# Patient Record
Sex: FEMALE | Race: WHITE | Employment: UNEMPLOYED | URBAN - METROPOLITAN AREA
[De-identification: names, ages, dates, MRNs, and addresses within clinical notes are randomized per-mention and may not be internally consistent; named-entity substitution may affect disease eponyms.]

---

## 2018-09-17 ENCOUNTER — OFFICE VISIT (OUTPATIENT)
Dept: FAMILY MEDICINE CLINIC | Age: 43
End: 2018-09-17

## 2018-09-17 VITALS
OXYGEN SATURATION: 98 % | BODY MASS INDEX: 42.52 KG/M2 | HEIGHT: 66 IN | RESPIRATION RATE: 18 BRPM | SYSTOLIC BLOOD PRESSURE: 124 MMHG | HEART RATE: 82 BPM | WEIGHT: 264.6 LBS | DIASTOLIC BLOOD PRESSURE: 82 MMHG

## 2018-09-17 DIAGNOSIS — Z79.899 LONG-TERM USE OF HIGH-RISK MEDICATION: ICD-10-CM

## 2018-09-17 DIAGNOSIS — F31.61 BIPOLAR DISORDER, CURRENT EPISODE MIXED, MILD (HCC): Primary | ICD-10-CM

## 2018-09-17 DIAGNOSIS — Z13.220 LIPID SCREENING: ICD-10-CM

## 2018-09-17 DIAGNOSIS — Z12.39 BREAST CANCER SCREENING: ICD-10-CM

## 2018-09-17 DIAGNOSIS — G60.9 IDIOPATHIC PERIPHERAL NEUROPATHY: ICD-10-CM

## 2018-09-17 DIAGNOSIS — Z13.31 POSITIVE DEPRESSION SCREENING: ICD-10-CM

## 2018-09-17 DIAGNOSIS — Z13.1 DIABETES MELLITUS SCREENING: ICD-10-CM

## 2018-09-17 DIAGNOSIS — Z11.4 SCREENING FOR HUMAN IMMUNODEFICIENCY VIRUS WITHOUT PRESENCE OF RISK FACTORS: ICD-10-CM

## 2018-09-17 LAB
A/G RATIO: 2 (ref 1.1–2.2)
ALBUMIN SERPL-MCNC: 4.9 G/DL (ref 3.4–5)
ALP BLD-CCNC: 101 U/L (ref 40–129)
ALT SERPL-CCNC: 21 U/L (ref 10–40)
AMPHETAMINE SCREEN, URINE: NEGATIVE
ANION GAP SERPL CALCULATED.3IONS-SCNC: 13 MMOL/L (ref 3–16)
AST SERPL-CCNC: 16 U/L (ref 15–37)
BARBITURATE SCREEN, URINE: NEGATIVE
BENZODIAZEPINE SCREEN, URINE: POSITIVE
BILIRUB SERPL-MCNC: <0.2 MG/DL (ref 0–1)
BUN BLDV-MCNC: 13 MG/DL (ref 7–20)
BUPRENORPHINE URINE: NEGATIVE
CALCIUM SERPL-MCNC: 10.6 MG/DL (ref 8.3–10.6)
CHLORIDE BLD-SCNC: 100 MMOL/L (ref 99–110)
CHOLESTEROL, TOTAL: 270 MG/DL (ref 0–199)
CO2: 26 MMOL/L (ref 21–32)
COCAINE METABOLITE SCREEN URINE: NEGATIVE
CREAT SERPL-MCNC: 0.7 MG/DL (ref 0.6–1.1)
GABAPENTIN SCREEN, URINE: NEGATIVE
GFR AFRICAN AMERICAN: >60
GFR NON-AFRICAN AMERICAN: >60
GLOBULIN: 2.5 G/DL
GLUCOSE BLD-MCNC: 118 MG/DL (ref 70–99)
HDLC SERPL-MCNC: 46 MG/DL (ref 40–60)
LDL CHOLESTEROL CALCULATED: 182 MG/DL
METHADONE SCREEN, URINE: NEGATIVE
METHAMPHETAMINE, URINE: NEGATIVE
OPIATE SCREEN URINE: NEGATIVE
OXYCODONE SCREEN URINE: NEGATIVE
PHENCYCLIDINE SCREEN URINE: NEGATIVE
POTASSIUM SERPL-SCNC: 4.9 MMOL/L (ref 3.5–5.1)
PROPOXYPHENE SCREEN, URINE: NEGATIVE
SODIUM BLD-SCNC: 139 MMOL/L (ref 136–145)
THC SCREEN, URINE: NEGATIVE
TOTAL PROTEIN: 7.4 G/DL (ref 6.4–8.2)
TRICYCLIC ANTIDEPRESSANTS, UR: POSITIVE
TRIGL SERPL-MCNC: 212 MG/DL (ref 0–150)
VLDLC SERPL CALC-MCNC: 42 MG/DL

## 2018-09-17 PROCEDURE — G8431 POS CLIN DEPRES SCRN F/U DOC: HCPCS | Performed by: NURSE PRACTITIONER

## 2018-09-17 PROCEDURE — 1036F TOBACCO NON-USER: CPT | Performed by: NURSE PRACTITIONER

## 2018-09-17 PROCEDURE — 36415 COLL VENOUS BLD VENIPUNCTURE: CPT | Performed by: NURSE PRACTITIONER

## 2018-09-17 PROCEDURE — G8417 CALC BMI ABV UP PARAM F/U: HCPCS | Performed by: NURSE PRACTITIONER

## 2018-09-17 PROCEDURE — 80305 DRUG TEST PRSMV DIR OPT OBS: CPT | Performed by: NURSE PRACTITIONER

## 2018-09-17 PROCEDURE — 99204 OFFICE O/P NEW MOD 45 MIN: CPT | Performed by: NURSE PRACTITIONER

## 2018-09-17 PROCEDURE — G8427 DOCREV CUR MEDS BY ELIG CLIN: HCPCS | Performed by: NURSE PRACTITIONER

## 2018-09-17 PROCEDURE — 96160 PT-FOCUSED HLTH RISK ASSMT: CPT | Performed by: NURSE PRACTITIONER

## 2018-09-17 RX ORDER — LITHIUM CARBONATE 300 MG/1
300 CAPSULE ORAL 2 TIMES DAILY WITH MEALS
Qty: 90 CAPSULE | Refills: 3 | Status: SHIPPED | OUTPATIENT
Start: 2018-09-17 | End: 2019-03-19 | Stop reason: SDUPTHER

## 2018-09-17 RX ORDER — FLUOXETINE HYDROCHLORIDE 20 MG/1
20 CAPSULE ORAL NIGHTLY
COMMUNITY
Start: 2018-02-15 | End: 2019-02-13 | Stop reason: ALTCHOICE

## 2018-09-17 RX ORDER — BUSPIRONE HYDROCHLORIDE 10 MG/1
10 TABLET ORAL 2 TIMES DAILY
COMMUNITY
Start: 2018-02-15 | End: 2019-02-15

## 2018-09-17 RX ORDER — FLUOXETINE HYDROCHLORIDE 40 MG/1
60 CAPSULE ORAL NIGHTLY
COMMUNITY
Start: 2018-02-15 | End: 2019-02-28 | Stop reason: SDUPTHER

## 2018-09-17 RX ORDER — DIAZEPAM 10 MG/1
1 TABLET ORAL PRN
COMMUNITY
Start: 2018-08-27 | End: 2018-12-19 | Stop reason: SDUPTHER

## 2018-09-17 RX ORDER — AMITRIPTYLINE HYDROCHLORIDE 100 MG/1
100 TABLET, FILM COATED ORAL NIGHTLY
COMMUNITY
Start: 2018-05-21 | End: 2018-11-27 | Stop reason: SDUPTHER

## 2018-09-17 RX ORDER — HYDROCODONE BITARTRATE AND ACETAMINOPHEN 10; 325 MG/1; MG/1
1 TABLET ORAL EVERY 8 HOURS PRN
Qty: 50 TABLET | Refills: 0 | Status: SHIPPED | OUTPATIENT
Start: 2018-09-17 | End: 2018-09-17 | Stop reason: SDUPTHER

## 2018-09-17 RX ORDER — HYDROCODONE BITARTRATE AND ACETAMINOPHEN 5; 325 MG/1; MG/1
1 TABLET ORAL EVERY 8 HOURS PRN
COMMUNITY
End: 2018-09-17 | Stop reason: SDUPTHER

## 2018-09-17 RX ORDER — GABAPENTIN 300 MG/1
1 CAPSULE ORAL 3 TIMES DAILY
COMMUNITY
Start: 2018-04-18 | End: 2019-02-22 | Stop reason: SDUPTHER

## 2018-09-17 RX ORDER — HYDROCODONE BITARTRATE AND ACETAMINOPHEN 10; 325 MG/1; MG/1
1 TABLET ORAL EVERY 8 HOURS PRN
Qty: 50 TABLET | Refills: 0 | Status: SHIPPED | OUTPATIENT
Start: 2018-10-18 | End: 2018-11-06 | Stop reason: SDUPTHER

## 2018-09-17 RX ORDER — LISINOPRIL AND HYDROCHLOROTHIAZIDE 25; 20 MG/1; MG/1
1 TABLET ORAL DAILY
COMMUNITY
Start: 2017-09-18 | End: 2018-11-27 | Stop reason: SDUPTHER

## 2018-09-17 ASSESSMENT — PATIENT HEALTH QUESTIONNAIRE - PHQ9
8. MOVING OR SPEAKING SO SLOWLY THAT OTHER PEOPLE COULD HAVE NOTICED. OR THE OPPOSITE, BEING SO FIGETY OR RESTLESS THAT YOU HAVE BEEN MOVING AROUND A LOT MORE THAN USUAL: 2
2. FEELING DOWN, DEPRESSED OR HOPELESS: 3
SUM OF ALL RESPONSES TO PHQ QUESTIONS 1-9: 21
3. TROUBLE FALLING OR STAYING ASLEEP: 3
5. POOR APPETITE OR OVEREATING: 1
9. THOUGHTS THAT YOU WOULD BE BETTER OFF DEAD, OR OF HURTING YOURSELF: 0
10. IF YOU CHECKED OFF ANY PROBLEMS, HOW DIFFICULT HAVE THESE PROBLEMS MADE IT FOR YOU TO DO YOUR WORK, TAKE CARE OF THINGS AT HOME, OR GET ALONG WITH OTHER PEOPLE: 2
6. FEELING BAD ABOUT YOURSELF - OR THAT YOU ARE A FAILURE OR HAVE LET YOURSELF OR YOUR FAMILY DOWN: 3
7. TROUBLE CONCENTRATING ON THINGS, SUCH AS READING THE NEWSPAPER OR WATCHING TELEVISION: 3
1. LITTLE INTEREST OR PLEASURE IN DOING THINGS: 3
4. FEELING TIRED OR HAVING LITTLE ENERGY: 3
SUM OF ALL RESPONSES TO PHQ QUESTIONS 1-9: 21
SUM OF ALL RESPONSES TO PHQ9 QUESTIONS 1 & 2: 6

## 2018-09-17 NOTE — LETTER
MEDICATION AGREEMENT     Annabella Browder  [birth year]      For certain conditions, multiple classes of medications may be used to help better manage your symptoms, and to improve your ability to function at home, work and in social settings. However, these medications do have risks, which will be discussed with you, including addiction and dependency. The following prescribed medications need frequent monitoring and will require you to partner and assist in your healthcare. Medication  Dose, instructions and quantity as indicated on current prescription bottle Diagnosis/Reason(s) for Taking Category     Norco 10 /325 mg  Neuropathy  II                           Benefits and goals of Controlled Substance Medications: There are two potential goals for your treatment: (1) decreased pain and suffering (2) improved daily life functions. There are many possible treatments for your chronic condition(s), and, in addition to controlled substance medications, we will try alternatives such as physical therapy, yoga, massage, home daily exercise, meditation, relaxation techniques, injections, chiropractic manipulations, surgery, cognitive therapy, hypnosis and many medications that are not habit-forming. Use of controlled substance medications may be helpful, but they are unlikely to resolve all of your symptoms or restore all function. Risks of Controlled Substance Medications:    Opioid pain medications: These medications can lead to problems such as addiction/dependence, sedation, lightheadedness/dizziness, memory issues, falls, constipation, nausea, or vomiting. They may also impair the ability to drive or operate machinery. Additionally, these medications may lower testosterone levels, leading to loss of bone strength, stamina and sex drive.   They may cause problems with breathing, sleep apnea and reduced coughing, which are especially dangerous for patients with lung supplements and oral decongestants. Dependence withdrawal symptoms may include depressed mood, loss of interest, suicidal thoughts, anxiety, fatigue, appetite changes and agitation. Testosterone replacement therapy:  Potential side effects include increased risk of stroke and heart attack, blood clots, increased blood pressure, increased cholesterol, enlarged prostate, sleep apnea, irritability/aggression and other mood disorders, and decreased fertility. Other:     1. I understand that I have the following responsibilities:  · I will take medications at the dose and frequency prescribed. · I will not increase or change how I take my medications without the approval of the health care provider who signs this Medication Agreement. · I will arrange for refills at the prescribed interval ONLY during regular office hours. I will not ask for refills earlier than agreed, after-hours, on holidays or on weekends. · I will obtain all refills for these medications at  ·  ________[facility]____________  pharmacy (phone number  ·  ________[phone]___), with full consent for my provider and pharmacist to exchange information in writing or verbally. · I will not request any pain medications or controlled substances from other providers and will inform this provider of all other medications I am taking. · I will inform my other health care providers that I am taking these medications and of the existence of this [address]. In the event of an emergency, I will provide the same information to the emergency department providers. · I will protect my prescriptions and medications. I understand that lost or misplaced prescriptions will not be replaced. · I will keep medications only for my own use and will not share them with others. I will keep all medications away from children.   · I agree to participate in any medical, psychological or psychiatric assessments recommended by my provider. · I will actively participate in any program designed to improve function, including social, physical, psychological and daily or work activities. 2. I will not use illegal or street drugs or another person's prescription. If I have an addiction problem with drugs or alcohol and my provider asks me to enter a program to address this issue, I agree to follow through. Such programs may include:  · 12-Step program and securing a sponsor  · Individual counseling   · Inpatient or outpatient treatment  · Other:_____________________________________________________________________________________________________________________________________________    If in treatment, I will request that a copy of the programs initial evaluation and treatment recommendations be sent to this provider and will not expect refills until that is received. I will also request written monthly updates be sent to this provider to verify my continuing treatment. 3. I will consent to drug screening upon my providers request to assure I am only taking the prescribed drugs, described in this MEDICATION AGREEMENT. I understand that a drug screen is a laboratory test in which a sample of my urine, blood or saliva is checked to see what drugs I have been taking. 4. I agree that I will treat the providers and staff at this office with respect at all times. I will keep all of my scheduled appointments, but if I need to cancel my appointment, I will do so a minimum of 24 hours before it is scheduled. 5. I understand that this provider may stop prescribing the medications listed if:  · I do not show any improvement in pain, or my activity has not improved. · I develop rapid tolerance or loss of improvement, as described in my treatment plan. · I develop significant side effects from the medication.   · My behavior is inconsistent with the responsibilities outlined above, which may also result in my being prevented from receiving further care from this office. · Other:____________________________________________________________________    AGREEMENT:    I have read the above and have had all of my questions answered. For chronic disease management, I know that my symptoms can be managed with many types of treatments. A chronic medication trial may be part of my treatment, but I must be an active participant in my care. Medication therapy is only one part of my symptom management plan. In some cases, there may be limited scientific evidence to support the chronic use of certain medications to improve symptoms and daily function. Furthermore, in certain circumstances, there may be scientific information that suggests that use of chronic controlled substances may actually worsen my symptoms and increase my risk of unintentional death directly related to this medication therapy. I know that if my provider feels my risk from controlled medications is greater than my benefit, I will have my controlled substance medication(s) compassionately lowered or removed altogether. I agree to a controlled substance medication trial.      I further agree to allow this office to contact family or friends if there are concerns about my safety and use of the controlled medications. I have agreed to use the following medications above as instructed by my physician and as stated in this [address].      Patient Signature:  ______________________  Date:9/17/2018 or _____________    Provider Signature:______________________  Date:9/17/2018 or _____________

## 2018-09-17 NOTE — PATIENT INSTRUCTIONS
label.  · Schedule time each day for a bowel movement. Having a daily routine may help. Take your time and do not strain when having a bowel movement. · Ask your doctor about a laxative. The goal is to have one easy bowel movement every 1 to 2 days. Do not let constipation go untreated for more than 3 days. When should you call for help? Call your doctor now or seek immediate medical care if:    · You feel sad, anxious, or hopeless for more than a few days. This could mean you are depressed. Depression is common in people who have a lot of pain. But it can be treated.     · You have trouble with bowel movements, such as:  ¨ No bowel movement in 3 days. ¨ Blood in the anal area, in your stool, or on the toilet paper. ¨ Diarrhea for more than 24 hours.    Watch closely for changes in your health, and be sure to contact your doctor if:    · Your pain is getting worse.     · You can't sleep because of pain.     · You are very worried or anxious about your pain.     · You have trouble taking your pain medicine.     · You have any concerns about your pain medicine or its side effects.     · You have vomiting or cramps for more than 2 hours. Where can you learn more? Go to https://eThor.com.SlideRocket. org and sign in to your [employer] account. Enter W948 in the [employer] box to learn more about \"Neuropathic Pain: Care Instructions. \"     If you do not have an account, please click on the \"Sign Up Now\" link. Current as of: October 9, 2017  Content Version: 11.7  © 8636-0984 [employer], [facility]. Care instructions adapted under license by [facility] ([facility]). If you have questions about a medical condition or this instruction, always ask your healthcare professional. [place] [address] any warranty or liability for your use of this information.        Patient Education        Bipolar Disorder: Care Instructions  Your Care Instructions    Bipolar disorder is an illness that causes extreme mood changes, from times of very high energy (manic episodes) to times of depression. But many people with bipolar disorder show only the symptoms of depression. These moods may cause problems with your work, school, family life, friendships, and how well you function. This disease is also called manic-depression. There is no cure for bipolar disorder, but it can be helped with medicines. Counseling may also help. It is important to take your medicines exactly as prescribed, even when you feel well. You may need lifelong treatment. Follow-up care is a key part of your treatment and safety. Be sure to make and go to all appointments, and call your doctor if you are having problems. It's also a good idea to know your test results and keep a list of the medicines you take. How can you care for yourself at home? · Be safe with medicines. Take your medicines exactly as prescribed. Do not stop or change a medicine without talking to your doctor first. Kush Finders and your doctor may need to try different combinations of medicines to find what works for you. · Take your medicines on schedule to keep your moods even. When you feel good, you may think that you do not need your medicines. But it is important to keep taking them. · Go to your counseling sessions. Call and talk with your counselor if you can't go to a session or if you don't think the sessions are helping. Do not just stop going. · Get at least 30 minutes of activity on most days of the week. Walking is a good choice. You also may want to do other things, such as running, swimming, or cycling. · Get enough sleep. Keep your room dark and quiet. Try to go to bed at the same time every night. · Eat a healthy diet. This includes whole grains, dairy, fruits, vegetables, and protein. Eat foods from each of these groups. · Try to lower your stress. Manage your time, build a strong support system, and lead a healthy lifestyle.  To lower your stress, try else.   [facility] your doctor now or seek immediate medical care if:    · You hear voices.     · Someone you know has bipolar disorder and talks about suicide. Keep the numbers for these national suicide hotlines: 3-438-937-TALK (0-262.135.7966) and 7-617-AUGDDUU (7-779.308.4339). If a suicide threat seems real, with a specific plan and a way to carry it out, stay with the person, or ask someone you trust to stay with the person, until you can get help.     · Someone you know has bipolar disorder and:  ¨ Starts to give away possessions. ¨ Is using illegal drugs or drinking alcohol heavily. ¨ Talks or writes about death, including writing suicide notes or talking about guns, knives, or pills. ¨ Talks or writes about hurting someone else. ¨ Starts to spend a lot of time alone. ¨ Acts very aggressively or suddenly appears calm. ¨ Talks about beliefs that are not based in reality (delusions).    Watch closely for changes in your health, and be sure to contact your doctor if:    · You cannot go to your counseling sessions. Where can you learn more? Go to https://Structure VisionpepicYourSportseb.Altius Education. org and sign in to your [employer] account. Enter K052 in the [facility] box to learn more about \"Bipolar Disorder: Care Instructions. \"     If you do not have an account, please click on the \"Sign Up Now\" link. Current as of: December 7, 2017  Content Version: 11.7  © 3723-8051 Healthwise, Incorporated. Care instructions adapted under license by [facility] ([facility]). If you have questions about a medical condition or this instruction, always ask your healthcare professional. [place] [address] any warranty or liability for your use of this information. Patient Education        Anxiety Disorder: Care Instructions  Your Care Instructions    Anxiety is a normal reaction to stress. Difficult situations can cause you to have symptoms such as sweaty palms and a nervous feeling.   In an anxiety disorder, the

## 2018-09-17 NOTE — PROGRESS NOTES
On the basis of positive PHQ-9 screening (PHQ-9 Total Score: 21), the following plan was implemented: {[facility] Depression Plan:69466}. Patient will follow-up in {NUMBERS 1-12:10} {DAYS/WEEKS/MONTHS (D):50390} with {[facility] Psych Provider:70781}.
OBJECTIVE:  Vitals:    09/17/18 0747   BP: 124/82   Pulse: 82   Resp: 18   SpO2: 98%       Physical Exam   Constitutional: She is oriented to person, place, and time. Vital signs are normal. She appears well-developed. She is active. Eyes: Pupils are equal, round, and reactive to light. Conjunctivae, EOM and lids are normal.   Neck: Carotid bruit is not present. Cardiovascular: Normal rate, regular rhythm, S1 normal, S2 normal and normal heart sounds. No edema noted to feet or ankles   Pulmonary/Chest: Effort normal and breath sounds normal.   Musculoskeletal: Normal range of motion. Neurological: She is alert and oriented to person, place, and time. Skin: Skin is warm, dry and intact. Psychiatric: She has a normal mood and affect. Her speech is normal and behavior is normal. Judgment and thought content normal. Cognition and memory are normal.       ASSESSMENT:   Diagnosis Orders   1. Bipolar disorder, current episode mixed, mild (HCC)  lithium 300 MG capsule   2. Positive depression screening  Positive Screen for Clinical Depression with a Documented Follow-up Plan     lithium 300 MG capsule   3. Idiopathic peripheral neuropathy  HYDROcodone-acetaminophen (NORCO)  MG per tablet    DISCONTINUED: HYDROcodone-acetaminophen (NORCO)  MG per tablet    DISCONTINUED: HYDROcodone-acetaminophen (NORCO)  MG per tablet   4. Long-term use of high-risk medication  POCT Rapid Drug Screen   5. Lipid screening  Lipid Panel    Lipid Panel   6. Diabetes mellitus screening  Comprehensive Metabolic Panel    Hemoglobin A1C    Comprehensive Metabolic Panel    Hemoglobin A1C   7. Screening for human immunodeficiency virus without presence of risk factors  HIV-1 AND HIV-2 ANTIBODIES    HIV-1 AND HIV-2 ANTIBODIES   8. Breast cancer screening  MAMMO DIGITAL SCREEN BILATERAL MOBILE       PLAN:  Marian Schumacher was seen today for new patient, depression and peripheral neuropathy.     Diagnoses and all orders

## 2018-09-18 LAB
ESTIMATED AVERAGE GLUCOSE: 128.4 MG/DL
HBA1C MFR BLD: 6.1 %
HIV AG/AB: NORMAL
HIV ANTIGEN: NORMAL
HIV-1 ANTIBODY: NORMAL
HIV-2 AB: NORMAL

## 2018-11-06 ENCOUNTER — OFFICE VISIT (OUTPATIENT)
Dept: FAMILY MEDICINE CLINIC | Age: 43
End: 2018-11-06
Payer: COMMERCIAL

## 2018-11-06 VITALS
WEIGHT: 263.2 LBS | BODY MASS INDEX: 42.3 KG/M2 | SYSTOLIC BLOOD PRESSURE: 130 MMHG | OXYGEN SATURATION: 97 % | HEART RATE: 93 BPM | DIASTOLIC BLOOD PRESSURE: 84 MMHG | HEIGHT: 66 IN | RESPIRATION RATE: 21 BRPM

## 2018-11-06 DIAGNOSIS — G60.9 IDIOPATHIC PERIPHERAL NEUROPATHY: ICD-10-CM

## 2018-11-06 DIAGNOSIS — M16.11 ARTHRITIS OF RIGHT HIP: Primary | ICD-10-CM

## 2018-11-06 PROCEDURE — 99213 OFFICE O/P EST LOW 20 MIN: CPT | Performed by: NURSE PRACTITIONER

## 2018-11-06 RX ORDER — HYDROCODONE BITARTRATE AND ACETAMINOPHEN 10; 325 MG/1; MG/1
1 TABLET ORAL EVERY 8 HOURS PRN
Qty: 50 TABLET | Refills: 0 | Status: SHIPPED | OUTPATIENT
Start: 2018-11-17 | End: 2018-12-17

## 2018-11-06 ASSESSMENT — PATIENT HEALTH QUESTIONNAIRE - PHQ9
SUM OF ALL RESPONSES TO PHQ9 QUESTIONS 1 & 2: 2
2. FEELING DOWN, DEPRESSED OR HOPELESS: 1
SUM OF ALL RESPONSES TO PHQ QUESTIONS 1-9: 2
SUM OF ALL RESPONSES TO PHQ QUESTIONS 1-9: 2
1. LITTLE INTEREST OR PLEASURE IN DOING THINGS: 1

## 2018-11-08 ENCOUNTER — OFFICE VISIT (OUTPATIENT)
Dept: ORTHOPEDIC SURGERY | Age: 43
End: 2018-11-08
Payer: COMMERCIAL

## 2018-11-08 ENCOUNTER — HOSPITAL ENCOUNTER (OUTPATIENT)
Dept: MAMMOGRAPHY | Age: 43
Discharge: HOME | End: 2018-11-13
Payer: COMMERCIAL

## 2018-11-08 VITALS
TEMPERATURE: 99 F | DIASTOLIC BLOOD PRESSURE: 87 MMHG | SYSTOLIC BLOOD PRESSURE: 137 MMHG | HEART RATE: 81 BPM | WEIGHT: 263 LBS | RESPIRATION RATE: 16 BRPM | BODY MASS INDEX: 42.27 KG/M2 | HEIGHT: 66 IN

## 2018-11-08 DIAGNOSIS — M25.552 BILATERAL HIP PAIN: Primary | ICD-10-CM

## 2018-11-08 DIAGNOSIS — M25.551 BILATERAL HIP PAIN: Primary | ICD-10-CM

## 2018-11-08 DIAGNOSIS — M16.0 PRIMARY OSTEOARTHRITIS OF BOTH HIPS: ICD-10-CM

## 2018-11-08 DIAGNOSIS — Z12.31 VISIT FOR SCREENING MAMMOGRAM: ICD-10-CM

## 2018-11-08 PROCEDURE — G8484 FLU IMMUNIZE NO ADMIN: HCPCS | Performed by: PHYSICIAN ASSISTANT

## 2018-11-08 PROCEDURE — 1036F TOBACCO NON-USER: CPT | Performed by: PHYSICIAN ASSISTANT

## 2018-11-08 PROCEDURE — G8417 CALC BMI ABV UP PARAM F/U: HCPCS | Performed by: PHYSICIAN ASSISTANT

## 2018-11-08 PROCEDURE — G8427 DOCREV CUR MEDS BY ELIG CLIN: HCPCS | Performed by: PHYSICIAN ASSISTANT

## 2018-11-08 PROCEDURE — 99203 OFFICE O/P NEW LOW 30 MIN: CPT | Performed by: PHYSICIAN ASSISTANT

## 2018-11-08 PROCEDURE — 77067 SCR MAMMO BI INCL CAD: CPT

## 2018-11-08 NOTE — PROGRESS NOTES
Marijuana      Comment: socially     Sexual activity: Not on file       Current Outpatient Prescriptions   Medication Sig Dispense Refill    [START ON 11/17/2018] HYDROcodone-acetaminophen (NORCO)  MG per tablet Take 1 tablet by mouth every 8 hours as needed for Pain for up to 30 days. G60.9  FILL 11/17. Earliest Fill Date: 11/17/18 50 tablet 0    lisinopril-hydrochlorothiazide (PRINZIDE;ZESTORETIC) 20-25 MG per tablet Take 1 tablet by mouth daily      amitriptyline (ELAVIL) 100 MG tablet Take 100 mg by mouth nightly      busPIRone (BUSPAR) 10 MG tablet Take 10 mg by mouth nightly      FLUoxetine (PROZAC) 20 MG capsule Take 20 mg by mouth nightly      FLUoxetine (PROZAC) 40 MG capsule Take 40 mg by mouth nightly      gabapentin (NEURONTIN) 300 MG capsule Take 1 capsule by mouth 3 times daily. Adiel Kolbman diazepam (VALIUM) 10 MG tablet Take 1 tablet by mouth as needed. Rosealee Brakeman lithium 300 MG capsule Take 1 capsule by mouth 2 times daily (with meals) 90 capsule 3     No current facility-administered medications for this visit. Objective:     She is alert, oriented x 3, pleasant, well nourished, developed and in no   acute distress. /87   Pulse 81   Temp 99 °F (37.2 °C) (Temporal)   Resp 16   Ht 5' 6.25\" (1.683 m)   Wt 263 lb (119.3 kg)   BMI 42.13 kg/m²        Examination of the left and right hip shows: There is not deformity. There is not erythema. There is marked pain with internal and external rotation. There is moderate pain with flexion and extension. There is moderate pain with active SLR. ROM diminished range of motion. Leg lengths: Equal  Trochanteric region is not tender to palpation. Sacral Iliac is not tender to palpation. There is moderate pain with weight bearing. Examination of the lower extremities are intact with sensation to light touch. Motor testing  5/5 in all major motor groups of the lower extremities. Gait is normal heel to toe.   Gait is

## 2018-11-09 ENCOUNTER — TELEPHONE (OUTPATIENT)
Dept: ORTHOPEDIC SURGERY | Age: 43
End: 2018-11-09

## 2018-11-14 ENCOUNTER — OFFICE VISIT (OUTPATIENT)
Dept: ORTHOPEDIC SURGERY | Age: 43
End: 2018-11-14
Payer: COMMERCIAL

## 2018-11-14 VITALS
HEIGHT: 66 IN | DIASTOLIC BLOOD PRESSURE: 75 MMHG | SYSTOLIC BLOOD PRESSURE: 122 MMHG | WEIGHT: 263 LBS | BODY MASS INDEX: 42.27 KG/M2 | HEART RATE: 82 BPM

## 2018-11-14 DIAGNOSIS — M16.11 PRIMARY OSTEOARTHRITIS OF RIGHT HIP: Primary | ICD-10-CM

## 2018-11-14 PROCEDURE — 99999 PR OFFICE/OUTPT VISIT,PROCEDURE ONLY: CPT | Performed by: ORTHOPAEDIC SURGERY

## 2018-11-14 PROCEDURE — 20611 DRAIN/INJ JOINT/BURSA W/US: CPT | Performed by: ORTHOPAEDIC SURGERY

## 2018-11-14 RX ORDER — METHYLPREDNISOLONE ACETATE 40 MG/ML
80 INJECTION, SUSPENSION INTRA-ARTICULAR; INTRALESIONAL; INTRAMUSCULAR; SOFT TISSUE ONCE
Status: COMPLETED | OUTPATIENT
Start: 2018-11-14 | End: 2018-11-14

## 2018-11-14 RX ADMIN — METHYLPREDNISOLONE ACETATE 80 MG: 40 INJECTION, SUSPENSION INTRA-ARTICULAR; INTRALESIONAL; INTRAMUSCULAR; SOFT TISSUE at 10:23

## 2018-11-15 ENCOUNTER — HOSPITAL ENCOUNTER (OUTPATIENT)
Dept: ULTRASOUND IMAGING | Age: 43
Discharge: HOME | End: 2018-11-15
Payer: COMMERCIAL

## 2018-11-15 ENCOUNTER — HOSPITAL ENCOUNTER (OUTPATIENT)
Dept: MAMMOGRAPHY | Age: 43
Discharge: HOME | End: 2018-11-15
Payer: COMMERCIAL

## 2018-11-15 DIAGNOSIS — R92.8 ABNORMAL MAMMOGRAM: ICD-10-CM

## 2018-11-15 PROCEDURE — 76882 US LMTD JT/FCL EVL NVASC XTR: CPT

## 2018-11-26 ENCOUNTER — OFFICE VISIT (OUTPATIENT)
Dept: ORTHOPEDIC SURGERY | Age: 43
End: 2018-11-26
Payer: COMMERCIAL

## 2018-11-26 VITALS
TEMPERATURE: 95.7 F | BODY MASS INDEX: 42.27 KG/M2 | HEART RATE: 88 BPM | WEIGHT: 263 LBS | HEIGHT: 66 IN | DIASTOLIC BLOOD PRESSURE: 77 MMHG | SYSTOLIC BLOOD PRESSURE: 148 MMHG

## 2018-11-26 DIAGNOSIS — M16.11 PRIMARY OSTEOARTHRITIS OF RIGHT HIP: Primary | ICD-10-CM

## 2018-11-26 PROCEDURE — G8427 DOCREV CUR MEDS BY ELIG CLIN: HCPCS | Performed by: ORTHOPAEDIC SURGERY

## 2018-11-26 PROCEDURE — 1036F TOBACCO NON-USER: CPT | Performed by: ORTHOPAEDIC SURGERY

## 2018-11-26 PROCEDURE — G8417 CALC BMI ABV UP PARAM F/U: HCPCS | Performed by: ORTHOPAEDIC SURGERY

## 2018-11-26 PROCEDURE — 99214 OFFICE O/P EST MOD 30 MIN: CPT | Performed by: ORTHOPAEDIC SURGERY

## 2018-11-26 PROCEDURE — G8484 FLU IMMUNIZE NO ADMIN: HCPCS | Performed by: ORTHOPAEDIC SURGERY

## 2018-11-27 ENCOUNTER — TELEPHONE (OUTPATIENT)
Dept: FAMILY MEDICINE CLINIC | Age: 43
End: 2018-11-27

## 2018-11-27 RX ORDER — LISINOPRIL AND HYDROCHLOROTHIAZIDE 25; 20 MG/1; MG/1
1 TABLET ORAL DAILY
Qty: 30 TABLET | Refills: 0 | Status: SHIPPED | OUTPATIENT
Start: 2018-11-27 | End: 2018-12-19 | Stop reason: SDUPTHER

## 2018-11-27 RX ORDER — AMITRIPTYLINE HYDROCHLORIDE 100 MG/1
100 TABLET, FILM COATED ORAL NIGHTLY
Qty: 30 TABLET | Refills: 0 | Status: SHIPPED | OUTPATIENT
Start: 2018-11-27 | End: 2018-12-19 | Stop reason: SDUPTHER

## 2018-12-02 NOTE — PROGRESS NOTES
Patient is a 68-year-old female we follow for a degenerative arthritis of the right hip joint. She's been treated in the past with anti-inflammatories physical therapy and recently underwent right hip cortisone injection on 11/14/2018. Patient stated that she had about 90% relief however her symptoms have returned. Patient has no history of injury or surgery to the right hip. Patient has no history of DVT or allergies to metal.  Patient states she is diabetic however she does carry several psychiatric diagnoses including bipolar affective disorder and depression. There is a family history for degenerative arthritis. Unfortunately patient not only takes oral narcotics but also smokes cigarettes. Finally this patient has class III morbid obesity with BMI of 42.45. She complains of pain rating it up to 3-4 out of 10 and difficulty with ambulation. X-rays in the past of shown degenerative osteoarthritis of the right hip. She's here to discuss possible right total hip arthroplasty. Patient Active Problem List   Diagnosis    Migraine    Amenorrhea, secondary    Depression    Gastritis    TMJ (temporomandibular joint syndrome)    Lipoma    Grief reaction    Anxiety    Tobacco abuse    Insomnia    Bipolar affective disorder ([facility] [address]    Sleep apnea    Periodic limb movement disorder (PLMD)    Primary osteoarthritis of both hips    Bilateral hip pain     Prior to Visit Medications    Medication Sig Taking? Authorizing Provider   lisinopril-hydrochlorothiazide (PRINZIDE;ZESTORETIC) 20-25 MG per tablet Take 1 tablet by mouth daily appt required prior to additional refills  ROSSANA Zhou CNP   amitriptyline (ELAVIL) 100 MG tablet Take 1 tablet by mouth nightly appt required prior to additional refills  ROSSANA Zhou CNP   HYDROcodone-acetaminophen (NORCO)  MG per tablet Take 1 tablet by mouth every 8 hours as needed for Pain for up to 30 days. G60.9  FILL 11/17.

## 2018-12-11 ENCOUNTER — TELEPHONE (OUTPATIENT)
Dept: ORTHOPEDIC SURGERY | Age: 43
End: 2018-12-11

## 2018-12-11 DIAGNOSIS — M16.11 PRIMARY OSTEOARTHRITIS OF RIGHT HIP: Primary | ICD-10-CM

## 2018-12-14 ENCOUNTER — TELEPHONE (OUTPATIENT)
Dept: FAMILY MEDICINE CLINIC | Age: 43
End: 2018-12-14

## 2018-12-19 ENCOUNTER — OFFICE VISIT (OUTPATIENT)
Dept: FAMILY MEDICINE CLINIC | Age: 43
End: 2018-12-19
Payer: COMMERCIAL

## 2018-12-19 VITALS
HEART RATE: 68 BPM | SYSTOLIC BLOOD PRESSURE: 122 MMHG | RESPIRATION RATE: 20 BRPM | OXYGEN SATURATION: 96 % | WEIGHT: 290 LBS | DIASTOLIC BLOOD PRESSURE: 78 MMHG | BODY MASS INDEX: 46.61 KG/M2 | HEIGHT: 66 IN

## 2018-12-19 DIAGNOSIS — F51.04 PSYCHOPHYSIOLOGICAL INSOMNIA: ICD-10-CM

## 2018-12-19 DIAGNOSIS — M16.0 PRIMARY OSTEOARTHRITIS OF BOTH HIPS: Primary | ICD-10-CM

## 2018-12-19 DIAGNOSIS — F41.9 ANXIETY: ICD-10-CM

## 2018-12-19 DIAGNOSIS — I10 BENIGN ESSENTIAL HTN: ICD-10-CM

## 2018-12-19 DIAGNOSIS — G60.9 IDIOPATHIC PERIPHERAL NEUROPATHY: ICD-10-CM

## 2018-12-19 DIAGNOSIS — F31.62 BIPOLAR DISORDER, CURRENT EPISODE MIXED, MODERATE (HCC): ICD-10-CM

## 2018-12-19 LAB
LITHIUM DOSE AMOUNT: NORMAL
LITHIUM LEVEL: 0.7 MMOL/L (ref 0.6–1.2)

## 2018-12-19 PROCEDURE — G8427 DOCREV CUR MEDS BY ELIG CLIN: HCPCS | Performed by: NURSE PRACTITIONER

## 2018-12-19 PROCEDURE — G8484 FLU IMMUNIZE NO ADMIN: HCPCS | Performed by: NURSE PRACTITIONER

## 2018-12-19 PROCEDURE — 99214 OFFICE O/P EST MOD 30 MIN: CPT | Performed by: NURSE PRACTITIONER

## 2018-12-19 PROCEDURE — 1036F TOBACCO NON-USER: CPT | Performed by: NURSE PRACTITIONER

## 2018-12-19 PROCEDURE — 36415 COLL VENOUS BLD VENIPUNCTURE: CPT | Performed by: NURSE PRACTITIONER

## 2018-12-19 PROCEDURE — G8417 CALC BMI ABV UP PARAM F/U: HCPCS | Performed by: NURSE PRACTITIONER

## 2018-12-19 RX ORDER — DIAZEPAM 10 MG/1
10 TABLET ORAL PRN
Qty: 30 TABLET | Refills: 2 | Status: SHIPPED | OUTPATIENT
Start: 2018-12-19 | End: 2019-07-02 | Stop reason: SDUPTHER

## 2018-12-19 RX ORDER — HYDROCODONE BITARTRATE AND ACETAMINOPHEN 10; 325 MG/1; MG/1
1 TABLET ORAL EVERY 8 HOURS PRN
Qty: 90 TABLET | Refills: 0 | Status: SHIPPED | OUTPATIENT
Start: 2019-01-17 | End: 2019-02-08

## 2018-12-19 RX ORDER — HYDROCODONE BITARTRATE AND ACETAMINOPHEN 10; 325 MG/1; MG/1
1 TABLET ORAL EVERY 8 HOURS PRN
COMMUNITY
End: 2018-12-19 | Stop reason: SDUPTHER

## 2018-12-19 RX ORDER — AMITRIPTYLINE HYDROCHLORIDE 100 MG/1
100 TABLET, FILM COATED ORAL NIGHTLY
Qty: 30 TABLET | Refills: 0 | Status: SHIPPED | OUTPATIENT
Start: 2018-12-19 | End: 2019-01-28 | Stop reason: SDUPTHER

## 2018-12-19 RX ORDER — HYDROCODONE BITARTRATE AND ACETAMINOPHEN 10; 325 MG/1; MG/1
1 TABLET ORAL EVERY 8 HOURS PRN
Qty: 90 TABLET | Refills: 0 | Status: SHIPPED | OUTPATIENT
Start: 2018-12-19 | End: 2019-03-25 | Stop reason: SDUPTHER

## 2018-12-19 RX ORDER — LISINOPRIL AND HYDROCHLOROTHIAZIDE 25; 20 MG/1; MG/1
1 TABLET ORAL DAILY
Qty: 30 TABLET | Refills: 5 | Status: SHIPPED | OUTPATIENT
Start: 2018-12-19 | End: 2019-03-19 | Stop reason: SDUPTHER

## 2018-12-19 RX ORDER — HYDROCODONE BITARTRATE AND ACETAMINOPHEN 10; 325 MG/1; MG/1
1 TABLET ORAL EVERY 8 HOURS PRN
Qty: 90 TABLET | Refills: 0 | Status: ON HOLD | OUTPATIENT
Start: 2019-02-16 | End: 2019-02-19 | Stop reason: HOSPADM

## 2018-12-19 NOTE — PATIENT INSTRUCTIONS
are having a hard time with your feelings or with your relationship with your family member, talk with a counselor. When should you call for help? Call 911 anytime you think you may need emergency care. For example, call if:    · You feel like hurting yourself or someone else.     · Someone who has bipolar disorder displays dangerous behavior, and you think the person might hurt himself or herself or someone else.   [facility] your doctor now or seek immediate medical care if:    · You hear voices.     · Someone you know has bipolar disorder and talks about suicide. Keep the numbers for these national suicide hotlines: 6-250-895-TALK (8-561.838.5841) and 5-408-OFBRIPW (7-528.299.6882). If a suicide threat seems real, with a specific plan and a way to carry it out, stay with the person, or ask someone you trust to stay with the person, until you can get help.     · Someone you know has bipolar disorder and:  ? Starts to give away possessions. ? Is using illegal drugs or drinking alcohol heavily. ? Talks or writes about death, including writing suicide notes or talking about guns, knives, or pills. ? Talks or writes about hurting someone else. ? Starts to spend a lot of time alone. ? Acts very aggressively or suddenly appears calm. ? Talks about beliefs that are not based in reality (delusions).    Watch closely for changes in your health, and be sure to contact your doctor if:    · You cannot go to your counseling sessions. Where can you learn more? Go to https://Radiant Communicationsbao.EZ-Ticket. org and sign in to your [facility] account. Enter K052 in the [facility] box to learn more about \"Bipolar Disorder: Care Instructions. \"     If you do not have an account, please click on the \"Sign Up Now\" link. Current as of: December 7, 2017  Content Version: 11.8  © 6854-4909 Healthwise, Incorporated. Care instructions adapted under license by [facility] ([facility]).  If you have questions about a medical condition or of this information.

## 2018-12-19 NOTE — PROGRESS NOTES
SUBJECTIVE:    Patient ID: Danny Clemons is a 37 y.o. y.o. female. HPI   Chief Complaint   Patient presents with    Foot Pain     PT HERE FOR ROUTINE FOLLOW UP ON FOOT PAIN AND NEEDS REFILLS ON [address]. OARRS IN MEDIA, MC AND UDS UP TO DATE    Anxiety     PT HERE FOR ROUTINE FOLLOW UP ON ANXIETY AND NEEDS REFILLS ON VALIUM. OARRS IN MEDIA, MC AND UDS UP TO DATE    Manic Behavior     PT HERE FOR BIPOLAR DISORDER AND NEEDS REFILLS ON LITHIUM AND ELAVIL     NEUROPATHIC PAIN TO BILATERAL FEET -  SHE HAS NUMBNESS AND TINGLING IN BOTH - THEY PAIN CAN BE REALLY BAD AT TIMES - THE NEURONTIN HELPS BUT SHE HAS TO TAKE THE [address] AS WELL      BIPOLAR DISORDER   DIAGNOSED SEVERAL YEARS AGO HAS NOT MADE AN APPT WITH THE [facility] - SHE WOULD LIKE HER LITHIUM INCREASED CURRENTLY ON PROZAC - BUSPAR AND ELAVIL- SHE HAS HAD TO STOP WORKING DUE TO HER DISABILTY    SHE DOES NOT CHECK HER BLOOD PRESSURE AT HOME - N/O C/O JARRETT -PALPITATIONS - OR SWELLING OF FEET OR ANKLES- NO ISSUES WITH THE SODIUM INTAKE NO REGULAR EXERCISE SHE DOES NOT ADD SALT TO FOODS -  TRIES TO AVOID CARBS    Review of Systems   Cardiovascular: Negative for chest pain, palpitations and leg swelling. Musculoskeletal:        Right hip pain    Neuropathy to both feet   Psychiatric/Behavioral: Positive for sleep disturbance. The patient is nervous/anxious. All other systems reviewed and are negative. OBJECTIVE:    Vitals:    12/19/18 0749   BP: 122/78   Pulse: 68   Resp: 20   SpO2: 96%       Physical Exam   Constitutional: Vital signs are normal. She appears well-developed and well-nourished. She is active. Eyes: Pupils are equal, round, and reactive to light. Conjunctivae and EOM are normal. Lids are everted and swept, no foreign bodies found. Fundoscopic exam:       The right eye shows red reflex. The left eye shows red reflex. Neck: Carotid bruit is not present.    Cardiovascular: Normal rate, regular rhythm, S1 normal, S2 normal and

## 2019-01-15 DIAGNOSIS — M16.11 PRIMARY OSTEOARTHRITIS OF RIGHT HIP: ICD-10-CM

## 2019-01-18 LAB
3-OH-COTININE: <2 NG/ML
COTININE: <2 NG/ML
NICOTINE: <2 NG/ML

## 2019-01-24 ENCOUNTER — TELEPHONE (OUTPATIENT)
Dept: ORTHOPEDIC SURGERY | Age: 44
End: 2019-01-24

## 2019-01-25 ENCOUNTER — PREP FOR PROCEDURE (OUTPATIENT)
Dept: ORTHOPEDICS UNIT | Age: 44
End: 2019-01-25

## 2019-01-28 ENCOUNTER — TELEPHONE (OUTPATIENT)
Dept: FAMILY MEDICINE CLINIC | Age: 44
End: 2019-01-28

## 2019-01-28 DIAGNOSIS — F51.04 PSYCHOPHYSIOLOGICAL INSOMNIA: ICD-10-CM

## 2019-01-28 RX ORDER — CYCLOBENZAPRINE HCL 10 MG
10 TABLET ORAL 3 TIMES DAILY PRN
Qty: 30 TABLET | Refills: 0 | Status: SHIPPED | OUTPATIENT
Start: 2019-01-28 | End: 2019-02-07

## 2019-01-29 RX ORDER — AMITRIPTYLINE HYDROCHLORIDE 100 MG/1
100 TABLET, FILM COATED ORAL NIGHTLY
Qty: 30 TABLET | Refills: 0 | Status: SHIPPED | OUTPATIENT
Start: 2019-01-29 | End: 2019-02-26 | Stop reason: SDUPTHER

## 2019-01-29 RX ORDER — CELECOXIB 200 MG/1
200 CAPSULE ORAL ONCE
Status: CANCELLED | OUTPATIENT
Start: 2019-01-29 | End: 2019-01-29

## 2019-01-29 RX ORDER — OXYCODONE HYDROCHLORIDE 5 MG/1
10 TABLET ORAL ONCE
Status: CANCELLED | OUTPATIENT
Start: 2019-01-29 | End: 2019-01-29

## 2019-01-31 ENCOUNTER — OFFICE VISIT (OUTPATIENT)
Dept: ORTHOPEDIC SURGERY | Age: 44
End: 2019-01-31
Payer: COMMERCIAL

## 2019-01-31 VITALS
SYSTOLIC BLOOD PRESSURE: 137 MMHG | WEIGHT: 267 LBS | BODY MASS INDEX: 42.91 KG/M2 | HEART RATE: 102 BPM | DIASTOLIC BLOOD PRESSURE: 87 MMHG | HEIGHT: 66 IN | TEMPERATURE: 98.7 F

## 2019-01-31 DIAGNOSIS — M16.0 PRIMARY OSTEOARTHRITIS OF BOTH HIPS: Primary | ICD-10-CM

## 2019-01-31 PROCEDURE — G8417 CALC BMI ABV UP PARAM F/U: HCPCS | Performed by: ORTHOPAEDIC SURGERY

## 2019-01-31 PROCEDURE — 1036F TOBACCO NON-USER: CPT | Performed by: ORTHOPAEDIC SURGERY

## 2019-01-31 PROCEDURE — 99214 OFFICE O/P EST MOD 30 MIN: CPT | Performed by: ORTHOPAEDIC SURGERY

## 2019-01-31 PROCEDURE — G8427 DOCREV CUR MEDS BY ELIG CLIN: HCPCS | Performed by: ORTHOPAEDIC SURGERY

## 2019-01-31 PROCEDURE — G8484 FLU IMMUNIZE NO ADMIN: HCPCS | Performed by: ORTHOPAEDIC SURGERY

## 2019-02-08 ENCOUNTER — OFFICE VISIT (OUTPATIENT)
Dept: FAMILY MEDICINE CLINIC | Age: 44
End: 2019-02-08
Payer: COMMERCIAL

## 2019-02-08 VITALS
SYSTOLIC BLOOD PRESSURE: 130 MMHG | DIASTOLIC BLOOD PRESSURE: 80 MMHG | OXYGEN SATURATION: 95 % | BODY MASS INDEX: 43.58 KG/M2 | WEIGHT: 270 LBS | TEMPERATURE: 98.3 F | HEART RATE: 79 BPM

## 2019-02-08 DIAGNOSIS — Z01.818 PRE-OP EXAM: ICD-10-CM

## 2019-02-08 DIAGNOSIS — M16.11 ARTHRITIS OF RIGHT HIP: Primary | ICD-10-CM

## 2019-02-08 PROCEDURE — 99242 OFF/OP CONSLTJ NEW/EST SF 20: CPT | Performed by: NURSE PRACTITIONER

## 2019-02-08 PROCEDURE — G8417 CALC BMI ABV UP PARAM F/U: HCPCS | Performed by: NURSE PRACTITIONER

## 2019-02-08 PROCEDURE — G8484 FLU IMMUNIZE NO ADMIN: HCPCS | Performed by: NURSE PRACTITIONER

## 2019-02-08 PROCEDURE — G8427 DOCREV CUR MEDS BY ELIG CLIN: HCPCS | Performed by: NURSE PRACTITIONER

## 2019-02-11 ENCOUNTER — PREP FOR PROCEDURE (OUTPATIENT)
Dept: ORTHOPEDIC SURGERY | Age: 44
End: 2019-02-11

## 2019-02-11 DIAGNOSIS — M16.11 PRIMARY OSTEOARTHRITIS OF RIGHT HIP: Primary | ICD-10-CM

## 2019-02-12 ENCOUNTER — HOSPITAL ENCOUNTER (OUTPATIENT)
Dept: PREADMISSION TESTING | Age: 44
Discharge: HOME | End: 2019-02-16
Payer: COMMERCIAL

## 2019-02-12 DIAGNOSIS — M16.11 PRIMARY OSTEOARTHRITIS OF RIGHT HIP: ICD-10-CM

## 2019-02-12 LAB
ABO/RH: NORMAL
ALBUMIN SERPL-MCNC: 4 G/DL (ref 3.4–5)
ANION GAP SERPL CALCULATED.3IONS-SCNC: 17 MMOL/L (ref 3–16)
ANTIBODY SCREEN: NORMAL
APTT: 30.5 SEC (ref 26–36)
BACTERIA: ABNORMAL /HPF
BASOPHILS ABSOLUTE: 0 K/UL (ref 0–0.2)
BASOPHILS RELATIVE PERCENT: 0.5 %
BILIRUBIN URINE: NEGATIVE
BLOOD, URINE: NEGATIVE
BUN BLDV-MCNC: 16 MG/DL (ref 7–20)
CALCIUM SERPL-MCNC: 9.4 MG/DL (ref 8.3–10.6)
CHLORIDE BLD-SCNC: 103 MMOL/L (ref 99–110)
CLARITY: ABNORMAL
CO2: 20 MMOL/L (ref 21–32)
COLOR: YELLOW
CREAT SERPL-MCNC: 0.9 MG/DL (ref 0.6–1.1)
EKG ATRIAL RATE: 91 BPM
EKG DIAGNOSIS: NORMAL
EKG P AXIS: 56 DEGREES
EKG P-R INTERVAL: 154 MS
EKG Q-T INTERVAL: 348 MS
EKG QRS DURATION: 76 MS
EKG QTC CALCULATION (BAZETT): 428 MS
EKG R AXIS: 23 DEGREES
EKG T AXIS: 42 DEGREES
EKG VENTRICULAR RATE: 91 BPM
EOSINOPHILS ABSOLUTE: 0.2 K/UL (ref 0–0.6)
EOSINOPHILS RELATIVE PERCENT: 2.1 %
EPITHELIAL CELLS, UA: 14 /HPF (ref 0–5)
ESTIMATED AVERAGE GLUCOSE: 111.2 MG/DL
GFR AFRICAN AMERICAN: >60
GFR NON-AFRICAN AMERICAN: >60
GLUCOSE BLD-MCNC: 232 MG/DL (ref 70–99)
GLUCOSE URINE: 100 MG/DL
HBA1C MFR BLD: 5.5 %
HCT VFR BLD CALC: 39.2 % (ref 36–48)
HEMOGLOBIN: 12.7 G/DL (ref 12–16)
HYALINE CASTS: 3 /LPF (ref 0–8)
INR BLD: 0.94 (ref 0.86–1.14)
KETONES, URINE: NEGATIVE MG/DL
LEUKOCYTE ESTERASE, URINE: ABNORMAL
LYMPHOCYTES ABSOLUTE: 2 K/UL (ref 1–5.1)
LYMPHOCYTES RELATIVE PERCENT: 25 %
MCH RBC QN AUTO: 30.8 PG (ref 26–34)
MCHC RBC AUTO-ENTMCNC: 32.5 G/DL (ref 31–36)
MCV RBC AUTO: 95 FL (ref 80–100)
MICROSCOPIC EXAMINATION: YES
MONOCYTES ABSOLUTE: 0.4 K/UL (ref 0–1.3)
MONOCYTES RELATIVE PERCENT: 4.5 %
NEUTROPHILS ABSOLUTE: 5.6 K/UL (ref 1.7–7.7)
NEUTROPHILS RELATIVE PERCENT: 67.9 %
NITRITE, URINE: NEGATIVE
PDW BLD-RTO: 13.2 % (ref 12.4–15.4)
PH UA: 5
PLATELET # BLD: 259 K/UL (ref 135–450)
PMV BLD AUTO: 8.4 FL (ref 5–10.5)
POTASSIUM SERPL-SCNC: 4.4 MMOL/L (ref 3.5–5.1)
PREALBUMIN: 25.5 MG/DL (ref 20–40)
PROTEIN UA: NEGATIVE MG/DL
PROTHROMBIN TIME: 10.7 SEC (ref 9.8–13)
RBC # BLD: 4.13 M/UL (ref 4–5.2)
RBC UA: 2 /HPF (ref 0–4)
SEDIMENTATION RATE, ERYTHROCYTE: 14 MM/HR (ref 0–20)
SODIUM BLD-SCNC: 140 MMOL/L (ref 136–145)
SPECIFIC GRAVITY UA: 1.01
URINE REFLEX TO CULTURE: YES
URINE TYPE: ABNORMAL
UROBILINOGEN, URINE: 0.2 E.U./DL
VITAMIN D 25-HYDROXY: 36.9 NG/ML
WBC # BLD: 8.2 K/UL (ref 4–11)
WBC UA: 9 /HPF (ref 0–5)

## 2019-02-12 PROCEDURE — 93010 ELECTROCARDIOGRAM REPORT: CPT | Performed by: INTERNAL MEDICINE

## 2019-02-12 PROCEDURE — 86850 RBC ANTIBODY SCREEN: CPT

## 2019-02-12 PROCEDURE — 86901 BLOOD TYPING SEROLOGIC RH(D): CPT

## 2019-02-12 PROCEDURE — 85610 PROTHROMBIN TIME: CPT

## 2019-02-12 PROCEDURE — 93005 ELECTROCARDIOGRAM TRACING: CPT

## 2019-02-12 PROCEDURE — 87641 MR-STAPH DNA AMP PROBE: CPT

## 2019-02-12 PROCEDURE — 86900 BLOOD TYPING SEROLOGIC ABO: CPT

## 2019-02-12 PROCEDURE — 84134 ASSAY OF PREALBUMIN: CPT

## 2019-02-12 PROCEDURE — 85730 THROMBOPLASTIN TIME PARTIAL: CPT

## 2019-02-12 PROCEDURE — 82306 VITAMIN D 25 HYDROXY: CPT

## 2019-02-12 PROCEDURE — 82040 ASSAY OF SERUM ALBUMIN: CPT

## 2019-02-12 PROCEDURE — 85652 RBC SED RATE AUTOMATED: CPT

## 2019-02-12 PROCEDURE — 80048 BASIC METABOLIC PNL TOTAL CA: CPT

## 2019-02-12 PROCEDURE — 85025 COMPLETE CBC W/AUTO DIFF WBC: CPT

## 2019-02-12 PROCEDURE — 87086 URINE CULTURE/COLONY COUNT: CPT

## 2019-02-12 PROCEDURE — 83036 HEMOGLOBIN GLYCOSYLATED A1C: CPT

## 2019-02-12 PROCEDURE — 81001 URINALYSIS AUTO W/SCOPE: CPT

## 2019-02-13 LAB
MRSA SCREEN RT-PCR: NORMAL
URINE CULTURE, ROUTINE: NORMAL

## 2019-02-14 ENCOUNTER — OFFICE VISIT (OUTPATIENT)
Dept: ORTHOPEDIC SURGERY | Age: 44
End: 2019-02-14

## 2019-02-14 DIAGNOSIS — M16.11 PRIMARY OSTEOARTHRITIS OF RIGHT HIP: Primary | ICD-10-CM

## 2019-02-14 PROCEDURE — 99999 PR OFFICE/OUTPT VISIT,PROCEDURE ONLY: CPT | Performed by: ORTHOPAEDIC SURGERY

## 2019-02-18 ENCOUNTER — ANESTHESIA EVENT (OUTPATIENT)
Dept: OPERATING ROOM | Age: 44
DRG: 301 | End: 2019-02-18
Payer: COMMERCIAL

## 2019-02-19 ENCOUNTER — ANESTHESIA (OUTPATIENT)
Dept: OPERATING ROOM | Age: 44
DRG: 301 | End: 2019-02-19
Payer: COMMERCIAL

## 2019-02-19 ENCOUNTER — APPOINTMENT (OUTPATIENT)
Dept: GENERAL RADIOLOGY | Age: 44
DRG: 301 | End: 2019-02-19
Attending: ORTHOPAEDIC SURGERY
Payer: COMMERCIAL

## 2019-02-19 ENCOUNTER — HOSPITAL ENCOUNTER (INPATIENT)
Age: 44
LOS: 1 days | Discharge: HOME HEALTH | DRG: 301 | End: 2019-02-20
Attending: ORTHOPAEDIC SURGERY | Admitting: ORTHOPAEDIC SURGERY
Payer: COMMERCIAL

## 2019-02-19 VITALS
OXYGEN SATURATION: 100 % | RESPIRATION RATE: 30 BRPM | SYSTOLIC BLOOD PRESSURE: 102 MMHG | DIASTOLIC BLOOD PRESSURE: 50 MMHG

## 2019-02-19 DIAGNOSIS — M16.11 ARTHRITIS OF RIGHT HIP: ICD-10-CM

## 2019-02-19 PROBLEM — E66.813 OBESITY, CLASS III, BMI 40-49.9 (MORBID OBESITY): Status: ACTIVE | Noted: 2019-02-19

## 2019-02-19 PROBLEM — E66.01 OBESITY, CLASS III, BMI 40-49.9 (MORBID OBESITY) (HCC): Status: ACTIVE | Noted: 2019-02-19

## 2019-02-19 LAB
ABO/RH: NORMAL
ANTIBODY SCREEN: NORMAL
GLUCOSE BLD-MCNC: 107 MG/DL (ref 70–99)
GLUCOSE BLD-MCNC: 125 MG/DL (ref 70–99)
GLUCOSE BLD-MCNC: 185 MG/DL (ref 70–99)
PERFORMED ON: ABNORMAL

## 2019-02-19 PROCEDURE — 97166 OT EVAL MOD COMPLEX 45 MIN: CPT

## 2019-02-19 PROCEDURE — 3600000015 HC SURGERY LEVEL 5 ADDTL 15MIN: Performed by: ORTHOPAEDIC SURGERY

## 2019-02-19 PROCEDURE — 6360000002 HC RX W HCPCS: Performed by: ORTHOPAEDIC SURGERY

## 2019-02-19 PROCEDURE — 3700000000 HC ANESTHESIA ATTENDED CARE: Performed by: ORTHOPAEDIC SURGERY

## 2019-02-19 PROCEDURE — 97530 THERAPEUTIC ACTIVITIES: CPT

## 2019-02-19 PROCEDURE — 94760 N-INVAS EAR/PLS OXIMETRY 1: CPT

## 2019-02-19 PROCEDURE — 6370000000 HC RX 637 (ALT 250 FOR IP): Performed by: NURSE PRACTITIONER

## 2019-02-19 PROCEDURE — 6370000000 HC RX 637 (ALT 250 FOR IP): Performed by: ORTHOPAEDIC SURGERY

## 2019-02-19 PROCEDURE — 3700000001 HC ADD 15 MINUTES (ANESTHESIA): Performed by: ORTHOPAEDIC SURGERY

## 2019-02-19 PROCEDURE — 0SR90JA REPLACEMENT OF RIGHT HIP JOINT WITH SYNTHETIC SUBSTITUTE, UNCEMENTED, OPEN APPROACH: ICD-10-PCS | Performed by: ORTHOPAEDIC SURGERY

## 2019-02-19 PROCEDURE — 2580000003 HC RX 258: Performed by: ORTHOPAEDIC SURGERY

## 2019-02-19 PROCEDURE — 7100000001 HC PACU RECOVERY - ADDTL 15 MIN: Performed by: ORTHOPAEDIC SURGERY

## 2019-02-19 PROCEDURE — 2500000003 HC RX 250 WO HCPCS: Performed by: ORTHOPAEDIC SURGERY

## 2019-02-19 PROCEDURE — 88304 TISSUE EXAM BY PATHOLOGIST: CPT

## 2019-02-19 PROCEDURE — 86900 BLOOD TYPING SEROLOGIC ABO: CPT

## 2019-02-19 PROCEDURE — 83036 HEMOGLOBIN GLYCOSYLATED A1C: CPT

## 2019-02-19 PROCEDURE — 2580000003 HC RX 258: Performed by: NURSE ANESTHETIST, CERTIFIED REGISTERED

## 2019-02-19 PROCEDURE — 6360000002 HC RX W HCPCS: Performed by: NURSE ANESTHETIST, CERTIFIED REGISTERED

## 2019-02-19 PROCEDURE — 6360000002 HC RX W HCPCS: Performed by: ANESTHESIOLOGY

## 2019-02-19 PROCEDURE — 2500000003 HC RX 250 WO HCPCS: Performed by: NURSE ANESTHETIST, CERTIFIED REGISTERED

## 2019-02-19 PROCEDURE — 3209999900 FLUORO FOR SURGICAL PROCEDURES

## 2019-02-19 PROCEDURE — 2500000003 HC RX 250 WO HCPCS

## 2019-02-19 PROCEDURE — 88311 DECALCIFY TISSUE: CPT

## 2019-02-19 PROCEDURE — 2580000003 HC RX 258: Performed by: ANESTHESIOLOGY

## 2019-02-19 PROCEDURE — 3600000005 HC SURGERY LEVEL 5 BASE: Performed by: ORTHOPAEDIC SURGERY

## 2019-02-19 PROCEDURE — 86850 RBC ANTIBODY SCREEN: CPT

## 2019-02-19 PROCEDURE — 2709999900 HC NON-CHARGEABLE SUPPLY: Performed by: ORTHOPAEDIC SURGERY

## 2019-02-19 PROCEDURE — 36415 COLL VENOUS BLD VENIPUNCTURE: CPT

## 2019-02-19 PROCEDURE — 7100000000 HC PACU RECOVERY - FIRST 15 MIN: Performed by: ORTHOPAEDIC SURGERY

## 2019-02-19 PROCEDURE — C1776 JOINT DEVICE (IMPLANTABLE): HCPCS | Performed by: ORTHOPAEDIC SURGERY

## 2019-02-19 PROCEDURE — 86901 BLOOD TYPING SEROLOGIC RH(D): CPT

## 2019-02-19 PROCEDURE — 73502 X-RAY EXAM HIP UNI 2-3 VIEWS: CPT

## 2019-02-19 PROCEDURE — 1200000000 HC SEMI PRIVATE

## 2019-02-19 PROCEDURE — 97162 PT EVAL MOD COMPLEX 30 MIN: CPT

## 2019-02-19 DEVICE — STEM FEM SZ 2 HI OFFSET CLLRD 12/14 TAPR ACTIS ARTC EZ: Type: IMPLANTABLE DEVICE | Site: HIP | Status: FUNCTIONAL

## 2019-02-19 DEVICE — CUP ACET DIA52MM HIP GRIPTION PRI CEMENTLESS FIX SECT SER: Type: IMPLANTABLE DEVICE | Site: HIP | Status: FUNCTIONAL

## 2019-02-19 DEVICE — HEAD FEM DIA36MM -2MM OFFSET 12/14 TAPR ARTC EZ HIP MTL: Type: IMPLANTABLE DEVICE | Site: HIP | Status: FUNCTIONAL

## 2019-02-19 DEVICE — LINER ACET OD52MM ID36MM +4MM OFFSET HIP POLYETH MTL ON: Type: IMPLANTABLE DEVICE | Site: HIP | Status: FUNCTIONAL

## 2019-02-19 RX ORDER — SODIUM CHLORIDE 9 MG/ML
INJECTION, SOLUTION INTRAVENOUS CONTINUOUS
Status: DISCONTINUED | OUTPATIENT
Start: 2019-02-19 | End: 2019-02-19

## 2019-02-19 RX ORDER — MORPHINE SULFATE 2 MG/ML
2 INJECTION, SOLUTION INTRAMUSCULAR; INTRAVENOUS EVERY 5 MIN PRN
Status: DISCONTINUED | OUTPATIENT
Start: 2019-02-19 | End: 2019-02-19 | Stop reason: HOSPADM

## 2019-02-19 RX ORDER — LISINOPRIL AND HYDROCHLOROTHIAZIDE 12.5; 1 MG/1; MG/1
2 TABLET ORAL DAILY
Status: DISCONTINUED | OUTPATIENT
Start: 2019-02-20 | End: 2019-02-20 | Stop reason: HOSPADM

## 2019-02-19 RX ORDER — GABAPENTIN 300 MG/1
300 CAPSULE ORAL 3 TIMES DAILY
Status: DISCONTINUED | OUTPATIENT
Start: 2019-02-19 | End: 2019-02-20 | Stop reason: HOSPADM

## 2019-02-19 RX ORDER — OXYCODONE HYDROCHLORIDE AND ACETAMINOPHEN 5; 325 MG/1; MG/1
2 TABLET ORAL PRN
Status: DISCONTINUED | OUTPATIENT
Start: 2019-02-19 | End: 2019-02-19 | Stop reason: HOSPADM

## 2019-02-19 RX ORDER — FENTANYL CITRATE 50 UG/ML
50 INJECTION, SOLUTION INTRAMUSCULAR; INTRAVENOUS EVERY 5 MIN PRN
Status: COMPLETED | OUTPATIENT
Start: 2019-02-19 | End: 2019-02-19

## 2019-02-19 RX ORDER — SODIUM CHLORIDE 450 MG/100ML
INJECTION, SOLUTION INTRAVENOUS CONTINUOUS
Status: DISCONTINUED | OUTPATIENT
Start: 2019-02-19 | End: 2019-02-20 | Stop reason: HOSPADM

## 2019-02-19 RX ORDER — ROCURONIUM BROMIDE 10 MG/ML
INJECTION, SOLUTION INTRAVENOUS PRN
Status: DISCONTINUED | OUTPATIENT
Start: 2019-02-19 | End: 2019-02-19 | Stop reason: SDUPTHER

## 2019-02-19 RX ORDER — DEXTROSE MONOHYDRATE 25 G/50ML
12.5 INJECTION, SOLUTION INTRAVENOUS PRN
Status: DISCONTINUED | OUTPATIENT
Start: 2019-02-19 | End: 2019-02-20 | Stop reason: HOSPADM

## 2019-02-19 RX ORDER — SODIUM CHLORIDE 0.9 % (FLUSH) 0.9 %
10 SYRINGE (ML) INJECTION EVERY 12 HOURS SCHEDULED
Status: DISCONTINUED | OUTPATIENT
Start: 2019-02-19 | End: 2019-02-20 | Stop reason: HOSPADM

## 2019-02-19 RX ORDER — FLUOXETINE HYDROCHLORIDE 20 MG/1
60 CAPSULE ORAL NIGHTLY
Status: DISCONTINUED | OUTPATIENT
Start: 2019-02-19 | End: 2019-02-20 | Stop reason: HOSPADM

## 2019-02-19 RX ORDER — MORPHINE SULFATE 2 MG/ML
1 INJECTION, SOLUTION INTRAMUSCULAR; INTRAVENOUS EVERY 5 MIN PRN
Status: DISCONTINUED | OUTPATIENT
Start: 2019-02-19 | End: 2019-02-19 | Stop reason: HOSPADM

## 2019-02-19 RX ORDER — HYDROCODONE BITARTRATE AND ACETAMINOPHEN 10; 325 MG/1; MG/1
1 TABLET ORAL EVERY 4 HOURS PRN
Status: DISCONTINUED | OUTPATIENT
Start: 2019-02-19 | End: 2019-02-20 | Stop reason: HOSPADM

## 2019-02-19 RX ORDER — OXYCODONE HYDROCHLORIDE 5 MG/1
10 TABLET ORAL ONCE
Status: COMPLETED | OUTPATIENT
Start: 2019-02-19 | End: 2019-02-19

## 2019-02-19 RX ORDER — LIDOCAINE HYDROCHLORIDE 20 MG/ML
INJECTION, SOLUTION INFILTRATION; PERINEURAL PRN
Status: DISCONTINUED | OUTPATIENT
Start: 2019-02-19 | End: 2019-02-19 | Stop reason: SDUPTHER

## 2019-02-19 RX ORDER — DOCUSATE SODIUM 100 MG/1
100 CAPSULE, LIQUID FILLED ORAL 2 TIMES DAILY
Status: DISCONTINUED | OUTPATIENT
Start: 2019-02-19 | End: 2019-02-20 | Stop reason: HOSPADM

## 2019-02-19 RX ORDER — ACETAMINOPHEN 325 MG/1
650 TABLET ORAL EVERY 4 HOURS PRN
Status: DISCONTINUED | OUTPATIENT
Start: 2019-02-19 | End: 2019-02-20 | Stop reason: HOSPADM

## 2019-02-19 RX ORDER — PROPOFOL 10 MG/ML
INJECTION, EMULSION INTRAVENOUS PRN
Status: DISCONTINUED | OUTPATIENT
Start: 2019-02-19 | End: 2019-02-19 | Stop reason: SDUPTHER

## 2019-02-19 RX ORDER — MEPERIDINE HYDROCHLORIDE 25 MG/ML
12.5 INJECTION INTRAMUSCULAR; INTRAVENOUS; SUBCUTANEOUS EVERY 5 MIN PRN
Status: DISCONTINUED | OUTPATIENT
Start: 2019-02-19 | End: 2019-02-19 | Stop reason: HOSPADM

## 2019-02-19 RX ORDER — DEXTROSE MONOHYDRATE 50 MG/ML
100 INJECTION, SOLUTION INTRAVENOUS PRN
Status: DISCONTINUED | OUTPATIENT
Start: 2019-02-19 | End: 2019-02-20 | Stop reason: HOSPADM

## 2019-02-19 RX ORDER — TRANEXAMIC ACID 100 MG/ML
INJECTION, SOLUTION INTRAVENOUS
Status: COMPLETED | OUTPATIENT
Start: 2019-02-19 | End: 2019-02-19

## 2019-02-19 RX ORDER — ONDANSETRON 2 MG/ML
4 INJECTION INTRAMUSCULAR; INTRAVENOUS EVERY 6 HOURS PRN
Status: DISCONTINUED | OUTPATIENT
Start: 2019-02-19 | End: 2019-02-20 | Stop reason: HOSPADM

## 2019-02-19 RX ORDER — DIAZEPAM 5 MG/1
10 TABLET ORAL EVERY 6 HOURS PRN
Status: DISCONTINUED | OUTPATIENT
Start: 2019-02-19 | End: 2019-02-20 | Stop reason: HOSPADM

## 2019-02-19 RX ORDER — DIAZEPAM 10 MG/1
10 TABLET ORAL EVERY 6 HOURS PRN
COMMUNITY
End: 2020-06-12

## 2019-02-19 RX ORDER — SUCCINYLCHOLINE CHLORIDE 20 MG/ML
INJECTION INTRAMUSCULAR; INTRAVENOUS PRN
Status: DISCONTINUED | OUTPATIENT
Start: 2019-02-19 | End: 2019-02-19 | Stop reason: SDUPTHER

## 2019-02-19 RX ORDER — SODIUM CHLORIDE 0.9 % (FLUSH) 0.9 %
10 SYRINGE (ML) INJECTION PRN
Status: DISCONTINUED | OUTPATIENT
Start: 2019-02-19 | End: 2019-02-20 | Stop reason: HOSPADM

## 2019-02-19 RX ORDER — OXYCODONE HYDROCHLORIDE AND ACETAMINOPHEN 5; 325 MG/1; MG/1
1 TABLET ORAL EVERY 4 HOURS PRN
Status: DISCONTINUED | OUTPATIENT
Start: 2019-02-19 | End: 2019-02-19

## 2019-02-19 RX ORDER — SODIUM CHLORIDE 0.9 % (FLUSH) 0.9 %
10 SYRINGE (ML) INJECTION PRN
Status: DISCONTINUED | OUTPATIENT
Start: 2019-02-19 | End: 2019-02-19 | Stop reason: HOSPADM

## 2019-02-19 RX ORDER — MIDAZOLAM HYDROCHLORIDE 1 MG/ML
INJECTION INTRAMUSCULAR; INTRAVENOUS PRN
Status: DISCONTINUED | OUTPATIENT
Start: 2019-02-19 | End: 2019-02-19 | Stop reason: SDUPTHER

## 2019-02-19 RX ORDER — CELECOXIB 200 MG/1
200 CAPSULE ORAL EVERY 24 HOURS
Status: DISCONTINUED | OUTPATIENT
Start: 2019-02-20 | End: 2019-02-20 | Stop reason: HOSPADM

## 2019-02-19 RX ORDER — LITHIUM CARBONATE 300 MG/1
300 CAPSULE ORAL 2 TIMES DAILY WITH MEALS
Status: DISCONTINUED | OUTPATIENT
Start: 2019-02-19 | End: 2019-02-20 | Stop reason: HOSPADM

## 2019-02-19 RX ORDER — NICOTINE POLACRILEX 4 MG
15 LOZENGE BUCCAL PRN
Status: DISCONTINUED | OUTPATIENT
Start: 2019-02-19 | End: 2019-02-20 | Stop reason: HOSPADM

## 2019-02-19 RX ORDER — SODIUM CHLORIDE 0.9 % (FLUSH) 0.9 %
10 SYRINGE (ML) INJECTION EVERY 12 HOURS SCHEDULED
Status: DISCONTINUED | OUTPATIENT
Start: 2019-02-19 | End: 2019-02-19 | Stop reason: HOSPADM

## 2019-02-19 RX ORDER — OXYCODONE HYDROCHLORIDE AND ACETAMINOPHEN 5; 325 MG/1; MG/1
2 TABLET ORAL EVERY 4 HOURS PRN
Status: DISCONTINUED | OUTPATIENT
Start: 2019-02-19 | End: 2019-02-19

## 2019-02-19 RX ORDER — HYDROCODONE BITARTRATE AND ACETAMINOPHEN 10; 325 MG/1; MG/1
1 TABLET ORAL EVERY 4 HOURS PRN
Qty: 1 TABLET | Refills: 0 | Status: SHIPPED | OUTPATIENT
Start: 2019-02-19 | End: 2019-02-26

## 2019-02-19 RX ORDER — MORPHINE SULFATE 4 MG/ML
4 INJECTION, SOLUTION INTRAMUSCULAR; INTRAVENOUS
Status: DISCONTINUED | OUTPATIENT
Start: 2019-02-19 | End: 2019-02-20 | Stop reason: HOSPADM

## 2019-02-19 RX ORDER — HYDROMORPHONE HCL 110MG/55ML
PATIENT CONTROLLED ANALGESIA SYRINGE INTRAVENOUS PRN
Status: DISCONTINUED | OUTPATIENT
Start: 2019-02-19 | End: 2019-02-19 | Stop reason: SDUPTHER

## 2019-02-19 RX ORDER — ONDANSETRON 2 MG/ML
INJECTION INTRAMUSCULAR; INTRAVENOUS PRN
Status: DISCONTINUED | OUTPATIENT
Start: 2019-02-19 | End: 2019-02-19 | Stop reason: SDUPTHER

## 2019-02-19 RX ORDER — INSULIN GLARGINE 100 [IU]/ML
0.25 INJECTION, SOLUTION SUBCUTANEOUS NIGHTLY
Status: DISCONTINUED | OUTPATIENT
Start: 2019-02-19 | End: 2019-02-20 | Stop reason: HOSPADM

## 2019-02-19 RX ORDER — DIAZEPAM 5 MG/1
5 TABLET ORAL
Status: COMPLETED | OUTPATIENT
Start: 2019-02-19 | End: 2019-02-19

## 2019-02-19 RX ORDER — FENTANYL CITRATE 50 UG/ML
INJECTION, SOLUTION INTRAMUSCULAR; INTRAVENOUS PRN
Status: DISCONTINUED | OUTPATIENT
Start: 2019-02-19 | End: 2019-02-19 | Stop reason: SDUPTHER

## 2019-02-19 RX ORDER — AMITRIPTYLINE HYDROCHLORIDE 50 MG/1
100 TABLET, FILM COATED ORAL NIGHTLY
Status: DISCONTINUED | OUTPATIENT
Start: 2019-02-19 | End: 2019-02-20 | Stop reason: HOSPADM

## 2019-02-19 RX ORDER — FENTANYL CITRATE 50 UG/ML
25 INJECTION, SOLUTION INTRAMUSCULAR; INTRAVENOUS EVERY 5 MIN PRN
Status: DISCONTINUED | OUTPATIENT
Start: 2019-02-19 | End: 2019-02-19 | Stop reason: HOSPADM

## 2019-02-19 RX ORDER — DEXAMETHASONE SODIUM PHOSPHATE 4 MG/ML
INJECTION, SOLUTION INTRA-ARTICULAR; INTRALESIONAL; INTRAMUSCULAR; INTRAVENOUS; SOFT TISSUE PRN
Status: DISCONTINUED | OUTPATIENT
Start: 2019-02-19 | End: 2019-02-19 | Stop reason: SDUPTHER

## 2019-02-19 RX ORDER — CELECOXIB 200 MG/1
200 CAPSULE ORAL ONCE
Status: COMPLETED | OUTPATIENT
Start: 2019-02-19 | End: 2019-02-19

## 2019-02-19 RX ORDER — GLYCOPYRROLATE 0.2 MG/ML
INJECTION INTRAMUSCULAR; INTRAVENOUS PRN
Status: DISCONTINUED | OUTPATIENT
Start: 2019-02-19 | End: 2019-02-19 | Stop reason: SDUPTHER

## 2019-02-19 RX ORDER — ONDANSETRON 2 MG/ML
4 INJECTION INTRAMUSCULAR; INTRAVENOUS
Status: DISCONTINUED | OUTPATIENT
Start: 2019-02-19 | End: 2019-02-19 | Stop reason: HOSPADM

## 2019-02-19 RX ORDER — KETAMINE HCL IN NACL, ISO-OSM 100MG/10ML
SYRINGE (ML) INJECTION PRN
Status: DISCONTINUED | OUTPATIENT
Start: 2019-02-19 | End: 2019-02-19 | Stop reason: SDUPTHER

## 2019-02-19 RX ORDER — MORPHINE SULFATE 2 MG/ML
2 INJECTION, SOLUTION INTRAMUSCULAR; INTRAVENOUS
Status: DISCONTINUED | OUTPATIENT
Start: 2019-02-19 | End: 2019-02-20 | Stop reason: HOSPADM

## 2019-02-19 RX ORDER — OXYCODONE HYDROCHLORIDE AND ACETAMINOPHEN 5; 325 MG/1; MG/1
1 TABLET ORAL PRN
Status: DISCONTINUED | OUTPATIENT
Start: 2019-02-19 | End: 2019-02-19 | Stop reason: HOSPADM

## 2019-02-19 RX ADMIN — FLUOXETINE 60 MG: 20 CAPSULE ORAL at 20:51

## 2019-02-19 RX ADMIN — SODIUM CHLORIDE: 9 INJECTION, SOLUTION INTRAVENOUS at 11:59

## 2019-02-19 RX ADMIN — MORPHINE SULFATE 4 MG: 4 INJECTION INTRAVENOUS at 22:46

## 2019-02-19 RX ADMIN — SUGAMMADEX 50 MG: 100 INJECTION, SOLUTION INTRAVENOUS at 12:19

## 2019-02-19 RX ADMIN — FENTANYL CITRATE 50 MCG: 50 INJECTION, SOLUTION INTRAMUSCULAR; INTRAVENOUS at 13:30

## 2019-02-19 RX ADMIN — Medication 2 G: at 11:04

## 2019-02-19 RX ADMIN — ROCURONIUM BROMIDE 20 MG: 10 INJECTION INTRAVENOUS at 11:34

## 2019-02-19 RX ADMIN — CELECOXIB 200 MG: 200 CAPSULE ORAL at 10:17

## 2019-02-19 RX ADMIN — MIDAZOLAM 1 MG: 1 INJECTION INTRAMUSCULAR; INTRAVENOUS at 11:04

## 2019-02-19 RX ADMIN — DOCUSATE SODIUM 100 MG: 100 CAPSULE, LIQUID FILLED ORAL at 20:51

## 2019-02-19 RX ADMIN — FENTANYL CITRATE 50 MCG: 50 INJECTION, SOLUTION INTRAMUSCULAR; INTRAVENOUS at 12:50

## 2019-02-19 RX ADMIN — Medication 10 MG: at 12:15

## 2019-02-19 RX ADMIN — LITHIUM CARBONATE 300 MG: 300 CAPSULE, GELATIN COATED ORAL at 18:55

## 2019-02-19 RX ADMIN — FENTANYL CITRATE 50 MCG: 50 INJECTION, SOLUTION INTRAMUSCULAR; INTRAVENOUS at 13:22

## 2019-02-19 RX ADMIN — PHENYLEPHRINE HYDROCHLORIDE 100 MCG: 10 INJECTION, SOLUTION INTRAMUSCULAR; INTRAVENOUS; SUBCUTANEOUS at 12:12

## 2019-02-19 RX ADMIN — GABAPENTIN 300 MG: 300 CAPSULE ORAL at 20:51

## 2019-02-19 RX ADMIN — DIAZEPAM 10 MG: 5 TABLET ORAL at 20:51

## 2019-02-19 RX ADMIN — HYDROMORPHONE HYDROCHLORIDE 0.5 MG: 2 INJECTION INTRAMUSCULAR; INTRAVENOUS; SUBCUTANEOUS at 12:15

## 2019-02-19 RX ADMIN — PHENYLEPHRINE HYDROCHLORIDE 200 MCG: 10 INJECTION, SOLUTION INTRAMUSCULAR; INTRAVENOUS; SUBCUTANEOUS at 12:15

## 2019-02-19 RX ADMIN — PROPOFOL 200 MG: 10 INJECTION, EMULSION INTRAVENOUS at 11:10

## 2019-02-19 RX ADMIN — SUGAMMADEX 50 MG: 100 INJECTION, SOLUTION INTRAVENOUS at 12:25

## 2019-02-19 RX ADMIN — TRANEXAMIC ACID 1000 MG: 1 INJECTION, SOLUTION INTRAVENOUS at 11:16

## 2019-02-19 RX ADMIN — SUCCINYLCHOLINE CHLORIDE 140 MG: 20 INJECTION, SOLUTION INTRAMUSCULAR; INTRAVENOUS at 11:10

## 2019-02-19 RX ADMIN — MIDAZOLAM 1 MG: 1 INJECTION INTRAMUSCULAR; INTRAVENOUS at 11:10

## 2019-02-19 RX ADMIN — DEXAMETHASONE SODIUM PHOSPHATE 8 MG: 4 INJECTION, SOLUTION INTRAMUSCULAR; INTRAVENOUS at 11:20

## 2019-02-19 RX ADMIN — SODIUM CHLORIDE: 9 INJECTION, SOLUTION INTRAVENOUS at 09:54

## 2019-02-19 RX ADMIN — SUGAMMADEX 50 MG: 100 INJECTION, SOLUTION INTRAVENOUS at 12:22

## 2019-02-19 RX ADMIN — Medication 3 G: at 18:51

## 2019-02-19 RX ADMIN — FENTANYL CITRATE 50 MCG: 50 INJECTION, SOLUTION INTRAMUSCULAR; INTRAVENOUS at 13:11

## 2019-02-19 RX ADMIN — PHENYLEPHRINE HYDROCHLORIDE 200 MCG: 10 INJECTION, SOLUTION INTRAMUSCULAR; INTRAVENOUS; SUBCUTANEOUS at 12:27

## 2019-02-19 RX ADMIN — PHENYLEPHRINE HYDROCHLORIDE 200 MCG: 10 INJECTION, SOLUTION INTRAMUSCULAR; INTRAVENOUS; SUBCUTANEOUS at 12:24

## 2019-02-19 RX ADMIN — GABAPENTIN 300 MG: 300 CAPSULE ORAL at 16:28

## 2019-02-19 RX ADMIN — AMITRIPTYLINE HYDROCHLORIDE 100 MG: 50 TABLET, FILM COATED ORAL at 20:52

## 2019-02-19 RX ADMIN — GLYCOPYRROLATE 0.1 MG: 0.2 INJECTION, SOLUTION INTRAMUSCULAR; INTRAVENOUS at 11:04

## 2019-02-19 RX ADMIN — ONDANSETRON 4 MG: 2 INJECTION INTRAMUSCULAR; INTRAVENOUS at 12:10

## 2019-02-19 RX ADMIN — HYDROCODONE BITARTRATE AND ACETAMINOPHEN 1 TABLET: 10; 325 TABLET ORAL at 16:28

## 2019-02-19 RX ADMIN — MORPHINE SULFATE 4 MG: 4 INJECTION INTRAVENOUS at 18:51

## 2019-02-19 RX ADMIN — ROCURONIUM BROMIDE 30 MG: 10 INJECTION INTRAVENOUS at 11:15

## 2019-02-19 RX ADMIN — DIAZEPAM 5 MG: 5 TABLET ORAL at 10:18

## 2019-02-19 RX ADMIN — Medication 20 MG: at 11:10

## 2019-02-19 RX ADMIN — HYDROMORPHONE HYDROCHLORIDE 0.5 MG: 2 INJECTION INTRAMUSCULAR; INTRAVENOUS; SUBCUTANEOUS at 11:50

## 2019-02-19 RX ADMIN — FENTANYL CITRATE 50 MCG: 50 INJECTION INTRAMUSCULAR; INTRAVENOUS at 11:04

## 2019-02-19 RX ADMIN — LIDOCAINE HYDROCHLORIDE 5 ML: 20 INJECTION, SOLUTION INFILTRATION; PERINEURAL at 11:10

## 2019-02-19 RX ADMIN — OXYCODONE HYDROCHLORIDE 10 MG: 5 TABLET ORAL at 10:18

## 2019-02-19 RX ADMIN — FENTANYL CITRATE 50 MCG: 50 INJECTION INTRAMUSCULAR; INTRAVENOUS at 11:10

## 2019-02-19 RX ADMIN — HYDROCODONE BITARTRATE AND ACETAMINOPHEN 1 TABLET: 10; 325 TABLET ORAL at 20:52

## 2019-02-19 ASSESSMENT — PULMONARY FUNCTION TESTS
PIF_VALUE: 14
PIF_VALUE: 22
PIF_VALUE: 20
PIF_VALUE: 22
PIF_VALUE: 20
PIF_VALUE: 2
PIF_VALUE: 21
PIF_VALUE: 21
PIF_VALUE: 22
PIF_VALUE: 22
PIF_VALUE: 11
PIF_VALUE: 2
PIF_VALUE: 13
PIF_VALUE: 16
PIF_VALUE: 20
PIF_VALUE: 18
PIF_VALUE: 21
PIF_VALUE: 21
PIF_VALUE: 20
PIF_VALUE: 22
PIF_VALUE: 21
PIF_VALUE: 20
PIF_VALUE: 39
PIF_VALUE: 23
PIF_VALUE: 6
PIF_VALUE: 20
PIF_VALUE: 20
PIF_VALUE: 13
PIF_VALUE: 20
PIF_VALUE: 20
PIF_VALUE: 12
PIF_VALUE: 20
PIF_VALUE: 16
PIF_VALUE: 22
PIF_VALUE: 0
PIF_VALUE: 20
PIF_VALUE: 20
PIF_VALUE: 6
PIF_VALUE: 4
PIF_VALUE: 22
PIF_VALUE: 20
PIF_VALUE: 22
PIF_VALUE: 21
PIF_VALUE: 4
PIF_VALUE: 21
PIF_VALUE: 30
PIF_VALUE: 21
PIF_VALUE: 22
PIF_VALUE: 20
PIF_VALUE: 3
PIF_VALUE: 13
PIF_VALUE: 20
PIF_VALUE: 4
PIF_VALUE: 14
PIF_VALUE: 20
PIF_VALUE: 22
PIF_VALUE: 21
PIF_VALUE: 22
PIF_VALUE: 22
PIF_VALUE: 20
PIF_VALUE: 21
PIF_VALUE: 23
PIF_VALUE: 19
PIF_VALUE: 12
PIF_VALUE: 20
PIF_VALUE: 8
PIF_VALUE: 18
PIF_VALUE: 6
PIF_VALUE: 20
PIF_VALUE: 20
PIF_VALUE: 19
PIF_VALUE: 20
PIF_VALUE: 22
PIF_VALUE: 21
PIF_VALUE: 22
PIF_VALUE: 20
PIF_VALUE: 20
PIF_VALUE: 12
PIF_VALUE: 4
PIF_VALUE: 11
PIF_VALUE: 21
PIF_VALUE: 0
PIF_VALUE: 5
PIF_VALUE: 21
PIF_VALUE: 22
PIF_VALUE: 21

## 2019-02-19 ASSESSMENT — PAIN DESCRIPTION - PROGRESSION
CLINICAL_PROGRESSION: GRADUALLY IMPROVING

## 2019-02-19 ASSESSMENT — PAIN DESCRIPTION - ONSET
ONSET: ON-GOING

## 2019-02-19 ASSESSMENT — PAIN SCALES - GENERAL
PAINLEVEL_OUTOF10: 0
PAINLEVEL_OUTOF10: 10
PAINLEVEL_OUTOF10: 4
PAINLEVEL_OUTOF10: 0
PAINLEVEL_OUTOF10: 5
PAINLEVEL_OUTOF10: 8
PAINLEVEL_OUTOF10: 9
PAINLEVEL_OUTOF10: 7
PAINLEVEL_OUTOF10: 8
PAINLEVEL_OUTOF10: 7
PAINLEVEL_OUTOF10: 7
PAINLEVEL_OUTOF10: 8
PAINLEVEL_OUTOF10: 8
PAINLEVEL_OUTOF10: 9
PAINLEVEL_OUTOF10: 0
PAINLEVEL_OUTOF10: 8
PAINLEVEL_OUTOF10: 5
PAINLEVEL_OUTOF10: 6

## 2019-02-19 ASSESSMENT — PAIN DESCRIPTION - PAIN TYPE
TYPE: SURGICAL PAIN

## 2019-02-19 ASSESSMENT — PAIN DESCRIPTION - LOCATION
LOCATION: HIP

## 2019-02-19 ASSESSMENT — PAIN - FUNCTIONAL ASSESSMENT
PAIN_FUNCTIONAL_ASSESSMENT: 0-10
PAIN_FUNCTIONAL_ASSESSMENT: PREVENTS OR INTERFERES SOME ACTIVE ACTIVITIES AND ADLS

## 2019-02-19 ASSESSMENT — PAIN DESCRIPTION - ORIENTATION
ORIENTATION: RIGHT

## 2019-02-19 ASSESSMENT — PAIN DESCRIPTION - FREQUENCY
FREQUENCY: CONTINUOUS

## 2019-02-19 ASSESSMENT — ENCOUNTER SYMPTOMS: SHORTNESS OF BREATH: 0

## 2019-02-19 ASSESSMENT — PAIN DESCRIPTION - DESCRIPTORS
DESCRIPTORS: ACHING

## 2019-02-19 ASSESSMENT — LIFESTYLE VARIABLES: SMOKING_STATUS: 0

## 2019-02-20 ENCOUNTER — FOLLOWUP TELEPHONE ENCOUNTER (OUTPATIENT)
Dept: ORTHOPEDICS UNIT | Age: 44
End: 2019-02-20

## 2019-02-20 VITALS
TEMPERATURE: 98.1 F | RESPIRATION RATE: 18 BRPM | OXYGEN SATURATION: 94 % | WEIGHT: 263.01 LBS | BODY MASS INDEX: 42.27 KG/M2 | DIASTOLIC BLOOD PRESSURE: 65 MMHG | SYSTOLIC BLOOD PRESSURE: 112 MMHG | HEIGHT: 66 IN | HEART RATE: 98 BPM

## 2019-02-20 LAB
ESTIMATED AVERAGE GLUCOSE: 114 MG/DL
GLUCOSE BLD-MCNC: 135 MG/DL (ref 70–99)
HBA1C MFR BLD: 5.6 %
HCT VFR BLD CALC: 27.4 % (ref 36–48)
HEMOGLOBIN: 8.9 G/DL (ref 12–16)
PERFORMED ON: ABNORMAL

## 2019-02-20 PROCEDURE — 6360000002 HC RX W HCPCS: Performed by: ORTHOPAEDIC SURGERY

## 2019-02-20 PROCEDURE — 94760 N-INVAS EAR/PLS OXIMETRY 1: CPT

## 2019-02-20 PROCEDURE — 97530 THERAPEUTIC ACTIVITIES: CPT

## 2019-02-20 PROCEDURE — 6370000000 HC RX 637 (ALT 250 FOR IP): Performed by: NURSE PRACTITIONER

## 2019-02-20 PROCEDURE — 6370000000 HC RX 637 (ALT 250 FOR IP): Performed by: ORTHOPAEDIC SURGERY

## 2019-02-20 PROCEDURE — 97110 THERAPEUTIC EXERCISES: CPT

## 2019-02-20 PROCEDURE — 36415 COLL VENOUS BLD VENIPUNCTURE: CPT

## 2019-02-20 PROCEDURE — 97116 GAIT TRAINING THERAPY: CPT

## 2019-02-20 PROCEDURE — 97535 SELF CARE MNGMENT TRAINING: CPT

## 2019-02-20 PROCEDURE — 85014 HEMATOCRIT: CPT

## 2019-02-20 PROCEDURE — 85018 HEMOGLOBIN: CPT

## 2019-02-20 RX ADMIN — LITHIUM CARBONATE 300 MG: 300 CAPSULE, GELATIN COATED ORAL at 08:35

## 2019-02-20 RX ADMIN — INSULIN LISPRO 1 UNITS: 100 INJECTION, SOLUTION INTRAVENOUS; SUBCUTANEOUS at 00:30

## 2019-02-20 RX ADMIN — DOCUSATE SODIUM 100 MG: 100 CAPSULE, LIQUID FILLED ORAL at 09:46

## 2019-02-20 RX ADMIN — MORPHINE SULFATE 4 MG: 4 INJECTION INTRAVENOUS at 03:18

## 2019-02-20 RX ADMIN — HYDROCODONE BITARTRATE AND ACETAMINOPHEN 1 TABLET: 10; 325 TABLET ORAL at 00:41

## 2019-02-20 RX ADMIN — ENOXAPARIN SODIUM 40 MG: 40 INJECTION SUBCUTANEOUS at 09:47

## 2019-02-20 RX ADMIN — INSULIN LISPRO 10 UNITS: 100 INJECTION, SOLUTION INTRAVENOUS; SUBCUTANEOUS at 09:52

## 2019-02-20 RX ADMIN — GABAPENTIN 300 MG: 300 CAPSULE ORAL at 09:47

## 2019-02-20 RX ADMIN — HYDROCODONE BITARTRATE AND ACETAMINOPHEN 1 TABLET: 10; 325 TABLET ORAL at 08:35

## 2019-02-20 RX ADMIN — INSULIN GLARGINE 30 UNITS: 100 INJECTION, SOLUTION SUBCUTANEOUS at 00:30

## 2019-02-20 RX ADMIN — CELECOXIB 200 MG: 200 CAPSULE ORAL at 06:20

## 2019-02-20 RX ADMIN — HYDROCODONE BITARTRATE AND ACETAMINOPHEN 1 TABLET: 10; 325 TABLET ORAL at 04:31

## 2019-02-20 RX ADMIN — Medication 3 G: at 03:19

## 2019-02-20 ASSESSMENT — PAIN DESCRIPTION - DESCRIPTORS
DESCRIPTORS: ACHING

## 2019-02-20 ASSESSMENT — PAIN - FUNCTIONAL ASSESSMENT
PAIN_FUNCTIONAL_ASSESSMENT: PREVENTS OR INTERFERES SOME ACTIVE ACTIVITIES AND ADLS

## 2019-02-20 ASSESSMENT — PAIN SCALES - GENERAL
PAINLEVEL_OUTOF10: 3
PAINLEVEL_OUTOF10: 6
PAINLEVEL_OUTOF10: 7
PAINLEVEL_OUTOF10: 5
PAINLEVEL_OUTOF10: 0
PAINLEVEL_OUTOF10: 7
PAINLEVEL_OUTOF10: 6
PAINLEVEL_OUTOF10: 6
PAINLEVEL_OUTOF10: 5

## 2019-02-20 ASSESSMENT — PAIN DESCRIPTION - FREQUENCY
FREQUENCY: CONTINUOUS

## 2019-02-20 ASSESSMENT — PAIN DESCRIPTION - PAIN TYPE
TYPE: ACUTE PAIN;SURGICAL PAIN
TYPE: SURGICAL PAIN
TYPE: SURGICAL PAIN
TYPE: SURGICAL PAIN;ACUTE PAIN
TYPE: ACUTE PAIN;SURGICAL PAIN

## 2019-02-20 ASSESSMENT — PAIN DESCRIPTION - ONSET
ONSET: ON-GOING

## 2019-02-20 ASSESSMENT — PAIN DESCRIPTION - ORIENTATION
ORIENTATION: RIGHT

## 2019-02-20 ASSESSMENT — PAIN DESCRIPTION - PROGRESSION
CLINICAL_PROGRESSION: GRADUALLY IMPROVING

## 2019-02-20 ASSESSMENT — PAIN DESCRIPTION - LOCATION
LOCATION: HIP

## 2019-02-21 ENCOUNTER — FOLLOWUP TELEPHONE ENCOUNTER (OUTPATIENT)
Dept: ORTHOPEDICS UNIT | Age: 44
End: 2019-02-21

## 2019-02-21 ENCOUNTER — TELEPHONE (OUTPATIENT)
Dept: FAMILY MEDICINE CLINIC | Age: 44
End: 2019-02-21

## 2019-02-22 DIAGNOSIS — G60.9 IDIOPATHIC PERIPHERAL NEUROPATHY: Primary | ICD-10-CM

## 2019-02-22 RX ORDER — GABAPENTIN 300 MG/1
300 CAPSULE ORAL 3 TIMES DAILY
Qty: 90 CAPSULE | Refills: 2 | Status: SHIPPED | OUTPATIENT
Start: 2019-02-22 | End: 2019-08-20 | Stop reason: SDUPTHER

## 2019-02-26 DIAGNOSIS — F51.04 PSYCHOPHYSIOLOGICAL INSOMNIA: ICD-10-CM

## 2019-02-26 RX ORDER — AMITRIPTYLINE HYDROCHLORIDE 100 MG/1
100 TABLET, FILM COATED ORAL NIGHTLY
Qty: 30 TABLET | Refills: 5 | Status: SHIPPED | OUTPATIENT
Start: 2019-02-26 | End: 2019-08-19 | Stop reason: SDUPTHER

## 2019-02-28 RX ORDER — FLUOXETINE HYDROCHLORIDE 40 MG/1
60 CAPSULE ORAL NIGHTLY
Qty: 30 CAPSULE | Refills: 0 | Status: SHIPPED | OUTPATIENT
Start: 2019-02-28 | End: 2019-07-02 | Stop reason: ALTCHOICE

## 2019-03-01 DIAGNOSIS — F33.41 RECURRENT MAJOR DEPRESSIVE DISORDER, IN PARTIAL REMISSION (HCC): Primary | ICD-10-CM

## 2019-03-01 RX ORDER — FLUOXETINE HYDROCHLORIDE 20 MG/1
20 CAPSULE ORAL DAILY
Qty: 90 CAPSULE | Refills: 1 | Status: SHIPPED | OUTPATIENT
Start: 2019-03-01 | End: 2019-07-02 | Stop reason: ALTCHOICE

## 2019-03-04 ENCOUNTER — OFFICE VISIT (OUTPATIENT)
Dept: ORTHOPEDIC SURGERY | Age: 44
End: 2019-03-04

## 2019-03-04 ENCOUNTER — TELEPHONE (OUTPATIENT)
Dept: ORTHOPEDIC SURGERY | Age: 44
End: 2019-03-04

## 2019-03-04 VITALS
RESPIRATION RATE: 16 BRPM | HEART RATE: 88 BPM | WEIGHT: 270 LBS | HEIGHT: 66 IN | TEMPERATURE: 100.1 F | BODY MASS INDEX: 43.39 KG/M2

## 2019-03-04 DIAGNOSIS — Z96.641 H/O TOTAL HIP ARTHROPLASTY, RIGHT: ICD-10-CM

## 2019-03-04 DIAGNOSIS — M25.551 HIP PAIN, RIGHT: Primary | ICD-10-CM

## 2019-03-04 PROCEDURE — 99024 POSTOP FOLLOW-UP VISIT: CPT | Performed by: ORTHOPAEDIC SURGERY

## 2019-03-04 RX ORDER — OXYCODONE HYDROCHLORIDE AND ACETAMINOPHEN 5; 325 MG/1; MG/1
1-2 TABLET ORAL EVERY 4 HOURS PRN
Qty: 60 TABLET | Refills: 0 | Status: SHIPPED | OUTPATIENT
Start: 2019-03-04 | End: 2019-03-11

## 2019-03-13 ENCOUNTER — HOSPITAL ENCOUNTER (OUTPATIENT)
Dept: PHYSICAL THERAPY | Age: 44
Setting detail: THERAPIES SERIES
Discharge: HOME | End: 2019-03-13
Payer: COMMERCIAL

## 2019-03-13 PROCEDURE — G8978 MOBILITY CURRENT STATUS: HCPCS

## 2019-03-13 PROCEDURE — 97110 THERAPEUTIC EXERCISES: CPT

## 2019-03-13 PROCEDURE — 97161 PT EVAL LOW COMPLEX 20 MIN: CPT

## 2019-03-13 PROCEDURE — 97530 THERAPEUTIC ACTIVITIES: CPT

## 2019-03-13 ASSESSMENT — PAIN DESCRIPTION - PAIN TYPE: TYPE: SURGICAL PAIN

## 2019-03-13 ASSESSMENT — PAIN DESCRIPTION - LOCATION: LOCATION: HIP

## 2019-03-13 ASSESSMENT — PAIN DESCRIPTION - FREQUENCY: FREQUENCY: CONTINUOUS

## 2019-03-13 ASSESSMENT — PAIN DESCRIPTION - DESCRIPTORS: DESCRIPTORS: ACHING;CONSTANT

## 2019-03-13 ASSESSMENT — PAIN DESCRIPTION - ORIENTATION: ORIENTATION: RIGHT

## 2019-03-13 ASSESSMENT — PAIN SCALES - GENERAL: PAINLEVEL_OUTOF10: 8

## 2019-03-15 ENCOUNTER — HOSPITAL ENCOUNTER (OUTPATIENT)
Dept: PHYSICAL THERAPY | Age: 44
Setting detail: THERAPIES SERIES
Discharge: HOME | End: 2019-03-15
Payer: COMMERCIAL

## 2019-03-15 DIAGNOSIS — F31.61 BIPOLAR DISORDER, CURRENT EPISODE MIXED, MILD (HCC): ICD-10-CM

## 2019-03-15 DIAGNOSIS — Z13.31 POSITIVE DEPRESSION SCREENING: ICD-10-CM

## 2019-03-15 PROCEDURE — 97110 THERAPEUTIC EXERCISES: CPT

## 2019-03-18 ENCOUNTER — TELEPHONE (OUTPATIENT)
Dept: FAMILY MEDICINE CLINIC | Age: 44
End: 2019-03-18

## 2019-03-18 DIAGNOSIS — Z13.31 POSITIVE DEPRESSION SCREENING: ICD-10-CM

## 2019-03-18 DIAGNOSIS — F31.61 BIPOLAR DISORDER, CURRENT EPISODE MIXED, MILD (HCC): ICD-10-CM

## 2019-03-18 NOTE — TELEPHONE ENCOUNTER
[facility] [address], [address] [facility] -  [phone]    CLARIFICATION  SCRIPT FOR FLUOXETINE 40 MG  -- NEEDS TO BE QTY 60 -  FOR 30 DAYS    L/S 02/08/19

## 2019-03-19 DIAGNOSIS — Z13.31 POSITIVE DEPRESSION SCREENING: ICD-10-CM

## 2019-03-19 DIAGNOSIS — F31.61 BIPOLAR DISORDER, CURRENT EPISODE MIXED, MILD (HCC): ICD-10-CM

## 2019-03-19 RX ORDER — LITHIUM CARBONATE 300 MG/1
300 CAPSULE ORAL 2 TIMES DAILY WITH MEALS
Qty: 90 CAPSULE | Refills: 3 | Status: CANCELLED | OUTPATIENT
Start: 2019-03-19

## 2019-03-19 RX ORDER — LITHIUM CARBONATE 300 MG/1
300 CAPSULE ORAL 2 TIMES DAILY WITH MEALS
Qty: 90 CAPSULE | Refills: 0 | Status: SHIPPED | OUTPATIENT
Start: 2019-03-19 | End: 2019-04-18 | Stop reason: SDUPTHER

## 2019-03-19 RX ORDER — LISINOPRIL AND HYDROCHLOROTHIAZIDE 25; 20 MG/1; MG/1
1 TABLET ORAL DAILY
Qty: 60 TABLET | Refills: 0 | Status: SHIPPED | OUTPATIENT
Start: 2019-03-19 | End: 2019-06-26 | Stop reason: SDUPTHER

## 2019-03-19 RX ORDER — LISINOPRIL AND HYDROCHLOROTHIAZIDE 25; 20 MG/1; MG/1
1 TABLET ORAL DAILY
Qty: 30 TABLET | Refills: 5 | Status: CANCELLED | OUTPATIENT
Start: 2019-03-19

## 2019-03-19 RX ORDER — FLUOXETINE HYDROCHLORIDE 40 MG/1
40 CAPSULE ORAL DAILY
Qty: 60 CAPSULE | Refills: 0 | Status: SHIPPED | OUTPATIENT
Start: 2019-03-19 | End: 2019-04-05

## 2019-03-19 RX ORDER — LITHIUM CARBONATE 300 MG/1
CAPSULE ORAL
Qty: 90 CAPSULE | Refills: 3 | OUTPATIENT
Start: 2019-03-19

## 2019-03-19 NOTE — TELEPHONE ENCOUNTER
PHARMACY NEEDS FLUOXETINE 40 CHANGED TO 60QTY, NOW ALSO PT IS REQUESTING: LITHIUM AND LISINOPRIL     LAST VISIT 02/08/2019 PRE[facility], NEXT VISIT NONE.       12/19/2018  2:20 PM - [facility] Incoming Lab Results From Soft (Epic Adt)     Component Value Ref Range & Units Status Collected Lab   Lithium Lvl 0.7  0.6 - 1.2 mmol/L Final 12/19/2018  8:43 AM  - [facility]   Lithium Dose Amount Unknown   Final 12/19/2018  8:43 AM [facility]

## 2019-03-19 NOTE — TELEPHONE ENCOUNTER
Pt calling to f/u up on her medications - she is out of      Fluoxetine  Lithium  Lisinopril     Please call   Pt @  [phone]

## 2019-03-20 ENCOUNTER — TELEPHONE (OUTPATIENT)
Dept: ORTHOPEDIC SURGERY | Age: 44
End: 2019-03-20

## 2019-03-20 ENCOUNTER — HOSPITAL ENCOUNTER (OUTPATIENT)
Dept: PHYSICAL THERAPY | Age: 44
Setting detail: THERAPIES SERIES
Discharge: HOME | End: 2019-03-20
Payer: COMMERCIAL

## 2019-03-20 PROCEDURE — 97110 THERAPEUTIC EXERCISES: CPT

## 2019-03-22 ENCOUNTER — HOSPITAL ENCOUNTER (OUTPATIENT)
Dept: PHYSICAL THERAPY | Age: 44
Setting detail: THERAPIES SERIES
Discharge: HOME | End: 2019-03-22
Payer: COMMERCIAL

## 2019-03-25 ENCOUNTER — HOSPITAL ENCOUNTER (OUTPATIENT)
Dept: PHYSICAL THERAPY | Age: 44
Setting detail: THERAPIES SERIES
Discharge: HOME | End: 2019-03-25
Payer: COMMERCIAL

## 2019-03-25 ENCOUNTER — TELEPHONE (OUTPATIENT)
Dept: FAMILY MEDICINE CLINIC | Age: 44
End: 2019-03-25

## 2019-03-25 DIAGNOSIS — M16.0 PRIMARY OSTEOARTHRITIS OF BOTH HIPS: ICD-10-CM

## 2019-03-25 PROCEDURE — 97112 NEUROMUSCULAR REEDUCATION: CPT

## 2019-03-25 PROCEDURE — 97110 THERAPEUTIC EXERCISES: CPT

## 2019-03-25 RX ORDER — HYDROCODONE BITARTRATE AND ACETAMINOPHEN 10; 325 MG/1; MG/1
1 TABLET ORAL EVERY 8 HOURS PRN
Qty: 30 TABLET | Refills: 0 | Status: SHIPPED | OUTPATIENT
Start: 2019-03-25 | End: 2019-04-04

## 2019-03-27 ENCOUNTER — HOSPITAL ENCOUNTER (OUTPATIENT)
Dept: PHYSICAL THERAPY | Age: 44
Setting detail: THERAPIES SERIES
Discharge: HOME | End: 2019-03-27
Payer: COMMERCIAL

## 2019-03-27 PROCEDURE — 97110 THERAPEUTIC EXERCISES: CPT

## 2019-04-01 ENCOUNTER — HOSPITAL ENCOUNTER (OUTPATIENT)
Dept: PHYSICAL THERAPY | Age: 44
Setting detail: THERAPIES SERIES
Discharge: HOME | End: 2019-04-01
Payer: COMMERCIAL

## 2019-04-01 PROCEDURE — 97110 THERAPEUTIC EXERCISES: CPT

## 2019-04-01 NOTE — FLOWSHEET NOTE
Physical Therapy Daily Treatment Note  Date:  2019    Patient Name:  Sohan Pretty  \"Jen\"  :  1975  MRN: [id number]  Restrictions/Precautions:  Direct lateral/posterior approach Per Dr Hannah Lopes notes   Medical/Treatment Diagnosis Information:   · Diagnosis: R THR 19   · Treatment Diagnosis: Decreased right hip ROM, strength, balance, decreased gait mechanics    Tracking Information:  Physician Information Referring Practitioner: Antonio Melgar     Plan of Care Sent Date:  3/13/19  Signed Received:    Visit Count / Total Visits       Insurance Approved Visits    Approved Dates:     Insurance Information PT Insurance Information: [facility] 30 visits/year      Progress Note/G-codes   []  Yes  [x]  No Next Due:  10 or next f/u with MD 19     Pain level: 4/10 R hip      Subjective:  Pt reports she continues to have pain and swelling. She wishes she could return to the gym. Pt recently transitioned to the cane. She hasn't really been doing her exercises, but she has been doing a lot of walking and stairs at home. Objective:   Observation: 3/27. Increased antalgia this date, significant difficulty with gait with [facility]  3/25. Pt now on [facility] over the last 3 days. 3/15. No new findings to indicate further pathology, but discussed contacting Dr Hannah Lopes and to reassess subjective complaints over next 24-48 hours SEE EVAL   Test measurements:       Exercises:  Exercise/Equipment Resistance/Repetitions Other comments   Nu step Bike Level 5, seat 12 x 4 mins    Stairs Gentle HS Stretch 2 x 20 sec B (observing precautions)Hip Flexor Stretch 2 x 20 sec B (partial range)       T.G.  Squats 2 x 10  Sit to stand or // Bar squat NV   Mat   Supine hooklying marching x 10 BClamshells 3 x 10 R LE onlyBridging  3 x 10         Balance // Bars     Tandem balance 2 x 30 sec B on Airex       SLS on Airex 20\" x 3, occ UE support    Orange yoga block step-over x 10 RLE A-P and M-L (cues for good form)

## 2019-04-03 ENCOUNTER — HOSPITAL ENCOUNTER (OUTPATIENT)
Dept: PHYSICAL THERAPY | Age: 44
Setting detail: THERAPIES SERIES
Discharge: HOME | End: 2019-04-03
Payer: COMMERCIAL

## 2019-04-03 ENCOUNTER — TELEPHONE (OUTPATIENT)
Dept: FAMILY MEDICINE CLINIC | Age: 44
End: 2019-04-03

## 2019-04-03 PROCEDURE — 97530 THERAPEUTIC ACTIVITIES: CPT

## 2019-04-03 PROCEDURE — 97110 THERAPEUTIC EXERCISES: CPT

## 2019-04-03 NOTE — FLOWSHEET NOTE
Physical Therapy Daily Treatment Note  Date:  4/3/2019    Patient Name:  Candice Alpers  \"Jen\"  :  1975  MRN: [id number]  Restrictions/Precautions:  Direct lateral/posterior approach Per Dr Michell Guardado notes   Medical/Treatment Diagnosis Information:   · Diagnosis: R THR 19   · Treatment Diagnosis: Decreased right hip ROM, strength, balance, decreased gait mechanics    Tracking Information:  Physician Information Referring Practitioner: Cory Huber     Plan of Care Sent Date:  3/13/19  Signed Received:    Visit Count / Total Visits       Insurance Approved Visits    Approved Dates:     Insurance Information PT Insurance Information: [facility] 30 visits/year      Progress Note/G-codes   []  Yes  [x]  No Next Due:  10 or next f/u with MD 19     Pain level: 4/10 R hip      Subjective:  SEE PN. Pt has follow up with Dr. Michell Guardado tomorrow. She started doing her clamshells at home and is more sore than usual.     Objective:   Observation: 3/27. Increased antalgia this date, significant difficulty with gait with [address]  3/25. Pt now on [address] over the last 3 days. 3/15. No new findings to indicate further pathology, but discussed contacting Dr Michell Guardado and to reassess subjective complaints over next 24-48 hours SEE EVAL   Test measurements:  4/3: SEE PN       Exercises:  Exercise/Equipment Resistance/Repetitions Other comments   Nu step L5, Seat 11, Arms 10 x 5 min    Stairs Gentle HS Stretch 2 x 20 sec B (observing precautions)  Hip Flexor Stretch 2 x 20 sec B (partial range)      T.G. Squats 2 x 10  Sit to stand or // Bar squat NV   Mat   Clamshells 3 x 10 R LE only        Balance // Bars                    SLS with hip hike occasional UE support 3 x 15 sec B    Cable Column      Gait Training                           [facility]                  Other Therapeutic Activities:    4/3: Comprehensive reassessment performed this date.  Pt ed provided regarding updated objective measures,

## 2019-04-03 NOTE — PROGRESS NOTES
Outpatient Physical Therapy    Phone: [phone] Fax: [phone]    Physical Therapy Progress Note  Date: 4/3/2019        Patient Name:  Silvia Darke    :  1975  MRN: [id number]  Restrictions/Precautions:  Direct lateral/posterior approach Per Dr Pop Hicks notes   Medical/Treatment Diagnosis Information:   · Diagnosis: R THR 19   · Treatment Diagnosis: Decreased right hip ROM, strength, balance, decreased gait mechanics  Tracking Information:       Physician Information Referring Practitioner: Yuri Brice     Plan of Care Sent Date:  3/13/19  Signed Received:    Visit Count / Total Visits        Insurance Approved Visits    Approved Dates:     Insurance Information PT Insurance Information: [employer] 30 visits/year      Progress Note/G-codes   [x]  Yes                 []  No Next Due:  12 or next f/u with MD      Pain level: 2/10 R posterior/lateral hip, worst in past week 5/10    Time Period for Report: 3/13/19-4/3/19  Cancels/No-shows to date:      Plan of Care/Treatment to date:  x? Therapeutic Exercise      ? Modalities:  x? Therapeutic Activity       ? Ultrasound    x? Gait Training        ? Cervical Traction   x? Neuromuscular Re-education      ? Cold/hotpack    x? Instruction in HEP       ? Lumbar Traction  x? Manual Therapy        ? Electrical Stimulation            ? Aquatic Therapy        ? Iontophoresis            ? Lymphedema management  ? Women's Health     Other:  ? Vestibular Rehab        ?    ?  Needed                        Significant Findings At Last Visit/Comments:    Subjective: Pt reports overall things are going pretty good. She has been feeling slow and swollen. She feels like is functioning at 50% of where she would like to be at the end of her recovery. She continues to struggle with stairs, bending/lifting (laundry), dressing (tying shoes).       Objective:  AROM RLE (degrees)  R Hip Flexion 0-125: 100 deg  R Hip External Rotation 0-45: 35 min walk test and demonstrate normative values with no A.D. in order to return to normal community activities. GOAL   Long term goal 5: Pt will improve SLS to 15 secs RLE in order to establish normal gait mechanics. GOAL     Current Frequency/Duration:  # Days per week: ? 1 day # Weeks: ? 1 week ? 4 weeks      x? 2 days   ? 2 weeks ? 5 weeks      ? 3 days   x? 3 weeks ? 6 weeks     Rehab Potential: ? Excellent x? Good ? Fair  ? Poor     Goal Status:  ? Achieved x? Partially Achieved  ? Not Achieved     Patient Status: ? Continue per initial plan of Care     ? Patient now discharged     ? Additional visits requested, Please re-certify for additional visits:      Requested frequency/duration:  2 X/week for 4 weeks    Electronically signed by:  Little Sevilla, PT, DPT    If you have any questions or concerns, please don't hesitate to call.   Thank you for your referral.    Physician Signature:________________________________Date:__________________  By signing above, therapists plan is approved by physician

## 2019-04-04 ENCOUNTER — OFFICE VISIT (OUTPATIENT)
Dept: ORTHOPEDIC SURGERY | Age: 44
End: 2019-04-04

## 2019-04-04 VITALS — BODY MASS INDEX: 43.39 KG/M2 | HEIGHT: 66 IN | WEIGHT: 270 LBS | RESPIRATION RATE: 16 BRPM | TEMPERATURE: 98.2 F

## 2019-04-04 DIAGNOSIS — Z96.641 H/O TOTAL HIP ARTHROPLASTY, RIGHT: Primary | ICD-10-CM

## 2019-04-04 PROCEDURE — APPSS15 APP SPLIT SHARED TIME 0-15 MINUTES: Performed by: PHYSICIAN ASSISTANT

## 2019-04-04 PROCEDURE — 99024 POSTOP FOLLOW-UP VISIT: CPT | Performed by: PHYSICIAN ASSISTANT

## 2019-04-04 NOTE — PROGRESS NOTES
Subjective:      Patient ID: Bessy Morales is a 53 y.o.  female. Chief Complaint   Patient presents with    Hip Problem     f/u R hip, HUA 2/19/19        HPI:  Here for follow up on right hip arthroplasty. The date of procedure- 2/19/19. Anterior lateral approach. Mild discomfort. Still using a cane with a  mild limp. Review of Systems:   Negative for fever or chills. Negative for drainage or erythema around the surgical incision site. Past Medical History:   Diagnosis Date    Arthritis     Bipolar disorder ([address]     Depression     Gastritis     Hypertension     Insomnia     Migraine     PLMD (periodic limb movement disorder)     Sleep apnea     TMJ (temporomandibular joint syndrome)        Family History   Problem Relation Age of Onset    COPD Mother     Mult Sclerosis Father     No Known Problems Sister     High Cholesterol Brother     Diabetes Brother     Obesity Brother     Other Maternal Grandmother         hips replacement    Obesity Maternal Grandmother     No Known Problems Maternal Grandfather     No Known Problems Paternal Grandmother     Diabetes Paternal Grandfather     No Known Problems Sister        Past Surgical History:   Procedure Laterality Date    COLPOSCOPY      ENDOMETRIAL ABLATION      FOOT SURGERY      JOINT REPLACEMENT Right 02/19/2019    RIGHT LATERAL TOTAL HIP REPLACEMENT    TONSILLECTOMY      TOTAL HIP ARTHROPLASTY Right 2/19/2019    RIGHT LATERAL TOTAL HIP REPLACEMENT performed by Td Tobias MD at [address] [place] [address] History     Occupational History    Occupation: bank collections   Tobacco Use    Smoking status: Former Smoker     Packs/day: 0.50     Types: Cigarettes     Start date: 9/17/1987    Smokeless tobacco: Never Used    Tobacco comment: advised to quit   Substance and Sexual Activity    Alcohol use:  Yes     Alcohol/week: 3.0 oz     Types: 6 Standard drinks or equivalent per week     Comment: socially    Drug use: Yes     Types: Marijuana     Comment: - last smoke months ago-1/2019    Sexual activity: Not on file       Current Outpatient Medications   Medication Sig Dispense Refill    HYDROcodone-acetaminophen (NORCO)  MG per tablet Take 1 tablet by mouth every 8 hours as needed for Pain for up to 10 days. 30 tablet 0    lisinopril-hydrochlorothiazide (PRINZIDE;ZESTORETIC) 20-25 MG per tablet Take 1 tablet by mouth daily appt required prior to additional refills 60 tablet 0    lithium 300 MG capsule Take 1 capsule by mouth 2 times daily (with meals) appt required prior to additional refills 90 capsule 0    FLUoxetine (PROZAC) 40 MG capsule Take 1 capsule by mouth daily In addition to the 20 mg cap appt required prior to additional refills 60 capsule 0    FLUoxetine (PROZAC) 20 MG capsule Take 1 capsule by mouth daily 90 capsule 1    FLUoxetine (PROZAC) 40 MG capsule Take 2 capsules by mouth nightly 30 capsule 0    amitriptyline (ELAVIL) 100 MG tablet TAKE 1 TABLET BY MOUTH NIGHTLY 30 tablet 5    gabapentin (NEURONTIN) 300 MG capsule Take 1 capsule by mouth 3 times daily for 30 days. . 90 capsule 2    diazepam (VALIUM) 10 MG tablet Take 10 mg by mouth every 6 hours as needed for Anxiety. Pepper Frankel apixaban (ELIQUIS) 2.5 MG TABS tablet Take 1 tablet by mouth 2 times daily Begin day after discharge from hospital and continue for 30 total days 60 tablet 0     No current facility-administered medications for this visit. Objective:   She is alert, oriented x 3, pleasant, well nourished, developed and in no acute distress. Temp 98.2 °F (36.8 °C) (Temporal)   Resp 16   Ht 5' 6\" (1.676 m)   Wt 270 lb (122.5 kg)   BMI 43.58 kg/m²      HIP EXAM:  Examination of the right hip:  The incision is healing without evidence of infection. There is no erythema. There is no pain with internal and external rotation. There is mild  pain with flexion and extension. There is mild  pain with active SLR.   There is no pain with weight bearing. X Rays: not performed in the office today:     Diagnosis:        ICD-10-CM    1. H/O total hip arthroplasty, right 2/19/19 Z96.641         Assessment/plan:    Clinically doing very well status post right hip arthroplasty performed through an anterior lateral approach. Still has some abductor weakness as expected. Continue with a PT/ hip arthoplasty rehab as discussed. A home exercise program was instructed today including ROM exercises and strengthening exercises. The patient verbalized understanding of these exercises as well as the importance of the exercise program to promote return of normal function. If pain intensifies or other problems arise you are to notify the office. Activity restrictions discussed today. Need for dental prophylactics discussed today. Follow Up: 6 weeks. X-rays will be obtained of the right hip at that time. Call or return to clinic prn if these symptoms worsen or fail to improve as anticipated.

## 2019-04-05 ENCOUNTER — OFFICE VISIT (OUTPATIENT)
Dept: FAMILY MEDICINE CLINIC | Age: 44
End: 2019-04-05
Payer: COMMERCIAL

## 2019-04-05 VITALS
HEIGHT: 66 IN | OXYGEN SATURATION: 98 % | TEMPERATURE: 98.7 F | SYSTOLIC BLOOD PRESSURE: 114 MMHG | WEIGHT: 266.8 LBS | DIASTOLIC BLOOD PRESSURE: 60 MMHG | BODY MASS INDEX: 42.88 KG/M2 | HEART RATE: 98 BPM

## 2019-04-05 DIAGNOSIS — G60.9 IDIOPATHIC NEUROPATHY: Primary | ICD-10-CM

## 2019-04-05 DIAGNOSIS — M16.12 ARTHRITIS OF LEFT HIP: ICD-10-CM

## 2019-04-05 PROCEDURE — G8417 CALC BMI ABV UP PARAM F/U: HCPCS | Performed by: NURSE PRACTITIONER

## 2019-04-05 PROCEDURE — 96160 PT-FOCUSED HLTH RISK ASSMT: CPT | Performed by: NURSE PRACTITIONER

## 2019-04-05 PROCEDURE — 1036F TOBACCO NON-USER: CPT | Performed by: NURSE PRACTITIONER

## 2019-04-05 PROCEDURE — 99213 OFFICE O/P EST LOW 20 MIN: CPT | Performed by: NURSE PRACTITIONER

## 2019-04-05 PROCEDURE — G8427 DOCREV CUR MEDS BY ELIG CLIN: HCPCS | Performed by: NURSE PRACTITIONER

## 2019-04-05 RX ORDER — HYDROCODONE BITARTRATE AND ACETAMINOPHEN 10; 325 MG/1; MG/1
1 TABLET ORAL EVERY 8 HOURS PRN
Qty: 90 TABLET | Refills: 0 | Status: SHIPPED | OUTPATIENT
Start: 2019-05-05 | End: 2019-06-04

## 2019-04-05 RX ORDER — HYDROCODONE BITARTRATE AND ACETAMINOPHEN 10; 325 MG/1; MG/1
1 TABLET ORAL EVERY 8 HOURS PRN
Qty: 90 TABLET | Refills: 0 | Status: SHIPPED | OUTPATIENT
Start: 2019-05-05 | End: 2019-04-05 | Stop reason: SDUPTHER

## 2019-04-05 RX ORDER — HYDROCODONE BITARTRATE AND ACETAMINOPHEN 10; 325 MG/1; MG/1
1 TABLET ORAL EVERY 8 HOURS PRN
Qty: 90 TABLET | Refills: 0 | Status: SHIPPED | OUTPATIENT
Start: 2019-06-05 | End: 2019-07-02 | Stop reason: SDUPTHER

## 2019-04-05 RX ORDER — HYDROCODONE BITARTRATE AND ACETAMINOPHEN 10; 325 MG/1; MG/1
1 TABLET ORAL EVERY 8 HOURS PRN
Qty: 90 TABLET | Refills: 0 | Status: SHIPPED | OUTPATIENT
Start: 2019-04-05 | End: 2019-05-05

## 2019-04-05 ASSESSMENT — PATIENT HEALTH QUESTIONNAIRE - PHQ9
10. IF YOU CHECKED OFF ANY PROBLEMS, HOW DIFFICULT HAVE THESE PROBLEMS MADE IT FOR YOU TO DO YOUR WORK, TAKE CARE OF THINGS AT HOME, OR GET ALONG WITH OTHER PEOPLE: 2
9. THOUGHTS THAT YOU WOULD BE BETTER OFF DEAD, OR OF HURTING YOURSELF: 0
1. LITTLE INTEREST OR PLEASURE IN DOING THINGS: 2
5. POOR APPETITE OR OVEREATING: 1
4. FEELING TIRED OR HAVING LITTLE ENERGY: 2
8. MOVING OR SPEAKING SO SLOWLY THAT OTHER PEOPLE COULD HAVE NOTICED. OR THE OPPOSITE, BEING SO FIGETY OR RESTLESS THAT YOU HAVE BEEN MOVING AROUND A LOT MORE THAN USUAL: 2
SUM OF ALL RESPONSES TO PHQ QUESTIONS 1-9: 18
7. TROUBLE CONCENTRATING ON THINGS, SUCH AS READING THE NEWSPAPER OR WATCHING TELEVISION: 3
SUM OF ALL RESPONSES TO PHQ QUESTIONS 1-9: 18
6. FEELING BAD ABOUT YOURSELF - OR THAT YOU ARE A FAILURE OR HAVE LET YOURSELF OR YOUR FAMILY DOWN: 3
2. FEELING DOWN, DEPRESSED OR HOPELESS: 2
3. TROUBLE FALLING OR STAYING ASLEEP: 3
SUM OF ALL RESPONSES TO PHQ9 QUESTIONS 1 & 2: 4

## 2019-04-05 NOTE — PROGRESS NOTES
SUBJECTIVE:    Patient ID: Breonna Amaya is a 53 y.o. y.o. female. HPI    Chief Complaint   Patient presents with    Medication Refill     PT IS HERE FOR A REFILL ON NORCO, OARRS IN MEDIA, S AND  UP TO DATE. PT IS IN FOR MED REFILLS - HAS IDIOPATHIC NEUROPATHY -  PT STATES HER LEGS AND FEET FELL NUMB,TINGLY ALL THE TIME IT HAS GOTTEN BETTER WHEN SHE LOST WEIGHT  RATES THE PAIN  6/10 0 THE NEURONTIN AND [address] SEEM TO HELP     SHE JUST HAD A THR 2/19  DOING MUCH BETTER BUT STILL USING A  ACNE - WILL HAVE THE OTHER HIP DOWN THIS WINTER AS SHE DOES NOT WANT TO BE DOWN FOR THE SUMMER- SHE STATES THE RIGHT LEG DID NOT BOTHER HER AS MUCH BUT SINCE HAVING THE RIGHT HIP DONE SHE FEELS THE SAME KIND OF PAIN IN THE LEFT HIP- ACHING STABBING WORSE WITH ACTIVITY  Review of Systems   Musculoskeletal: Positive for arthralgias and myalgias. Neurological: Positive for numbness. All other systems reviewed and are negative. OBJECTIVE:    Vitals:    04/05/19 1437   BP: 114/60   Pulse: 98   Temp: 98.7 °F (37.1 °C)   SpO2: 98%      Physical Exam   Constitutional: She is oriented to person, place, and time. Vital signs are normal. She appears well-developed and well-nourished. She is active. Cardiovascular: Normal rate, regular rhythm, S1 normal, S2 normal and normal heart sounds. Pulmonary/Chest: Effort normal and breath sounds normal.   Neurological: She is alert and oriented to person, place, and time. Psychiatric: She has a normal mood and affect. Her speech is normal and behavior is normal. Judgment and thought content normal. Cognition and memory are normal.       Judson Atkinson was seen today for medication refill.     Diagnoses and all orders for this visit:    Idiopathic neuropathy  Arthritis of left hip  Controlled substances monitoring: possible medication side effects, risk of tolerance and/or dependence, and alternative treatments discussed, no signs of potential drug abuse or diversion identified and OARRS report reviewed today- activity consistent with treatment plan   SEDATION PRECAUTIONS DW PT AS WELL  WILL HAVE PT COMPLETE CONSENT OF OPIOD MEDICATION CONTRACT.  -     HYDROcodone-acetaminophen ([facility])  MG per tablet; Take 1 tablet by mouth every 8 hours as needed for Pain for up to 30 days. FILL ON OR AFTER 4/5  -     HYDROcodone-acetaminophen (NORCO)  MG per tablet; Take 1 tablet by mouth every 8 hours as needed for Pain for up to 30 days. FILL ON OR AFTER 5/5  -     HYDROcodone-acetaminophen (NORCO)  MG per tablet; Take 1 tablet by mouth every 8 hours as needed for Pain for up to 30 days.  FILL ON OR AFTER 6/5

## 2019-04-08 ENCOUNTER — HOSPITAL ENCOUNTER (OUTPATIENT)
Dept: PHYSICAL THERAPY | Age: 44
Setting detail: THERAPIES SERIES
Discharge: HOME | End: 2019-04-08
Payer: COMMERCIAL

## 2019-04-08 PROCEDURE — 97110 THERAPEUTIC EXERCISES: CPT

## 2019-04-08 NOTE — FLOWSHEET NOTE
Physical Therapy Daily Treatment Note  Date:  2019    Patient Name:  Sudheer Henao  \"Jen\"  :  1975  MRN: [id number]  Restrictions/Precautions:  Direct lateral/posterior approach Per Dr Venu Neves notes   Medical/Treatment Diagnosis Information:   · Diagnosis: R THR 19   · Treatment Diagnosis: Decreased right hip ROM, strength, balance, decreased gait mechanics    Tracking Information:  Physician Information Referring Practitioner: Jeniffer Juares     Plan of Care Sent Date:  3/13/19  Signed Received:    Visit Count / Total Visits       Insurance Approved Visits    Approved Dates:     Insurance Information PT Insurance Information: [facility] 30 visits/year      Progress Note/G-codes   []  Yes  [x]  No Next Due:  10 or next f/u with MD 19     Pain level: 4/10 R hip      Subjective:  Pt reports her appt with her doctor went well. In the morning the muscle in the back of her hip is sore. Objective:   Observation: 3/27. Increased antalgia this date, significant difficulty with gait with [facility]  3/25. Pt now on [facility] over the last 3 days. 3/15. No new findings to indicate further pathology, but discussed contacting Dr Venu Neves and to reassess subjective complaints over next 24-48 hours SEE EVAL   Test measurements:  4/3: SEE PN     : LEFS 43/80 pts    Exercises:  Exercise/Equipment Resistance/Repetitions Other comments   Nu step L5, Seat 11, Arms 10 x 5 min    Stairs Gentle HS Stretch 2 x 20 sec B (observing precautions)  Hip Flexor Stretch 2 x 20 sec B (partial range)      T.G.  Squats 2 x 10   Single Squats x 10 B Sit to stand or // Bar squat NV   Mat           Balance // Bars     Tandem balance 2 x 30 sec B on Airex       3 way SLR 2  x 10 B TB yellow (1 x 10 flexion)6\" Step Up frwd and lat x 15 R LE only (progress to BOSU)         SLS with hip hike occasional UE support 3 x 15 sec B    TRX Squats 2 x 10    Gait Training      [facility] Other Therapeutic Activities:    4/3: Comprehensive reassessment performed this date. Pt ed provided regarding updated objective measures, prognosis, and POC. Discussed importance of appropriate performance of HEP. Pt was educated on PT POC, Diagnosis, Prognosis, pathomechanics, as well as treatment goals and options, and frequency/duration of scheduling future physical therapy appointments. Time was also taken on this day to answer all patient questions involving their participation in PT. Explained importance of walking every hour, importance of HEP, and icing to help minimize edema and improve outcomes. Home Exercise Program:  Pt was educated on  HEP including distribution of handout describing exercises, sets, repetitions, frequency and intensity. Exercises/activities include: SLR, SAQ, SLS supported, clamshells blue TB, hip add isometrics     Manual Treatments:      Modalities:     4/1, 3/20: Pt declines CP this date  3/27, 3/25, 3/15 CP in supine to right glut/anterior hip x 15 mins      Timed Code Treatment Minutes:   30    Total Treatment Minutes:   30    Treatment/Activity Tolerance:  [x] Patient tolerated treatment well [] Patient limited by fatigue  [] Patient limited by pain  [] Patient limited by other medical complications  [x] Other: Continue to progress posterior lateral hip strength, functional strength, balance training, gait.      Prognosis: [x] Good [] Fair  [] Poor    Patient Requires Follow-up: [] Yes  [] No    Plan:   [x] Continue per plan of care [] Alter current plan (see comments)  [] Plan of care initiated [] Hold pending MD visit [] Discharge     Plan for Next Session:   Progress THR, return to gym activities, restore normal gait mechanics     Electronically signed by:  Estefany Hunter, PT, DPT

## 2019-04-10 ENCOUNTER — HOSPITAL ENCOUNTER (OUTPATIENT)
Dept: PHYSICAL THERAPY | Age: 44
Setting detail: THERAPIES SERIES
Discharge: HOME | End: 2019-04-10
Payer: COMMERCIAL

## 2019-04-10 PROCEDURE — 97110 THERAPEUTIC EXERCISES: CPT

## 2019-04-10 NOTE — FLOWSHEET NOTE
biasing R 2 x 10    Gait Training      [facility]                  Other Therapeutic Activities:    4/3: Comprehensive reassessment performed this date. Pt ed provided regarding updated objective measures, prognosis, and POC. Discussed importance of appropriate performance of HEP. Pt was educated on PT POC, Diagnosis, Prognosis, pathomechanics, as well as treatment goals and options, and frequency/duration of scheduling future physical therapy appointments. Time was also taken on this day to answer all patient questions involving their participation in PT. Explained importance of walking every hour, importance of HEP, and icing to help minimize edema and improve outcomes. Home Exercise Program:  Pt was educated on  HEP including distribution of handout describing exercises, sets, repetitions, frequency and intensity. Exercises/activities include: SLR, SAQ, SLS supported, clamshells blue TB, hip add isometrics     Manual Treatments:      Modalities:     4/1, 3/20: Pt declines CP this date  3/27, 3/25, 3/15 CP in supine to right glut/anterior hip x 15 mins      Timed Code Treatment Minutes:   30    Total Treatment Minutes:   30    Treatment/Activity Tolerance:  [x] Patient tolerated treatment well [] Patient limited by fatigue  [] Patient limited by pain  [] Patient limited by other medical complications  [x] Other: Continue to progress posterior lateral hip strength, functional strength, balance training, gait. Pt encouraged to delegate work and take frequent rest breaks when performing housework to help with pain and swelling management.      Prognosis: [x] Good [] Fair  [] Poor    Patient Requires Follow-up: [] Yes  [] No    Plan:   [x] Continue per plan of care [] Alter current plan (see comments)  [] Plan of care initiated [] Hold pending MD visit [] Discharge     Plan for Next Session:   Progress THR, return to gym activities, restore normal gait mechanics Electronically signed by:  Rhonda Brooks, PT, DPT

## 2019-04-15 ENCOUNTER — APPOINTMENT (OUTPATIENT)
Dept: PHYSICAL THERAPY | Age: 44
End: 2019-04-15
Payer: COMMERCIAL

## 2019-04-17 ENCOUNTER — HOSPITAL ENCOUNTER (OUTPATIENT)
Dept: PHYSICAL THERAPY | Age: 44
Setting detail: THERAPIES SERIES
Discharge: HOME | End: 2019-04-17
Payer: COMMERCIAL

## 2019-04-17 PROCEDURE — 97110 THERAPEUTIC EXERCISES: CPT

## 2019-04-17 NOTE — FLOWSHEET NOTE
lat 2 x 10 R LE only                  TRX Squats  x 10  Squats biasing R 2 x 10    Gait Training      Cable Column 2-way ext/abd 2 plates x 15 LLE, 1 plate x 15 RLE    Rebounder Tandem, gold ball x 20 reps B                [facility]                  Other Therapeutic Activities:    4/3: Comprehensive reassessment performed this date. Pt ed provided regarding updated objective measures, prognosis, and POC. Discussed importance of appropriate performance of HEP. Pt was educated on PT POC, Diagnosis, Prognosis, pathomechanics, as well as treatment goals and options, and frequency/duration of scheduling future physical therapy appointments. Time was also taken on this day to answer all patient questions involving their participation in PT. Explained importance of walking every hour, importance of HEP, and icing to help minimize edema and improve outcomes. Home Exercise Program:  Pt was educated on  HEP including distribution of handout describing exercises, sets, repetitions, frequency and intensity. Exercises/activities include: SLR, SAQ, SLS supported, clamshells blue TB, hip add isometrics     Manual Treatments:      Modalities:     4/1, 3/20: Pt declines CP this date  3/27, 3/25, 3/15 CP in supine to right glut/anterior hip x 15 mins      Timed Code Treatment Minutes:   30    Total Treatment Minutes:   30    Treatment/Activity Tolerance:  [] Patient tolerated treatment well [] Patient limited by fatigue  [] Patient limited by pain  [] Patient limited by other medical complications  [x] Other: Continue to progress posterior lateral hip strength, functional strength, balance training, gait. Pt encouraged to delegate work and take frequent rest breaks when performing housework to help with pain and swelling management. Explained pt on importance of safe performance and speed with exercise to reduce chance of injury.       Prognosis: [x] Good [] Fair  [] Poor    Patient Requires Follow-up: [] Yes  [] No    Plan:   [x] Continue per plan of care [] Alter current plan (see comments)  [] Plan of care initiated [] Hold pending MD visit [] Discharge     Plan for Next Session:   Progress THR, return to gym activities, restore normal gait mechanics     Electronically signed by:  Znadra Hooper, PT

## 2019-04-18 DIAGNOSIS — F31.61 BIPOLAR DISORDER, CURRENT EPISODE MIXED, MILD (HCC): ICD-10-CM

## 2019-04-18 DIAGNOSIS — Z13.31 POSITIVE DEPRESSION SCREENING: ICD-10-CM

## 2019-04-18 RX ORDER — LITHIUM CARBONATE 300 MG/1
300 CAPSULE ORAL 2 TIMES DAILY WITH MEALS
Qty: 90 CAPSULE | Refills: 3 | Status: SHIPPED | OUTPATIENT
Start: 2019-04-18 | End: 2020-01-02 | Stop reason: SDUPTHER

## 2019-04-22 ENCOUNTER — HOSPITAL ENCOUNTER (OUTPATIENT)
Dept: PHYSICAL THERAPY | Age: 44
Setting detail: THERAPIES SERIES
Discharge: HOME | End: 2019-04-22
Payer: COMMERCIAL

## 2019-04-22 NOTE — PROGRESS NOTES
Physical Therapy  Cancellation/No-show Note  Patient Name:  Carly Canales  :  1975   Date:  2019  Cancelled visits to date: 1  No-shows to date: 0    Patient status for today's appointment patient:  []  [facility] 3/22  [x]  Rescheduled appointment   []  No-show     Reason given by patient:  []  Patient ill  []  Conflicting appointment  []  No transportation    []  Conflict with work  []  No reason given  [x]  Other:  R/S'd for 19   Comments:      Phone call information:   []  Phone call made today to patient at _ time at number provided:      []  Patient answered, conversation as follows:    []  Patient did not answer, message left as follows:  [x]  Phone call not made today    Electronically signed by:  Brandy Turner PT

## 2019-04-23 ENCOUNTER — APPOINTMENT (OUTPATIENT)
Dept: PHYSICAL THERAPY | Age: 44
End: 2019-04-23
Payer: COMMERCIAL

## 2019-04-23 ENCOUNTER — HOSPITAL ENCOUNTER (OUTPATIENT)
Dept: PHYSICAL THERAPY | Age: 44
Setting detail: THERAPIES SERIES
Discharge: HOME | End: 2019-04-23
Payer: COMMERCIAL

## 2019-04-23 PROCEDURE — 97110 THERAPEUTIC EXERCISES: CPT

## 2019-04-23 PROCEDURE — 97530 THERAPEUTIC ACTIVITIES: CPT

## 2019-04-23 ASSESSMENT — PAIN DESCRIPTION - LOCATION: LOCATION: HIP

## 2019-04-23 ASSESSMENT — PAIN DESCRIPTION - PAIN TYPE: TYPE: SURGICAL PAIN

## 2019-04-23 ASSESSMENT — PAIN DESCRIPTION - FREQUENCY: FREQUENCY: CONTINUOUS

## 2019-04-23 ASSESSMENT — PAIN DESCRIPTION - DESCRIPTORS: DESCRIPTORS: ACHING;CONSTANT

## 2019-04-23 ASSESSMENT — PAIN DESCRIPTION - ORIENTATION: ORIENTATION: RIGHT

## 2019-04-23 ASSESSMENT — PAIN SCALES - GENERAL: PAINLEVEL_OUTOF10: 2

## 2019-04-23 NOTE — PROGRESS NOTES
(degrees)  R Hip Flexion 0-125: 110 deg  R Hip External Rotation 0-45: 40 deg  R Hip Internal Rotation 0-45: 20 deg  Strength RLE  R Hip Flexion: 5/5  R Hip Extension: 4/5  R Hip ABduction: 4-/5  R Hip Internal Rotation: 4/5  R Hip External Rotation: 4+/5  R Knee Extension: 5/5    Additional Measures  Special Tests: TU.7 sec with SPC; 6 min walk test: 1115 ft/340 m with SPC (0 rest breaks); 30 sec Sit To Stand: 14 reps  Other: 30 sec sit to stand:  14 REPS   Balance  Tandem Stance R Le  Tandem Stance L Le  Single Leg Stance R Le  Single Leg Stance L Le    Assessment:  Conditions Requiring Skilled Therapeutic Intervention  Body structures, Functions, Activity limitations: Decreased functional mobility , Decreased ROM, Decreased strength, Increased Pain, Decreased balance  Assessment: Pt has received 10 skilled PT visits after having R THR on 19. Pt has made good progress, and is IND with HEP and gym program.  Had had some inconsistent improvement based on pt's desire to overdo it at times, but overall has good functional strength and ROM and will continue to show improvement through a gym program and HEP.   Will be discharged this date and will f/u prn if strength does not progress as expected    Treatment Diagnosis: Decreased right hip ROM, strength, balance, decreased gait mechanics  Prognosis: Good  REQUIRES PT FOLLOW UP: Yes    Plan:   Plan  Times per week: 2  Times per day: Daily  Current Treatment Recommendations: Strengthening, ROM, Balance Training, Stair training, Gait Training, Neuromuscular Re-education, Home Exercise Program, Manual Therapy - Soft Tissue Mobilization, Patient/Caregiver Education & Training, Modalities  Plan Comment: D/C with HEP          Progress towards goals:    Long term goals  Long term goal 1: Pt will be IND with progressive LE debility HEP protocol with reference to written instructions, to achieve maximal level of functional mobility and independence --GOAL MET   Long term goal 2: Pt will improve left hip abd strength to 4+/5 to allow patient to ambulate without A.D. and resume normal gym activities---NOT MET, PROGRESSING  Long term goal 3: Pt will ambulate community distances with no evidence of antalgia without A.D. to resume PLOF---MET MAJORITY OF TIME, UNLESS AGGRAVATED BY SORENESS, OVERDOING ACTIVITIES   Long term goal 4: Pt will perform TUG, 6 min walk test and demonstrate normative values with no A.D. in order to return to normal community activities ---GOAL MET   Long term goal 5: Pt will improve SLS to 15 secs RLE in order to establish normal gait mechanics---NOT MET     Current Frequency/Duration:  # Days per week: ? 1 day # Weeks: ? 1 week ? 4 weeks      X  2 days   ? 2 weeks ? 5 weeks      ? 3 days   ? 3 weeks X  6 weeks     Rehab Potential: ? Excellent X  Good ? Fair  ? Poor     Goal Status:  ? Achieved X  Partially Achieved  ? Not Achieved     Patient Status: ? Continue per initial plan of Care     X Patient now discharged     ? Additional visits requested, Please re-certify for additional visits:      Requested frequency/duration:  X/week for weeks    Electronically signed by:  Laure Ramachandran PT    If you have any questions or concerns, please don't hesitate to call.   Thank you for your referral.    Physician Signature:________________________________Date:__________________  By signing above, therapists plan is approved by physician

## 2019-04-23 NOTE — FLOWSHEET NOTE
Physical Therapy Daily Treatment Note  Date:  2019    Patient Name:  Horacio Montejo  \"Jen\"  :  1975  MRN: [id number]  Restrictions/Precautions:  Direct lateral/posterior approach Per Dr Vargas Baumann notes   Medical/Treatment Diagnosis Information:   · Diagnosis: R THR 19   · Treatment Diagnosis: Decreased right hip ROM, strength, balance, decreased gait mechanics    Tracking Information:  Physician Information Referring Practitioner: Pineda Lind     Plan of Care Sent Date:  3/13/19  Signed Received:    Visit Count / Total Visits  10/12     Insurance Approved Visits  10/30  Approved Dates:     Insurance Information PT Insurance Information: [employer] 30 visits/year      Progress Note/G-codes   []  Yes  [x]  No Next Due:  10 or next f/u with MD 19     Pain level: 2 /10 R hip, 6/10 when sitting for long periods of time      Subjective:  States she has been feeling really good, and then she did a whole lot of cleaning her house for Easter, and had to go up/down the steps a lot. Pain depends on how she is doing the day before. Doing steps reciprocally, able to put on shoes, and get in/out of car ok. Around 85% overall improvement. Objective:   Observation: 3/27. Increased antalgia this date, significant difficulty with gait with [facility]  3/25. Pt now on [facility] over the last 3 days. 3/15. No new findings to indicate further pathology, but discussed contacting Dr Vargas Baumann and to reassess subjective complaints over next 24-48 hours SEE EVAL   Test measurements:  4/3: SEE PN     : LEFS 43/80 pts    Exercises:  Exercise/Equipment Resistance/Repetitions Other comments   Nu step L5, Seat 11, Arms 10 x 5 min    Stairs Gentle HS Stretch 2 x 20 sec B (observing precautions)  Hip Flexor Stretch 2 x 20 sec B (partial range)      T.G.  Squats 2 x 10   Single Squats x 10 B    Mat           Balance // Bars     Tandem balance 2 x 30 sec B on Airex       SLS on Airex 30\" x 2, occ UE support BOSU Step Up frwd and lat 2 x 10 R LE only                  TRX Squats  x 10  Squats biasing R 2 x 10    Gait Training      Cable Column 2-way ext/abd 2 plates x 15 LLE, 1 plate x 15 RLE    Rebounder Tandem, gold ball x 20 reps B                [facility]                  Other Therapeutic Activities:    4/3: Comprehensive reassessment performed this date. Pt ed provided regarding updated objective measures, prognosis, and POC. Discussed importance of appropriate performance of HEP. Pt was educated on PT POC, Diagnosis, Prognosis, pathomechanics, as well as treatment goals and options, and frequency/duration of scheduling future physical therapy appointments. Time was also taken on this day to answer all patient questions involving their participation in PT. Explained importance of walking every hour, importance of HEP, and icing to help minimize edema and improve outcomes. Home Exercise Program:  4/23. Updated HEP with leg press, and other gym machines, monster walk, lateral band walk, and wall squats, as well as balance activities    Pt was educated on  HEP including distribution of handout describing exercises, sets, repetitions, frequency and intensity. Exercises/activities include: SLR, SAQ, SLS supported, clamshells blue TB, hip add isometrics     Manual Treatments:      Modalities:     4/1, 3/20: Pt declines CP this date  3/27, 3/25, 3/15 CP in supine to right glut/anterior hip x 15 mins      Timed Code Treatment Minutes:   32    Total Treatment Minutes:   32    Treatment/Activity Tolerance:  [] Patient tolerated treatment well [] Patient limited by fatigue  [] Patient limited by pain  [] Patient limited by other medical complications  [] Other: Continue to progress posterior lateral hip strength, functional strength, balance training, gait. Pt encouraged to delegate work and take frequent rest breaks when performing housework to help with pain and swelling management.   Explained pt on importance of safe performance and speed with exercise to reduce chance of injury.       Prognosis: [x] Good [] Fair  [] Poor    Patient Requires Follow-up: [] Yes  [] No    Plan:    [] Continue per plan of care [] Alter current plan (see comments)  [] Plan of care initiated [] Hold pending MD visit [x] Discharge    Plan for Next Session:   Progress THR, return to gym activities, restore normal gait mechanics     Electronically signed by:  Madeleine Ochoa PT

## 2019-05-15 RX ORDER — FLUOXETINE HYDROCHLORIDE 40 MG/1
CAPSULE ORAL
Qty: 60 CAPSULE | Refills: 0 | Status: SHIPPED | OUTPATIENT
Start: 2019-05-15 | End: 2019-07-02 | Stop reason: SDUPTHER

## 2019-05-16 ENCOUNTER — TELEPHONE (OUTPATIENT)
Dept: FAMILY MEDICINE CLINIC | Age: 44
End: 2019-05-16

## 2019-05-16 NOTE — TELEPHONE ENCOUNTER
PT CALLING RE: HER PROSAC -  SHE THOUGHT SHE WAS SUPPOSE TO BE TAKING 40 MG TWICE A DAY    PT @  [phone]    IF SOMEONE CAN CHECK THIS OUT TODAY THAT WOULD BE GREAT IF NOT SHE CAN WAIT TIL TOMORROW FOR Dunia Wooten

## 2019-05-20 ENCOUNTER — OFFICE VISIT (OUTPATIENT)
Dept: ORTHOPEDIC SURGERY | Age: 44
End: 2019-05-20
Payer: COMMERCIAL

## 2019-05-20 VITALS — TEMPERATURE: 99.7 F | BODY MASS INDEX: 42.88 KG/M2 | WEIGHT: 266.8 LBS | RESPIRATION RATE: 16 BRPM | HEIGHT: 66 IN

## 2019-05-20 DIAGNOSIS — Z96.641 H/O TOTAL HIP ARTHROPLASTY, RIGHT: Primary | ICD-10-CM

## 2019-05-20 DIAGNOSIS — M16.12 PRIMARY OSTEOARTHRITIS OF LEFT HIP: ICD-10-CM

## 2019-05-20 PROCEDURE — G8417 CALC BMI ABV UP PARAM F/U: HCPCS | Performed by: PHYSICIAN ASSISTANT

## 2019-05-20 PROCEDURE — 4004F PT TOBACCO SCREEN RCVD TLK: CPT | Performed by: PHYSICIAN ASSISTANT

## 2019-05-20 PROCEDURE — G8427 DOCREV CUR MEDS BY ELIG CLIN: HCPCS | Performed by: PHYSICIAN ASSISTANT

## 2019-05-20 PROCEDURE — 99213 OFFICE O/P EST LOW 20 MIN: CPT | Performed by: PHYSICIAN ASSISTANT

## 2019-05-22 NOTE — PROGRESS NOTES
This dictation was done with [employer] dictation and may contain mechanical errors related to translation. Temperature 99.7 °F (37.6 °C), temperature source Temporal, resp. rate 16, height 5' 6\" (1.676 m), weight 266 lb 12.8 oz (121 kg), not currently breastfeeding. This is a very pleasant 53 showed female who had a right total hip replacement June 19, 2019 and states that her pain is down to one out of 10 she does have some pain after she has she sits for too long in the muscles in her back feels like they're pulled. She does have pretty significant arthritis on her opposite hip as well and now that she's had enough improvement with the right we were talking about when to proceed to the left total hip replacement we center for x-rays at the office today including a standing AP pelvis a 2 view right hip. Three views of the total Hip arthroplasty were done today including an AP pelvis and a 2 view hip. This reveals satisfactory alignment of the prosthesis with good sizing and fit. There is good version of the cup and no subsiding of the stem. . No signs of significant polyethylene wear or failure. No progressive radiolucencies,fractures, tumors or dislocations. It showed a stable right total hip replacement and osteoarthritis of the left    Her exam is consistent with the good range of motion and hip flexion and abduction strength on the right with diminished range of motion but good hip strength on the left she is neurologically intact both the left and right foot with good dorsiflexion plantarflexion strength. The right hip has a well-healed incision no signs of infection left hip has crepitus with internal and external rotation and some weakness with the high internal rotation.     My impression is stable right total hip replacement and osteoarthritis of the left hip    We talked about options and treatment for the left hip and we will look to schedule this either later this year or early next year and she will continue to do the exercises for her right hip was replaced recently.

## 2019-07-01 RX ORDER — LISINOPRIL AND HYDROCHLOROTHIAZIDE 25; 20 MG/1; MG/1
TABLET ORAL
Qty: 60 TABLET | Refills: 0 | Status: SHIPPED | OUTPATIENT
Start: 2019-07-01 | End: 2020-05-04

## 2019-07-02 ENCOUNTER — OFFICE VISIT (OUTPATIENT)
Dept: FAMILY MEDICINE CLINIC | Age: 44
End: 2019-07-02
Payer: COMMERCIAL

## 2019-07-02 VITALS
WEIGHT: 262 LBS | HEART RATE: 84 BPM | SYSTOLIC BLOOD PRESSURE: 130 MMHG | DIASTOLIC BLOOD PRESSURE: 72 MMHG | BODY MASS INDEX: 42.11 KG/M2 | HEIGHT: 66 IN | RESPIRATION RATE: 18 BRPM

## 2019-07-02 DIAGNOSIS — G60.9 IDIOPATHIC NEUROPATHY: Primary | ICD-10-CM

## 2019-07-02 DIAGNOSIS — F51.04 PSYCHOPHYSIOLOGICAL INSOMNIA: ICD-10-CM

## 2019-07-02 DIAGNOSIS — F31.62 BIPOLAR DISORDER, CURRENT EPISODE MIXED, MODERATE (HCC): ICD-10-CM

## 2019-07-02 DIAGNOSIS — G47.33 OSA (OBSTRUCTIVE SLEEP APNEA): ICD-10-CM

## 2019-07-02 DIAGNOSIS — F41.9 ANXIETY: ICD-10-CM

## 2019-07-02 PROCEDURE — 4004F PT TOBACCO SCREEN RCVD TLK: CPT | Performed by: NURSE PRACTITIONER

## 2019-07-02 PROCEDURE — G8417 CALC BMI ABV UP PARAM F/U: HCPCS | Performed by: NURSE PRACTITIONER

## 2019-07-02 PROCEDURE — G8427 DOCREV CUR MEDS BY ELIG CLIN: HCPCS | Performed by: NURSE PRACTITIONER

## 2019-07-02 PROCEDURE — 99214 OFFICE O/P EST MOD 30 MIN: CPT | Performed by: NURSE PRACTITIONER

## 2019-07-02 RX ORDER — HYDROCODONE BITARTRATE AND ACETAMINOPHEN 10; 325 MG/1; MG/1
1 TABLET ORAL EVERY 8 HOURS PRN
Qty: 90 TABLET | Refills: 0 | Status: SHIPPED | OUTPATIENT
Start: 2019-07-04 | End: 2019-07-02 | Stop reason: SDUPTHER

## 2019-07-02 RX ORDER — LISDEXAMFETAMINE DIMESYLATE 50 MG
1 CAPSULE ORAL DAILY
Refills: 0 | COMMUNITY
Start: 2019-06-11 | End: 2020-05-04 | Stop reason: ALTCHOICE

## 2019-07-02 RX ORDER — HYDROCODONE BITARTRATE AND ACETAMINOPHEN 10; 325 MG/1; MG/1
1 TABLET ORAL EVERY 8 HOURS PRN
Qty: 90 TABLET | Refills: 0 | Status: SHIPPED | OUTPATIENT
Start: 2019-07-04 | End: 2019-08-03

## 2019-07-02 RX ORDER — DIAZEPAM 10 MG/1
10 TABLET ORAL NIGHTLY PRN
Qty: 30 TABLET | Refills: 2 | Status: SHIPPED | OUTPATIENT
Start: 2019-07-02 | End: 2019-09-18 | Stop reason: SDUPTHER

## 2019-07-02 RX ORDER — DIAZEPAM 10 MG/1
10 TABLET ORAL EVERY 6 HOURS PRN
Status: CANCELLED | OUTPATIENT
Start: 2019-07-02

## 2019-07-02 RX ORDER — HYDROCODONE BITARTRATE AND ACETAMINOPHEN 10; 325 MG/1; MG/1
1 TABLET ORAL EVERY 8 HOURS PRN
Qty: 90 TABLET | Refills: 0 | Status: SHIPPED | OUTPATIENT
Start: 2019-08-03 | End: 2019-07-02 | Stop reason: SDUPTHER

## 2019-07-02 RX ORDER — FLUOXETINE HYDROCHLORIDE 40 MG/1
CAPSULE ORAL
Qty: 60 CAPSULE | Status: CANCELLED | OUTPATIENT
Start: 2019-07-02

## 2019-07-02 RX ORDER — LISINOPRIL AND HYDROCHLOROTHIAZIDE 25; 20 MG/1; MG/1
TABLET ORAL
Qty: 60 TABLET | Status: CANCELLED | OUTPATIENT
Start: 2019-07-02

## 2019-07-02 RX ORDER — HYDROCODONE BITARTRATE AND ACETAMINOPHEN 10; 325 MG/1; MG/1
1 TABLET ORAL EVERY 8 HOURS PRN
Qty: 90 TABLET | Refills: 0 | Status: SHIPPED | OUTPATIENT
Start: 2019-09-02 | End: 2019-09-18 | Stop reason: SDUPTHER

## 2019-07-02 RX ORDER — FLUOXETINE HYDROCHLORIDE 40 MG/1
CAPSULE ORAL
Qty: 90 CAPSULE | Refills: 3 | Status: SHIPPED | OUTPATIENT
Start: 2019-07-02 | End: 2020-01-14 | Stop reason: SDUPTHER

## 2019-07-18 ENCOUNTER — TELEPHONE (OUTPATIENT)
Dept: FAMILY MEDICINE CLINIC | Age: 44
End: 2019-07-18

## 2019-07-18 RX ORDER — ORPHENADRINE CITRATE 100 MG/1
100 TABLET, EXTENDED RELEASE ORAL 2 TIMES DAILY
Qty: 14 TABLET | Refills: 0 | Status: SHIPPED | OUTPATIENT
Start: 2019-07-18 | End: 2019-07-25

## 2019-07-18 NOTE — TELEPHONE ENCOUNTER
Pt calling she pulled a muscle in her back and thigh area. She did this yesterday she picked up a basket that was to heavy for her. Pt is wanting to know if we could prescribe her some muscle relaxers?       Pt is wanting a phone call back please

## 2019-07-18 NOTE — TELEPHONE ENCOUNTER
Norflex sent to pharmacy to use twice daily for up to 1 week  F/u if not getting better at appointment

## 2019-07-29 ENCOUNTER — OFFICE VISIT (OUTPATIENT)
Dept: ORTHOPEDIC SURGERY | Age: 44
End: 2019-07-29
Payer: COMMERCIAL

## 2019-07-29 VITALS
TEMPERATURE: 96.8 F | BODY MASS INDEX: 42.11 KG/M2 | WEIGHT: 262 LBS | HEART RATE: 105 BPM | DIASTOLIC BLOOD PRESSURE: 80 MMHG | HEIGHT: 66 IN | SYSTOLIC BLOOD PRESSURE: 127 MMHG

## 2019-07-29 DIAGNOSIS — M75.41 IMPINGEMENT SYNDROME OF RIGHT SHOULDER: ICD-10-CM

## 2019-07-29 DIAGNOSIS — M25.511 RIGHT SHOULDER PAIN, UNSPECIFIED CHRONICITY: Primary | ICD-10-CM

## 2019-07-29 PROCEDURE — 20610 DRAIN/INJ JOINT/BURSA W/O US: CPT | Performed by: PHYSICIAN ASSISTANT

## 2019-07-29 PROCEDURE — G8417 CALC BMI ABV UP PARAM F/U: HCPCS | Performed by: PHYSICIAN ASSISTANT

## 2019-07-29 PROCEDURE — G8427 DOCREV CUR MEDS BY ELIG CLIN: HCPCS | Performed by: PHYSICIAN ASSISTANT

## 2019-07-29 PROCEDURE — 99213 OFFICE O/P EST LOW 20 MIN: CPT | Performed by: PHYSICIAN ASSISTANT

## 2019-07-29 PROCEDURE — 4004F PT TOBACCO SCREEN RCVD TLK: CPT | Performed by: PHYSICIAN ASSISTANT

## 2019-07-31 ENCOUNTER — OFFICE VISIT (OUTPATIENT)
Dept: SLEEP MEDICINE | Age: 44
End: 2019-07-31
Payer: COMMERCIAL

## 2019-07-31 VITALS
BODY MASS INDEX: 40.82 KG/M2 | SYSTOLIC BLOOD PRESSURE: 124 MMHG | RESPIRATION RATE: 16 BRPM | WEIGHT: 254 LBS | HEIGHT: 66 IN | HEART RATE: 99 BPM | OXYGEN SATURATION: 99 % | DIASTOLIC BLOOD PRESSURE: 84 MMHG

## 2019-07-31 DIAGNOSIS — G47.33 OBSTRUCTIVE SLEEP APNEA: Primary | ICD-10-CM

## 2019-07-31 DIAGNOSIS — E66.01 OBESITY, CLASS III, BMI 40-49.9 (MORBID OBESITY) (HCC): ICD-10-CM

## 2019-07-31 DIAGNOSIS — I10 ESSENTIAL HYPERTENSION, BENIGN: ICD-10-CM

## 2019-07-31 PROCEDURE — 4004F PT TOBACCO SCREEN RCVD TLK: CPT | Performed by: PSYCHIATRY & NEUROLOGY

## 2019-07-31 PROCEDURE — G8417 CALC BMI ABV UP PARAM F/U: HCPCS | Performed by: PSYCHIATRY & NEUROLOGY

## 2019-07-31 PROCEDURE — G8427 DOCREV CUR MEDS BY ELIG CLIN: HCPCS | Performed by: PSYCHIATRY & NEUROLOGY

## 2019-07-31 PROCEDURE — 99203 OFFICE O/P NEW LOW 30 MIN: CPT | Performed by: PSYCHIATRY & NEUROLOGY

## 2019-07-31 ASSESSMENT — SLEEP AND FATIGUE QUESTIONNAIRES
HOW LIKELY ARE YOU TO NOD OFF OR FALL ASLEEP WHILE WATCHING TV: 2
HOW LIKELY ARE YOU TO NOD OFF OR FALL ASLEEP WHILE SITTING QUIETLY AFTER LUNCH WITHOUT ALCOHOL: 3
HOW LIKELY ARE YOU TO NOD OFF OR FALL ASLEEP IN A CAR, WHILE STOPPED FOR A FEW MINUTES IN TRAFFIC: 0
HOW LIKELY ARE YOU TO NOD OFF OR FALL ASLEEP WHILE SITTING AND READING: 2
HOW LIKELY ARE YOU TO NOD OFF OR FALL ASLEEP WHEN YOU ARE A PASSENGER IN A CAR FOR AN HOUR WITHOUT A BREAK: 1
ESS TOTAL SCORE: 11
HOW LIKELY ARE YOU TO NOD OFF OR FALL ASLEEP WHILE SITTING INACTIVE IN A PUBLIC PLACE: 0
NECK CIRCUMFERENCE (INCHES): 16.5
HOW LIKELY ARE YOU TO NOD OFF OR FALL ASLEEP WHILE SITTING AND TALKING TO SOMEONE: 0
HOW LIKELY ARE YOU TO NOD OFF OR FALL ASLEEP WHILE LYING DOWN TO REST IN THE AFTERNOON WHEN CIRCUMSTANCES PERMIT: 3

## 2019-07-31 ASSESSMENT — ENCOUNTER SYMPTOMS
CHOKING: 1
APNEA: 0
EYES NEGATIVE: 1
GASTROINTESTINAL NEGATIVE: 1
ALLERGIC/IMMUNOLOGIC NEGATIVE: 1

## 2019-07-31 NOTE — PROGRESS NOTES
is indicativeof daytime fatigue). The patient takes 3 naps/week for 120 minutes and usually is not refreshing nap. Previous evaluation and treatment has included- PSG/CPAP, 8 years ago, she used the CPAP for 4 years then she started using it as needed after she lost weigh, she lost the machine 3 months ago. She has been obese for many years and tried, lost 15 pounds in the last 5 years. DOT/CDL - N/A  MARLY/'chauncey - N/A      Previous Report(s) Reviewed: historical medical records         Social History     Socioeconomic History    Marital status:      Spouse name: Not on file    Number of children: Not on file    Years of education: Not on file    Highest education level: Not on file   Occupational History    Occupation: bank collections   Social Needs    Financial resource strain: Not on file    Food insecurity:     Worry: Not on file     Inability: Not on file    Transportation needs:     Medical: Not on file     Non-medical: Not on file   Tobacco Use    Smoking status: Current Every Day Smoker     Packs/day: 0.50     Types: Cigarettes     Start date: 9/17/1987    Smokeless tobacco: Never Used    Tobacco comment: advised to quit   Substance and Sexual Activity    Alcohol use:  Yes     Alcohol/week: 5.0 standard drinks     Types: 6 Standard drinks or equivalent per week     Comment: socially    Drug use: Yes     Types: Marijuana     Comment: - last smoke months ago-1/2019    Sexual activity: Not on file   Lifestyle    Physical activity:     Days per week: Not on file     Minutes per session: Not on file    Stress: Not on file   Relationships    Social connections:     Talks on phone: Not on file     Gets together: Not on file     Attends [employer] service: Not on file     Active member of club or organization: Not on file     Attends meetings of clubs or organizations: Not on file     Relationship status: Not on file    Intimate partner violence:     Fear of current or

## 2019-08-19 DIAGNOSIS — F51.04 PSYCHOPHYSIOLOGICAL INSOMNIA: ICD-10-CM

## 2019-08-19 RX ORDER — AMITRIPTYLINE HYDROCHLORIDE 100 MG/1
100 TABLET, FILM COATED ORAL NIGHTLY
Qty: 30 TABLET | Refills: 5 | Status: SHIPPED | OUTPATIENT
Start: 2019-08-19 | End: 2020-01-14 | Stop reason: SDUPTHER

## 2019-08-26 ENCOUNTER — OFFICE VISIT (OUTPATIENT)
Dept: ORTHOPEDIC SURGERY | Age: 44
End: 2019-08-26
Payer: COMMERCIAL

## 2019-08-26 VITALS
TEMPERATURE: 98.1 F | HEIGHT: 66 IN | SYSTOLIC BLOOD PRESSURE: 154 MMHG | HEART RATE: 84 BPM | RESPIRATION RATE: 16 BRPM | WEIGHT: 254 LBS | DIASTOLIC BLOOD PRESSURE: 93 MMHG | BODY MASS INDEX: 40.82 KG/M2

## 2019-08-26 DIAGNOSIS — M25.511 RIGHT SHOULDER PAIN, UNSPECIFIED CHRONICITY: Primary | ICD-10-CM

## 2019-08-26 DIAGNOSIS — M75.101 TEAR OF RIGHT ROTATOR CUFF, UNSPECIFIED TEAR EXTENT, UNSPECIFIED WHETHER TRAUMATIC: ICD-10-CM

## 2019-08-26 PROCEDURE — 99213 OFFICE O/P EST LOW 20 MIN: CPT | Performed by: PHYSICIAN ASSISTANT

## 2019-08-26 PROCEDURE — 4004F PT TOBACCO SCREEN RCVD TLK: CPT | Performed by: PHYSICIAN ASSISTANT

## 2019-08-26 PROCEDURE — G8417 CALC BMI ABV UP PARAM F/U: HCPCS | Performed by: PHYSICIAN ASSISTANT

## 2019-08-26 PROCEDURE — G8427 DOCREV CUR MEDS BY ELIG CLIN: HCPCS | Performed by: PHYSICIAN ASSISTANT

## 2019-08-29 PROBLEM — M75.101 TEAR OF RIGHT ROTATOR CUFF: Status: ACTIVE | Noted: 2019-08-29

## 2019-08-29 NOTE — PROGRESS NOTES
not tender to palpation. Bicipital Groove is not  tender to palpation. Pectoralis  is not tender to palpation. Scapula/ trapezius is not tender to palpation. There is moderate weakness with supraspinatus testing. There is marked pain with supraspinatus testing. Yergason Test negative. Drop Arm Test negative. Apprehension Test negative. Cross Arm Test negative. Shoulder AROM-      FF 95  ABD 95 IR to ASIS ER 85    Examination of the upper extremities are intact with sensation to light touch. Motor testing  5/5 in all major motor groups including hand intrinsics. Radial, Median and Ulnar nerves are intact. Santiago's Sign absent. Examination of the upper extremities shows intact perfusion to all extremities. No cyanosis. Digits are warm to touch. Capillary refill is less than 2 seconds. There is no edema noted. Examination of the skin over the upper extremities:  Reveals the skin to be intact without lacerations or abrasions. There are no significant erythema, rashes or skin lesions. X Rays: not performed in the office today:     Diagnosis:        ICD-10-CM    1. Right shoulder pain, unspecified chronicity M25.511 MRI Shoulder Right WO Contrast   2. Tear of right rotator cuff, unspecified tear extent, unspecified whether traumatic M75.101 MRI Shoulder Right WO Contrast        Assessment/ Plan:      Probable traumatic tear of the rotator cuff tendon. The natural history of the patient's diagnosis as well as the treatment options were discussed in full and questions were answered. Risks and benefits of the treatment options also reviewed in detail. MRI of the right shoulder to evaluate for rotator cuff tear extent of tear. Follow Up: She is to call after the MRI to review the results over the phone. More than likely she will need to be referred on to sports medicine for surgical consultation.   Call or return to clinic prn if these symptoms worsen or fail to improve as

## 2019-09-10 ENCOUNTER — HOSPITAL ENCOUNTER (OUTPATIENT)
Dept: SLEEP CENTER | Age: 44
Discharge: HOME | End: 2019-09-10
Payer: COMMERCIAL

## 2019-09-10 DIAGNOSIS — E66.01 OBESITY, CLASS III, BMI 40-49.9 (MORBID OBESITY) (HCC): ICD-10-CM

## 2019-09-10 DIAGNOSIS — I10 ESSENTIAL HYPERTENSION, BENIGN: ICD-10-CM

## 2019-09-10 DIAGNOSIS — G47.33 OBSTRUCTIVE SLEEP APNEA: ICD-10-CM

## 2019-09-10 PROCEDURE — 95810 POLYSOM 6/> YRS 4/> PARAM: CPT | Performed by: PSYCHIATRY & NEUROLOGY

## 2019-09-10 PROCEDURE — 95810 POLYSOM 6/> YRS 4/> PARAM: CPT

## 2019-09-11 ENCOUNTER — HOSPITAL ENCOUNTER (OUTPATIENT)
Dept: MRI IMAGING | Age: 44
Discharge: HOME | End: 2019-09-11
Payer: COMMERCIAL

## 2019-09-11 DIAGNOSIS — M25.511 RIGHT SHOULDER PAIN, UNSPECIFIED CHRONICITY: ICD-10-CM

## 2019-09-11 DIAGNOSIS — M75.101 TEAR OF RIGHT ROTATOR CUFF, UNSPECIFIED TEAR EXTENT, UNSPECIFIED WHETHER TRAUMATIC: ICD-10-CM

## 2019-09-11 PROCEDURE — 73221 MRI JOINT UPR EXTREM W/O DYE: CPT

## 2019-09-16 ENCOUNTER — TELEPHONE (OUTPATIENT)
Dept: PULMONOLOGY | Age: 44
End: 2019-09-16

## 2019-09-18 ENCOUNTER — TELEPHONE (OUTPATIENT)
Dept: SLEEP MEDICINE | Age: 44
End: 2019-09-18

## 2019-09-18 ENCOUNTER — OFFICE VISIT (OUTPATIENT)
Dept: FAMILY MEDICINE CLINIC | Age: 44
End: 2019-09-18
Payer: COMMERCIAL

## 2019-09-18 ENCOUNTER — OFFICE VISIT (OUTPATIENT)
Dept: SLEEP MEDICINE | Age: 44
End: 2019-09-18
Payer: COMMERCIAL

## 2019-09-18 VITALS
SYSTOLIC BLOOD PRESSURE: 150 MMHG | HEART RATE: 77 BPM | BODY MASS INDEX: 41.14 KG/M2 | HEIGHT: 66 IN | OXYGEN SATURATION: 97 % | DIASTOLIC BLOOD PRESSURE: 86 MMHG | RESPIRATION RATE: 16 BRPM | WEIGHT: 256 LBS

## 2019-09-18 VITALS
HEART RATE: 91 BPM | OXYGEN SATURATION: 98 % | HEIGHT: 66 IN | DIASTOLIC BLOOD PRESSURE: 90 MMHG | SYSTOLIC BLOOD PRESSURE: 160 MMHG | RESPIRATION RATE: 16 BRPM | WEIGHT: 258 LBS | BODY MASS INDEX: 41.46 KG/M2

## 2019-09-18 DIAGNOSIS — G60.9 IDIOPATHIC NEUROPATHY: ICD-10-CM

## 2019-09-18 DIAGNOSIS — F41.9 ANXIETY: ICD-10-CM

## 2019-09-18 DIAGNOSIS — F31.62 BIPOLAR DISORDER, CURRENT EPISODE MIXED, MODERATE (HCC): ICD-10-CM

## 2019-09-18 DIAGNOSIS — Z13.1 DIABETES MELLITUS SCREENING: Primary | ICD-10-CM

## 2019-09-18 DIAGNOSIS — F51.04 PSYCHOPHYSIOLOGICAL INSOMNIA: ICD-10-CM

## 2019-09-18 DIAGNOSIS — G47.33 OSA (OBSTRUCTIVE SLEEP APNEA): Primary | ICD-10-CM

## 2019-09-18 DIAGNOSIS — Z13.220 LIPID SCREENING: ICD-10-CM

## 2019-09-18 LAB
AMPHETAMINE SCREEN, URINE: POSITIVE
BARBITURATE SCREEN, URINE: ABNORMAL
BENZODIAZEPINE SCREEN, URINE: POSITIVE
BUPRENORPHINE URINE: ABNORMAL
COCAINE METABOLITE SCREEN URINE: ABNORMAL
GABAPENTIN SCREEN, URINE: ABNORMAL
MDMA URINE: ABNORMAL
METHADONE SCREEN, URINE: ABNORMAL
METHAMPHETAMINE, URINE: ABNORMAL
OPIATE SCREEN URINE: ABNORMAL
OXYCODONE SCREEN URINE: ABNORMAL
PHENCYCLIDINE SCREEN URINE: ABNORMAL
PROPOXYPHENE SCREEN, URINE: ABNORMAL
THC SCREEN, URINE: ABNORMAL
TRICYCLIC ANTIDEPRESSANTS, UR: ABNORMAL

## 2019-09-18 PROCEDURE — G8417 CALC BMI ABV UP PARAM F/U: HCPCS | Performed by: PSYCHIATRY & NEUROLOGY

## 2019-09-18 PROCEDURE — 99212 OFFICE O/P EST SF 10 MIN: CPT | Performed by: PSYCHIATRY & NEUROLOGY

## 2019-09-18 PROCEDURE — 4004F PT TOBACCO SCREEN RCVD TLK: CPT | Performed by: PSYCHIATRY & NEUROLOGY

## 2019-09-18 PROCEDURE — 80305 DRUG TEST PRSMV DIR OPT OBS: CPT | Performed by: NURSE PRACTITIONER

## 2019-09-18 PROCEDURE — G8427 DOCREV CUR MEDS BY ELIG CLIN: HCPCS | Performed by: NURSE PRACTITIONER

## 2019-09-18 PROCEDURE — 99212 OFFICE O/P EST SF 10 MIN: CPT | Performed by: NURSE PRACTITIONER

## 2019-09-18 PROCEDURE — G8427 DOCREV CUR MEDS BY ELIG CLIN: HCPCS | Performed by: PSYCHIATRY & NEUROLOGY

## 2019-09-18 PROCEDURE — G8417 CALC BMI ABV UP PARAM F/U: HCPCS | Performed by: NURSE PRACTITIONER

## 2019-09-18 RX ORDER — HYDROCODONE BITARTRATE AND ACETAMINOPHEN 10; 325 MG/1; MG/1
1 TABLET ORAL EVERY 8 HOURS PRN
Qty: 90 TABLET | Refills: 0 | Status: SHIPPED | OUTPATIENT
Start: 2019-11-05 | End: 2019-12-05

## 2019-09-18 RX ORDER — HYDROCODONE BITARTRATE AND ACETAMINOPHEN 7.5; 325 MG/1; MG/1
1 TABLET ORAL EVERY 8 HOURS PRN
Qty: 90 TABLET | Refills: 0 | Status: CANCELLED | OUTPATIENT
Start: 2019-11-05 | End: 2019-12-05

## 2019-09-18 RX ORDER — HYDROCODONE BITARTRATE AND ACETAMINOPHEN 10; 325 MG/1; MG/1
1 TABLET ORAL EVERY 8 HOURS PRN
Qty: 90 TABLET | Refills: 0 | Status: SHIPPED | OUTPATIENT
Start: 2019-10-05 | End: 2019-11-04

## 2019-09-18 RX ORDER — HYDROCODONE BITARTRATE AND ACETAMINOPHEN 10; 325 MG/1; MG/1
1 TABLET ORAL EVERY 8 HOURS PRN
Qty: 90 TABLET | Refills: 0 | Status: SHIPPED | OUTPATIENT
Start: 2019-12-05 | End: 2020-01-02 | Stop reason: SDUPTHER

## 2019-09-18 RX ORDER — HYDROCODONE BITARTRATE AND ACETAMINOPHEN 7.5; 325 MG/1; MG/1
1 TABLET ORAL EVERY 8 HOURS PRN
Qty: 90 TABLET | Refills: 0 | Status: CANCELLED | OUTPATIENT
Start: 2019-12-05 | End: 2020-01-04

## 2019-09-18 RX ORDER — DIAZEPAM 10 MG/1
10 TABLET ORAL NIGHTLY PRN
Qty: 30 TABLET | Refills: 2 | Status: SHIPPED | OUTPATIENT
Start: 2019-09-19 | End: 2019-12-27 | Stop reason: SDUPTHER

## 2019-09-18 ASSESSMENT — ENCOUNTER SYMPTOMS: CHOKING: 1

## 2019-09-18 NOTE — PATIENT INSTRUCTIONS
Patient Education        Anxiety Disorder: Care Instructions  Your Care Instructions    Anxiety is a normal reaction to stress. Difficult situations can cause you to have symptoms such as sweaty palms and a nervous feeling. In an anxiety disorder, the symptoms are far more severe. Constant worry, muscle tension, trouble sleeping, nausea and diarrhea, and other symptoms can make normal daily activities difficult or impossible. These symptoms may occur for no reason, and they can affect your work, school, or social life. Medicines, counseling, and self-care can all help. Follow-up care is a key part of your treatment and safety. Be sure to make and go to all appointments, and call your doctor if you are having problems. It's also a good idea to know your test results and keep a list of the medicines you take. How can you care for yourself at home? · Take medicines exactly as directed. Call your doctor if you think you are having a problem with your medicine. · Go to your counseling sessions and follow-up appointments. · Recognize and accept your anxiety. Then, when you are in a situation that makes you anxious, say to yourself, \"This is not an emergency. I feel uncomfortable, but I am not in danger. I can keep going even if I feel anxious. \"  · Be kind to your body:  ? Relieve tension with exercise or a massage. ? Get enough rest.  ? Avoid alcohol, caffeine, nicotine, and illegal drugs. They can increase your anxiety level and cause sleep problems. ? Learn and do relaxation techniques. See below for more about these techniques. · Engage your mind. Get out and do something you enjoy. Go to a funny movie, or take a walk or hike. Plan your day. Having too much or too little to do can make you anxious. · Keep a record of your symptoms. Discuss your fears with a good friend or family member, or join a support group for people with similar problems. Talking to others sometimes relieves stress.   · Get involved in even when you feel well. You may need lifelong treatment. Follow-up care is a key part of your treatment and safety. Be sure to make and go to all appointments, and call your doctor if you are having problems. It's also a good idea to know your test results and keep a list of the medicines you take. How can you care for yourself at home? · Be safe with medicines. Take your medicines exactly as prescribed. Do not stop or change a medicine without talking to your doctor first. Juliet Felipe and your doctor may need to try different combinations of medicines to find what works for you. · Take your medicines on schedule to keep your moods even. When you feel good, you may think that you do not need your medicines. But it is important to keep taking them. · Go to your counseling sessions. Call and talk with your counselor if you can't go to a session or if you don't think the sessions are helping. Do not just stop going. · Get at least 30 minutes of activity on most days of the week. Walking is a good choice. You also may want to do other things, such as running, swimming, or cycling. · Get enough sleep. Keep your room dark and quiet. Try to go to bed at the same time every night. · Eat a healthy diet. This includes whole grains, dairy, fruits, vegetables, and protein. Eat foods from each of these groups. · Try to lower your stress. Manage your time, build a strong support system, and lead a healthy lifestyle. To lower your stress, try physical activity, slow deep breathing, or getting a massage. · Do not use alcohol or illegal drugs. · Learn the early signs of your mood changes. You can then take steps to help yourself feel better. · Ask for help from friends and family when you need it. You may need help with daily chores when you are depressed. When you are manic, you may need support to control your high energy levels. What should you do if someone in your family has bipolar disorder?   · Learn about the disease If the pain makes it hard to sleep, talk with your doctor. · Think positively. Your thoughts can affect your pain. Do fun things to distract yourself from the pain. See a movie, read a book, listen to music, or spend time with a friend. · Keep a pain diary. Try to write down how strong your pain is and what it feels like. Also try to notice and write down how your moods, thoughts, sleep, activities, and medicine affect your pain. These notes can help you and your doctor find the best ways to treat your pain. Reducing constipation caused by pain medicine  Pain medicines often cause constipation. To reduce constipation:  · Include fruits, vegetables, beans, and whole grains in your diet each day. These foods are high in fiber. · Drink plenty of fluids, enough so that your urine is light yellow or clear like water. If you have kidney, heart, or liver disease and have to limit fluids, talk with your doctor before you increase the amount of fluids you drink. · Get some exercise every day. Build up slowly to 30 to 60 minutes a day on 5 or more days of the week. · Take a fiber supplement, such as Citrucel or Metamucil, every day if needed. Read and follow all instructions on the label. · Schedule time each day for a bowel movement. Having a daily routine may help. Take your time and do not strain when having a bowel movement. · Ask your doctor about a laxative. The goal is to have one easy bowel movement every 1 to 2 days. Do not let constipation go untreated for more than 3 days. When should you call for help? Call your doctor now or seek immediate medical care if:    · You feel sad, anxious, or hopeless for more than a few days. This could mean you are depressed. Depression is common in people who have a lot of pain. But it can be treated.     · You have trouble with bowel movements, such as:  ? No bowel movement in 3 days. ? Blood in the anal area, in your stool, or on the toilet paper.   ? Diarrhea for more

## 2019-09-18 NOTE — LETTER
stimulants, such as caffeine pills or energy drinks, certain weight loss supplements and oral decongestants. Dependence withdrawal symptoms may include depressed mood, loss of interest, suicidal thoughts, anxiety, fatigue, appetite changes and agitation. Testosterone replacement therapy:  Potential side effects include increased risk of stroke and heart attack, blood clots, increased blood pressure, increased cholesterol, enlarged prostate, sleep apnea, irritability/aggression and other mood disorders, and decreased fertility. Other:     1. I understand that I have the following responsibilities:  · I will take medications at the dose and frequency prescribed. · I will not increase or change how I take my medications without the approval of the health care provider who signs this Medication Agreement. · I will arrange for refills at the prescribed interval ONLY during regular office hours. I will not ask for refills earlier than agreed, after-hours, on holidays or on weekends. · I will obtain all refills for these medications at  ·  [facility] , and Pharmacy phone number is  __[phone] __), with full consent for my provider and pharmacist to exchange information in writing or verbally. · I will not request any pain medications or controlled substances from other providers and will inform this provider of all other medications I am taking. · I will inform my other health care providers that I am taking these medications and of the existence of this [id number]. In the event of an emergency, I will provide the same information to the emergency department providers. · I will protect my prescriptions and medications. I understand that lost or misplaced prescriptions will not be replaced. · I will keep medications only for my own use and will not share them with others. I will keep all medications away from children.

## 2019-09-19 ENCOUNTER — NURSE ONLY (OUTPATIENT)
Dept: FAMILY MEDICINE CLINIC | Age: 44
End: 2019-09-19
Payer: COMMERCIAL

## 2019-09-19 DIAGNOSIS — Z13.220 LIPID SCREENING: ICD-10-CM

## 2019-09-19 DIAGNOSIS — F31.62 BIPOLAR DISORDER, CURRENT EPISODE MIXED, MODERATE (HCC): ICD-10-CM

## 2019-09-19 DIAGNOSIS — Z13.1 DIABETES MELLITUS SCREENING: ICD-10-CM

## 2019-09-19 LAB
A/G RATIO: 2 (ref 1.1–2.2)
ALBUMIN SERPL-MCNC: 4.7 G/DL (ref 3.4–5)
ALP BLD-CCNC: 96 U/L (ref 40–129)
ALT SERPL-CCNC: 13 U/L (ref 10–40)
ANION GAP SERPL CALCULATED.3IONS-SCNC: 14 MMOL/L (ref 3–16)
AST SERPL-CCNC: 12 U/L (ref 15–37)
BILIRUB SERPL-MCNC: 0.3 MG/DL (ref 0–1)
BUN BLDV-MCNC: 15 MG/DL (ref 7–20)
CALCIUM SERPL-MCNC: 9.8 MG/DL (ref 8.3–10.6)
CHLORIDE BLD-SCNC: 98 MMOL/L (ref 99–110)
CHOLESTEROL, TOTAL: 248 MG/DL (ref 0–199)
CO2: 24 MMOL/L (ref 21–32)
CREAT SERPL-MCNC: 0.9 MG/DL (ref 0.6–1.1)
GFR AFRICAN AMERICAN: >60
GFR NON-AFRICAN AMERICAN: >60
GLOBULIN: 2.4 G/DL
GLUCOSE BLD-MCNC: 105 MG/DL (ref 70–99)
HDLC SERPL-MCNC: 61 MG/DL (ref 40–60)
LDL CHOLESTEROL CALCULATED: 156 MG/DL
LITHIUM DOSE AMOUNT: NORMAL
LITHIUM LEVEL: 0.7 MMOL/L (ref 0.6–1.2)
POTASSIUM SERPL-SCNC: 5 MMOL/L (ref 3.5–5.1)
SODIUM BLD-SCNC: 136 MMOL/L (ref 136–145)
TOTAL PROTEIN: 7.1 G/DL (ref 6.4–8.2)
TRIGL SERPL-MCNC: 154 MG/DL (ref 0–150)
VLDLC SERPL CALC-MCNC: 31 MG/DL

## 2019-09-19 PROCEDURE — 36415 COLL VENOUS BLD VENIPUNCTURE: CPT | Performed by: NURSE PRACTITIONER

## 2019-09-20 LAB
ESTIMATED AVERAGE GLUCOSE: 108.3 MG/DL
HBA1C MFR BLD: 5.4 %

## 2019-09-22 DIAGNOSIS — E78.2 MIXED HYPERLIPIDEMIA: Primary | ICD-10-CM

## 2019-09-22 RX ORDER — ATORVASTATIN CALCIUM 20 MG/1
20 TABLET, FILM COATED ORAL DAILY
Qty: 90 TABLET | Refills: 0 | Status: SHIPPED | OUTPATIENT
Start: 2019-09-22 | End: 2019-12-27 | Stop reason: SDUPTHER

## 2019-09-25 ENCOUNTER — TELEPHONE (OUTPATIENT)
Dept: FAMILY MEDICINE CLINIC | Age: 44
End: 2019-09-25

## 2019-09-25 ENCOUNTER — OFFICE VISIT (OUTPATIENT)
Dept: ORTHOPEDIC SURGERY | Age: 44
End: 2019-09-25
Payer: COMMERCIAL

## 2019-09-25 VITALS
WEIGHT: 256 LBS | SYSTOLIC BLOOD PRESSURE: 149 MMHG | HEIGHT: 66 IN | TEMPERATURE: 98.3 F | HEART RATE: 107 BPM | DIASTOLIC BLOOD PRESSURE: 83 MMHG | BODY MASS INDEX: 41.14 KG/M2

## 2019-09-25 DIAGNOSIS — S46.011D TRAUMATIC COMPLETE TEAR OF RIGHT ROTATOR CUFF, SUBSEQUENT ENCOUNTER: Primary | ICD-10-CM

## 2019-09-25 PROBLEM — S46.011A TRAUMATIC COMPLETE TEAR OF RIGHT ROTATOR CUFF: Status: ACTIVE | Noted: 2019-09-25

## 2019-09-25 PROCEDURE — 4004F PT TOBACCO SCREEN RCVD TLK: CPT | Performed by: PHYSICIAN ASSISTANT

## 2019-09-25 PROCEDURE — G8417 CALC BMI ABV UP PARAM F/U: HCPCS | Performed by: PHYSICIAN ASSISTANT

## 2019-09-25 PROCEDURE — 99213 OFFICE O/P EST LOW 20 MIN: CPT | Performed by: PHYSICIAN ASSISTANT

## 2019-09-25 PROCEDURE — G8427 DOCREV CUR MEDS BY ELIG CLIN: HCPCS | Performed by: PHYSICIAN ASSISTANT

## 2019-09-25 NOTE — TELEPHONE ENCOUNTER
Patient would like her blood test result from last Thursday. Please give her a call back. Did Chalo Siddiqi talk with Judy about a appointment for [facility]?

## 2019-09-30 ENCOUNTER — OFFICE VISIT (OUTPATIENT)
Dept: ORTHOPEDIC SURGERY | Age: 44
End: 2019-09-30
Payer: COMMERCIAL

## 2019-09-30 VITALS
HEART RATE: 75 BPM | BODY MASS INDEX: 41.14 KG/M2 | SYSTOLIC BLOOD PRESSURE: 153 MMHG | WEIGHT: 256 LBS | HEIGHT: 66 IN | RESPIRATION RATE: 16 BRPM | DIASTOLIC BLOOD PRESSURE: 96 MMHG

## 2019-09-30 DIAGNOSIS — M75.101 TEAR OF RIGHT ROTATOR CUFF, UNSPECIFIED TEAR EXTENT, UNSPECIFIED WHETHER TRAUMATIC: Primary | ICD-10-CM

## 2019-09-30 PROCEDURE — G8417 CALC BMI ABV UP PARAM F/U: HCPCS | Performed by: ORTHOPAEDIC SURGERY

## 2019-09-30 PROCEDURE — 99243 OFF/OP CNSLTJ NEW/EST LOW 30: CPT | Performed by: ORTHOPAEDIC SURGERY

## 2019-09-30 PROCEDURE — G8427 DOCREV CUR MEDS BY ELIG CLIN: HCPCS | Performed by: ORTHOPAEDIC SURGERY

## 2019-09-30 NOTE — PROGRESS NOTES
CHIEF COMPLAINT: Right shoulder pain    History:    Sherley Borrero is a 53 y.o. morbidly obese right handed female referred by [employer], PA for Sport Medicine consultation for  evaluation and treatment of Right shoulder pain. This is evaluated as a personal injury. The pain is described as aching, sharp and stabbing. The pain began 3 months ago. There was an injury. She fell and stretched her arm/shoulder out and had immediate pain. Pain is rated as a 7/10 . Pain is located lateral.  The symptoms are worse with all activities. Symptoms improve with nothing. Limited activities include reaching, lifting, work at or above shoulder height. No stiffness, moderate weakness reported. The patient does report night pain. The patient has not had PT. The patient has  had an injection. The patient has tried ice. Patient's occupation is unemployed. She has chronic pain management through he PCP, ROSSANA Russo - CNP. Outside reports reviewed:  office notes. Past Medical History:   Diagnosis Date    Arthritis     Bipolar disorder ([facility] [address]     Depression     Gastritis     Hypertension     Insomnia     Migraine     PLMD (periodic limb movement disorder)     Sleep apnea     TMJ (temporomandibular joint syndrome)        Past Surgical History:   Procedure Laterality Date    COLPOSCOPY      ENDOMETRIAL ABLATION      FOOT SURGERY      JOINT REPLACEMENT Right 02/19/2019    RIGHT LATERAL TOTAL HIP REPLACEMENT    TONSILLECTOMY      TOTAL HIP ARTHROPLASTY Right 2/19/2019    RIGHT LATERAL TOTAL HIP REPLACEMENT performed by Norm Singleton MD at [facility]       Current Outpatient Medications on File Prior to Visit   Medication Sig Dispense Refill    atorvastatin (LIPITOR) 20 MG tablet Take 1 tablet by mouth daily 90 tablet 0    [START ON 11/5/2019] HYDROcodone-acetaminophen (NORCO)  MG per tablet Take 1 tablet by mouth every 8 hours as needed for Pain for up to 30 days.  11/5 G60.9 90 tablet 0    diazepam (VALIUM) 10 MG tablet Take 1 tablet by mouth nightly as needed for Anxiety or Sleep for up to 30 days. FILL ON OR AFTER 9/19 30 tablet 2    [START ON 12/5/2019] HYDROcodone-acetaminophen (NORCO)  MG per tablet Take 1 tablet by mouth every 8 hours as needed for Pain for up to 30 days. G60.9 FILL 12/5Intended supply: 30 days 90 tablet 0    [START ON 10/5/2019] HYDROcodone-acetaminophen (NORCO)  MG per tablet Take 1 tablet by mouth every 8 hours as needed for Pain for up to 30 days. G60.9 FILL 10/5 90 tablet 0    gabapentin (NEURONTIN) 300 MG capsule Take 1 capsule by mouth 3 times daily for 30 days. 90 capsule 0    amitriptyline (ELAVIL) 100 MG tablet TAKE 1 TABLET BY MOUTH NIGHTLY 30 tablet 5    VYVANSE 50 MG capsule Take 1 capsule by mouth daily. 0    FLUoxetine (PROZAC) 40 MG capsule TAKE 1 CAPSULE BY MOUTH ONCE DAILY IN ADDITION 90 capsule 3    lisinopril-hydrochlorothiazide (PRINZIDE;ZESTORETIC) 20-25 MG per tablet TAKE 1 TABLET BY MOUTH ONCE DAILY 60 tablet 0    lithium 300 MG capsule Take 1 capsule by mouth 2 times daily (with meals) appt required prior to additional refills 90 capsule 3    diazepam (VALIUM) 10 MG tablet Take 10 mg by mouth every 6 hours as needed for Anxiety. .       No current facility-administered medications on file prior to visit.         Allergies   Allergen Reactions    Imitrex [Sumatriptan] Nausea Only     nausea       Social History     Socioeconomic History    Marital status:      Spouse name: Not on file    Number of children: Not on file    Years of education: Not on file    Highest education level: Not on file   Occupational History    Occupation: bank collections   Social Needs    Financial resource strain: Not on file    Food insecurity:     Worry: Not on file     Inability: Not on file    Transportation needs:     Medical: Not on file     Non-medical: Not on file   Tobacco Use    Smoking status: Current Every Day

## 2019-10-02 ENCOUNTER — OFFICE VISIT (OUTPATIENT)
Dept: ORTHOPEDIC SURGERY | Age: 44
End: 2019-10-02
Payer: COMMERCIAL

## 2019-10-02 VITALS — WEIGHT: 261.2 LBS | HEIGHT: 62 IN | BODY MASS INDEX: 48.07 KG/M2 | RESPIRATION RATE: 16 BRPM

## 2019-10-02 DIAGNOSIS — M67.921 BICEPS TENDINOPATHY, RIGHT: ICD-10-CM

## 2019-10-02 DIAGNOSIS — Z72.0 TOBACCO ABUSE: ICD-10-CM

## 2019-10-02 DIAGNOSIS — F11.90 CHRONIC, CONTINUOUS USE OF OPIOIDS: ICD-10-CM

## 2019-10-02 DIAGNOSIS — E66.01 MORBID OBESITY WITH BMI OF 45.0-49.9, ADULT (HCC): ICD-10-CM

## 2019-10-02 DIAGNOSIS — M75.01 ADHESIVE CAPSULITIS OF RIGHT SHOULDER: ICD-10-CM

## 2019-10-02 DIAGNOSIS — S46.011D TRAUMATIC COMPLETE TEAR OF RIGHT ROTATOR CUFF, SUBSEQUENT ENCOUNTER: Primary | ICD-10-CM

## 2019-10-02 PROCEDURE — G8427 DOCREV CUR MEDS BY ELIG CLIN: HCPCS | Performed by: ORTHOPAEDIC SURGERY

## 2019-10-02 PROCEDURE — 4004F PT TOBACCO SCREEN RCVD TLK: CPT | Performed by: ORTHOPAEDIC SURGERY

## 2019-10-02 PROCEDURE — G8417 CALC BMI ABV UP PARAM F/U: HCPCS | Performed by: ORTHOPAEDIC SURGERY

## 2019-10-02 PROCEDURE — G8484 FLU IMMUNIZE NO ADMIN: HCPCS | Performed by: ORTHOPAEDIC SURGERY

## 2019-10-02 PROCEDURE — 99214 OFFICE O/P EST MOD 30 MIN: CPT | Performed by: ORTHOPAEDIC SURGERY

## 2019-10-07 ENCOUNTER — PREP FOR PROCEDURE (OUTPATIENT)
Dept: ORTHOPEDIC SURGERY | Age: 44
End: 2019-10-07

## 2019-10-07 DIAGNOSIS — Z01.818 PREOP TESTING: ICD-10-CM

## 2019-10-07 DIAGNOSIS — Z72.0 TOBACCO ABUSE: Primary | ICD-10-CM

## 2019-10-08 ENCOUNTER — HOSPITAL ENCOUNTER (OUTPATIENT)
Dept: SLEEP CENTER | Age: 44
Discharge: HOME | End: 2019-10-08
Payer: COMMERCIAL

## 2019-10-08 DIAGNOSIS — G47.33 OSA (OBSTRUCTIVE SLEEP APNEA): ICD-10-CM

## 2019-10-08 PROCEDURE — 95811 POLYSOM 6/>YRS CPAP 4/> PARM: CPT

## 2019-10-09 ENCOUNTER — TELEPHONE (OUTPATIENT)
Dept: FAMILY MEDICINE CLINIC | Age: 44
End: 2019-10-09

## 2019-10-09 PROCEDURE — 95811 POLYSOM 6/>YRS CPAP 4/> PARM: CPT | Performed by: PSYCHIATRY & NEUROLOGY

## 2019-10-10 ENCOUNTER — TELEPHONE (OUTPATIENT)
Dept: FAMILY MEDICINE CLINIC | Age: 44
End: 2019-10-10

## 2019-10-10 ENCOUNTER — TELEPHONE (OUTPATIENT)
Dept: SLEEP MEDICINE | Age: 44
End: 2019-10-10

## 2019-10-15 ENCOUNTER — TELEPHONE (OUTPATIENT)
Dept: FAMILY MEDICINE CLINIC | Age: 44
End: 2019-10-15

## 2019-10-28 ENCOUNTER — OFFICE VISIT (OUTPATIENT)
Dept: ORTHOPEDIC SURGERY | Age: 44
End: 2019-10-28
Payer: COMMERCIAL

## 2019-10-28 ENCOUNTER — TELEPHONE (OUTPATIENT)
Dept: ORTHOPEDIC SURGERY | Age: 44
End: 2019-10-28

## 2019-10-28 VITALS
HEART RATE: 90 BPM | TEMPERATURE: 98.2 F | DIASTOLIC BLOOD PRESSURE: 81 MMHG | SYSTOLIC BLOOD PRESSURE: 145 MMHG | BODY MASS INDEX: 48.03 KG/M2 | RESPIRATION RATE: 16 BRPM | HEIGHT: 62 IN | WEIGHT: 261 LBS

## 2019-10-28 DIAGNOSIS — Z96.641 H/O TOTAL HIP ARTHROPLASTY, RIGHT: Primary | ICD-10-CM

## 2019-10-28 PROCEDURE — G8417 CALC BMI ABV UP PARAM F/U: HCPCS | Performed by: PHYSICIAN ASSISTANT

## 2019-10-28 PROCEDURE — G8484 FLU IMMUNIZE NO ADMIN: HCPCS | Performed by: PHYSICIAN ASSISTANT

## 2019-10-28 PROCEDURE — 99212 OFFICE O/P EST SF 10 MIN: CPT | Performed by: PHYSICIAN ASSISTANT

## 2019-10-28 PROCEDURE — 4004F PT TOBACCO SCREEN RCVD TLK: CPT | Performed by: PHYSICIAN ASSISTANT

## 2019-10-28 PROCEDURE — G8427 DOCREV CUR MEDS BY ELIG CLIN: HCPCS | Performed by: PHYSICIAN ASSISTANT

## 2019-10-29 PROBLEM — Z96.641 H/O TOTAL HIP ARTHROPLASTY, RIGHT: Status: ACTIVE | Noted: 2019-10-29

## 2019-10-31 ENCOUNTER — TELEPHONE (OUTPATIENT)
Dept: FAMILY MEDICINE CLINIC | Age: 44
End: 2019-10-31

## 2019-10-31 RX ORDER — AMOXICILLIN 500 MG/1
1000 CAPSULE ORAL ONCE
Qty: 2 CAPSULE | Refills: 0 | Status: SHIPPED | OUTPATIENT
Start: 2019-10-31 | End: 2019-10-31

## 2019-11-01 ENCOUNTER — TELEPHONE (OUTPATIENT)
Dept: ORTHOPEDIC SURGERY | Age: 44
End: 2019-11-01

## 2019-11-12 ENCOUNTER — HOSPITAL ENCOUNTER (OUTPATIENT)
Dept: PHYSICAL THERAPY | Age: 44
Setting detail: THERAPIES SERIES
Discharge: HOME | End: 2019-11-12
Payer: COMMERCIAL

## 2019-11-12 PROCEDURE — 97110 THERAPEUTIC EXERCISES: CPT | Performed by: PHYSICAL THERAPIST

## 2019-11-12 PROCEDURE — 97161 PT EVAL LOW COMPLEX 20 MIN: CPT | Performed by: PHYSICAL THERAPIST

## 2019-11-12 PROCEDURE — 97140 MANUAL THERAPY 1/> REGIONS: CPT | Performed by: PHYSICAL THERAPIST

## 2019-11-18 ENCOUNTER — OFFICE VISIT (OUTPATIENT)
Dept: FAMILY MEDICINE CLINIC | Age: 44
End: 2019-11-18
Payer: COMMERCIAL

## 2019-11-18 VITALS
HEART RATE: 84 BPM | HEIGHT: 66 IN | SYSTOLIC BLOOD PRESSURE: 128 MMHG | WEIGHT: 263.6 LBS | TEMPERATURE: 98.6 F | DIASTOLIC BLOOD PRESSURE: 84 MMHG | RESPIRATION RATE: 14 BRPM | BODY MASS INDEX: 42.36 KG/M2

## 2019-11-18 DIAGNOSIS — G60.9 IDIOPATHIC PERIPHERAL NEUROPATHY: ICD-10-CM

## 2019-11-18 DIAGNOSIS — G89.29 CHRONIC RIGHT SHOULDER PAIN: Primary | ICD-10-CM

## 2019-11-18 DIAGNOSIS — Z01.818 PREOP TESTING: ICD-10-CM

## 2019-11-18 DIAGNOSIS — M25.511 CHRONIC RIGHT SHOULDER PAIN: Primary | ICD-10-CM

## 2019-11-18 DIAGNOSIS — Z72.0 TOBACCO ABUSE: ICD-10-CM

## 2019-11-18 PROCEDURE — G8427 DOCREV CUR MEDS BY ELIG CLIN: HCPCS | Performed by: NURSE PRACTITIONER

## 2019-11-18 PROCEDURE — 99242 OFF/OP CONSLTJ NEW/EST SF 20: CPT | Performed by: NURSE PRACTITIONER

## 2019-11-18 PROCEDURE — G8484 FLU IMMUNIZE NO ADMIN: HCPCS | Performed by: NURSE PRACTITIONER

## 2019-11-18 PROCEDURE — G8417 CALC BMI ABV UP PARAM F/U: HCPCS | Performed by: NURSE PRACTITIONER

## 2019-11-18 RX ORDER — GABAPENTIN 300 MG/1
300 CAPSULE ORAL 3 TIMES DAILY
Qty: 90 CAPSULE | Refills: 1 | Status: SHIPPED | OUTPATIENT
Start: 2019-11-18 | End: 2020-01-14 | Stop reason: SDUPTHER

## 2019-11-18 RX ORDER — LISINOPRIL AND HYDROCHLOROTHIAZIDE 25; 20 MG/1; MG/1
TABLET ORAL
Qty: 60 TABLET | Refills: 0 | Status: CANCELLED | OUTPATIENT
Start: 2019-11-18

## 2019-11-18 RX ORDER — GABAPENTIN 300 MG/1
300 CAPSULE ORAL 3 TIMES DAILY
Qty: 90 CAPSULE | Refills: 0 | Status: CANCELLED | OUTPATIENT
Start: 2019-11-18 | End: 2019-12-18

## 2019-11-19 ENCOUNTER — TELEPHONE (OUTPATIENT)
Dept: ORTHOPEDIC SURGERY | Age: 44
End: 2019-11-19

## 2019-11-20 ENCOUNTER — HOSPITAL ENCOUNTER (OUTPATIENT)
Dept: PHYSICAL THERAPY | Age: 44
Setting detail: THERAPIES SERIES
Discharge: HOME | End: 2019-11-20
Payer: COMMERCIAL

## 2019-11-20 PROCEDURE — 97110 THERAPEUTIC EXERCISES: CPT

## 2019-11-20 PROCEDURE — 97530 THERAPEUTIC ACTIVITIES: CPT

## 2019-11-20 PROCEDURE — 97140 MANUAL THERAPY 1/> REGIONS: CPT

## 2019-11-21 LAB
3-OH-COTININE: <2 NG/ML
COTININE: <2 NG/ML
NICOTINE: <2 NG/ML

## 2019-11-22 ENCOUNTER — ANESTHESIA EVENT (OUTPATIENT)
Dept: OPERATING ROOM | Age: 44
End: 2019-11-22
Payer: COMMERCIAL

## 2019-11-25 ENCOUNTER — HOSPITAL ENCOUNTER (OUTPATIENT)
Age: 44
Setting detail: OUTPATIENT SURGERY
Discharge: HOME | End: 2019-11-25
Attending: ORTHOPAEDIC SURGERY | Admitting: ORTHOPAEDIC SURGERY
Payer: COMMERCIAL

## 2019-11-25 ENCOUNTER — ANESTHESIA (OUTPATIENT)
Dept: OPERATING ROOM | Age: 44
End: 2019-11-25
Payer: COMMERCIAL

## 2019-11-25 VITALS
TEMPERATURE: 96.8 F | HEART RATE: 75 BPM | BODY MASS INDEX: 42.84 KG/M2 | OXYGEN SATURATION: 92 % | DIASTOLIC BLOOD PRESSURE: 68 MMHG | SYSTOLIC BLOOD PRESSURE: 124 MMHG | HEIGHT: 66 IN | RESPIRATION RATE: 17 BRPM | WEIGHT: 266.54 LBS

## 2019-11-25 VITALS
DIASTOLIC BLOOD PRESSURE: 67 MMHG | RESPIRATION RATE: 4 BRPM | TEMPERATURE: 98.6 F | OXYGEN SATURATION: 100 % | SYSTOLIC BLOOD PRESSURE: 136 MMHG

## 2019-11-25 DIAGNOSIS — S46.011A TRAUMATIC COMPLETE TEAR OF RIGHT ROTATOR CUFF, INITIAL ENCOUNTER: Primary | ICD-10-CM

## 2019-11-25 DIAGNOSIS — F11.20 CHRONIC NARCOTIC DEPENDENCE (HCC): ICD-10-CM

## 2019-11-25 LAB
GLUCOSE BLD-MCNC: 126 MG/DL (ref 70–99)
PERFORMED ON: ABNORMAL

## 2019-11-25 PROCEDURE — 6360000002 HC RX W HCPCS: Performed by: NURSE ANESTHETIST, CERTIFIED REGISTERED

## 2019-11-25 PROCEDURE — 2580000003 HC RX 258: Performed by: ANESTHESIOLOGY

## 2019-11-25 PROCEDURE — 7100000010 HC PHASE II RECOVERY - FIRST 15 MIN: Performed by: ORTHOPAEDIC SURGERY

## 2019-11-25 PROCEDURE — 3700000000 HC ANESTHESIA ATTENDED CARE: Performed by: ORTHOPAEDIC SURGERY

## 2019-11-25 PROCEDURE — 3600000004 HC SURGERY LEVEL 4 BASE: Performed by: ORTHOPAEDIC SURGERY

## 2019-11-25 PROCEDURE — 3700000001 HC ADD 15 MINUTES (ANESTHESIA): Performed by: ORTHOPAEDIC SURGERY

## 2019-11-25 PROCEDURE — 7100000001 HC PACU RECOVERY - ADDTL 15 MIN: Performed by: ORTHOPAEDIC SURGERY

## 2019-11-25 PROCEDURE — 2500000003 HC RX 250 WO HCPCS: Performed by: NURSE ANESTHETIST, CERTIFIED REGISTERED

## 2019-11-25 PROCEDURE — 2720000010 HC SURG SUPPLY STERILE: Performed by: ORTHOPAEDIC SURGERY

## 2019-11-25 PROCEDURE — 2709999900 HC NON-CHARGEABLE SUPPLY: Performed by: ORTHOPAEDIC SURGERY

## 2019-11-25 PROCEDURE — L3650 SO 8 ABD RESTRAINT PRE OTS: HCPCS | Performed by: ORTHOPAEDIC SURGERY

## 2019-11-25 PROCEDURE — 7100000011 HC PHASE II RECOVERY - ADDTL 15 MIN: Performed by: ORTHOPAEDIC SURGERY

## 2019-11-25 PROCEDURE — 7100000000 HC PACU RECOVERY - FIRST 15 MIN: Performed by: ORTHOPAEDIC SURGERY

## 2019-11-25 PROCEDURE — 3600000014 HC SURGERY LEVEL 4 ADDTL 15MIN: Performed by: ORTHOPAEDIC SURGERY

## 2019-11-25 PROCEDURE — 29826 SHO ARTHRS SRG DECOMPRESSION: CPT | Performed by: ORTHOPAEDIC SURGERY

## 2019-11-25 PROCEDURE — 6360000002 HC RX W HCPCS: Performed by: ANESTHESIOLOGY

## 2019-11-25 PROCEDURE — 29827 SHO ARTHRS SRG RT8TR CUF RPR: CPT | Performed by: ORTHOPAEDIC SURGERY

## 2019-11-25 PROCEDURE — 6360000002 HC RX W HCPCS: Performed by: ORTHOPAEDIC SURGERY

## 2019-11-25 PROCEDURE — C1713 ANCHOR/SCREW BN/BN,TIS/BN: HCPCS | Performed by: ORTHOPAEDIC SURGERY

## 2019-11-25 PROCEDURE — 2580000003 HC RX 258: Performed by: ORTHOPAEDIC SURGERY

## 2019-11-25 PROCEDURE — 64415 NJX AA&/STRD BRCH PLXS IMG: CPT | Performed by: ANESTHESIOLOGY

## 2019-11-25 DEVICE — ANCHOR SUT L14.7MM DIA5.5MM 2 NO2 TIGERTAIL FULL THRD: Type: IMPLANTABLE DEVICE | Site: SHOULDER | Status: FUNCTIONAL

## 2019-11-25 DEVICE — ANCHOR SUT L19.1MM DIA5.5MM BIOCOMPOSITE FULL THRD KNOTLESS: Type: IMPLANTABLE DEVICE | Site: SHOULDER | Status: FUNCTIONAL

## 2019-11-25 RX ORDER — FENTANYL CITRATE 50 UG/ML
25 INJECTION, SOLUTION INTRAMUSCULAR; INTRAVENOUS EVERY 5 MIN PRN
Status: DISCONTINUED | OUTPATIENT
Start: 2019-11-25 | End: 2019-11-25 | Stop reason: HOSPADM

## 2019-11-25 RX ORDER — HYDROCODONE BITARTRATE AND ACETAMINOPHEN 5; 325 MG/1; MG/1
1 TABLET ORAL PRN
Status: DISCONTINUED | OUTPATIENT
Start: 2019-11-25 | End: 2019-11-25 | Stop reason: HOSPADM

## 2019-11-25 RX ORDER — SUCCINYLCHOLINE/SOD CL,ISO/PF 200MG/10ML
SYRINGE (ML) INTRAVENOUS PRN
Status: DISCONTINUED | OUTPATIENT
Start: 2019-11-25 | End: 2019-11-25 | Stop reason: SDUPTHER

## 2019-11-25 RX ORDER — SODIUM CHLORIDE 0.9 % (FLUSH) 0.9 %
10 SYRINGE (ML) INJECTION PRN
Status: DISCONTINUED | OUTPATIENT
Start: 2019-11-25 | End: 2019-11-25 | Stop reason: HOSPADM

## 2019-11-25 RX ORDER — LABETALOL HYDROCHLORIDE 5 MG/ML
INJECTION, SOLUTION INTRAVENOUS PRN
Status: DISCONTINUED | OUTPATIENT
Start: 2019-11-25 | End: 2019-11-25 | Stop reason: SDUPTHER

## 2019-11-25 RX ORDER — PROMETHAZINE HYDROCHLORIDE 25 MG/ML
6.25 INJECTION, SOLUTION INTRAMUSCULAR; INTRAVENOUS
Status: DISCONTINUED | OUTPATIENT
Start: 2019-11-25 | End: 2019-11-25 | Stop reason: HOSPADM

## 2019-11-25 RX ORDER — HYDROCODONE BITARTRATE AND ACETAMINOPHEN 5; 325 MG/1; MG/1
2 TABLET ORAL PRN
Status: DISCONTINUED | OUTPATIENT
Start: 2019-11-25 | End: 2019-11-25 | Stop reason: HOSPADM

## 2019-11-25 RX ORDER — MAGNESIUM HYDROXIDE 1200 MG/15ML
LIQUID ORAL CONTINUOUS PRN
Status: COMPLETED | OUTPATIENT
Start: 2019-11-25 | End: 2019-11-25

## 2019-11-25 RX ORDER — MORPHINE SULFATE 15 MG/1
15 TABLET, FILM COATED, EXTENDED RELEASE ORAL EVERY 12 HOURS
Qty: 14 TABLET | Refills: 0 | Status: SHIPPED | OUTPATIENT
Start: 2019-11-25 | End: 2019-12-02

## 2019-11-25 RX ORDER — ONDANSETRON 2 MG/ML
4 INJECTION INTRAMUSCULAR; INTRAVENOUS
Status: DISCONTINUED | OUTPATIENT
Start: 2019-11-25 | End: 2019-11-25 | Stop reason: HOSPADM

## 2019-11-25 RX ORDER — FENTANYL CITRATE 50 UG/ML
INJECTION, SOLUTION INTRAMUSCULAR; INTRAVENOUS PRN
Status: DISCONTINUED | OUTPATIENT
Start: 2019-11-25 | End: 2019-11-25 | Stop reason: SDUPTHER

## 2019-11-25 RX ORDER — PROPOFOL 10 MG/ML
INJECTION, EMULSION INTRAVENOUS PRN
Status: DISCONTINUED | OUTPATIENT
Start: 2019-11-25 | End: 2019-11-25 | Stop reason: SDUPTHER

## 2019-11-25 RX ORDER — ROPIVACAINE HYDROCHLORIDE 5 MG/ML
INJECTION, SOLUTION EPIDURAL; INFILTRATION; PERINEURAL
Status: DISCONTINUED | OUTPATIENT
Start: 2019-11-25 | End: 2019-11-25 | Stop reason: SDUPTHER

## 2019-11-25 RX ORDER — SODIUM CHLORIDE 0.9 % (FLUSH) 0.9 %
10 SYRINGE (ML) INJECTION EVERY 12 HOURS SCHEDULED
Status: DISCONTINUED | OUTPATIENT
Start: 2019-11-25 | End: 2019-11-25 | Stop reason: HOSPADM

## 2019-11-25 RX ORDER — LIDOCAINE HYDROCHLORIDE 20 MG/ML
INJECTION, SOLUTION EPIDURAL; INFILTRATION; INTRACAUDAL; PERINEURAL PRN
Status: DISCONTINUED | OUTPATIENT
Start: 2019-11-25 | End: 2019-11-25 | Stop reason: SDUPTHER

## 2019-11-25 RX ORDER — MEPERIDINE HYDROCHLORIDE 25 MG/ML
12.5 INJECTION INTRAMUSCULAR; INTRAVENOUS; SUBCUTANEOUS EVERY 5 MIN PRN
Status: DISCONTINUED | OUTPATIENT
Start: 2019-11-25 | End: 2019-11-25 | Stop reason: HOSPADM

## 2019-11-25 RX ORDER — FENTANYL CITRATE 50 UG/ML
50 INJECTION, SOLUTION INTRAMUSCULAR; INTRAVENOUS EVERY 5 MIN PRN
Status: DISCONTINUED | OUTPATIENT
Start: 2019-11-25 | End: 2019-11-25 | Stop reason: HOSPADM

## 2019-11-25 RX ORDER — MIDAZOLAM HYDROCHLORIDE 1 MG/ML
INJECTION INTRAMUSCULAR; INTRAVENOUS PRN
Status: DISCONTINUED | OUTPATIENT
Start: 2019-11-25 | End: 2019-11-25 | Stop reason: SDUPTHER

## 2019-11-25 RX ORDER — SODIUM CHLORIDE 9 MG/ML
INJECTION, SOLUTION INTRAVENOUS CONTINUOUS
Status: DISCONTINUED | OUTPATIENT
Start: 2019-11-25 | End: 2019-11-25 | Stop reason: HOSPADM

## 2019-11-25 RX ORDER — HYDRALAZINE HYDROCHLORIDE 20 MG/ML
5 INJECTION INTRAMUSCULAR; INTRAVENOUS EVERY 10 MIN PRN
Status: DISCONTINUED | OUTPATIENT
Start: 2019-11-25 | End: 2019-11-25 | Stop reason: HOSPADM

## 2019-11-25 RX ADMIN — Medication 140 MG: at 10:17

## 2019-11-25 RX ADMIN — PROPOFOL 200 MG: 10 INJECTION, EMULSION INTRAVENOUS at 10:17

## 2019-11-25 RX ADMIN — ROPIVACAINE HYDROCHLORIDE 30 ML: 5 INJECTION, SOLUTION EPIDURAL; INFILTRATION; PERINEURAL at 13:36

## 2019-11-25 RX ADMIN — MIDAZOLAM 2 MG: 1 INJECTION INTRAMUSCULAR; INTRAVENOUS at 10:00

## 2019-11-25 RX ADMIN — LIDOCAINE HYDROCHLORIDE 100 MG: 20 INJECTION, SOLUTION EPIDURAL; INFILTRATION; INTRACAUDAL; PERINEURAL at 10:17

## 2019-11-25 RX ADMIN — FENTANYL CITRATE 100 MCG: 50 INJECTION INTRAMUSCULAR; INTRAVENOUS at 10:00

## 2019-11-25 RX ADMIN — Medication 3 G: at 10:14

## 2019-11-25 RX ADMIN — LABETALOL HYDROCHLORIDE 10 MG: 5 INJECTION, SOLUTION INTRAVENOUS at 10:33

## 2019-11-25 RX ADMIN — LABETALOL HYDROCHLORIDE 10 MG: 5 INJECTION, SOLUTION INTRAVENOUS at 10:37

## 2019-11-25 RX ADMIN — SODIUM CHLORIDE: 9 INJECTION, SOLUTION INTRAVENOUS at 09:59

## 2019-11-25 ASSESSMENT — PULMONARY FUNCTION TESTS
PIF_VALUE: 9
PIF_VALUE: 8
PIF_VALUE: 9
PIF_VALUE: 8
PIF_VALUE: 9
PIF_VALUE: 9
PIF_VALUE: 8
PIF_VALUE: 22
PIF_VALUE: 9
PIF_VALUE: 8
PIF_VALUE: 9
PIF_VALUE: 8
PIF_VALUE: 9
PIF_VALUE: 9
PIF_VALUE: 1
PIF_VALUE: 0
PIF_VALUE: 9
PIF_VALUE: 11
PIF_VALUE: 9
PIF_VALUE: 3
PIF_VALUE: 8
PIF_VALUE: 9
PIF_VALUE: 8
PIF_VALUE: 1
PIF_VALUE: 9
PIF_VALUE: 9
PIF_VALUE: 8
PIF_VALUE: 9
PIF_VALUE: 9
PIF_VALUE: 21
PIF_VALUE: 8
PIF_VALUE: 0
PIF_VALUE: 9
PIF_VALUE: 9
PIF_VALUE: 10
PIF_VALUE: 9
PIF_VALUE: 8
PIF_VALUE: 1
PIF_VALUE: 8
PIF_VALUE: 9
PIF_VALUE: 9
PIF_VALUE: 8
PIF_VALUE: 9
PIF_VALUE: 9
PIF_VALUE: 0
PIF_VALUE: 8
PIF_VALUE: 9
PIF_VALUE: 8
PIF_VALUE: 1
PIF_VALUE: 4
PIF_VALUE: 8
PIF_VALUE: 10
PIF_VALUE: 9
PIF_VALUE: 8
PIF_VALUE: 9
PIF_VALUE: 1
PIF_VALUE: 8
PIF_VALUE: 23
PIF_VALUE: 9
PIF_VALUE: 24
PIF_VALUE: 8
PIF_VALUE: 9
PIF_VALUE: 8
PIF_VALUE: 9
PIF_VALUE: 8
PIF_VALUE: 9
PIF_VALUE: 8
PIF_VALUE: 9
PIF_VALUE: 8
PIF_VALUE: 8
PIF_VALUE: 1
PIF_VALUE: 8
PIF_VALUE: 9
PIF_VALUE: 9
PIF_VALUE: 8
PIF_VALUE: 9
PIF_VALUE: 8
PIF_VALUE: 18
PIF_VALUE: 9
PIF_VALUE: 25
PIF_VALUE: 4
PIF_VALUE: 11
PIF_VALUE: 8

## 2019-11-25 ASSESSMENT — ENCOUNTER SYMPTOMS: SHORTNESS OF BREATH: 0

## 2019-11-25 ASSESSMENT — PAIN SCALES - GENERAL
PAINLEVEL_OUTOF10: 0

## 2019-11-25 ASSESSMENT — PAIN - FUNCTIONAL ASSESSMENT
PAIN_FUNCTIONAL_ASSESSMENT: PREVENTS OR INTERFERES SOME ACTIVE ACTIVITIES AND ADLS
PAIN_FUNCTIONAL_ASSESSMENT: 0-10

## 2019-11-25 ASSESSMENT — PAIN DESCRIPTION - DESCRIPTORS: DESCRIPTORS: CONSTANT;SORE

## 2019-11-25 ASSESSMENT — LIFESTYLE VARIABLES: SMOKING_STATUS: 0

## 2019-11-26 ENCOUNTER — TELEPHONE (OUTPATIENT)
Dept: ORTHOPEDIC SURGERY | Age: 44
End: 2019-11-26

## 2019-12-10 ENCOUNTER — OFFICE VISIT (OUTPATIENT)
Dept: ORTHOPEDIC SURGERY | Age: 44
End: 2019-12-10

## 2019-12-10 DIAGNOSIS — Z98.890 S/P COMPLETE REPAIR OF ROTATOR CUFF: Primary | ICD-10-CM

## 2019-12-10 PROCEDURE — 99024 POSTOP FOLLOW-UP VISIT: CPT | Performed by: ORTHOPAEDIC SURGERY

## 2019-12-20 ENCOUNTER — TELEPHONE (OUTPATIENT)
Dept: ORTHOPEDIC SURGERY | Age: 44
End: 2019-12-20

## 2019-12-23 ENCOUNTER — TELEPHONE (OUTPATIENT)
Dept: FAMILY MEDICINE CLINIC | Age: 44
End: 2019-12-23

## 2019-12-23 NOTE — TELEPHONE ENCOUNTER
PT HAS MADE AN APPT IN January - SHE WILL BE OUT OF MEDICATION BY THE 7TH -- COULD YOU WRITE  SCRIPT FOR HER    NORCO -- DIAZEPAM - LISINOPRIL -AMITRIPTYLINE - POSSIBLY GABAPENTIN - ATORVASTATIN     SHE WILL  IF NECESSARY          [address], [facility] [phone]    PT @  [phone] - PLEASE LET HER KNOW

## 2019-12-27 DIAGNOSIS — F51.04 PSYCHOPHYSIOLOGICAL INSOMNIA: ICD-10-CM

## 2019-12-27 DIAGNOSIS — E78.2 MIXED HYPERLIPIDEMIA: ICD-10-CM

## 2019-12-27 DIAGNOSIS — F41.9 ANXIETY: ICD-10-CM

## 2019-12-27 DIAGNOSIS — F31.62 BIPOLAR DISORDER, CURRENT EPISODE MIXED, MODERATE (HCC): ICD-10-CM

## 2019-12-27 RX ORDER — DIAZEPAM 10 MG/1
10 TABLET ORAL NIGHTLY PRN
Qty: 30 TABLET | Refills: 0 | Status: SHIPPED | OUTPATIENT
Start: 2019-12-27 | End: 2020-01-14 | Stop reason: SDUPTHER

## 2019-12-27 RX ORDER — LISINOPRIL AND HYDROCHLOROTHIAZIDE 25; 20 MG/1; MG/1
TABLET ORAL
Qty: 60 TABLET | Refills: 0 | Status: CANCELLED | OUTPATIENT
Start: 2019-12-27

## 2019-12-27 RX ORDER — ATORVASTATIN CALCIUM 20 MG/1
20 TABLET, FILM COATED ORAL DAILY
Qty: 90 TABLET | Refills: 0 | Status: SHIPPED | OUTPATIENT
Start: 2019-12-27 | End: 2020-01-14 | Stop reason: SDUPTHER

## 2020-01-02 RX ORDER — LITHIUM CARBONATE 300 MG/1
300 CAPSULE ORAL 2 TIMES DAILY WITH MEALS
Qty: 60 CAPSULE | Refills: 0 | Status: SHIPPED | OUTPATIENT
Start: 2020-01-02 | End: 2020-01-14 | Stop reason: SDUPTHER

## 2020-01-03 RX ORDER — HYDROCODONE BITARTRATE AND ACETAMINOPHEN 10; 325 MG/1; MG/1
1 TABLET ORAL EVERY 8 HOURS PRN
Qty: 90 TABLET | Refills: 0 | Status: SHIPPED | OUTPATIENT
Start: 2020-01-06 | End: 2020-01-14 | Stop reason: SDUPTHER

## 2020-01-03 NOTE — TELEPHONE ENCOUNTER
Sent in 1 month of lithium based on dose in chart and forwarding to Mishel to address norco - early to fill today. Also to ensure corrent dose of the lithium.    OARRS reviewed

## 2020-01-14 ENCOUNTER — OFFICE VISIT (OUTPATIENT)
Dept: ORTHOPEDIC SURGERY | Age: 45
End: 2020-01-14

## 2020-01-14 ENCOUNTER — OFFICE VISIT (OUTPATIENT)
Dept: FAMILY MEDICINE CLINIC | Age: 45
End: 2020-01-14
Payer: COMMERCIAL

## 2020-01-14 VITALS
HEART RATE: 70 BPM | TEMPERATURE: 98.6 F | SYSTOLIC BLOOD PRESSURE: 128 MMHG | WEIGHT: 261.8 LBS | BODY MASS INDEX: 43.62 KG/M2 | DIASTOLIC BLOOD PRESSURE: 84 MMHG | HEIGHT: 65 IN | RESPIRATION RATE: 18 BRPM

## 2020-01-14 VITALS — HEIGHT: 66 IN | BODY MASS INDEX: 41.95 KG/M2 | WEIGHT: 261 LBS | RESPIRATION RATE: 16 BRPM

## 2020-01-14 LAB
LITHIUM DOSE AMOUNT: ABNORMAL
LITHIUM LEVEL: 0.5 MMOL/L (ref 0.6–1.2)

## 2020-01-14 PROCEDURE — 1036F TOBACCO NON-USER: CPT | Performed by: NURSE PRACTITIONER

## 2020-01-14 PROCEDURE — 36415 COLL VENOUS BLD VENIPUNCTURE: CPT | Performed by: NURSE PRACTITIONER

## 2020-01-14 PROCEDURE — G8427 DOCREV CUR MEDS BY ELIG CLIN: HCPCS | Performed by: NURSE PRACTITIONER

## 2020-01-14 PROCEDURE — 99214 OFFICE O/P EST MOD 30 MIN: CPT | Performed by: NURSE PRACTITIONER

## 2020-01-14 PROCEDURE — G8484 FLU IMMUNIZE NO ADMIN: HCPCS | Performed by: NURSE PRACTITIONER

## 2020-01-14 PROCEDURE — G8417 CALC BMI ABV UP PARAM F/U: HCPCS | Performed by: NURSE PRACTITIONER

## 2020-01-14 PROCEDURE — 99024 POSTOP FOLLOW-UP VISIT: CPT | Performed by: ORTHOPAEDIC SURGERY

## 2020-01-14 RX ORDER — HYDROCODONE BITARTRATE AND ACETAMINOPHEN 10; 325 MG/1; MG/1
1 TABLET ORAL EVERY 8 HOURS PRN
Qty: 90 TABLET | Refills: 0 | Status: SHIPPED | OUTPATIENT
Start: 2020-02-05 | End: 2020-03-06

## 2020-01-14 RX ORDER — DIAZEPAM 10 MG/1
10 TABLET ORAL NIGHTLY PRN
Qty: 30 TABLET | Refills: 0 | Status: SHIPPED | OUTPATIENT
Start: 2020-01-26 | End: 2020-02-25

## 2020-01-14 RX ORDER — LISINOPRIL AND HYDROCHLOROTHIAZIDE 25; 20 MG/1; MG/1
TABLET ORAL
Qty: 90 TABLET | Refills: 1 | Status: CANCELLED | OUTPATIENT
Start: 2020-01-14

## 2020-01-14 RX ORDER — HYDROCODONE BITARTRATE AND ACETAMINOPHEN 10; 325 MG/1; MG/1
1 TABLET ORAL EVERY 8 HOURS PRN
Qty: 90 TABLET | Refills: 0 | Status: SHIPPED | OUTPATIENT
Start: 2020-04-05 | End: 2020-05-04 | Stop reason: SDUPTHER

## 2020-01-14 RX ORDER — FLUOXETINE HYDROCHLORIDE 40 MG/1
80 CAPSULE ORAL DAILY
Qty: 180 CAPSULE | Refills: 3 | Status: SHIPPED | OUTPATIENT
Start: 2020-01-14 | End: 2020-11-03 | Stop reason: SDUPTHER

## 2020-01-14 RX ORDER — HYDROCODONE BITARTRATE AND ACETAMINOPHEN 10; 325 MG/1; MG/1
1 TABLET ORAL EVERY 8 HOURS PRN
Qty: 90 TABLET | Refills: 0 | Status: SHIPPED | OUTPATIENT
Start: 2020-03-06 | End: 2020-04-05

## 2020-01-14 RX ORDER — LITHIUM CARBONATE 300 MG/1
300 CAPSULE ORAL 2 TIMES DAILY WITH MEALS
Qty: 60 CAPSULE | Refills: 5 | Status: SHIPPED | OUTPATIENT
Start: 2020-01-14 | End: 2020-11-24 | Stop reason: SDUPTHER

## 2020-01-14 RX ORDER — GABAPENTIN 300 MG/1
300 CAPSULE ORAL 3 TIMES DAILY
Qty: 90 CAPSULE | Refills: 1 | Status: SHIPPED | OUTPATIENT
Start: 2020-01-14 | End: 2020-03-27

## 2020-01-14 RX ORDER — ATORVASTATIN CALCIUM 20 MG/1
20 TABLET, FILM COATED ORAL DAILY
Qty: 90 TABLET | Refills: 2 | Status: SHIPPED | OUTPATIENT
Start: 2020-01-14 | End: 2021-01-28

## 2020-01-14 RX ORDER — AMITRIPTYLINE HYDROCHLORIDE 100 MG/1
100 TABLET, FILM COATED ORAL NIGHTLY
Qty: 30 TABLET | Refills: 5 | Status: SHIPPED | OUTPATIENT
Start: 2020-01-14 | End: 2020-05-04 | Stop reason: ALTCHOICE

## 2020-01-14 NOTE — PATIENT INSTRUCTIONS
Patient Education        Bipolar Disorder: Care Instructions  Your Care Instructions    Bipolar disorder is an illness that causes extreme mood changes, from times of very high energy (manic episodes) to times of depression. But many people with bipolar disorder show only the symptoms of depression. These moods may cause problems with your work, school, family life, friendships, and how well you function. This disease is also called manic-depression. There is no cure for bipolar disorder, but it can be helped with medicines. Counseling may also help. It is important to take your medicines exactly as prescribed, even when you feel well. You may need lifelong treatment. Follow-up care is a key part of your treatment and safety. Be sure to make and go to all appointments, and call your doctor if you are having problems. It's also a good idea to know your test results and keep a list of the medicines you take. How can you care for yourself at home? · Be safe with medicines. Take your medicines exactly as prescribed. Do not stop or change a medicine without talking to your doctor first. Carmelita Jay and your doctor may need to try different combinations of medicines to find what works for you. · Take your medicines on schedule to keep your moods even. When you feel good, you may think that you do not need your medicines. But it is important to keep taking them. · Go to your counseling sessions. Call and talk with your counselor if you can't go to a session or if you don't think the sessions are helping. Do not just stop going. · Get at least 30 minutes of activity on most days of the week. Walking is a good choice. You also may want to do other things, such as running, swimming, or cycling. · Get enough sleep. Keep your room dark and quiet. Try to go to bed at the same time every night. · Eat a healthy diet. This includes whole grains, dairy, fruits, vegetables, and protein. Eat foods from each of these groups.   · Try to lower your stress. Manage your time, build a strong support system, and lead a healthy lifestyle. To lower your stress, try physical activity, slow deep breathing, or getting a massage. · Do not use alcohol or illegal drugs. · Learn the early signs of your mood changes. You can then take steps to help yourself feel better. · Ask for help from friends and family when you need it. You may need help with daily chores when you are depressed. When you are manic, you may need support to control your high energy levels. What should you do if someone in your family has bipolar disorder? · Learn about the disease and the signs that it is getting worse. · Remind your family member that you love him or her. · Make a plan with all family members about how to take care of your loved one when his or her symptoms are bad. · Talk about your fears and concerns and those of other family members. Seek counseling if needed. · Do not focus attention only on the person who is in treatment. · Remind yourself that it will take time for changes to occur. · Do not blame yourself for the disease. · Know your legal rights and the legal rights of your family member. Support groups or counselors can help you with this information. · Take care of yourself. Keep up with your own interests, such as your career, hobbies, and friends. Use exercise, positive self-talk, deep breathing, and other relaxing exercises to help lower your stress. · Give yourself time to grieve. You may need to deal with emotions such as anger, fear, and frustration. After you work through your feelings, you will be better able to care for yourself and your family. · If you are having a hard time with your feelings or with your relationship with your family member, talk with a counselor. When should you call for help? Call 911 anytime you think you may need emergency care.  For example, call if:    · You feel like hurting yourself or someone else.     · Someone who has bipolar disorder displays dangerous behavior, and you think the person might hurt himself or herself or someone else.   [facility] your doctor now or seek immediate medical care if:    · You hear voices.     · Someone you know has bipolar disorder and talks about suicide. Keep the numbers for these national suicide hotlines: 9-870-910-TALK (9-837.715.8080) and 8-605-UWHKHZO (5-785.999.3452). If a suicide threat seems real, with a specific plan and a way to carry it out, stay with the person, or ask someone you trust to stay with the person, until you can get help.     · Someone you know has bipolar disorder and:  ? Starts to give away possessions. ? Is using illegal drugs or drinking alcohol heavily. ? Talks or writes about death, including writing suicide notes or talking about guns, knives, or pills. ? Talks or writes about hurting someone else. ? Starts to spend a lot of time alone. ? Acts very aggressively or suddenly appears calm. ? Talks about beliefs that are not based in reality (delusions).    Watch closely for changes in your health, and be sure to contact your doctor if:    · You cannot go to your counseling sessions. Where can you learn more? Go to https://Silent Edge.StackSafe. org and sign in to your [employer] account. Enter K052 in the [facility] box to learn more about \"Bipolar Disorder: Care Instructions. \"     If you do not have an account, please click on the \"Sign Up Now\" link. Current as of: May 28, 2019  Content Version: 12.3  © 5577-3472 Healthwise, Incorporated. Care instructions adapted under license by [facility] ([facility]). If you have questions about a medical condition or this instruction, always ask your healthcare professional. [place] [address] any warranty or liability for your use of this information. Patient Education        Learning About Generalized Anxiety Disorder  What is generalized anxiety disorder? We all worry. It's a normal part of life. But when you have generalized anxiety disorder, you worry about lots of things and have a hard time stopping your worry. This worry or anxiety interferes with your relationships, work, and life. What causes it? The cause is not known. But it may be passed down through families. What are the symptoms? You may feel anxious or worry most days about things like work, relationships, health, or money. You may worry about things that are unlikely to happen. You find it hard to stop or control the worry. Because you worry a lot and try hard to stop worrying, you may feel restless, tired, tense, or cranky. You may also find it hard to think or sleep. And you may have headaches or an upset stomach. How is it diagnosed? Your doctor will ask about your health and how often you worry or feel anxious. He or she may ask about other symptoms, like whether you:  · Feel restless. · Feel tired. · Have a hard time thinking or feel that your mind goes blank. · Feel cranky. · Have tense muscles. · Have sleep problems. A physical exam and tests can help make sure that your symptoms aren't caused by a different condition, such as a thyroid problem. How is it treated? Counseling and medicine can both work to treat anxiety. The two are often used along with lifestyle changes. Cognitive-behavioral therapy (CBT) is a type of counseling that's used to help treat anxiety. In CBT, you learn how to notice and replace thoughts that make you feel worried. It also can help you learn how to relax when you worry. Medicines can help. These medicines are often also used for depression. Selective serotonin reuptake inhibitors (SSRIs) are often tried first. But there are other medicines that your doctor may use. You may need to try a few medicines to find one that works well. Many people feel better by getting regular exercise, eating healthy meals, and getting good sleep.  Mindfulness--focusing on things that through families. What are the symptoms? You may feel anxious or worry most days about things like work, relationships, health, or money. You may worry about things that are unlikely to happen. You find it hard to stop or control the worry. Because you worry a lot and try hard to stop worrying, you may feel restless, tired, tense, or cranky. You may also find it hard to think or sleep. And you may have headaches or an upset stomach. How is it diagnosed? Your doctor will ask about your health and how often you worry or feel anxious. He or she may ask about other symptoms, like whether you:  · Feel restless. · Feel tired. · Have a hard time thinking or feel that your mind goes blank. · Feel cranky. · Have tense muscles. · Have sleep problems. A physical exam and tests can help make sure that your symptoms aren't caused by a different condition, such as a thyroid problem. How is it treated? Counseling and medicine can both work to treat anxiety. The two are often used along with lifestyle changes. Cognitive-behavioral therapy (CBT) is a type of counseling that's used to help treat anxiety. In CBT, you learn how to notice and replace thoughts that make you feel worried. It also can help you learn how to relax when you worry. Medicines can help. These medicines are often also used for depression. Selective serotonin reuptake inhibitors (SSRIs) are often tried first. But there are other medicines that your doctor may use. You may need to try a few medicines to find one that works well. Many people feel better by getting regular exercise, eating healthy meals, and getting good sleep. Mindfulness--focusing on things that happen in the present moment--also can help reduce your anxiety. What can you expect when you have it? Having anxiety can be upsetting. Some people might feel less worried and stressed after a couple of months of treatment.  But for other people, it might take longer to feel better. Reaching out to people for help is important. Try not to isolate yourself. Let your family and friends help you. Find someone you can trust and confide in. Talk to that person. When you know what anxiety is--and how you can get help for it--you can start to learn new ways of thinking. This can help you cope and work through your anxiety. Follow-up care is a key part of your treatment and safety. Be sure to make and go to all appointments, and call your doctor if you are having problems. It's also a good idea to know your test results and keep a list of the medicines you take. Where can you learn more? Go to https://WaicaipeOpenfinance.ID [employer]. org and sign in to your [employer] account. Enter G110 in the [employer] box to learn more about \"Learning About Generalized Anxiety Disorder. \"     If you do not have an account, please click on the \"Sign Up Now\" link. Current as of: May 28, 2019  Content Version: 12.3  © 3348-4463 Healthwise, Incorporated. Care instructions adapted under license by [facility] ([facility]). If you have questions about a medical condition or this instruction, always ask your healthcare professional. [place] [address] any warranty or liability for your use of this information.

## 2020-01-14 NOTE — PROGRESS NOTES
ORTHOPAEDIC OFFICE NOTE    Chief Complaint   Patient presents with    Post-Op Check     Right shoulder arthroscopy; DOS 11/25/19. HPI   1/14/2020  7 weeks postop  Doing well  Using sling and pillow   Doing HEP  No wound problems  Denies N/T    12/10/2019  First postop  She is doing well  No wound issues  Pain well controlled  Denies N/T  Admits to doing more with the right arm/shoulder than she should    11/25/2019  OPERATION PERFORMED:  Right shoulder arthroscopy, rotator cuff repair,  subacromial decompression with acromioplasty. 10/2/2019  50 y.o. female seen for evaluation of right shoulder pain  This is a second opinion  Previously saw Soledad Glez, and was referred to Yessica Davalos, who saw her on Monday and discussed surgery with her. Onset July 2019  Injury/trauma - was coming down steps, when she fell, she used her right hand to grab onto something and hyperextended the right shoulder - pain since then  History of symptoms none prior to July  Pain is located right posterior shoulder  Worse with laying on right side, sleeping, night time, overhead activities, reaching  Better with arm at side, rest  Associated with stiffness  Associated with intermittent N/T in right hand    Chronic pain from neuropathy - takes norco daily  Smokes 1/2ppd  Not currently working  Depression/anxiety/bipolar disorder    Had Right HUA with Dr Emmalene Blizzard in February, overall doing well with it        Allergies   Allergen Reactions    Imitrex [Sumatriptan] Nausea Only     nausea        Current Outpatient Medications   Medication Sig Dispense Refill    [START ON 2/5/2020] HYDROcodone-acetaminophen (NORCO)  MG per tablet Take 1 tablet by mouth every 8 hours as needed for Pain for up to 30 days.  G60.9 FILL 2/5 Intended supply: 30 days 90 tablet 0    FLUoxetine (PROZAC) 40 MG capsule Take 2 capsules by mouth daily TAKE 1 CAPSULE BY MOUTH ONCE DAILY IN ADDITION 180 capsule 3    amitriptyline (ELAVIL) 100 MG tablet Take 1 tablet by mouth nightly 30 tablet 5    atorvastatin (LIPITOR) 20 MG tablet Take 1 tablet by mouth daily 90 tablet 2    lithium 300 MG capsule Take 1 capsule by mouth 2 times daily (with meals) 60 capsule 5    gabapentin (NEURONTIN) 300 MG capsule Take 1 capsule by mouth 3 times daily for 30 days. 90 capsule 1    [START ON 3/6/2020] HYDROcodone-acetaminophen (NORCO)  MG per tablet Take 1 tablet by mouth every 8 hours as needed for Pain for up to 30 days. Fill 3/6/20 G60.9 90 tablet 0    [START ON 4/5/2020] HYDROcodone-acetaminophen (NORCO)  MG per tablet Take 1 tablet by mouth every 8 hours as needed for Pain for up to 30 days. Fill 4/5 - G60.9 90 tablet 0    [START ON 1/26/2020] diazepam (VALIUM) 10 MG tablet Take 1 tablet by mouth nightly as needed for Anxiety or Sleep for up to 30 days. FILL ON OR AFTER 1/26 30 tablet 0    VYVANSE 50 MG capsule Take 1 capsule by mouth daily. 0    lisinopril-hydrochlorothiazide (PRINZIDE;ZESTORETIC) 20-25 MG per tablet TAKE 1 TABLET BY MOUTH ONCE DAILY 60 tablet 0    diazepam (VALIUM) 10 MG tablet Take 10 mg by mouth every 6 hours as needed for Anxiety. .       No current facility-administered medications for this visit.         Past Medical History:   Diagnosis Date    Arthritis     [facility] [address]     Depression     Gastritis     Hypertension     Insomnia     Migraine     Neuropathy     PLMD (periodic limb movement disorder)     Sleep apnea     uses C-PAP    TMJ (temporomandibular joint syndrome)         Past Surgical History:   Procedure Laterality Date    COLPOSCOPY      ENDOMETRIAL ABLATION      FOOT SURGERY      JOINT REPLACEMENT Right 02/19/2019    RIGHT LATERAL TOTAL HIP REPLACEMENT    SHOULDER ARTHROSCOPY Right 11/25/2019    RIGHT SHOULDER ARTHROSCOPY, SUBACROMIAL DECOMPRESSION,   ROTATOR CUFF REPAIR performed by Maite Soto MD at [facility] Right 2/19/2019    RIGHT LATERAL TOTAL HIP REPLACEMENT Relationship status: Not on file    Intimate partner violence:     Fear of current or ex partner: Not on file     Emotionally abused: Not on file     Physically abused: Not on file     Forced sexual activity: Not on file   Other Topics Concern    Not on file   Social History Narrative    Not on file        Vitals:    01/14/20 1506   Resp: 16   Weight: 261 lb (118.4 kg)   Height: 5' 6\" (1.676 m)       Physical Exam  Body mass index is 42.13 kg/m². RRR, radial pulse 2+  SILT M/U/R/A nerve distributions; AIN/PIN/IO intact  Right shoulder:   Portal sites are healed, c/d/i   AAROM        ER 10    Imaging:  None today    Assessment & Plan:  50 y.o. female who presents with    Diagnosis Orders   1. S/P complete repair of rotator cuff         No orders of the defined types were placed in this encounter.     Discontinue sling  Okay for full Passive ROM, full Active Assist ROM, and full Active ROM now  Progress as tolerated  Start formal outpatient PT (order from date of surgery in Epic with protocol in there)     FU in 3 months    Liudmila Clemons
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
## 2020-01-15 ENCOUNTER — TELEPHONE (OUTPATIENT)
Dept: INTERNAL MEDICINE CLINIC | Age: 45
End: 2020-01-15

## 2020-01-15 NOTE — TELEPHONE ENCOUNTER
Submitted PA for FLUoxetine HCl 40MG capsules    Via  Key: [id number]   CMM STATUS: Drug is covered by current benefit plan. No further PA activity needed. Please notify patient, thank you.

## 2020-01-16 ENCOUNTER — TELEPHONE (OUTPATIENT)
Dept: FAMILY MEDICINE CLINIC | Age: 45
End: 2020-01-16

## 2020-01-16 LAB
AMPHETAMINES SCREEN BLOOD: NEGATIVE NG/ML
BARBITURATES SCREEN BLOOD: NEGATIVE NG/ML
BENZODIAZEPINES SCREEN BLOOD: NEGATIVE NG/ML
BUPRENORPHINE: NEGATIVE NG/ML
CANNABINOID SCREEN BLOOD: NEGATIVE NG/ML
COCAINE SCREEN BLOOD: NEGATIVE NG/ML
Lab: NORMAL
METHADONE SCREEN BLOOD: NEGATIVE NG/ML
METHAMPHETAMINES SERUM/ PLASMA: NEGATIVE NG/ML
OPIATES SCREEN BLOOD: NEGATIVE NG/ML
OXYCODONE: NEGATIVE NG/ML
PHENCYCLIDINE SCREEN BLOOD: NEGATIVE NG/ML

## 2020-01-16 NOTE — TELEPHONE ENCOUNTER
[facility] is calling because they need clarification on direction concerning the medication FLUOXETINE 40 MG CAPSULE. Please give them a call back.

## 2020-02-07 ENCOUNTER — TELEPHONE (OUTPATIENT)
Dept: FAMILY MEDICINE CLINIC | Age: 45
End: 2020-02-07

## 2020-02-07 NOTE — TELEPHONE ENCOUNTER
Dr Fredi Burk is a Psychiatrist with  [facility]. He has done an initial eval and patient complained of Memory loss. He preformed a MOCA and patient scored 23/30. He would like to speak with Mishel on Monday and have the patient followed up here within about a week.

## 2020-02-10 NOTE — TELEPHONE ENCOUNTER
Safia Olmedo states that she would like to talk to the psychologist first before we call the patient .  She tried to call today but did not get a hold of him     LH

## 2020-02-19 ENCOUNTER — OFFICE VISIT (OUTPATIENT)
Dept: FAMILY MEDICINE CLINIC | Age: 45
End: 2020-02-19
Payer: COMMERCIAL

## 2020-02-19 VITALS
TEMPERATURE: 98.6 F | BODY MASS INDEX: 44.15 KG/M2 | RESPIRATION RATE: 14 BRPM | HEIGHT: 65 IN | WEIGHT: 265 LBS | SYSTOLIC BLOOD PRESSURE: 128 MMHG | DIASTOLIC BLOOD PRESSURE: 78 MMHG | HEART RATE: 72 BPM

## 2020-02-19 LAB
A/G RATIO: 1.7 (ref 1.1–2.2)
ALBUMIN SERPL-MCNC: 4.3 G/DL (ref 3.4–5)
ALP BLD-CCNC: 102 U/L (ref 40–129)
ALT SERPL-CCNC: 17 U/L (ref 10–40)
ANION GAP SERPL CALCULATED.3IONS-SCNC: 14 MMOL/L (ref 3–16)
AST SERPL-CCNC: 14 U/L (ref 15–37)
BILIRUB SERPL-MCNC: <0.2 MG/DL (ref 0–1)
BILIRUBIN, POC: NEGATIVE
BLOOD URINE, POC: NORMAL
BUN BLDV-MCNC: 12 MG/DL (ref 7–20)
CALCIUM SERPL-MCNC: 9.5 MG/DL (ref 8.3–10.6)
CHLORIDE BLD-SCNC: 102 MMOL/L (ref 99–110)
CLARITY, POC: CLEAR
CO2: 23 MMOL/L (ref 21–32)
COLOR, POC: YELLOW
CREAT SERPL-MCNC: 0.7 MG/DL (ref 0.6–1.1)
FOLATE: 10.09 NG/ML (ref 4.78–24.2)
GFR AFRICAN AMERICAN: >60
GFR NON-AFRICAN AMERICAN: >60
GLOBULIN: 2.6 G/DL
GLUCOSE BLD-MCNC: 121 MG/DL (ref 70–99)
GLUCOSE URINE, POC: NEGATIVE
HCT VFR BLD CALC: 37.8 % (ref 36–48)
HEMOGLOBIN: 12.5 G/DL (ref 12–16)
KETONES, POC: NEGATIVE
LEUKOCYTE EST, POC: NORMAL
MCH RBC QN AUTO: 29.6 PG (ref 26–34)
MCHC RBC AUTO-ENTMCNC: 33 G/DL (ref 31–36)
MCV RBC AUTO: 89.6 FL (ref 80–100)
NITRITE, POC: NORMAL
PDW BLD-RTO: 13.8 % (ref 12.4–15.4)
PH, POC: 6.5
PLATELET # BLD: 261 K/UL (ref 135–450)
PMV BLD AUTO: 8 FL (ref 5–10.5)
POTASSIUM SERPL-SCNC: 4.4 MMOL/L (ref 3.5–5.1)
PROTEIN, POC: NORMAL
RBC # BLD: 4.22 M/UL (ref 4–5.2)
SODIUM BLD-SCNC: 139 MMOL/L (ref 136–145)
SPECIFIC GRAVITY, POC: 1.02
T4 FREE: 0.9 NG/DL (ref 0.9–1.8)
TOTAL PROTEIN: 6.9 G/DL (ref 6.4–8.2)
TSH SERPL DL<=0.05 MIU/L-ACNC: 1.5 UIU/ML (ref 0.27–4.2)
UROBILINOGEN, POC: 0.2
VITAMIN B-12: 454 PG/ML (ref 211–911)
WBC # BLD: 8.8 K/UL (ref 4–11)

## 2020-02-19 PROCEDURE — G8417 CALC BMI ABV UP PARAM F/U: HCPCS | Performed by: NURSE PRACTITIONER

## 2020-02-19 PROCEDURE — 81002 URINALYSIS NONAUTO W/O SCOPE: CPT | Performed by: NURSE PRACTITIONER

## 2020-02-19 PROCEDURE — G8427 DOCREV CUR MEDS BY ELIG CLIN: HCPCS | Performed by: NURSE PRACTITIONER

## 2020-02-19 PROCEDURE — G8484 FLU IMMUNIZE NO ADMIN: HCPCS | Performed by: NURSE PRACTITIONER

## 2020-02-19 PROCEDURE — 1036F TOBACCO NON-USER: CPT | Performed by: NURSE PRACTITIONER

## 2020-02-19 PROCEDURE — 36415 COLL VENOUS BLD VENIPUNCTURE: CPT | Performed by: NURSE PRACTITIONER

## 2020-02-19 PROCEDURE — 99213 OFFICE O/P EST LOW 20 MIN: CPT | Performed by: NURSE PRACTITIONER

## 2020-02-19 NOTE — PROGRESS NOTES
VITAMIN B12 & FOLATE; Future  -     TSH without Reflex; Future  -     T4, FREE; Future  -     POCT Urinalysis no Micro  -     CBC; Future  -     COMPREHENSIVE METABOLIC PANEL; Future  -     COMPREHENSIVE METABOLIC PANEL  -     CBC  -     T4, FREE  -     TSH without Reflex  -     VITAMIN B12 & FOLATE  I informed the pt that I will reduce her Norco by 10 monthly until she is seen by pain management as the chronic use of this may be the cause of her mental/confusion forgetfulness  I have also reached out to the psychiatrist as well        PLAN:  There are no diagnoses linked to this encounter.

## 2020-03-02 ENCOUNTER — TELEPHONE (OUTPATIENT)
Dept: PULMONOLOGY | Age: 45
End: 2020-03-02

## 2020-03-02 NOTE — TELEPHONE ENCOUNTER
Sabas burnett/ Haylie Lucas called to say that caresource isn't covered and the order will need to be sent to DME.   LOV 9/18/19 & no future appointments  Patient was suppose to come back 3 months.

## 2020-03-02 NOTE — TELEPHONE ENCOUNTER
Pt is calling asking if she can have an order for a Full face mask, because the pillow isn't working. She wakes up with nose bleeds and eyes are dried out from air pressure being so strong. She said   Daisy told her to call into the office and get an order sent over.  Please advise

## 2020-03-05 ENCOUNTER — TELEPHONE (OUTPATIENT)
Dept: PAIN MANAGEMENT | Age: 45
End: 2020-03-05

## 2020-03-10 ENCOUNTER — OFFICE VISIT (OUTPATIENT)
Dept: SLEEP MEDICINE | Age: 45
End: 2020-03-10
Payer: COMMERCIAL

## 2020-03-10 VITALS
WEIGHT: 269 LBS | SYSTOLIC BLOOD PRESSURE: 132 MMHG | HEART RATE: 91 BPM | HEIGHT: 65 IN | BODY MASS INDEX: 44.82 KG/M2 | DIASTOLIC BLOOD PRESSURE: 90 MMHG | RESPIRATION RATE: 16 BRPM | OXYGEN SATURATION: 96 %

## 2020-03-10 PROCEDURE — 1036F TOBACCO NON-USER: CPT | Performed by: PSYCHIATRY & NEUROLOGY

## 2020-03-10 PROCEDURE — G8417 CALC BMI ABV UP PARAM F/U: HCPCS | Performed by: PSYCHIATRY & NEUROLOGY

## 2020-03-10 PROCEDURE — 99213 OFFICE O/P EST LOW 20 MIN: CPT | Performed by: PSYCHIATRY & NEUROLOGY

## 2020-03-10 PROCEDURE — G8484 FLU IMMUNIZE NO ADMIN: HCPCS | Performed by: PSYCHIATRY & NEUROLOGY

## 2020-03-10 PROCEDURE — G8427 DOCREV CUR MEDS BY ELIG CLIN: HCPCS | Performed by: PSYCHIATRY & NEUROLOGY

## 2020-03-10 ASSESSMENT — SLEEP AND FATIGUE QUESTIONNAIRES
HOW LIKELY ARE YOU TO NOD OFF OR FALL ASLEEP WHILE SITTING INACTIVE IN A PUBLIC PLACE: 2
HOW LIKELY ARE YOU TO NOD OFF OR FALL ASLEEP IN A CAR, WHILE STOPPED FOR A FEW MINUTES IN TRAFFIC: 0
HOW LIKELY ARE YOU TO NOD OFF OR FALL ASLEEP WHILE SITTING AND TALKING TO SOMEONE: 0
HOW LIKELY ARE YOU TO NOD OFF OR FALL ASLEEP WHILE SITTING AND READING: 2
HOW LIKELY ARE YOU TO NOD OFF OR FALL ASLEEP WHEN YOU ARE A PASSENGER IN A CAR FOR AN HOUR WITHOUT A BREAK: 3
HOW LIKELY ARE YOU TO NOD OFF OR FALL ASLEEP WHILE LYING DOWN TO REST IN THE AFTERNOON WHEN CIRCUMSTANCES PERMIT: 3
HOW LIKELY ARE YOU TO NOD OFF OR FALL ASLEEP WHILE WATCHING TV: 1
HOW LIKELY ARE YOU TO NOD OFF OR FALL ASLEEP WHILE SITTING QUIETLY AFTER LUNCH WITHOUT ALCOHOL: 1
ESS TOTAL SCORE: 12

## 2020-03-10 ASSESSMENT — ENCOUNTER SYMPTOMS
APNEA: 0
CHOKING: 0

## 2020-03-10 NOTE — PATIENT INSTRUCTIONS
Some machines have air pressure that adjusts on its own. Others have air pressures that are different when you breathe in than when you breathe out. This may reduce discomfort caused by too much pressure in your nose. Where can you learn more? Go to [email or web address] and sign in to your [employer] account. Enter K683 in the [email or web address] box to learn more about \"Learning About CPAP for Sleep Apnea. \"     If you do not have an account, please click on the \"Sign Up Now\" link. Current as of: June 9, 2019  Content Version: 12.3  © 4889-3731 Healthwise, Incorporated. Care instructions adapted under license by [facility]. If you have questions about a medical condition or this instruction, always ask your healthcare professional. [address] any warranty or liability for your use of this information.

## 2020-03-10 NOTE — PROGRESS NOTES
attempt to quit: 2019     Years since quittin.3    Smokeless tobacco: Never Used    Tobacco comment: advised to quit   Substance and Sexual Activity    Alcohol use: Yes     Alcohol/week: 5.0 standard drinks     Types: 6 Standard drinks or equivalent per week     Comment: rarely    Drug use: Not Currently     Types: Marijuana     Comment: - last smoke months ago-2019    Sexual activity: Yes   Lifestyle    Physical activity     Days per week: Not on file     Minutes per session: Not on file    Stress: Not on file   Relationships    Social connections     Talks on phone: Not on file     Gets together: Not on file     Attends Synagogue service: Not on file     Active member of club or organization: Not on file     Attends meetings of clubs or organizations: Not on file     Relationship status: Not on file    Intimate partner violence     Fear of current or ex partner: Not on file     Emotionally abused: Not on file     Physically abused: Not on file     Forced sexual activity: Not on file   Other Topics Concern    Not on file   Social History Narrative    Not on file       Prior to Admission medications    Medication Sig Start Date End Date Taking? Authorizing Provider   FLUoxetine (PROZAC) 40 MG capsule Take 2 capsules by mouth daily TAKE 1 CAPSULE BY MOUTH ONCE DAILY IN ADDITION 20 Yes ROSSANA Niño CNP   amitriptyline (ELAVIL) 100 MG tablet Take 1 tablet by mouth nightly 20  Yes ROSSANA Niño CNP   atorvastatin (LIPITOR) 20 MG tablet Take 1 tablet by mouth daily 20  Yes ROSSANA Niño CNP   lithium 300 MG capsule Take 1 capsule by mouth 2 times daily (with meals) 20  Yes Mishel Villafana Phoenix, APRN - CNP   HYDROcodone-acetaminophen (NORCO)  MG per tablet Take 1 tablet by mouth every 8 hours as needed for Pain for up to 30 days.  Fill 3/6/20 G60.9 3/6/20 4/5/20 Yes ROSSANA Man CNP Atraumatic. Nose: Nose normal.   Eyes:      Extraocular Movements: Extraocular movements intact. Neck:      Musculoskeletal: Normal range of motion and neck supple. Cardiovascular:      Rate and Rhythm: Normal rate and regular rhythm. Heart sounds: Normal heart sounds. Pulmonary:      Effort: Pulmonary effort is normal.      Breath sounds: Normal breath sounds. Musculoskeletal: Normal range of motion. General: No swelling. Skin:     General: Skin is warm. Neurological:      General: No focal deficit present. Psychiatric:         Mood and Affect: Mood normal.         Assessment:    Obstructive Sleep Apnea/Hypopnea Syndrome      Diagnosis Orders   1. VICTORIANO on CPAP     2. Dependence on other enabling machines and devices       Plan: Will continue the PAP at 9 cmwp, I encouraged the patient to use the PAP on nightly basis. Mask fitting with FFM F30 airfit  I educated to use the PAP every night more than 4 hours at least.    I will order PAP supplies, mask, filters. No orders of the defined types were placed in this encounter. Return in about 5 weeks (around 4/14/2020) for Reveiwing CPAP usage and compliance report and tro.     Galdino England MD  Medical Director - [facility]

## 2020-03-24 NOTE — TELEPHONE ENCOUNTER
Third message was called and  for the patient to call [facility] regarding their referral. Patient states she wants to wait until around MAY and will have her provider resubmit referral.

## 2020-03-27 RX ORDER — GABAPENTIN 300 MG/1
CAPSULE ORAL
Qty: 90 CAPSULE | Refills: 0 | Status: SHIPPED | OUTPATIENT
Start: 2020-03-27 | End: 2020-04-27

## 2020-04-08 ENCOUNTER — TELEPHONE (OUTPATIENT)
Dept: FAMILY MEDICINE CLINIC | Age: 45
End: 2020-04-08

## 2020-04-08 ENCOUNTER — VIRTUAL VISIT (OUTPATIENT)
Dept: SLEEP MEDICINE | Age: 45
End: 2020-04-08
Payer: COMMERCIAL

## 2020-04-08 PROCEDURE — 99213 OFFICE O/P EST LOW 20 MIN: CPT | Performed by: PSYCHIATRY & NEUROLOGY

## 2020-04-08 ASSESSMENT — SLEEP AND FATIGUE QUESTIONNAIRES
HOW LIKELY ARE YOU TO NOD OFF OR FALL ASLEEP WHILE LYING DOWN TO REST IN THE AFTERNOON WHEN CIRCUMSTANCES PERMIT: 3
HOW LIKELY ARE YOU TO NOD OFF OR FALL ASLEEP WHILE SITTING QUIETLY AFTER LUNCH WITHOUT ALCOHOL: 1
HOW LIKELY ARE YOU TO NOD OFF OR FALL ASLEEP WHILE SITTING INACTIVE IN A PUBLIC PLACE: 0
HOW LIKELY ARE YOU TO NOD OFF OR FALL ASLEEP IN A CAR, WHILE STOPPED FOR A FEW MINUTES IN TRAFFIC: 0
ESS TOTAL SCORE: 7
HOW LIKELY ARE YOU TO NOD OFF OR FALL ASLEEP WHEN YOU ARE A PASSENGER IN A CAR FOR AN HOUR WITHOUT A BREAK: 0
HOW LIKELY ARE YOU TO NOD OFF OR FALL ASLEEP WHILE SITTING AND TALKING TO SOMEONE: 0
HOW LIKELY ARE YOU TO NOD OFF OR FALL ASLEEP WHILE SITTING AND READING: 1
HOW LIKELY ARE YOU TO NOD OFF OR FALL ASLEEP WHILE WATCHING TV: 2

## 2020-04-08 ASSESSMENT — ENCOUNTER SYMPTOMS
ALLERGIC/IMMUNOLOGIC NEGATIVE: 1
EYES NEGATIVE: 1
APNEA: 0
GASTROINTESTINAL NEGATIVE: 1

## 2020-04-08 NOTE — PROGRESS NOTES
Tobacco Use    Smoking status: Former Smoker     Packs/day: 0.50     Years: 10.00     Pack years: 5.00     Types: Cigarettes     Start date: 1987     Last attempt to quit: 2019     Years since quittin.4    Smokeless tobacco: Never Used    Tobacco comment: advised to quit   Substance and Sexual Activity    Alcohol use: Yes     Alcohol/week: 5.0 standard drinks     Types: 6 Standard drinks or equivalent per week     Comment: rarely    Drug use: Not Currently     Types: Marijuana     Comment: - last smoke months ago-2019    Sexual activity: Yes   Lifestyle    Physical activity     Days per week: Not on file     Minutes per session: Not on file    Stress: Not on file   Relationships    Social connections     Talks on phone: Not on file     Gets together: Not on file     Attends Spiritism service: Not on file     Active member of club or organization: Not on file     Attends meetings of clubs or organizations: Not on file     Relationship status: Not on file    Intimate partner violence     Fear of current or ex partner: Not on file     Emotionally abused: Not on file     Physically abused: Not on file     Forced sexual activity: Not on file   Other Topics Concern    Not on file   Social History Narrative    Not on file       Prior to Admission medications    Medication Sig Start Date End Date Taking?  Authorizing Provider   gabapentin (NEURONTIN) 300 MG capsule TAKE 1 CAPSULE BY MOUTH THREE TIMES DAILY FOR 30 DAYS 3/27/20 4/26/20  Claude Brawn, APRN - CNP   FLUoxetine (PROZAC) 40 MG capsule Take 2 capsules by mouth daily TAKE 1 CAPSULE BY MOUTH ONCE DAILY IN ADDITION 20  Claude Brawn, APRN - CNP   amitriptyline (ELAVIL) 100 MG tablet Take 1 tablet by mouth nightly 20   Claude Brawn, APRN - CNP   atorvastatin (LIPITOR) 20 MG tablet Take 1 tablet by mouth daily 20   Claude Brawn, APRN - CNP   lithium 300 MG capsule Take 1 capsule by mouth 2 times daily (with meals) 1/14/20   Charlene Rockford Gowers, APRN - CNP   HYDROcodone-acetaminophen (NORCO)  MG per tablet Take 1 tablet by mouth every 8 hours as needed for Pain for up to 30 days. Fill 4/5 - G60.9 4/5/20 5/5/20  Charlene Rockford Gowers, APRN - CNP   VYVANSE 50 MG capsule Take 1 capsule by mouth daily. 6/11/19   Historical Provider, MD   lisinopril-hydrochlorothiazide (PRINZIDE;ZESTORETIC) 20-25 MG per tablet TAKE 1 TABLET BY MOUTH ONCE DAILY 7/1/19   ROSSANA Navarro CNP   diazepam (VALIUM) 10 MG tablet Take 10 mg by mouth every 6 hours as needed for Anxiety. Richie Henry     Historical Provider, MD       Allergies as of 04/08/2020 - Review Complete 03/10/2020   Allergen Reaction Noted    Imitrex [sumatriptan] Nausea Only 02/04/2013       Patient Active Problem List   Diagnosis    Migraine    Amenorrhea, secondary    Depression    Gastritis    TMJ (temporomandibular joint syndrome)    Lipoma    Grief reaction    Anxiety    Tobacco abuse    Insomnia    Bipolar affective disorder ([address]    VICTORIANO (obstructive sleep apnea)    Periodic limb movement disorder (PLMD)    Primary osteoarthritis of both hips    Bilateral hip pain    Arthritis of right hip    Obesity, Class III, BMI 40-49.9 (morbid obesity) ([address]    Essential hypertension, benign    Type 2 diabetes mellitus without complication, without long-term current use of insulin (HCC)    Bipolar disorder with depression ([address]    Morbid obesity ([address]    Tear of right rotator cuff    Hypersomnia    Traumatic complete tear of right rotator cuff    H/O total hip arthroplasty, right 2/19/19       Past Medical History:   Diagnosis Date    Arthritis     Bipolar disorder ([address]     Depression     Gastritis     Hypertension     Insomnia     Migraine     Neuropathy     PLMD (periodic limb movement disorder)     Sleep apnea     uses C-PAP    TMJ (temporomandibular joint syndrome)        Past Surgical History:   Procedure Physical Exam  Not done  Assessment:    Obstructive Sleep Apnea/Hypopnea Syndrome    Diagnosis Orders   1. VICTORIANO on CPAP     2. Dependence on other enabling machines and devices     3. Class 3 severe obesity due to excess calories with serious comorbidity and body mass index (BMI) of 40.0 to 44.9 in adult [facility])       Plan:   Has to place the CPAP on the floor. Will continue the PAP at 9 cmwp, I educated the Patient about the PAP machine including how to change the heated humidifier. I will order PAP supplies, mask, filters. ... We discussed the proportionality between weight and AHI. With 10% weight change, the AHI has a 27% proportionate change. With 20% weight change, the AHI has a 45-50% proportionate change. No orders of the defined types were placed in this encounter. No follow-ups on file.     Briana Tesfaye MD  Medical Director - [facility]

## 2020-04-08 NOTE — TELEPHONE ENCOUNTER
CALLED AND SPOKE TO PATIENT AND TOLD HER THAT HER PSYCHOLOGIST DID NOT CALL HER. THAT IS ALL SHE WANTED TO KNOW THEN HUNG UP THE PHONE.  SC

## 2020-04-27 RX ORDER — GABAPENTIN 300 MG/1
CAPSULE ORAL
Qty: 90 CAPSULE | Refills: 0 | Status: SHIPPED | OUTPATIENT
Start: 2020-04-27 | End: 2020-05-27

## 2020-05-04 ENCOUNTER — VIRTUAL VISIT (OUTPATIENT)
Dept: FAMILY MEDICINE CLINIC | Age: 45
End: 2020-05-04
Payer: COMMERCIAL

## 2020-05-04 ENCOUNTER — TELEPHONE (OUTPATIENT)
Dept: FAMILY MEDICINE CLINIC | Age: 45
End: 2020-05-04

## 2020-05-04 VITALS — HEIGHT: 65 IN | BODY MASS INDEX: 44.76 KG/M2

## 2020-05-04 PROCEDURE — G8427 DOCREV CUR MEDS BY ELIG CLIN: HCPCS | Performed by: NURSE PRACTITIONER

## 2020-05-04 PROCEDURE — 99213 OFFICE O/P EST LOW 20 MIN: CPT | Performed by: NURSE PRACTITIONER

## 2020-05-04 RX ORDER — HYDROCODONE BITARTRATE AND ACETAMINOPHEN 10; 325 MG/1; MG/1
1 TABLET ORAL 2 TIMES DAILY PRN
Qty: 80 TABLET | Refills: 0 | Status: SHIPPED | OUTPATIENT
Start: 2020-05-05 | End: 2020-06-04

## 2020-05-04 RX ORDER — LAMOTRIGINE 100 MG/1
100 TABLET ORAL DAILY
COMMUNITY
End: 2020-11-03 | Stop reason: SDUPTHER

## 2020-05-04 RX ORDER — HYDROCODONE BITARTRATE AND ACETAMINOPHEN 10; 325 MG/1; MG/1
1 TABLET ORAL 2 TIMES DAILY PRN
Qty: 60 TABLET | Refills: 0 | Status: SHIPPED | OUTPATIENT
Start: 2020-06-03 | End: 2020-07-03

## 2020-05-04 RX ORDER — HYDROCODONE BITARTRATE AND ACETAMINOPHEN 10; 325 MG/1; MG/1
1 TABLET ORAL 2 TIMES DAILY PRN
Qty: 40 TABLET | Refills: 0 | Status: SHIPPED | OUTPATIENT
Start: 2020-07-03 | End: 2020-08-02

## 2020-05-04 RX ORDER — LISINOPRIL AND HYDROCHLOROTHIAZIDE 12.5; 1 MG/1; MG/1
1 TABLET ORAL DAILY
Qty: 30 TABLET | Refills: 0 | Status: SHIPPED
Start: 2020-05-04 | End: 2020-05-08 | Stop reason: SINTOL

## 2020-05-04 ASSESSMENT — PATIENT HEALTH QUESTIONNAIRE - PHQ9
SUM OF ALL RESPONSES TO PHQ QUESTIONS 1-9: 2
2. FEELING DOWN, DEPRESSED OR HOPELESS: 1
SUM OF ALL RESPONSES TO PHQ QUESTIONS 1-9: 2
SUM OF ALL RESPONSES TO PHQ9 QUESTIONS 1 & 2: 2
1. LITTLE INTEREST OR PLEASURE IN DOING THINGS: 1

## 2020-05-04 NOTE — PROGRESS NOTES
2020     Maria C Jerald Tee (:  1975) is a 50 y.o. female, here for evaluation of the following medical concerns:   Jerald Tee is a 50 y.o. female being evaluated by a Virtual Visit (video visit) encounter to address concerns as mentioned above. A caregiver was present when appropriate. Due to this being a TeleHealth encounter (During - public health emergency), evaluation of the following organ systems was limited: Vitals/Constitutional/EENT/Resp/CV/GI//MS/Neuro/Skin/Heme-Lymph-Imm. Pursuant to the emergency declaration under the [address] [place] [address], [address] [place] [address] authority and the [employer] Act, this Virtual Visit was conducted with patient's (and/or legal guardian's) consent, to reduce the patient's risk of exposure to COVID-19 and provide necessary medical care. The patient (and/or legal guardian) has also been advised to contact this office for worsening conditions or problems, and seek emergency medical treatment and/or call 911 if deemed necessary. Patient identification was verified at the start of the visit: Yes    Total time spent for this encounter: [address] were provided through a video synchronous discussion virtually to substitute for in-person clinic visit. Patient and provider were located at their individual homes. --ROSSANA Mcgowan - CNP on 2020 at 9:06 AM    An electronic signature was used to authenticate this note.   HPI  Chief Complaint   Patient presents with    Pain     NEUROPATHY/CHRONIC PAIN ROUTINE FOLLOW UP   ROUTINE FOLLOW UP - MEDICATION REFILLS- VICODIN-   HTN - SHE NEEDS TO BE BACK ON HER PILLS SO SHE CAN GET HER VYVANSE    PT C/O NUMBNESS AND TINGLING TO BOTH FEET- NO BACK PAIN OR INJURY NOTED- THE PAIN - STINGING SENSATION HAS GOTTEN WORSE- SHE TAKES THE VICODIN AS PRESCRIBED - STATES IT DOES NOT WORK THAT GOOD NOT SURE IF SHE IS GETTING USED TO IT    HER BLOOD PRESSURE HAS BEEN HIGH AT THE PSYCIATRIST OFFICE AND SHE NEEDS TO BE BACK ON HER MEDICATION SO SHE CAN RESTART HER VYVANSE- NO C/O CHEST PAIN- HEADACHES- SHE DOES NOT CHECK HER B/P AT HOME  Review of Systems   Cardiovascular: Negative for chest pain, palpitations and leg swelling. Neurological: Positive for numbness. Psychiatric/Behavioral: Positive for decreased concentration. All other systems reviewed and are negative. Prior to Visit Medications    Medication Sig Taking? Authorizing Provider   lamoTRIgine (LAMICTAL) 100 MG tablet Take 100 mg by mouth daily Yes Historical Provider, MD   HYDROcodone-acetaminophen (NORCO)  MG per tablet Take 1 tablet by mouth 2 times daily as needed for Pain for up to 30 days. Fill 5/5 - G60.9 Yes ROSSANA Mann CNP   lisinopril-hydroCHLOROthiazide (PRINZIDE;ZESTORETIC) 10-12.5 MG per tablet Take 1 tablet by mouth daily Yes ROSSANA Mann CNP   HYDROcodone-acetaminophen (NORCO)  MG per tablet Take 1 tablet by mouth 2 times daily as needed for Pain for up to 30 days. Fill  6/3 G60.9 Yes ROSSANA Mann CNP   HYDROcodone-acetaminophen (NORCO)  MG per tablet Take 1 tablet by mouth 2 times daily as needed for Pain for up to 30 days. FILL 7/3 Yes ROSSANA Mcgowan CNP   gabapentin (NEURONTIN) 300 MG capsule TAKE 1 CAPSULE BY MOUTH THREE TIMES DAILY FOR 30 DAYS Yes ROSSANA Mcgowan CNP   FLUoxetine (PROZAC) 40 MG capsule Take 2 capsules by mouth daily TAKE 1 CAPSULE BY MOUTH ONCE DAILY IN ADDITION Yes ROSSANA Mcgowan CNP   atorvastatin (LIPITOR) 20 MG tablet Take 1 tablet by mouth daily Yes ROSSANA Ross CNP   lithium 300 MG capsule Take 1 capsule by mouth 2 times daily (with meals) Yes ROSSANA Mann CNP   diazepam (VALIUM) 10 MG tablet Take 10 mg by mouth every 6 hours as needed for Anxiety. . Yes Historical seen today for pain. Diagnoses and all orders for this visit:    Essential hypertension, benign  SHE IS ENCOURAGED TO MONITOR B/P GOAL 140 /90 OR LESS lisinopril-hydroCHLOROthiazide (PRINZIDE;ZESTORETIC) 10-12.5 MG per tablet; Take 1 tablet by mouth daily    Idiopathic peripheral neuropathy  Controlled substances monitoring: possible medication side effects, risk of tolerance and/or dependence, and alternative treatments discussed and OARRS report reviewed today- activity consistent with treatment plan. WEANING OFF MEDICATION DUE TO CONCERNS IN REGARDS TO HER MEMORY  AS DISCUSSED WITH DR TONJA FRANCISCO( PSYCHIATRIST) PT INFORMED PRIOR TO VISIT  -     HYDROcodone-acetaminophen (NORCO)  MG per tablet; Take 1 tablet by mouth 2 times daily as needed for Pain for up to 30 days. Fill 5/5 - G60.9 80- TABS  -     HYDROcodone-acetaminophen (NORCO)  MG per tablet; Take 1 tablet by mouth 2 times daily as needed for Pain for up to 30 days. Fill  6/3 G60.9- 60 TABS-     HYDROcodone-acetaminophen (NORCO)  MG per tablet; Take 1 tablet by mouth 2 times daily as needed for Pain for up to 30 days.  FILL 7/3 -40 TABS

## 2020-05-07 ENCOUNTER — TELEPHONE (OUTPATIENT)
Dept: FAMILY MEDICINE CLINIC | Age: 45
End: 2020-05-07

## 2020-05-08 RX ORDER — AMLODIPINE BESYLATE 10 MG/1
10 TABLET ORAL DAILY
Qty: 30 TABLET | Refills: 3 | Status: SHIPPED | OUTPATIENT
Start: 2020-05-08 | End: 2020-09-18

## 2020-05-22 ENCOUNTER — TELEPHONE (OUTPATIENT)
Dept: FAMILY MEDICINE CLINIC | Age: 45
End: 2020-05-22

## 2020-05-22 NOTE — TELEPHONE ENCOUNTER
BETSY @  [phone] - CALLING FROM [employer] -  SHE WOULD LIKE TO SPEAK TO AN MA - IF SOME ONE COULD CALL HER BACK

## 2020-05-26 NOTE — TELEPHONE ENCOUNTER
SPOKE WITH BETSY- STATES ANGIE HAS TAKEN THE VALIUM 3 AT ONE TIME TO HELP HER SLEEP - I INFORMED HER THAT I WILL NO LONGER PRESCRIBE FOR HER - WILL LET ANGIE KNOW AS WELL

## 2020-05-27 RX ORDER — GABAPENTIN 300 MG/1
CAPSULE ORAL
Qty: 90 CAPSULE | Refills: 0 | Status: SHIPPED | OUTPATIENT
Start: 2020-05-27 | End: 2020-06-26

## 2020-06-12 ENCOUNTER — OFFICE VISIT (OUTPATIENT)
Dept: ORTHOPEDIC SURGERY | Age: 45
End: 2020-06-12
Payer: COMMERCIAL

## 2020-06-12 VITALS — HEIGHT: 65 IN | TEMPERATURE: 98.2 F | RESPIRATION RATE: 16 BRPM | BODY MASS INDEX: 44.82 KG/M2 | WEIGHT: 269 LBS

## 2020-06-12 PROCEDURE — 1036F TOBACCO NON-USER: CPT | Performed by: ORTHOPAEDIC SURGERY

## 2020-06-12 PROCEDURE — 99213 OFFICE O/P EST LOW 20 MIN: CPT | Performed by: ORTHOPAEDIC SURGERY

## 2020-06-12 PROCEDURE — G8427 DOCREV CUR MEDS BY ELIG CLIN: HCPCS | Performed by: ORTHOPAEDIC SURGERY

## 2020-06-12 PROCEDURE — G8417 CALC BMI ABV UP PARAM F/U: HCPCS | Performed by: ORTHOPAEDIC SURGERY

## 2020-06-12 NOTE — PROGRESS NOTES
ORTHOPAEDIC OFFICE NOTE    Chief Complaint   Patient presents with    Follow-up     Right shoulder arthroscopy; DOS 11/25/19. HPI   6/12/2020  6.5 months postop  Marguerite Chavez states she is doing well  She has full ROM  Just feels weaker, particularly when moving furniture  She never went to physical therapy postop  Denies N/T    1/14/2020  7 weeks postop  Doing well  Using sling and pillow   Doing HEP  No wound problems  Denies N/T    12/10/2019  First postop  She is doing well  No wound issues  Pain well controlled  Denies N/T  Admits to doing more with the right arm/shoulder than she should    11/25/2019  OPERATION PERFORMED:  Right shoulder arthroscopy, rotator cuff repair,  subacromial decompression with acromioplasty. 10/2/2019  50 y.o. female seen for evaluation of right shoulder pain  This is a second opinion  Previously saw Brian Romano, and was referred to Yessica Davalos, who saw her on Monday and discussed surgery with her.     Onset July 2019  Injury/trauma - was coming down steps, when she fell, she used her right hand to grab onto something and hyperextended the right shoulder - pain since then  History of symptoms none prior to July  Pain is located right posterior shoulder  Worse with laying on right side, sleeping, night time, overhead activities, reaching  Better with arm at side, rest  Associated with stiffness  Associated with intermittent N/T in right hand    Chronic pain from neuropathy - takes norco daily  Smokes 1/2ppd  Not currently working  Depression/anxiety/bipolar disorder    Had Right HUA with Dr Loyd Ojeda in February, overall doing well with it        Allergies   Allergen Reactions    Imitrex [Sumatriptan] Nausea Only     nausea        Current Outpatient Medications   Medication Sig Dispense Refill    gabapentin (NEURONTIN) 300 MG capsule TAKE 1 CAPSULE BY MOUTH THREE TIMES DAILY FOR 30 DAYS 90 capsule 0    amLODIPine (NORVASC) 10 MG tablet Take 1 tablet by mouth daily 30 tablet 3    lamoTRIgine Obesity Maternal Grandmother     No Known Problems Maternal Grandfather     No Known Problems Paternal Grandmother     Diabetes Paternal Grandfather     Arthritis Sister        Social History     Socioeconomic History    Marital status:      Spouse name: Not on file    Number of children: Not on file    Years of education: Not on file    Highest education level: Not on file   Occupational History    Occupation: bank collections   Social Needs    Financial resource strain: Not on file    Food insecurity     Worry: Not on file     Inability: Not on file   [employer] needs     Medical: Not on file     Non-medical: Not on file   Tobacco Use    Smoking status: Former Smoker     Packs/day: 0.50     Years: 10.00     Pack years: 5.00     Types: Cigarettes     Start date: 1987     Last attempt to quit: 2019     Years since quittin.6    Smokeless tobacco: Never Used    Tobacco comment: advised to quit   Substance and Sexual Activity    Alcohol use:  Yes     Alcohol/week: 5.0 standard drinks     Types: 6 Standard drinks or equivalent per week     Comment: rarely    Drug use: Not Currently     Types: Marijuana     Comment: - last smoke months ago-2019    Sexual activity: Yes   Lifestyle    Physical activity     Days per week: Not on file     Minutes per session: Not on file    Stress: Not on file   Relationships    Social connections     Talks on phone: Not on file     Gets together: Not on file     Attends Yarsani service: Not on file     Active member of club or organization: Not on file     Attends meetings of clubs or organizations: Not on file     Relationship status: Not on file    Intimate partner violence     Fear of current or ex partner: Not on file     Emotionally abused: Not on file     Physically abused: Not on file     Forced sexual activity: Not on file   Other Topics Concern    Not on file   Social History Narrative    Not on file        Vitals:    20

## 2020-06-26 RX ORDER — GABAPENTIN 300 MG/1
CAPSULE ORAL
Qty: 90 CAPSULE | Refills: 0 | Status: SHIPPED | OUTPATIENT
Start: 2020-06-26 | End: 2020-07-29

## 2020-07-13 ENCOUNTER — VIRTUAL VISIT (OUTPATIENT)
Dept: SLEEP MEDICINE | Age: 45
End: 2020-07-13
Payer: COMMERCIAL

## 2020-07-13 PROCEDURE — 99213 OFFICE O/P EST LOW 20 MIN: CPT | Performed by: PSYCHIATRY & NEUROLOGY

## 2020-07-13 NOTE — PROGRESS NOTES
MD ASHKAN Vigil Board Certified in Sleep Medicine  Certified in [address] [place] [address] Certified in Neurology [address] [facility] [address] Fausto Jacobs   [phone]   [facility], [facility]                      [address]  [address] [place] [id number] [phone]    Subjective:     Patient ID: Claribel Zuluaga is a 50 y.o. female. No chief complaint on file. HPI:  This is a video visual telehealth visit at patient home, no physical exam performed Patient agreed for this visit. Claribel Zuluaga is a 50 y.o. female was seen today as a follow for mild obstructive sleep apnea. Patient is using the PAP machine about 96% of the time, more than 4 hours a nightabout  91 %, in total average of 7:35 hours a night in last 90 days. Currently on PAP at 9 cm (), the AHI is only 3.2 events per hour at this pressure. Patient improved regarding daytime sleepiness and fatigue, wakes up refreshed in the morning. The Patient scored   on Crosby Sleepiness Scale ( more than 10 is indicative of daytime sleepiness)   Patient has no problem with PAP pressure or mask. Need smore cushion.      DOT/CDL - N/A        Previous Report(s)Reviewed: historical medical records         Social History     Socioeconomic History    Marital status:      Spouse name: Not on file    Number of children: Not on file    Years of education: Not on file    Highest education level: Not on file   Occupational History    Occupation: bank collections   Social Needs    Financial resource strain: Not on file    Food insecurity     Worry: Not on file     Inability: Not on file   [employer] needs     Medical: Not on file     Non-medical: Not on file   Tobacco Use    Smoking status: Former Smoker     Packs/day: 0.50     Years: 10.00     Pack years: 5.00     Types: Cigarettes     Start date: 1987     Last attempt to quit: 2019     Years since quittin.6    Smokeless tobacco: Never Used    Tobacco comment: advised to quit   Substance and Sexual Activity    Alcohol use: Yes     Alcohol/week: 5.0 standard drinks     Types: 6 Standard drinks or equivalent per week     Comment: rarely    Drug use: Not Currently     Types: Marijuana     Comment: - last smoke months ago-2019    Sexual activity: Yes   Lifestyle    Physical activity     Days per week: Not on file     Minutes per session: Not on file    Stress: Not on file   Relationships    Social connections     Talks on phone: Not on file     Gets together: Not on file     Attends Mormon service: Not on file     Active member of club or organization: Not on file     Attends meetings of clubs or organizations: Not on file     Relationship status: Not on file    Intimate partner violence     Fear of current or ex partner: Not on file     Emotionally abused: Not on file     Physically abused: Not on file     Forced sexual activity: Not on file   Other Topics Concern    Not on file   Social History Narrative    Not on file       Prior to Admission medications    Medication Sig Start Date End Date Taking? Authorizing Provider   gabapentin (NEURONTIN) 300 MG capsule TAKE 1 CAPSULE BY MOUTH THREE TIMES DAILY FOR 30 DAYS 20  ROSSANA Fong CNP   amLODIPine (NORVASC) 10 MG tablet Take 1 tablet by mouth daily 20   ROSSANA Fong CNP   lamoTRIgine (LAMICTAL) 100 MG tablet Take 100 mg by mouth daily    Historical Provider, MD   HYDROcodone-acetaminophen (NORCO)  MG per tablet Take 1 tablet by mouth 2 times daily as needed for Pain for up to 30 days.  FILL 7/3 7/3/20 8/2/20  ROSSANA Fong CNP   FLUoxetine (PROZAC) 40 MG capsule Take 2 capsules by mouth daily TAKE 1 CAPSULE BY MOUTH ONCE DAILY IN ADDITION 20 12/31/20  Mishel Dye, APRN - CNP   atorvastatin (LIPITOR) 20 MG tablet Take 1 tablet by mouth daily 1/14/20   ROSSANA Tellez CNP   lithium 300 MG capsule Take 1 capsule by mouth 2 times daily (with meals) 1/14/20   ROSSANA Tellez CNP       Allergies as of 07/13/2020 - Review Complete 06/12/2020   Allergen Reaction Noted    Imitrex [sumatriptan] Nausea Only 02/04/2013       Patient Active Problem List   Diagnosis    Migraine    Amenorrhea, secondary    Depression    Gastritis    TMJ (temporomandibular joint syndrome)    Lipoma    Grief reaction    Anxiety    Tobacco abuse    Insomnia    Bipolar affective disorder ([address]    VICTORIANO (obstructive sleep apnea)    Periodic limb movement disorder (PLMD)    Primary osteoarthritis of both hips    Bilateral hip pain    Arthritis of right hip    Obesity, Class III, BMI 40-49.9 (morbid obesity) ([address]    Essential hypertension, benign    Type 2 diabetes mellitus without complication, without long-term current use of insulin (HCC)    Bipolar disorder with depression ([address]    Morbid obesity ([address]    Tear of right rotator cuff    Hypersomnia    Traumatic complete tear of right rotator cuff    H/O total hip arthroplasty, right 2/19/19       Past Medical History:   Diagnosis Date    Arthritis     Bipolar disorder ([address]     Depression     Gastritis     Hypertension     Insomnia     Migraine     Neuropathy     PLMD (periodic limb movement disorder)     Sleep apnea     uses C-PAP    TMJ (temporomandibular joint syndrome)        Past Surgical History:   Procedure Laterality Date    COLPOSCOPY      ENDOMETRIAL ABLATION      FOOT SURGERY      JOINT REPLACEMENT Right 02/19/2019    RIGHT LATERAL TOTAL HIP REPLACEMENT    SHOULDER ARTHROSCOPY Right 11/25/2019    RIGHT SHOULDER ARTHROSCOPY, SUBACROMIAL DECOMPRESSION,   ROTATOR CUFF REPAIR performed by Giorgi Mullins MD at [address] [place] [address] ARTHROPLASTY Right 2/19/2019    RIGHT LATERAL TOTAL HIP REPLACEMENT performed by Abdulaziz Carlson MD at [facility] History   Problem Relation Age of Onset    COPD Mother     Mult Sclerosis Father     No Known Problems Sister     High Cholesterol Brother     Diabetes Brother     Obesity Brother     Arthritis Brother     Other Maternal Grandmother         hips replacement    Obesity Maternal Grandmother     No Known Problems Maternal Grandfather     No Known Problems Paternal Grandmother     Diabetes Paternal Grandfather     Arthritis Sister        Review of Systems    Objective:     Vitals:  Weight BMI Neck circumference    Wt Readings from Last 3 Encounters:   06/12/20 269 lb (122 kg)   03/10/20 269 lb (122 kg)   02/19/20 265 lb (120.2 kg)    There is no height or weight on file to calculate BMI. BP HR SaO2   BP Readings from Last 3 Encounters:   03/10/20 (!) 132/90   02/19/20 128/78   01/14/20 128/84    Pulse Readings from Last 3 Encounters:   03/10/20 91   02/19/20 72   01/14/20 70    SpO2 Readings from Last 3 Encounters:   03/10/20 96%   11/25/19 100%   11/25/19 92%        Physical Exam    :   Mild Obstructive Sleep Apnea/Hypopnea Syndrome under good control on PAP at 9 cmwp. Diagnosis Orders   1. VICTORIANO on CPAP     2. Dependence on other enabling machines and devices       Plan: Will continue the PAP at 9 cmwp. I will order PAP supplies, mask, filters. ... No orders of the defined types were placed in this encounter. Return in about 1 year (around 7/13/2021) for Reveiwing CPAP usage and compliance report and tro.     Johny Esposito MD  Medical Director - [facility]

## 2020-07-14 ENCOUNTER — VIRTUAL VISIT (OUTPATIENT)
Dept: FAMILY MEDICINE CLINIC | Age: 45
End: 2020-07-14
Payer: COMMERCIAL

## 2020-07-14 PROCEDURE — G8427 DOCREV CUR MEDS BY ELIG CLIN: HCPCS | Performed by: NURSE PRACTITIONER

## 2020-07-14 PROCEDURE — 1036F TOBACCO NON-USER: CPT | Performed by: NURSE PRACTITIONER

## 2020-07-14 PROCEDURE — 99213 OFFICE O/P EST LOW 20 MIN: CPT | Performed by: NURSE PRACTITIONER

## 2020-07-14 PROCEDURE — G8417 CALC BMI ABV UP PARAM F/U: HCPCS | Performed by: NURSE PRACTITIONER

## 2020-07-14 RX ORDER — CIPROFLOXACIN 500 MG/1
500 TABLET, FILM COATED ORAL 2 TIMES DAILY
Qty: 20 TABLET | Refills: 0 | Status: SHIPPED | OUTPATIENT
Start: 2020-07-14 | End: 2020-07-20 | Stop reason: CLARIF

## 2020-07-14 RX ORDER — METRONIDAZOLE 500 MG/1
500 TABLET ORAL 3 TIMES DAILY
Qty: 30 TABLET | Refills: 0 | Status: SHIPPED | OUTPATIENT
Start: 2020-07-14 | End: 2020-07-20 | Stop reason: CLARIF

## 2020-07-14 ASSESSMENT — ENCOUNTER SYMPTOMS
DIARRHEA: 1
BLOOD IN STOOL: 0
ABDOMINAL PAIN: 1
NAUSEA: 1

## 2020-07-14 NOTE — PROGRESS NOTES
SUBJECTIVE:    Robin Bingham is a 50 y.o. female evaluated via telephone on 7/14/2020. Consent:  She and/or health care decision maker is aware that that she may receive a bill for this telephone service, depending on her insurance coverage, and has provided verbal consent to proceed: Yes      Documentation:  I communicated with the patient and/or health care decision maker about abdominal pain- appendicitis- diverticulitis - when to go to the ER. Details of this discussion including any medical advice provided:       I affirm this is a Patient Initiated Episode with a Patient who has not had a related appointment within my department in the past 7 days or scheduled within the next 24 hours. Patient identification was verified at the start of the visit: Yes    Total Time: minutes: 11-20 minutes    Note: not billable if this call serves to triage the patient into an appointment for the relevant concern      Angie Ag   An electronic signature was used to authenticate this note. HPI  Chief Complaint   Patient presents with    Pain     STOMACH PAIN FOR WEEKS STARTED OFF AS SHOOTING PAIN CANNOT EAT PAIN GOES TO LEFT SIDE EXHAUSTED DIARHHEA ALL WEEKEND NO FEVER HAS NOT TAKEN ANYTHING OTC, NO BLOOD IN STOOL      Pt c/o abdominal pain for the last three weeks getting worse started as a sharp pain off and on - she feels bloated sharp pain in lower left side tender to touch at the bottom and it feels like a really bad cramp - no diarrhea today but over the weekend - no blood noted - she has not had any fevers that she is aware of   She rates the pain 8/10 - she does have her appendix  Review of Systems   Gastrointestinal: Positive for abdominal pain, diarrhea and nausea. Negative for blood in stool. All other systems reviewed and are negative.        OBJECTIVE:        Physical Exam  Psychiatric:         Attention and Perception: Attention and perception normal.         Mood and Affect: Mood

## 2020-07-16 ENCOUNTER — APPOINTMENT (OUTPATIENT)
Dept: CT IMAGING | Age: 45
End: 2020-07-16
Payer: COMMERCIAL

## 2020-07-16 ENCOUNTER — HOSPITAL ENCOUNTER (EMERGENCY)
Age: 45
Discharge: HOME | End: 2020-07-16
Payer: COMMERCIAL

## 2020-07-16 VITALS
SYSTOLIC BLOOD PRESSURE: 138 MMHG | OXYGEN SATURATION: 95 % | RESPIRATION RATE: 15 BRPM | BODY MASS INDEX: 42.23 KG/M2 | TEMPERATURE: 98.6 F | WEIGHT: 262.79 LBS | DIASTOLIC BLOOD PRESSURE: 64 MMHG | HEIGHT: 66 IN | HEART RATE: 58 BPM

## 2020-07-16 LAB
A/G RATIO: 1.6 (ref 1.1–2.2)
ALBUMIN SERPL-MCNC: 4.2 G/DL (ref 3.4–5)
ALP BLD-CCNC: 87 U/L (ref 40–129)
ALT SERPL-CCNC: 16 U/L (ref 10–40)
ANION GAP SERPL CALCULATED.3IONS-SCNC: 11 MMOL/L (ref 3–16)
AST SERPL-CCNC: 14 U/L (ref 15–37)
BASOPHILS ABSOLUTE: 0.1 K/UL (ref 0–0.2)
BASOPHILS RELATIVE PERCENT: 0.6 %
BILIRUB SERPL-MCNC: <0.2 MG/DL (ref 0–1)
BILIRUBIN URINE: NEGATIVE
BLOOD, URINE: NEGATIVE
BUN BLDV-MCNC: 16 MG/DL (ref 7–20)
CALCIUM SERPL-MCNC: 9.5 MG/DL (ref 8.3–10.6)
CHLORIDE BLD-SCNC: 103 MMOL/L (ref 99–110)
CLARITY: ABNORMAL
CO2: 22 MMOL/L (ref 21–32)
COLOR: YELLOW
CREAT SERPL-MCNC: 0.7 MG/DL (ref 0.6–1.1)
EOSINOPHILS ABSOLUTE: 0.2 K/UL (ref 0–0.6)
EOSINOPHILS RELATIVE PERCENT: 1.7 %
EPITHELIAL CELLS, UA: 2 /HPF (ref 0–5)
GFR AFRICAN AMERICAN: >60
GFR NON-AFRICAN AMERICAN: >60
GLOBULIN: 2.7 G/DL
GLUCOSE BLD-MCNC: 95 MG/DL (ref 70–99)
GLUCOSE URINE: NEGATIVE MG/DL
HCG QUALITATIVE: NEGATIVE
HCT VFR BLD CALC: 41.3 % (ref 36–48)
HEMOGLOBIN: 13.5 G/DL (ref 12–16)
HYALINE CASTS: 0 /LPF (ref 0–8)
KETONES, URINE: ABNORMAL MG/DL
LEUKOCYTE ESTERASE, URINE: ABNORMAL
LIPASE: 20 U/L (ref 13–60)
LYMPHOCYTES ABSOLUTE: 2.3 K/UL (ref 1–5.1)
LYMPHOCYTES RELATIVE PERCENT: 24.8 %
MCH RBC QN AUTO: 29.7 PG (ref 26–34)
MCHC RBC AUTO-ENTMCNC: 32.8 G/DL (ref 31–36)
MCV RBC AUTO: 90.6 FL (ref 80–100)
MICROSCOPIC EXAMINATION: YES
MONOCYTES ABSOLUTE: 0.6 K/UL (ref 0–1.3)
MONOCYTES RELATIVE PERCENT: 6.8 %
NEUTROPHILS ABSOLUTE: 6 K/UL (ref 1.7–7.7)
NEUTROPHILS RELATIVE PERCENT: 66.1 %
NITRITE, URINE: NEGATIVE
PDW BLD-RTO: 13.7 % (ref 12.4–15.4)
PH UA: 5 (ref 5–8)
PLATELET # BLD: 226 K/UL (ref 135–450)
PMV BLD AUTO: 8.2 FL (ref 5–10.5)
POTASSIUM REFLEX MAGNESIUM: 4.2 MMOL/L (ref 3.5–5.1)
PROTEIN UA: NEGATIVE MG/DL
RBC # BLD: 4.56 M/UL (ref 4–5.2)
RBC UA: 1 /HPF (ref 0–4)
SODIUM BLD-SCNC: 136 MMOL/L (ref 136–145)
SPECIFIC GRAVITY UA: 1.01 (ref 1–1.03)
TOTAL PROTEIN: 6.9 G/DL (ref 6.4–8.2)
URINE REFLEX TO CULTURE: ABNORMAL
URINE TYPE: ABNORMAL
UROBILINOGEN, URINE: 0.2 E.U./DL
WBC # BLD: 9.1 K/UL (ref 4–11)
WBC UA: 2 /HPF (ref 0–5)

## 2020-07-16 PROCEDURE — 96375 TX/PRO/DX INJ NEW DRUG ADDON: CPT

## 2020-07-16 PROCEDURE — 85025 COMPLETE CBC W/AUTO DIFF WBC: CPT

## 2020-07-16 PROCEDURE — 2500000003 HC RX 250 WO HCPCS: Performed by: PHYSICIAN ASSISTANT

## 2020-07-16 PROCEDURE — 2580000003 HC RX 258: Performed by: PHYSICIAN ASSISTANT

## 2020-07-16 PROCEDURE — 84703 CHORIONIC GONADOTROPIN ASSAY: CPT

## 2020-07-16 PROCEDURE — 6360000002 HC RX W HCPCS: Performed by: PHYSICIAN ASSISTANT

## 2020-07-16 PROCEDURE — 96374 THER/PROPH/DIAG INJ IV PUSH: CPT

## 2020-07-16 PROCEDURE — 6370000000 HC RX 637 (ALT 250 FOR IP): Performed by: PHYSICIAN ASSISTANT

## 2020-07-16 PROCEDURE — 6360000004 HC RX CONTRAST MEDICATION: Performed by: PHYSICIAN ASSISTANT

## 2020-07-16 PROCEDURE — 83690 ASSAY OF LIPASE: CPT

## 2020-07-16 PROCEDURE — 99284 EMERGENCY DEPT VISIT MOD MDM: CPT

## 2020-07-16 PROCEDURE — 80053 COMPREHEN METABOLIC PANEL: CPT

## 2020-07-16 PROCEDURE — 74177 CT ABD & PELVIS W/CONTRAST: CPT

## 2020-07-16 PROCEDURE — 80175 DRUG SCREEN QUAN LAMOTRIGINE: CPT

## 2020-07-16 PROCEDURE — 81001 URINALYSIS AUTO W/SCOPE: CPT

## 2020-07-16 RX ORDER — ONDANSETRON 2 MG/ML
4 INJECTION INTRAMUSCULAR; INTRAVENOUS ONCE
Status: COMPLETED | OUTPATIENT
Start: 2020-07-16 | End: 2020-07-16

## 2020-07-16 RX ORDER — ONDANSETRON 4 MG/1
4 TABLET, ORALLY DISINTEGRATING ORAL EVERY 8 HOURS PRN
Qty: 10 TABLET | Refills: 0 | Status: SHIPPED | OUTPATIENT
Start: 2020-07-16 | End: 2021-03-08

## 2020-07-16 RX ORDER — DICYCLOMINE HYDROCHLORIDE 10 MG/1
20 CAPSULE ORAL ONCE
Status: COMPLETED | OUTPATIENT
Start: 2020-07-16 | End: 2020-07-16

## 2020-07-16 RX ORDER — 0.9 % SODIUM CHLORIDE 0.9 %
1000 INTRAVENOUS SOLUTION INTRAVENOUS ONCE
Status: COMPLETED | OUTPATIENT
Start: 2020-07-16 | End: 2020-07-16

## 2020-07-16 RX ORDER — DICYCLOMINE HYDROCHLORIDE 10 MG/1
10 CAPSULE ORAL 4 TIMES DAILY
Qty: 10 CAPSULE | Refills: 0 | Status: SHIPPED | OUTPATIENT
Start: 2020-07-16 | End: 2020-09-09 | Stop reason: ALTCHOICE

## 2020-07-16 RX ORDER — KETOROLAC TROMETHAMINE 30 MG/ML
15 INJECTION, SOLUTION INTRAMUSCULAR; INTRAVENOUS ONCE
Status: COMPLETED | OUTPATIENT
Start: 2020-07-16 | End: 2020-07-16

## 2020-07-16 RX ADMIN — ONDANSETRON 4 MG: 2 INJECTION INTRAMUSCULAR; INTRAVENOUS at 20:50

## 2020-07-16 RX ADMIN — DICYCLOMINE HYDROCHLORIDE 20 MG: 10 CAPSULE ORAL at 21:33

## 2020-07-16 RX ADMIN — SODIUM CHLORIDE 1000 ML: 9 INJECTION, SOLUTION INTRAVENOUS at 20:51

## 2020-07-16 RX ADMIN — KETOROLAC TROMETHAMINE 15 MG: 30 INJECTION, SOLUTION INTRAMUSCULAR at 20:50

## 2020-07-16 RX ADMIN — FAMOTIDINE 20 MG: 10 INJECTION, SOLUTION INTRAVENOUS at 20:51

## 2020-07-16 RX ADMIN — IOPAMIDOL 75 ML: 755 INJECTION, SOLUTION INTRAVENOUS at 19:57

## 2020-07-16 ASSESSMENT — ENCOUNTER SYMPTOMS
SHORTNESS OF BREATH: 0
VOMITING: 0
BACK PAIN: 0
COLOR CHANGE: 0
ABDOMINAL PAIN: 1
DIARRHEA: 1

## 2020-07-16 ASSESSMENT — PAIN - FUNCTIONAL ASSESSMENT
PAIN_FUNCTIONAL_ASSESSMENT: PREVENTS OR INTERFERES SOME ACTIVE ACTIVITIES AND ADLS
PAIN_FUNCTIONAL_ASSESSMENT: PREVENTS OR INTERFERES SOME ACTIVE ACTIVITIES AND ADLS

## 2020-07-16 ASSESSMENT — PAIN DESCRIPTION - FREQUENCY
FREQUENCY: CONTINUOUS
FREQUENCY: INTERMITTENT

## 2020-07-16 ASSESSMENT — PAIN DESCRIPTION - PAIN TYPE
TYPE: ACUTE PAIN

## 2020-07-16 ASSESSMENT — PAIN SCALES - GENERAL
PAINLEVEL_OUTOF10: 7
PAINLEVEL_OUTOF10: 3
PAINLEVEL_OUTOF10: 2

## 2020-07-16 ASSESSMENT — PAIN DESCRIPTION - DESCRIPTORS
DESCRIPTORS: ACHING
DESCRIPTORS: ACHING;CRAMPING
DESCRIPTORS: CRAMPING
DESCRIPTORS: ACHING

## 2020-07-16 ASSESSMENT — PAIN SCALES - WONG BAKER: WONGBAKER_NUMERICALRESPONSE: 8

## 2020-07-16 ASSESSMENT — PAIN DESCRIPTION - ORIENTATION
ORIENTATION: MID
ORIENTATION: LOWER

## 2020-07-16 ASSESSMENT — PAIN DESCRIPTION - LOCATION
LOCATION: ABDOMEN

## 2020-07-16 ASSESSMENT — PAIN DESCRIPTION - PROGRESSION
CLINICAL_PROGRESSION: GRADUALLY WORSENING
CLINICAL_PROGRESSION: NOT CHANGED

## 2020-07-16 ASSESSMENT — PAIN DESCRIPTION - ONSET
ONSET: ON-GOING
ONSET: PROGRESSIVE

## 2020-07-16 NOTE — ED TRIAGE NOTES
Pt to ED with c/o lower abd pain and cramping that started 2 weeks ago.   States the pain has become more severe over the past several days along with n/v/d.

## 2020-07-17 ENCOUNTER — TELEPHONE (OUTPATIENT)
Dept: FAMILY MEDICINE CLINIC | Age: 45
End: 2020-07-17

## 2020-07-17 NOTE — ED NOTES
Report given to receiving RN Melvia Landau, all questions answered. Pain assessment completed as documented.       Lelia Bender RN  07/16/20 4255

## 2020-07-17 NOTE — ED PROVIDER NOTES
pain, neck pain and neck stiffness. Skin: Negative for color change, rash and wound. Neurological: Negative for weakness and numbness. Psychiatric/Behavioral: Negative for agitation, behavioral problems and confusion. Except as noted above the remainder of the review of systems was reviewed and negative. PAST MEDICAL HISTORY         Diagnosis Date    Arthritis     Bipolar disorder ([address]     Depression     Gastritis     Hypertension     Insomnia     Migraine     Neuropathy     PLMD (periodic limb movement disorder)     Sleep apnea     uses C-PAP    TMJ (temporomandibular joint syndrome)        SURGICAL HISTORY           Procedure Laterality Date    COLPOSCOPY      ENDOMETRIAL ABLATION      FOOT SURGERY      JOINT REPLACEMENT Right 02/19/2019    RIGHT LATERAL TOTAL HIP REPLACEMENT    SHOULDER ARTHROSCOPY Right 11/25/2019    RIGHT SHOULDER ARTHROSCOPY, SUBACROMIAL DECOMPRESSION,   ROTATOR CUFF REPAIR performed by Elaine Davidson MD at [place] [address] Right 2/19/2019    RIGHT LATERAL TOTAL HIP REPLACEMENT performed by Lindsey Simon MD at [address] [place] [address]       Discharge Medication List as of 7/16/2020 10:10 PM      CONTINUE these medications which have NOT CHANGED    Details   ciprofloxacin (CIPRO) 500 MG tablet Take 1 tablet by mouth 2 times daily for 10 days, Disp-20 tablet,R-0Normal      metroNIDAZOLE (FLAGYL) 500 MG tablet Take 1 tablet by mouth 3 times daily for 10 days, Disp-30 tablet,R-0Normal      gabapentin (NEURONTIN) 300 MG capsule TAKE 1 CAPSULE BY MOUTH THREE TIMES DAILY FOR 30 DAYS, Disp-90 capsule,R-0Normal      amLODIPine (NORVASC) 10 MG tablet Take 1 tablet by mouth daily, Disp-30 tablet, R-3Normal      lamoTRIgine (LAMICTAL) 100 MG tablet Take 100 mg by mouth dailyHistorical Med      HYDROcodone-acetaminophen (NORCO)  MG per tablet Take 1 tablet by mouth 2 times daily as needed for Pain for up to 30 days. FILL 7/3, Disp-40 tablet, R-0Normal      FLUoxetine (PROZAC) 40 MG capsule Take 2 capsules by mouth daily TAKE 1 CAPSULE BY MOUTH ONCE DAILY IN ADDITION, Disp-180 capsule, R-3Please consider 90 day supplies to promote better adherenceNormal      atorvastatin (LIPITOR) 20 MG tablet Take 1 tablet by mouth daily, Disp-90 tablet, R-2Normal      lithium 300 MG capsule Take 1 capsule by mouth 2 times daily (with meals), Disp-60 capsule, R-5Normal             ALLERGIES     Imitrex [sumatriptan]    FAMILY HISTORY           Problem Relation Age of Onset    COPD Mother     Mult Sclerosis Father     No Known Problems Sister     High Cholesterol Brother     Diabetes Brother     Obesity Brother     Arthritis Brother     Other Maternal Grandmother         hips replacement    Obesity Maternal Grandmother     No Known Problems Maternal Grandfather     No Known Problems Paternal Grandmother     Diabetes Paternal Grandfather     Arthritis Sister      Family Status   Relation Name Status    Mother      Father      Sister  Alive    Brother  Alive        bilaterl hip replacements    MGM      MGF      PGM      PGF      Sister  Alive        bilaterl Hip replacement        SOCIAL HISTORY      reports that she quit smoking about 8 months ago. Her smoking use included cigarettes. She started smoking about 32 years ago. She has a 5.00 pack-year smoking history. She has never used smokeless tobacco. She reports current alcohol use of about 5.0 standard drinks of alcohol per week. She reports previous drug use. Drug: Marijuana.     PHYSICAL EXAM    (up to 7 for level 4, 8 or more for level 5)     ED Triage Vitals   BP Temp Temp Source Pulse Resp SpO2 Height Weight   20 1816 20 1816 20 1816 20 1816 20 1816 20 1816 20 1825 20 1825   114/88 97.5 °F (36.4 °C) Oral 99 18 96 % 5' 6\" (1.676 m) 262 lb 12.6 oz (119.2 kg)       Physical Exam  Vitals signs and nursing note reviewed. Constitutional:       Appearance: Normal appearance. HENT:      Head: Normocephalic and atraumatic. Eyes:      Pupils: Pupils are equal, round, and reactive to light. Cardiovascular:      Rate and Rhythm: Normal rate. Pulses: Normal pulses. Pulmonary:      Effort: Pulmonary effort is normal. No respiratory distress. Abdominal:      Tenderness: There is abdominal tenderness. There is no guarding or rebound. Musculoskeletal: Normal range of motion. Skin:     General: Skin is warm. Neurological:      General: No focal deficit present. Mental Status: She is alert and oriented to person, place, and time. Psychiatric:         Mood and Affect: Mood normal.         Behavior: Behavior normal.         DIAGNOSTIC RESULTS     RADIOLOGY:   Non-plain film images such as CT, Ultrasound and MRI are read by the radiologist. Plain radiographic images are visualized and preliminarily interpreted by JESS Salgado with the below findings:    Reviewed radiologist's interpretation. Interpretation per the Radiologist below, if available at the time of this note:    CT ABDOMEN PELVIS W IV CONTRAST Additional Contrast? None   Final Result   No diverticulitis or acute inflammatory abnormality is identified. The   spleen is normal in size. No urolithiasis or obstructive uropathy. Hepatic steatosis. Cholelithiasis.                LABS:  Labs Reviewed   COMPREHENSIVE METABOLIC PANEL W/ REFLEX TO MG FOR LOW K - Abnormal; Notable for the following components:       Result Value    AST 14 (*)     All other components within normal limits    Narrative:     Performed at:  [address] [address]   Phone [phone]   URINE RT REFLEX TO CULTURE - Abnormal; Notable for the following components:    Clarity, UA CLOUDY (*)     Ketones, Urine TRACE (*)     Leukocyte Esterase, Urine TRACE (*)     All other components within normal limits    Narrative:     Performed at:  [facility]  [address] [facility] [address] [place] [address]   Phone [phone]   GASTROINTESTINAL PANEL, MOLECULAR   C DIFF TOXIN/ANTIGEN   HCG, SERUM, QUALITATIVE    Narrative:     Performed at:  [facility]  [address] [facility] [place] [place] [address]   Phone [phone]   CBC WITH AUTO DIFFERENTIAL    Narrative:     Performed at:  [facility]  [address] [facility] [address] [facility] [place] [address]   Phone [phone]   LIPASE    Narrative:     Performed at:  [facility]  [facility] [address] [facility] [place] [address]   Phone [phone]   MICROSCOPIC URINALYSIS    Narrative:     Performed at:  [facility]  [address] [facility] [place] [place] [address]   Phone [phone]   LAMOTRIGINE LEVEL       All other labs were within normal range or not returned as of this dictation. EMERGENCY DEPARTMENT COURSE and DIFFERENTIAL DIAGNOSIS/MDM:   Vitals:    Vitals:    07/16/20 2132 07/16/20 2147 07/16/20 2202 07/16/20 2209   BP: (!) 138/52 139/72 138/64    Pulse: 58 58 61 58   Resp: 16 14 14 15   Temp:       TempSrc:       SpO2: 97% 98% 98% 95%   Weight:       Height:         I discussed with Moon Matos and/or family the exam results, diagnosis, care, prognosis, reasons to return and the importance of follow up. Patient and/or family is in full agreement with plan and all questions have been answered. Specific discharge instructions explained, including reasons to return to the emergency department. Moon Matos is well appearing, non-toxic, and afebrile at the time of discharge. Patient has left-sided abdominal pain primarily left lower quadrant no rebound, guarding or rigidity. Afebrile. Having diarrhea.   I have low suspicion for infectious diarrhea because she was

## 2020-07-17 NOTE — TELEPHONE ENCOUNTER
GENERAL SURGERY REFERRAL - FOR EVALUATION      General and Vascular Surgery - Howard Schlatter, MD  [address] [facility]  [phone]

## 2020-07-17 NOTE — TELEPHONE ENCOUNTER
SHE WENT TO [place] AND THEY FOUND GALL STONES -  THEY DIDN'T GIVE HER ANY TREATMENT PLAN - PT WANTING TO SPEAK TO Rola Felton    PT @  [phone]

## 2020-07-18 LAB — LAMOTRIGINE LEVEL: 1.8 UG/ML (ref 2.5–15)

## 2020-07-20 ENCOUNTER — INITIAL CONSULT (OUTPATIENT)
Dept: SURGERY | Age: 45
End: 2020-07-20
Payer: COMMERCIAL

## 2020-07-20 VITALS
BODY MASS INDEX: 41.12 KG/M2 | HEIGHT: 67 IN | WEIGHT: 262 LBS | SYSTOLIC BLOOD PRESSURE: 124 MMHG | DIASTOLIC BLOOD PRESSURE: 80 MMHG

## 2020-07-20 PROCEDURE — G8427 DOCREV CUR MEDS BY ELIG CLIN: HCPCS | Performed by: SURGERY

## 2020-07-20 PROCEDURE — 99243 OFF/OP CNSLTJ NEW/EST LOW 30: CPT | Performed by: SURGERY

## 2020-07-20 PROCEDURE — G8417 CALC BMI ABV UP PARAM F/U: HCPCS | Performed by: SURGERY

## 2020-07-22 ASSESSMENT — ENCOUNTER SYMPTOMS: ABDOMINAL PAIN: 1

## 2020-07-22 NOTE — PROGRESS NOTES
Subjective:      Patient ID: Ryan Bailey is a 50 y.o. female. HPI  Chief Complaint: \"I have gallstones\"    Patient referred by BRINDA Ag for evaluation of cholelithiasis. Patient reports symptoms of LLQ cramping. Symptoms were first noted last week after she \"cheated\" and went off her Keto diet for a day. Previous evaluation includes CT which did not reveal a cause of the LLQ but did show cholelithiasis. Patient has no history of RUQ pain or issues with fatty foods. No history of pancreatitis or jaundice. Will plan following treatment: observation. Signs and symptoms of gallbladder issues were discussed and she will call if any new symptoms arise. Past Medical History:   Diagnosis Date    Arthritis     Bipolar disorder ([address]     Depression     Gastritis     Hypertension     Insomnia     Migraine     Neuropathy     PLMD (periodic limb movement disorder)     Sleep apnea     uses C-PAP    TMJ (temporomandibular joint syndrome)        Past Surgical History:   Procedure Laterality Date    COLPOSCOPY      ENDOMETRIAL ABLATION      FOOT SURGERY      JOINT REPLACEMENT Right 02/19/2019    RIGHT LATERAL TOTAL HIP REPLACEMENT    SHOULDER ARTHROSCOPY Right 11/25/2019    RIGHT SHOULDER ARTHROSCOPY, SUBACROMIAL DECOMPRESSION,   ROTATOR CUFF REPAIR performed by Elías Pham MD at [facility] Right 2/19/2019    RIGHT LATERAL TOTAL HIP REPLACEMENT performed by Tara Garza MD at [facility]       Current Outpatient Medications   Medication Sig Dispense Refill    ondansetron (ZOFRAN ODT) 4 MG disintegrating tablet Take 1 tablet by mouth every 8 hours as needed for Nausea or Vomiting Let dissolve in mouth.  10 tablet 0    gabapentin (NEURONTIN) 300 MG capsule TAKE 1 CAPSULE BY MOUTH THREE TIMES DAILY FOR 30 DAYS 90 capsule 0    amLODIPine (NORVASC) 10 MG tablet Take 1 tablet by mouth daily 30 tablet 3    lamoTRIgine (LAMICTAL) 100 MG tablet Take 100 mg by mouth daily      HYDROcodone-acetaminophen (NORCO)  MG per tablet Take 1 tablet by mouth 2 times daily as needed for Pain for up to 30 days. FILL 7/3 40 tablet 0    FLUoxetine (PROZAC) 40 MG capsule Take 2 capsules by mouth daily TAKE 1 CAPSULE BY MOUTH ONCE DAILY IN ADDITION 180 capsule 3    atorvastatin (LIPITOR) 20 MG tablet Take 1 tablet by mouth daily 90 tablet 2    lithium 300 MG capsule Take 1 capsule by mouth 2 times daily (with meals) 60 capsule 5    dicyclomine (BENTYL) 10 MG capsule Take 1 capsule by mouth 4 times daily for 10 doses 10 capsule 0     No current facility-administered medications for this visit. Prior to Admission medications    Medication Sig Start Date End Date Taking? Authorizing Provider   ondansetron (ZOFRAN ODT) 4 MG disintegrating tablet Take 1 tablet by mouth every 8 hours as needed for Nausea or Vomiting Let dissolve in mouth. 7/16/20  Yes JESS Yadav   gabapentin (NEURONTIN) 300 MG capsule TAKE 1 CAPSULE BY MOUTH THREE TIMES DAILY FOR 30 DAYS 6/26/20 7/26/20 Yes ROSSANA Arevalo CNP   amLODIPine (NORVASC) 10 MG tablet Take 1 tablet by mouth daily 5/8/20  Yes ROSSANA Rios CNP   lamoTRIgine (LAMICTAL) 100 MG tablet Take 100 mg by mouth daily   Yes Historical Provider, MD   HYDROcodone-acetaminophen (NORCO)  MG per tablet Take 1 tablet by mouth 2 times daily as needed for Pain for up to 30 days.  FILL 7/3 7/3/20 8/2/20 Yes ROSSANA Sotelo CNP   FLUoxetine (PROZAC) 40 MG capsule Take 2 capsules by mouth daily TAKE 1 CAPSULE BY MOUTH ONCE DAILY IN ADDITION 1/14/20 12/31/20 Yes ROSSANA Rios CNP   atorvastatin (LIPITOR) 20 MG tablet Take 1 tablet by mouth daily 1/14/20  Yes ROSSANA Rios CNP   lithium 300 MG capsule Take 1 capsule by mouth 2 times daily (with meals) 1/14/20  Yes ROSSANA Soteol CNP   dicyclomine (BENTYL) 10 MG capsule Take 1 capsule by mouth 4 times daily for 10 doses 20  JESS Pierce         Allergies   Allergen Reactions    Imitrex [Sumatriptan] Nausea Only     nausea       Social History     Socioeconomic History    Marital status:      Spouse name: Not on file    Number of children: Not on file    Years of education: Not on file    Highest education level: Not on file   Occupational History    Occupation: bank collections   Social Needs    Financial resource strain: Not on file    Food insecurity     Worry: Not on file     Inability: Not on file   [employer] needs     Medical: Not on file     Non-medical: Not on file   Tobacco Use    Smoking status: Former Smoker     Packs/day: 0.50     Years: 10.00     Pack years: 5.00     Types: Cigarettes     Start date: 1987     Last attempt to quit: 2019     Years since quittin.7    Smokeless tobacco: Never Used    Tobacco comment: advised to quit   Substance and Sexual Activity    Alcohol use:  Yes     Alcohol/week: 5.0 standard drinks     Types: 6 Standard drinks or equivalent per week     Comment: rarely    Drug use: Not Currently     Types: Marijuana     Comment: - last smoke months ago-2019    Sexual activity: Yes   Lifestyle    Physical activity     Days per week: Not on file     Minutes per session: Not on file    Stress: Not on file   Relationships    Social connections     Talks on phone: Not on file     Gets together: Not on file     Attends Roman Catholic service: Not on file     Active member of club or organization: Not on file     Attends meetings of clubs or organizations: Not on file     Relationship status: Not on file    Intimate partner violence     Fear of current or ex partner: Not on file     Emotionally abused: Not on file     Physically abused: Not on file     Forced sexual activity: Not on file   Other Topics Concern    Not on file   Social History Narrative    Not on file       Family History   Problem Relation Age of Onset    COPD Mother     Mult Sclerosis Father     No Known Problems Sister     High Cholesterol Brother     Diabetes Brother     Obesity Brother     Arthritis Brother     Other Maternal Grandmother         hips replacement    Obesity Maternal Grandmother     No Known Problems Maternal Grandfather     No Known Problems Paternal Grandmother     Diabetes Paternal Grandfather     Arthritis Sister        Review of Systems   Gastrointestinal: Positive for abdominal pain. All other systems reviewed and are negative. Objective:   Physical Exam  Constitutional:       Appearance: She is well-developed. HENT:      Head: Normocephalic and atraumatic. Neck:      Musculoskeletal: Normal range of motion and neck supple. Thyroid: No thyromegaly. Trachea: No tracheal deviation. Cardiovascular:      Rate and Rhythm: Normal rate. Heart sounds: Normal heart sounds. No murmur. Pulmonary:      Effort: Pulmonary effort is normal.      Breath sounds: Normal breath sounds. Abdominal:      General: There is no distension. Palpations: Abdomen is soft. Tenderness: There is abdominal tenderness (LLQ, mild). There is no guarding. Musculoskeletal: Normal range of motion. Skin:     General: Skin is warm and dry. Neurological:      Mental Status: She is alert and oriented to person, place, and time. Psychiatric:         Thought Content: Thought content normal.       Vitals    Last recorded: 07/20 1533   BP: 124/80       Height: 5' 6.5\" (1.689 m)       Weight: 262 lb (118.8 kg)       Assessment:       Diagnosis Orders   1.  Calculus of gallbladder without cholecystitis without obstruction             Plan:      Follow up with me as needed          Sarahi Myers MD

## 2020-07-27 ENCOUNTER — TELEPHONE (OUTPATIENT)
Dept: PULMONOLOGY | Age: 45
End: 2020-07-27

## 2020-07-27 NOTE — TELEPHONE ENCOUNTER
Patient called in to ask for a new mask, states that her mask is ripped. She asked if you could send out a new order to [address] [place] [address].     Thank you

## 2020-07-27 NOTE — PROGRESS NOTES
Esteban Leary         : 1975        PHONE: [phone] (home)     Diagnosis: [] VICTORIANO (G47.33) [] CSA (G47.31) [] Apnea (G47.30)   Length of Need: [] 12 Months [] 99 Months [] Other:    Machine (TRENT!): [] Respironics Dream Station      Auto [] ResMed AirSense     Auto [] Other:     []  CPAP () [] Bilevel ()   Mode: [] Auto [] Spontaneous    Mode: [] Auto [] Spontaneous                                 Humidifier: [] Heated ()        [x] Water chamber replacement ()/ 1 per 6 months        Mask:   [] Nasal () /1 per 3 months [x] Full Face () /1 per 3 months   [] Patient choice -Size and fit mask [x] Patient Choice - Size and fit mask   [] Dispense:  [] Dispense:    [] Headgear () / 1 per 3 months [x] Headgear () / 1 per 3 months   [] Replacement Nasal Cushion ()/2 per month [x] Interface Replacement ()/1 per month   [] Replacement Nasal Pillows ()/2 per month         Tubing: [x] Heated ()/1 per 3 months    [] Standard ()/1 per 3 months [] Other:           Filters: [x] Non-disposable ()/1 per 6 months     [x] Ultra-Fine, Disposable ()/2 per month        Miscellaneous: [x] Chin Strap ()/ 1 per 6 months [] O2 bleed-in:       LPM   [] Oximetry on CPAP/Bilevel []  Other:          Start Order Date: 20    MEDICAL JUSTIFICATION:  I, the undersigned, certify that the above prescribed supplies are medically necessary for this patients wellbeing. In my opinion, the supplies are both reasonable and necessary in reference to accepted standards of medicalpractice in treatment of this patients condition.     Gale Boyd MD      NPI: [id number]       Order Signed Date: 20    Electronically signed by Gale Boyd MD on 2020 at 11:29 AM

## 2020-07-29 RX ORDER — GABAPENTIN 300 MG/1
CAPSULE ORAL
Qty: 90 CAPSULE | Refills: 0 | Status: SHIPPED | OUTPATIENT
Start: 2020-07-29 | End: 2020-09-02

## 2020-08-18 RX ORDER — DIAZEPAM 10 MG/1
TABLET ORAL
Qty: 30 TABLET | OUTPATIENT
Start: 2020-08-18 | End: 2020-09-17

## 2020-08-19 RX ORDER — DIAZEPAM 10 MG/1
TABLET ORAL
Qty: 30 TABLET | Refills: 0 | Status: SHIPPED | OUTPATIENT
Start: 2020-08-19 | End: 2020-09-18

## 2020-08-28 ENCOUNTER — HOSPITAL ENCOUNTER (EMERGENCY)
Age: 45
Discharge: HOME | End: 2020-08-28
Attending: STUDENT IN AN ORGANIZED HEALTH CARE EDUCATION/TRAINING PROGRAM
Payer: COMMERCIAL

## 2020-08-28 ENCOUNTER — APPOINTMENT (OUTPATIENT)
Dept: GENERAL RADIOLOGY | Age: 45
End: 2020-08-28
Payer: COMMERCIAL

## 2020-08-28 VITALS
WEIGHT: 258.82 LBS | TEMPERATURE: 97.7 F | DIASTOLIC BLOOD PRESSURE: 88 MMHG | RESPIRATION RATE: 17 BRPM | OXYGEN SATURATION: 97 % | SYSTOLIC BLOOD PRESSURE: 155 MMHG | HEART RATE: 72 BPM | BODY MASS INDEX: 41.15 KG/M2

## 2020-08-28 PROCEDURE — 73630 X-RAY EXAM OF FOOT: CPT

## 2020-08-28 PROCEDURE — 99283 EMERGENCY DEPT VISIT LOW MDM: CPT

## 2020-08-28 PROCEDURE — 6370000000 HC RX 637 (ALT 250 FOR IP): Performed by: STUDENT IN AN ORGANIZED HEALTH CARE EDUCATION/TRAINING PROGRAM

## 2020-08-28 RX ORDER — PREGABALIN 25 MG/1
75 CAPSULE ORAL ONCE
Status: COMPLETED | OUTPATIENT
Start: 2020-08-28 | End: 2020-08-28

## 2020-08-28 RX ORDER — ACETAMINOPHEN 500 MG
1000 TABLET ORAL ONCE
Status: COMPLETED | OUTPATIENT
Start: 2020-08-28 | End: 2020-08-28

## 2020-08-28 RX ADMIN — PREGABALIN 75 MG: 25 CAPSULE ORAL at 12:11

## 2020-08-28 RX ADMIN — ACETAMINOPHEN 1000 MG: 500 TABLET ORAL at 12:11

## 2020-08-28 RX ADMIN — IBUPROFEN 600 MG: 200 TABLET, FILM COATED ORAL at 12:11

## 2020-08-28 ASSESSMENT — PAIN DESCRIPTION - PAIN TYPE
TYPE: ACUTE PAIN
TYPE: ACUTE PAIN

## 2020-08-28 ASSESSMENT — PAIN - FUNCTIONAL ASSESSMENT
PAIN_FUNCTIONAL_ASSESSMENT: ACTIVITIES ARE NOT PREVENTED
PAIN_FUNCTIONAL_ASSESSMENT: PREVENTS OR INTERFERES SOME ACTIVE ACTIVITIES AND ADLS

## 2020-08-28 ASSESSMENT — PAIN DESCRIPTION - LOCATION
LOCATION: FOOT
LOCATION: FOOT

## 2020-08-28 ASSESSMENT — PAIN DESCRIPTION - PROGRESSION
CLINICAL_PROGRESSION: NOT CHANGED
CLINICAL_PROGRESSION: NOT CHANGED

## 2020-08-28 ASSESSMENT — PAIN DESCRIPTION - ORIENTATION
ORIENTATION: LEFT;OUTER
ORIENTATION: LEFT;OUTER

## 2020-08-28 ASSESSMENT — PAIN DESCRIPTION - FREQUENCY
FREQUENCY: CONTINUOUS
FREQUENCY: CONTINUOUS

## 2020-08-28 ASSESSMENT — PAIN DESCRIPTION - ONSET
ONSET: ON-GOING
ONSET: ON-GOING

## 2020-08-28 ASSESSMENT — PAIN DESCRIPTION - DESCRIPTORS
DESCRIPTORS: ACHING;SHARP
DESCRIPTORS: ACHING;SHARP

## 2020-08-28 ASSESSMENT — PAIN SCALES - GENERAL
PAINLEVEL_OUTOF10: 8
PAINLEVEL_OUTOF10: 9
PAINLEVEL_OUTOF10: 9

## 2020-08-28 NOTE — ED TRIAGE NOTES
Pt with left foot pain x 2 weeks, started on vacation. stated she noticed it while climbing up stairs, hurts on the outer part near the pinky toe the most, also states it has swollen up some as well.

## 2020-08-28 NOTE — ED PROVIDER NOTES
[facility]      Pt Name: Kacey Angela  MRN: [id number]  [place] 1975  Date of evaluation: 8/28/2020  Provider: Mary Ann Mcdonald MD    CHIEF COMPLAINT       Chief Complaint   Patient presents with    Foot Pain     Pt with left foot pain x 2 weeks, started on vacation. stated she noticed it while climbing up stairs, hurts on the outer part near the pinky toe the most, also states it has swollen up some as well. HISTORY OF PRESENT ILLNESS   (Location/Symptom, Timing/Onset,Context/Setting, Quality, Duration, Modifying Factors, Severity)  Note limiting factors. Kacey Angela is a 50 y.o. female who presents to the emergency department who presents with left foot pain for the past 2 weeks. It started while she was on vacation, gradually worsening over the course of vacation, worse with excessive use such as hiking and climbing up stairs. Denies trauma to the foot otherwise, described as burning and stabbing pain, worst when she gets out of the bed in the morning, localized to the lateral plantar aspect near the metatarsal heads. Denies fevers, chills, nausea, vomiting, skin discoloration, numbness, tingling, joint swelling, joint pain. Exacerbated by weightbearing and using the foot, alleviated by rest, ice and elevation. Symptoms not otherwise alleviated or exacerbated by other factors. NursingNotes were reviewed. REVIEW OF SYSTEMS    (2-9 systems for level 4, 10 or more for level 5)       Constitutional: No fever or chills. Eye: No visual disturbances. No eye pain. Ear/Nose/Mouth/Throat: No nasal congestion. No sore throat. Respiratory: No cough, No shortness of breath, No sputum production. Cardiovascular: No chest pain. No palpitations. Gastrointestinal: No abdominal pain. No nausea or vomiting  Genitourinary: No dysuria. No hematuria.   Hematology/Lymphatics: No bleeding or bruising tendency. Immunologic: No malaise. No swollen glands. Musculoskeletal: No back pain. No joint pain. Foot pain as in HPI. Integumentary: No rash. No abrasions. Neurologic: No headache. No focal numbness or weakness.       PAST MEDICAL HISTORY     Past Medical History:   Diagnosis Date    Arthritis     Bipolar disorder ([facility] [address]     Depression     Gastritis     Hypertension     Insomnia     Migraine     Neuropathy     PLMD (periodic limb movement disorder)     Sleep apnea     uses C-PAP    TMJ (temporomandibular joint syndrome)          SURGICALHISTORY       Past Surgical History:   Procedure Laterality Date    COLPOSCOPY      ENDOMETRIAL ABLATION      FOOT SURGERY      JOINT REPLACEMENT Right 02/19/2019    RIGHT LATERAL TOTAL HIP REPLACEMENT    SHOULDER ARTHROSCOPY Right 11/25/2019    RIGHT SHOULDER ARTHROSCOPY, SUBACROMIAL DECOMPRESSION,   ROTATOR CUFF REPAIR performed by Helen Pendleton MD at [place] [address] Right 2/19/2019    RIGHT LATERAL TOTAL HIP REPLACEMENT performed by Abdulaziz Carlson MD at [address]       Previous Medications    AMLODIPINE (NORVASC) 10 MG TABLET    Take 1 tablet by mouth daily    ATORVASTATIN (LIPITOR) 20 MG TABLET    Take 1 tablet by mouth daily    DIAZEPAM (VALIUM) 10 MG TABLET    FILL 8/19 JOEL 1 TABLET BY MOUTH NIGHTLY AS NEEDED FOR ANXIETY OR SLEEP FOR UP TO 30 DAYS    DICYCLOMINE (BENTYL) 10 MG CAPSULE    Take 1 capsule by mouth 4 times daily for 10 doses    FLUOXETINE (PROZAC) 40 MG CAPSULE    Take 2 capsules by mouth daily TAKE 1 CAPSULE BY MOUTH ONCE DAILY IN ADDITION    GABAPENTIN (NEURONTIN) 300 MG CAPSULE    TAKE 1 CAPSULE BY MOUTH THREE TIMES DAILY FOR 30 DAYS    LAMOTRIGINE (LAMICTAL) 100 MG TABLET    Take 100 mg by mouth daily    LITHIUM 300 MG CAPSULE    Take 1 capsule by mouth 2 times daily (with meals)    ONDANSETRON (ZOFRAN ODT) 4 MG DISINTEGRATING TABLET    Take 1 tablet by mouth every 8 hours as needed for Nausea or Vomiting Let dissolve in mouth. ALLERGIES     Imitrex [sumatriptan]    FAMILY HISTORY       Family History   Problem Relation Age of Onset    COPD Mother     Mult Sclerosis Father     No Known Problems Sister     High Cholesterol Brother     Diabetes Brother     Obesity Brother     Arthritis Brother     Other Maternal Grandmother         hips replacement    Obesity Maternal Grandmother     No Known Problems Maternal Grandfather     No Known Problems Paternal Grandmother     Diabetes Paternal Grandfather     Arthritis Sister           SOCIAL HISTORY       Social History     Socioeconomic History    Marital status:      Spouse name: None    Number of children: None    Years of education: None    Highest education level: None   Occupational History    Occupation: bank collections   Social Needs    Financial resource strain: None    Food insecurity     Worry: None     Inability: None    Transportation needs     Medical: None     Non-medical: None   Tobacco Use    Smoking status: Former Smoker     Packs/day: 0.50     Years: 10.00     Pack years: 5.00     Types: Cigarettes     Start date: 1987     Last attempt to quit: 2019     Years since quittin.8    Smokeless tobacco: Never Used    Tobacco comment: advised to quit   Substance and Sexual Activity    Alcohol use:  Yes     Alcohol/week: 5.0 standard drinks     Types: 6 Standard drinks or equivalent per week     Comment: rarely    Drug use: Not Currently     Types: Marijuana     Comment: - last smoke months ago-2019    Sexual activity: Yes   Lifestyle    Physical activity     Days per week: None     Minutes per session: None    Stress: None   Relationships    Social connections     Talks on phone: None     Gets together: None     Attends Mosque service: None     Active member of club or organization: None     Attends meetings of clubs or organizations: None     Relationship status: None  Intimate partner violence     Fear of current or ex partner: None     Emotionally abused: None     Physically abused: None     Forced sexual activity: None   Other Topics Concern    None   Social History Narrative    None       SCREENINGS             PHYSICAL EXAM    (up to 7 for level 4, 8 or more for level 5)     ED Triage Vitals   BP Temp Temp Source Pulse Resp SpO2 Height Weight   08/28/20 1140 08/28/20 1128 08/28/20 1128 08/28/20 1128 08/28/20 1128 08/28/20 1128 -- 08/28/20 1128   (!) 159/94 97.6 °F (36.4 °C) Oral 74 18 96 %  258 lb 13.1 oz (117.4 kg)       General: Alert and oriented appropriately for age, No acute distress. Eye: Normal conjunctiva. Pupils equal and reactive. HENT: Oral mucosa is moist.  Respiratory: Respirations even and non-labored. Cardiovascular: Normal rate, Regular rhythm. Gastrointestinal: Soft, Non-tender, Non-distended. Musculoskeletal: No swelling. Left foot and ankle: No joint swelling, small abrasion to the mid plantar surface of the foot with no surrounding induration, fluctuance or erythema. Allows full range of motion of the left ankle, all toes. Tender to palpation along the plantar surface at the area of the MTP joints worse along the lateral aspect of toes 4 and 5. Integumentary: Warm, Dry. Neurologic: Alert and appropriate for age. No focal deficits. Psychiatric: Cooperative. DIAGNOSTIC RESULTS         RADIOLOGY:   Non-plain filmimages such as CT, Ultrasound and MRI are read by the radiologist. Plain radiographic images are visualized and preliminarily interpreted by the emergency physician with the below findings:      Interpretation per the Radiologist below, if available at the time ofthis note:    XR FOOT LEFT (MIN 3 VIEWS)   Final Result   No acute bone or joint abnormality. Mild hallux valgus angulation. Cause for stated pain not identified.                ED BEDSIDE ULTRASOUND:   Performed by ED Physician - none    All other labs were within normal range or not returned as of this dictation. EMERGENCY DEPARTMENT COURSE and DIFFERENTIAL DIAGNOSIS/MDM:   Vitals:    Vitals:    20 1128 20 1140   BP:  (!) 159/94   Pulse: 74    Resp: 18    Temp: 97.6 °F (36.4 °C)    TempSrc: Oral    SpO2: 96%    Weight: 258 lb 13.1 oz (117.4 kg)          Medical decision makin-year-old woman with history of prediabetes who presents with left plantar foot pain started 2 weeks ago is been progressively worse with excessive use. Likely plantar fasciitis, gives classic story of worse in the morning when she gets out of bed, though with plantar fasciitis tenderness tends to be more posterior in the foot. Given her tenderness along the metatarsal heads, getting x-ray to assess for fracture or ligamentous Lisfranc dislocation, obvious bony erosion as I have low suspicion for osteomyelitis given the patient is afebrile, has no overlying skin changes, no foot ulcerations or wounds. Given p.o. meds for pain control. X-ray negative acute, no fracture, no ligamentous Lisfranc with weightbearing films. Patient feeling better after medications given, as possible neuropathic pain as patient states she had diabetes but then lost a lot of weight and no longer required medication for her diabetes, possible persistent neuropathy. Recommended follow-up with her primary care physician, given outpatient referral to orthopedist for treatment of most likely diagnosis of plantar fasciitis. Given discharge instruction and return precautions, patient voiced understanding, tolerated p.o. and ambulate steadily in the emergency department without assistance, subsequently discharged home. FINAL IMPRESSION      1.  Plantar fasciitis of left foot          DISPOSITION/PLAN   DISPOSITION Decision To Discharge 2020 12:44:18 PM      PATIENT REFERRED TO:  ROSSANA Quarles - CNP  [address] [facility] [place] [address]  [phone]    In 1 week      Bridgett Garnett Jhony Rodriguez MD  [address] [facility] [address]  [address]  [phone]    In 2 weeks      Psychiatry  [phone]          DISCHARGE MEDICATIONS:  Discharge Medication List as of 8/28/2020 12:52 PM             (Please note that portions of this note were completed with a voice recognition program.Efforts were made to edit the dictations but occasionally words are mis-transcribed.)    Genaro Amaya MD (electronically signed)  Attending Emergency Physician         Genaro Amaya MD  08/29/20 [id number]

## 2020-09-02 RX ORDER — GABAPENTIN 300 MG/1
300 CAPSULE ORAL 3 TIMES DAILY
Qty: 90 CAPSULE | Refills: 2 | Status: SHIPPED | OUTPATIENT
Start: 2020-09-02 | End: 2020-11-09 | Stop reason: ALTCHOICE

## 2020-09-09 ENCOUNTER — VIRTUAL VISIT (OUTPATIENT)
Dept: FAMILY MEDICINE CLINIC | Age: 45
End: 2020-09-09
Payer: COMMERCIAL

## 2020-09-09 PROCEDURE — G8417 CALC BMI ABV UP PARAM F/U: HCPCS | Performed by: NURSE PRACTITIONER

## 2020-09-09 PROCEDURE — 99213 OFFICE O/P EST LOW 20 MIN: CPT | Performed by: NURSE PRACTITIONER

## 2020-09-09 PROCEDURE — 1036F TOBACCO NON-USER: CPT | Performed by: NURSE PRACTITIONER

## 2020-09-09 PROCEDURE — G8427 DOCREV CUR MEDS BY ELIG CLIN: HCPCS | Performed by: NURSE PRACTITIONER

## 2020-09-09 RX ORDER — PREGABALIN 75 MG/1
75 CAPSULE ORAL NIGHTLY
Qty: 30 CAPSULE | Refills: 0 | Status: SHIPPED | OUTPATIENT
Start: 2020-09-09 | End: 2020-11-09 | Stop reason: ALTCHOICE

## 2020-09-10 ENCOUNTER — TELEPHONE (OUTPATIENT)
Dept: ADMINISTRATIVE | Age: 45
End: 2020-09-10

## 2020-09-10 NOTE — TELEPHONE ENCOUNTER
Received PA request for Vraylar 3MG capsules, Key: [id number]. I need a chart note to send with this request so I was just following up on the status of yesterdays visit summary. Please advise. Thank you.

## 2020-09-11 ENCOUNTER — TELEPHONE (OUTPATIENT)
Dept: FAMILY MEDICINE CLINIC | Age: 45
End: 2020-09-11

## 2020-09-11 NOTE — TELEPHONE ENCOUNTER
Submitted PA for Vraylar 3MG capsules, Key: [id number]. Medication is DENIED. Will scan letter once received. Please notify patient. Thank you.

## 2020-09-14 ENCOUNTER — TELEPHONE (OUTPATIENT)
Dept: PULMONOLOGY | Age: 45
End: 2020-09-14

## 2020-09-14 NOTE — TELEPHONE ENCOUNTER
Patient still has not called [facility] to ask about the mask order.  She was given their phone number

## 2020-09-14 NOTE — TELEPHONE ENCOUNTER
Patient called about mask order from July. She states she has no received any mask at all.     Please send order again thank you

## 2020-09-15 NOTE — TELEPHONE ENCOUNTER
SPOKE WITH PT AND ADVISED HER TO CALL HER INSURANCE TO SEE WHAT ELSE IS COVERED. PT ALSO HAS A CALL INTO [facility] FOR A NEW PSYCHOLOGIST.  HS

## 2020-09-16 ENCOUNTER — TELEPHONE (OUTPATIENT)
Dept: FAMILY MEDICINE CLINIC | Age: 45
End: 2020-09-16

## 2020-09-16 NOTE — TELEPHONE ENCOUNTER
PT CALLING HER INSURANCE COMPANY IS REQUESTING A P/A -  FOR HER VRAYLAR 3 MG       FAX:  [phone] - [facility]      ONE WAS SENT ON 09/09/20 - TO P/A DEPT -   PATIENT ASKING TO SEND TO ABOVE FAX

## 2020-09-18 RX ORDER — AMLODIPINE BESYLATE 10 MG/1
TABLET ORAL
Qty: 30 TABLET | Refills: 0 | Status: SHIPPED | OUTPATIENT
Start: 2020-09-18 | End: 2020-10-20

## 2020-09-27 NOTE — PROGRESS NOTES
2020     Moon Matos (:  1975) is a 50 y.o. female, here for evaluation of the following medical concerns:  Moon Matos is a 50 y.o. female being evaluated by a Virtual Visit (video visit) encounter to address concerns as mentioned above. A caregiver was present when appropriate. Due to this being a TeleHealth encounter (During JYQQO-81 public health emergency), evaluation of the following organ systems was limited: Vitals/Constitutional/EENT/Resp/CV/GI//MS/Neuro/Skin/Heme-Lymph-Imm. Pursuant to the emergency declaration under the [address] [place] [address], [address] [place] [address] and the [employer] Act, this Virtual Visit was conducted with patient's (and/or legal guardian's) consent, to reduce the patient's risk of exposure to COVID-19 and provide necessary medical care. The patient (and/or legal guardian) has also been advised to contact this office for worsening conditions or problems, and seek emergency medical treatment and/or call 911 if deemed necessary. Patient identification was verified at the start of the visit: Yes    Total time spent for this encounter: [address] were provided through a video synchronous discussion virtually to substitute for in-person clinic visit. Patient and provider were located at their individual homes. --ROSSANA Bear CNP on 2020 at 9:22 AM    An electronic signature was used to authenticate this note. HPI     Chief Complaint   Patient presents with    Pain     WANTS TO KNOW IF SHE CAN SWITCH FROM GABAPENTIN TO LYRICA WAS GIVEN THIS IN ER AND SEEMED TO HELP A LOT    Anxiety     ANXIETY ROUTINE FOLLOW UP      Mood Disorder:  Patient presents for follow-up of anxiety disorder. Current complaints include: tearfulness, insomnia, fatigue and restlessness. She denies any other symptoms. Symptoms/signs of darren: NONE CURRENTLY. External stressors: HER LIFE. Current treatment includes: Prozac- VRAYLAR- LAMICTAL   Medication side effects: none. PT STATES WHEN SHE WAS IN THE ER FOR HER FOOT PAIN SHE WAS GIVEN LYRICA AND IT WORKED REALLY WELL SHE WOULD LIKE TO TRY THIS INSTEAD OF THE NEURONTIN BECAUSE IT DOES NOT HELP MUCH      Review of Systems   Neurological: Positive for numbness. PERIPHERAL NEUROPATHY   Psychiatric/Behavioral: The patient is nervous/anxious. Prior to Visit Medications    Medication Sig Taking? Authorizing Provider   pregabalin (LYRICA) 75 MG capsule Take 1 capsule by mouth nightly for 30 days. Yes ROSSANA Lamar CNP   cariprazine hcl (VRAYLAR) 3 MG CAPS capsule Take 1 capsule by mouth daily Yes ROSSANA Mcclure CNP   gabapentin (NEURONTIN) 300 MG capsule Take 1 capsule by mouth 3 times daily for 30 days. Yes ROSSANA Joseph CNP   ondansetron (ZOFRAN ODT) 4 MG disintegrating tablet Take 1 tablet by mouth every 8 hours as needed for Nausea or Vomiting Let dissolve in mouth.  Yes JESS Woo   lamoTRIgine (LAMICTAL) 100 MG tablet Take 100 mg by mouth daily Yes Historical Provider, MD   FLUoxetine (PROZAC) 40 MG capsule Take 2 capsules by mouth daily TAKE 1 CAPSULE BY MOUTH ONCE DAILY IN ADDITION Yes ROSSANA Garcia CNP   atorvastatin (LIPITOR) 20 MG tablet Take 1 tablet by mouth daily Yes ROSSANA Lamar CNP   lithium 300 MG capsule Take 1 capsule by mouth 2 times daily (with meals) Yes ROSSANA Lamar CNP   amLODIPine (NORVASC) 10 MG tablet Take 1 tablet by mouth once daily  ROSSANA Joseph CNP        Social History     Tobacco Use    Smoking status: Former Smoker     Packs/day: 0.50     Years: 10.00     Pack years: 5.00     Types: Cigarettes     Start date: 1987     Last attempt to quit: 2019     Years since quittin.9    Smokeless tobacco: Never Used    Tobacco comment: advised to quit Substance Use Topics    Alcohol use: Yes     Alcohol/week: 5.0 standard drinks     Types: 6 Standard drinks or equivalent per week     Comment: rarely        There were no vitals filed for this visit. Estimated body mass index is 41.15 kg/m² as calculated from the following:    Height as of 7/20/20: 5' 6.5\" (1.689 m). Weight as of 8/28/20: 258 lb 13.1 oz (117.4 kg). Physical Exam    ASSESSMENT/PLAN:  1. Bipolar disorder, current episode mixed, mild (HCC)  - cariprazine hcl (VRAYLAR) 3 MG CAPS capsule; Take 1 capsule by mouth daily  Dispense: 30 capsule; Refill: 3 [facility]  CONTINUE LAMICTAL  VALIUM DISCONTINUED  SHE IS ENCOURAGED TO FOLLOW UP WITH HER THERAPIST AS WELL    2. Idiopathic neuropathy  NEURONTIN DISCONTINUED  - pregabalin (LYRICA) 75 MG capsule; Take 1 capsule by mouth nightly for 30 days. Dispense: 30 capsule; Refill: 0      No follow-ups on file. An electronic signature was used to authenticate this note.     --Dillon Phalen, APRN - CNP on 9/27/2020 at 9:22 AM

## 2020-10-20 RX ORDER — AMLODIPINE BESYLATE 10 MG/1
TABLET ORAL
Qty: 30 TABLET | Refills: 0 | Status: SHIPPED | OUTPATIENT
Start: 2020-10-20 | End: 2020-11-09 | Stop reason: SDUPTHER

## 2020-10-20 NOTE — TELEPHONE ENCOUNTER
PT NEEDED VV FOR HTN FOLLOW UP.  SHE IS STOPPING BY THE OFFICE ON Friday 10/6/20 FOR SOMEONE TO TAKE HER BP. MED REFILLED

## 2020-11-03 RX ORDER — FLUOXETINE HYDROCHLORIDE 40 MG/1
80 CAPSULE ORAL DAILY
Qty: 180 CAPSULE | Refills: 0 | Status: SHIPPED | OUTPATIENT
Start: 2020-11-03 | End: 2021-02-08 | Stop reason: SDUPTHER

## 2020-11-03 RX ORDER — LAMOTRIGINE 100 MG/1
100 TABLET ORAL DAILY
Qty: 30 TABLET | Refills: 0 | Status: SHIPPED | OUTPATIENT
Start: 2020-11-03 | End: 2020-12-03

## 2020-11-09 ENCOUNTER — OFFICE VISIT (OUTPATIENT)
Dept: FAMILY MEDICINE CLINIC | Age: 45
End: 2020-11-09
Payer: COMMERCIAL

## 2020-11-09 VITALS — SYSTOLIC BLOOD PRESSURE: 136 MMHG | HEART RATE: 102 BPM | DIASTOLIC BLOOD PRESSURE: 80 MMHG

## 2020-11-09 PROCEDURE — G8427 DOCREV CUR MEDS BY ELIG CLIN: HCPCS | Performed by: NURSE PRACTITIONER

## 2020-11-09 PROCEDURE — 99214 OFFICE O/P EST MOD 30 MIN: CPT | Performed by: NURSE PRACTITIONER

## 2020-11-09 RX ORDER — AMLODIPINE BESYLATE 10 MG/1
TABLET ORAL
Qty: 90 TABLET | Refills: 3 | Status: SHIPPED | OUTPATIENT
Start: 2020-11-09 | End: 2021-02-08 | Stop reason: SDUPTHER

## 2020-11-09 RX ORDER — GABAPENTIN 300 MG/1
300 CAPSULE ORAL 3 TIMES DAILY
Qty: 90 CAPSULE | Refills: 2 | Status: SHIPPED | OUTPATIENT
Start: 2020-11-09 | End: 2021-02-08 | Stop reason: SDUPTHER

## 2020-11-09 RX ORDER — DIAZEPAM 10 MG/1
10 TABLET ORAL NIGHTLY PRN
Qty: 90 TABLET | Refills: 0 | Status: SHIPPED | OUTPATIENT
Start: 2020-11-09 | End: 2020-12-13

## 2020-11-09 NOTE — PROGRESS NOTES
2020     Lalo Fang (:  1975) is a 50 y.o. female, here for evaluation of the following medical concerns:  Lalo Fang is a 50 y.o. female being evaluated by a Virtual Visit (video visit) encounter to address concerns as mentioned above. A caregiver was present when appropriate. Due to this being a TeleHealth encounter (During TZNGF-13 public health emergency), evaluation of the following organ systems was limited: Vitals/Constitutional/EENT/Resp/CV/GI//MS/Neuro/Skin/Heme-Lymph-Imm. Pursuant to the emergency declaration under the [address] [place] [address], [address] [place] [address] authority and the [employer] Act, this Virtual Visit was conducted with patient's (and/or legal guardian's) consent, to reduce the patient's risk of exposure to COVID-19 and provide necessary medical care. The patient (and/or legal guardian) has also been advised to contact this office for worsening conditions or problems, and seek emergency medical treatment and/or call 911 if deemed necessary. Patient identification was verified at the start of the visit: Yes    Total time spent for this encounter: [address] were provided through a video synchronous discussion virtually to substitute for in-person clinic visit. Patient and provider were located at their individual homes. --ROSSANA Gomez CNP on 2020 at 12:23 PM    An electronic signature was used to authenticate this note. HPI   Chief Complaint   Patient presents with    Hypertension     HTN ROUTINE FOLLOW UP      Hypertension:  Home blood pressure monitoring: No.  She is adherent to a low sodium diet. Patient denies chest pain, shortness of breath, headache, lightheadedness, blurred vision, peripheral edema, palpitations, dry cough and fatigue. Antihypertensive medication side effects: no medication side effects noted.   Use of agents associated with hypertension: none. SHE IS WORKING OUT - [employer] DIET AND WORKING OUT                                      Sodium (mmol/L)   Date Value   07/16/2020 136    BUN (mg/dL)   Date Value   07/16/2020 16    Glucose (mg/dL)   Date Value   07/16/2020 95      Potassium reflex Magnesium (mmol/L)   Date Value   07/16/2020 4.2    CREATININE (mg/dL)   Date Value   07/16/2020 0.7         Mood Disorder:   SHE HAS NOT SEEN THE THERAPIST  - THE DOCTOR LEFT- AND SHE WAS THE NP   Patient presents for follow-up of depression and anxiety disorder. Current complaints include: insomnia and restlessness. She denies any other symptoms. Symptoms/signs of darren: none. External stressors: LIFE. Current treatment includes: Prozac- 80 MG - LITHIUM- LAMICTAL. Medication side effects: none. STATES SHE IS HAVING A HARD TIME FALLING AND STAYING ASLEEP- HAS TRIED MELATONIN IN THE PAST BUT IT DID NOT HELP      IDIOPATHIC NEUROPATHY IN HER FEET- NUMBNESS TINGLING   SHE TRIED LYRICA BUT IT DID NOT HELP  FEELS LIKE THE NEURONTIN HELPS MORE SHE STILL HAS THE NUMBNESS/TINGLING BUT NOT AS BAD AS IT HAS BEEN    Review of Systems   Cardiovascular: Negative for chest pain, palpitations and leg swelling. Neurological: Positive for numbness. Psychiatric/Behavioral: Positive for sleep disturbance. The patient is nervous/anxious. Prior to Visit Medications    Medication Sig Taking? Authorizing Provider   FLUoxetine (PROZAC) 40 MG capsule Take 2 capsules by mouth daily TAKE 1 CAPSULE BY MOUTH ONCE DAILY IN ADDITION Yes ROSSANA Ram CNP   lamoTRIgine (LAMICTAL) 100 MG tablet Take 1 tablet by mouth daily Yes ROSSANA Perales CNP   amLODIPine (NORVASC) 10 MG tablet Take 1 tablet by mouth once daily Yes ROSSANA Null CNP   gabapentin (NEURONTIN) 300 MG capsule Take 1 capsule by mouth 3 times daily for 30 days.  Yes ROSSANA Null CNP   ondansetron (ZOFRAN ODT) 4 MG disintegrating tablet Take 1 tablet by mouth every 8 hours as needed for Nausea or Vomiting Let dissolve in mouth. Yes JESS Lovelace   atorvastatin (LIPITOR) 20 MG tablet Take 1 tablet by mouth daily Yes ROSSANA Henry CNP   lithium 300 MG capsule Take 1 capsule by mouth 2 times daily (with meals) Yes ROSSANA Napoles CNP        Social History     Tobacco Use    Smoking status: Former Smoker     Packs/day: 0.50     Years: 10.00     Pack years: 5.00     Types: Cigarettes     Start date: 1987     Last attempt to quit: 2019     Years since quittin.0    Smokeless tobacco: Never Used    Tobacco comment: advised to quit   Substance Use Topics    Alcohol use: Yes     Alcohol/week: 5.0 standard drinks     Types: 6 Standard drinks or equivalent per week     Comment: rarely        Vitals:    20 1155   BP: 136/80   Site: Left Upper Arm   Position: Standing   Cuff Size: Large Adult   Pulse: 102     Estimated body mass index is 41.15 kg/m² as calculated from the following:    Height as of 20: 5' 6.5\" (1.689 m). Weight as of 20: 258 lb 13.1 oz (117.4 kg). Physical Exam  Vitals signs reviewed. Constitutional:       General: She is awake. She is not in acute distress. Appearance: Normal appearance. She is well-developed and well-groomed. She is morbidly obese. She is not ill-appearing, toxic-appearing or diaphoretic. Neurological:      Mental Status: She is alert and oriented to person, place, and time. Psychiatric:         Attention and Perception: Attention and perception normal.         Mood and Affect: Mood and affect normal.         Speech: Speech normal.         Behavior: Behavior normal. Behavior is cooperative. Thought Content: Thought content normal.         Cognition and Memory: Cognition and memory normal.         Judgment: Judgment normal.         Cliff Sandoval was seen today for hypertension.     Diagnoses and all orders for this visit:    Anxiety  Bipolar disorder, current episode mixed, mild (HCC)  diazePAM (VALIUM) 10 MG tablet; Take 1 tablet by mouth nightly as needed for Anxiety or Sleep for up to 90 days. FILL 11/9  Continue Prozac -lithium- lamictal as prescribed  Lithium level at next visit  FOLLOW UP WITH THERAPIST        Psychophysiological insomnia   diazePAM (VALIUM) 10 MG tablet; Take 1 tablet by mouth nightly as needed for Anxiety or Sleep for up to 90 days. FILL 11/9 [facility]  Encouraged to try OTC Benadryl as well  Sleep techniques dw pt as well - discussed with pt  She is encouraged to stop drinking     Idiopathic peripheral neuropathy  -     gabapentin (NEURONTIN) 300 MG capsule; Take 1 capsule by mouth 3 times daily for 30 days. Essential hypertension, benign  CONTINUE NORVASC AS PRESCRIBED  ENCOURAGED TO MONITOR B/P AT LEAST ONCE WEEKLY GOAL 140/90 OR LESS  amLODIPine (NORVASC) 10 MG tablet; Take 1 tablet by mouth once daily  Lifestyle Recommendations for High Blood Pressure:  Reduce sodium intake to less than 1500 mg daily  Include 5-7 servings of fruits and vegetables daily  Reduce weight to ideal if overweight. .  30 minutes of physical activity daily

## 2020-11-17 NOTE — TELEPHONE ENCOUNTER
I DON'T SEE LITHIUM LISTED IN HER MED LIST THE LAST 2 VISITS. IT IS LISTED IN YOUR NOTES THOUGH. IS THE PT STILL TAKING THIS?

## 2020-11-24 ENCOUNTER — TELEPHONE (OUTPATIENT)
Dept: FAMILY MEDICINE CLINIC | Age: 45
End: 2020-11-24

## 2020-11-24 RX ORDER — LITHIUM CARBONATE 300 MG/1
300 CAPSULE ORAL 2 TIMES DAILY WITH MEALS
Qty: 60 CAPSULE | Refills: 2 | Status: SHIPPED | OUTPATIENT
Start: 2020-11-24 | End: 2021-02-08 | Stop reason: SDUPTHER

## 2020-11-24 RX ORDER — TIZANIDINE 2 MG/1
2 TABLET ORAL 3 TIMES DAILY PRN
Qty: 30 TABLET | Refills: 0 | Status: SHIPPED | OUTPATIENT
Start: 2020-11-24 | End: 2021-01-21

## 2020-11-24 NOTE — TELEPHONE ENCOUNTER
Patient is having neck pain and it is going down her left shoulder, she was wondering if Mishel could order her some muscle relaxer. Please give her a call back. Started on Sunday-pain really got bad. [address]  [facility].  Phone no. [phone]

## 2020-12-03 RX ORDER — LAMOTRIGINE 100 MG/1
TABLET ORAL
Qty: 30 TABLET | Refills: 2 | Status: SHIPPED | OUTPATIENT
Start: 2020-12-03 | End: 2021-02-08 | Stop reason: SDUPTHER

## 2020-12-10 ENCOUNTER — TELEPHONE (OUTPATIENT)
Dept: FAMILY MEDICINE CLINIC | Age: 45
End: 2020-12-10

## 2020-12-13 RX ORDER — ZOLPIDEM TARTRATE 10 MG/1
10 TABLET ORAL NIGHTLY PRN
Qty: 30 TABLET | Refills: 0 | Status: SHIPPED | OUTPATIENT
Start: 2020-12-13 | End: 2021-01-21

## 2021-01-07 ENCOUNTER — VIRTUAL VISIT (OUTPATIENT)
Dept: FAMILY MEDICINE CLINIC | Age: 46
End: 2021-01-07
Payer: COMMERCIAL

## 2021-01-07 DIAGNOSIS — J02.9 ACUTE VIRAL PHARYNGITIS: Primary | ICD-10-CM

## 2021-01-07 PROCEDURE — G8427 DOCREV CUR MEDS BY ELIG CLIN: HCPCS | Performed by: NURSE PRACTITIONER

## 2021-01-07 PROCEDURE — 99213 OFFICE O/P EST LOW 20 MIN: CPT | Performed by: NURSE PRACTITIONER

## 2021-01-07 RX ORDER — AZITHROMYCIN 250 MG/1
250 TABLET, FILM COATED ORAL SEE ADMIN INSTRUCTIONS
Qty: 6 TABLET | Refills: 0 | Status: SHIPPED | OUTPATIENT
Start: 2021-01-07 | End: 2021-01-12

## 2021-01-07 RX ORDER — METHYLPREDNISOLONE 4 MG/1
TABLET ORAL
Qty: 1 KIT | Refills: 0 | Status: SHIPPED | OUTPATIENT
Start: 2021-01-07 | End: 2021-01-13

## 2021-01-07 ASSESSMENT — ENCOUNTER SYMPTOMS: SORE THROAT: 1

## 2021-01-07 NOTE — PROGRESS NOTES
Marcus Mosquera (:  1975) is a 50 y.o. female,Established patient, here for evaluation of the following chief complaint(s): Pharyngitis (SORE THROAT, HEADAHCE DIARRHEA THAT IS GONE, SX STARTED  NO FEVER TAKING IBUPROFEN NO FEVER FEELS LIKE SOMETHING IS ON THE BACK OF HER THROAT CAN FEEL IT SHE DOES NOT HAVE TONSILS)      A  SUBJECTIVE/OBJECTIVE:  HPI  Pt c/o sore throat since  hurts to swallow a bit - more irritated worse when she yawns a pulling sensation - no fevers or ear pain noted   Her daughter said her throat looked red no lesions noted  - has  A headache as well but thinks it is due to  Her chonic neck pain  she rates the pain/throat 6/10- has tried Ibuprofen only  Review of Systems   Constitutional: Negative for fever. HENT: Positive for sore throat. Negative for ear pain. Neurological: Positive for headaches. All other systems reviewed and are negative. Physical Exam  Constitutional:       General: She is awake. Neurological:      Mental Status: She is alert. Psychiatric:         Attention and Perception: Attention and perception normal.         Mood and Affect: Mood and affect normal.         Speech: Speech normal.         Behavior: Behavior normal. Behavior is cooperative. Thought Content: Thought content normal.         Cognition and Memory: Cognition and memory normal.         Judgment: Judgment normal.         Laura He was seen today for pharyngitis. Diagnoses and all orders for this visit:    Acute viral pharyngitis  -     azithromycin (ZITHROMAX) 250 MG tablet; Take 1 tablet by mouth See Admin Instructions for 5 days 500mg on day 1 followed by 250mg on days 2 - 5  -     methylPREDNISolone (MEDROL DOSEPACK) 4 MG tablet; Take by mouth.   Instructions for Respiratory Infections (SAVE THIS SHEET) For the first 7-14 days of symptoms follow instructions below, even before being seen in the office or even during treatment with antibiotics, until symptom free. 1. Water: Drink 1 ounce of water for every 2 pounds of body weight for adults need 64 Ounces of water per day. This will loosen mucus in the head and chest & improve the weak feeling of dehydration, allow the body to get germ fighting resources to the infection. Half can be juice or sugar free Crystal Light. Don't count drinks with caffeine or carbonation. Infants can have Pedialyte liquid or freezer pops. Avoid salt if you have high Blood Pressure, swelling in the feet or ankles or have heart problems. 2. Humidity: Humidify the air to 35-50% ( or until the windows fog over slightly). Can use a humidifier, vaporizer, boil water on the stove or put a coffee can full of water on the heater vents. This will loosen mucus from infections and allergies. 3. Sleep: Get 8-10 hours a night and rest during the evening after work or school. If you have trouble sleeping, adults can take Melatonin 3mg up to 3 tabs at bedtime ( not for children or pregnant women). If Mono is suspected then sleep during 9PM to 9AM time span (if possible.)   4. Cough: Take cough medicines with Guaifenesin ( to loosen chest or head congestion) and Dextromethorphan ( to decrease excess cough). Robitussin D.M. Syrup every 4-6 hrs or Mucinex D.M. Pills twice a day. Use the pediatric formulations for children over 6 months making sure they are alcohol & sugar free for children, pregnant women, and diabetics. 5. Pain And Fevers: Take Acetaminophen ( Tylenol) for fevers, aches, and headaches. 2-500 mg every 8 hours for adults. Appropriate doses at bedtime for children may help them sleep better. If pregnant take 1 -500 mg. (Tylenol) every 8 hours as needed. Ibuprofen may be used if not pregnant, but should be given with food to avoid nausea. Avoid Ibuprofen if you have high blood pressure, CHF, or kidney problems. 6.Gargle: Gargle in the back of the throat with the head tilted back and to the sides with a strong mouthwash ( Listerine or Scope) after meals and at bedtime at least 4 -5 times a day. This helps kill bacteria and viruses in the back of the throat and will shorten the duration and decrease the severity of your symptoms: sore throat, cough, ear popping,/ear pain, and possibly dizziness. 7. Smoking: Avoid smoking or exposure to second hand smoke. 8. Zinc: Zinc lozenges such as Cold Filemon, or Basic will help shorten the duration and lessen symptoms such as sore throat, cough, nasal congestion, runny nose, and post nasal drip. Use 1 lozenge every 2-4 hours ( after meals if stomach is sensitive). Children can use 10-15 mg. Or less 3-4 times a day or Zinc lollypops. In pregnancy limit to 50-60 mg. A day for 7 days as prenatals have Zinc also. With diarrhea use zinc pills 50 mg 1/2 to 1 pill 2x/day. 9. Vitamins: Vitamin C 500 mg. With breakfast and dinner. Children and pregnant women should drink citrus juices. This speeds healing and strengthens immune system. 10. Chest Symptoms: Vicks Vapor rub to the chest at bedtime. 11. Decongestants: Avoid all decongestants and antihistamine cold preparations in children. Try all of the above starting with day 1 of symptoms. If Strep throat symptoms appear call to be seen in the office as soon as possible and don't gargle on that day. Newborns, infants, or anyone with earaches or influenza may need to be seen quickly. Adults with fevers over 103 degrees or shortness of breath should call the office immediately. Arelis Coles is a 50 y.o. female evaluated via telephone on 1/7/2021. Consent:  She and/or health care decision maker is aware that that she may receive a bill for this telephone service, depending on her insurance coverage, and has provided verbal consent to proceed: Yes      Documentation:  I communicated with the patient and/or health care decision maker about sore throat. Viral illnesses  Details of this discussion including any medical advice provided:       I affirm this is a Patient Initiated Episode with a Patient who has not had a related appointment within my department in the past 7 days or scheduled within the next 24 hours.     Patient identification was verified at the start of the visit: Yes    Total Time: minutes: 11-20 minutes    Note: not billable if this call serves to triage the patient into an appointment for the relevant concern      Lubna Jaramillo 42, APRN - CNP

## 2021-01-13 ENCOUNTER — OFFICE VISIT (OUTPATIENT)
Dept: ORTHOPEDIC SURGERY | Age: 46
End: 2021-01-13
Payer: COMMERCIAL

## 2021-01-13 VITALS — TEMPERATURE: 98.1 F | HEIGHT: 67 IN | WEIGHT: 258.82 LBS | BODY MASS INDEX: 40.62 KG/M2

## 2021-01-13 DIAGNOSIS — M25.552 LEFT HIP PAIN: ICD-10-CM

## 2021-01-13 DIAGNOSIS — M54.2 NECK PAIN: ICD-10-CM

## 2021-01-13 DIAGNOSIS — M47.812 CERVICAL SPONDYLOSIS: Primary | ICD-10-CM

## 2021-01-13 DIAGNOSIS — Z96.641 HISTORY OF TOTAL HIP ARTHROPLASTY, RIGHT: ICD-10-CM

## 2021-01-13 PROCEDURE — G8484 FLU IMMUNIZE NO ADMIN: HCPCS | Performed by: PHYSICIAN ASSISTANT

## 2021-01-13 PROCEDURE — 99214 OFFICE O/P EST MOD 30 MIN: CPT | Performed by: PHYSICIAN ASSISTANT

## 2021-01-13 PROCEDURE — 1036F TOBACCO NON-USER: CPT | Performed by: PHYSICIAN ASSISTANT

## 2021-01-13 PROCEDURE — G8417 CALC BMI ABV UP PARAM F/U: HCPCS | Performed by: PHYSICIAN ASSISTANT

## 2021-01-13 PROCEDURE — G8427 DOCREV CUR MEDS BY ELIG CLIN: HCPCS | Performed by: PHYSICIAN ASSISTANT

## 2021-01-21 DIAGNOSIS — F51.04 PSYCHOPHYSIOLOGICAL INSOMNIA: ICD-10-CM

## 2021-01-21 RX ORDER — ZOLPIDEM TARTRATE 10 MG/1
10 TABLET ORAL NIGHTLY PRN
Qty: 30 TABLET | Refills: 0 | Status: SHIPPED | OUTPATIENT
Start: 2021-01-21 | End: 2021-02-08 | Stop reason: SDUPTHER

## 2021-01-21 RX ORDER — TIZANIDINE 2 MG/1
TABLET ORAL
Qty: 30 TABLET | Refills: 0 | Status: SHIPPED | OUTPATIENT
Start: 2021-01-21 | End: 2021-02-08 | Stop reason: SDUPTHER

## 2021-01-26 ENCOUNTER — HOSPITAL ENCOUNTER (OUTPATIENT)
Dept: PHYSICAL THERAPY | Age: 46
Setting detail: THERAPIES SERIES
Discharge: HOME | End: 2021-01-26
Payer: COMMERCIAL

## 2021-01-26 PROCEDURE — 97535 SELF CARE MNGMENT TRAINING: CPT

## 2021-01-26 PROCEDURE — 97161 PT EVAL LOW COMPLEX 20 MIN: CPT

## 2021-01-26 PROCEDURE — 97140 MANUAL THERAPY 1/> REGIONS: CPT
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
## 2021-01-26 NOTE — FLOWSHEET NOTE
[address] [facility] Physical Therapy  Phone: [phone]   Fax: [phone]    Physical Therapy Daily Treatment Note  Date:  2021    Patient Name:  Debi Pineda    :  1975  MRN: [id number]  Medical/Treatment Diagnosis Information:  · Diagnosis: M47.812 (ICD-10-CM) - Cervical spondylosis  · Treatment Diagnosis: Decreased cervical AROM; cervical hypomobility; headaches; postural dysfunction  Insurance/Certification information:  PT Insurance Information: [employer]  Physician Information:  Referring Practitioner: JESS Reynoso  Plan of care signed (Y/N): []  Yes [x]  No     Date of Patient follow up with Physician:      Progress Report: []  Yes  [x]  No     Date Range for reporting period:  Beginnin/26  Ending:     Progress report due (10 Rx/or 30 days whichever is less): visit #10 or  (date)     Recertification due (POC duration/ or 90 days whichever is less):  (date)     Visit # Insurance Allowable Auth required?  Date Range    30 PT/OT/Speech combined   (pt may need total hip later this year - wants to save enough PT visits for future THR PT tx) []  Yes  [x]  No      Latex Allergy:  [x]NO      []YES  Preferred Language for Healthcare:   [x]English       []other:    Functional Scale:        Date assessed:  NDI: raw score = 18/50; dysfunction = 36%  2021    Pain level:  8/10 - currently has headache going down into shoulders - may have done too much cleaning     SUBJECTIVE:  See eval    OBJECTIVE: See eval      RESTRICTIONS/PRECAUTIONS: bipolar    Exercises/Interventions:     Therapeutic Exercises (01134) Resistance / level Sets/sec Reps Notes   Chin tucks - supine with towel roll  5 sec 10    Scap retraction - seated with lumbar roll  5 sec  10                                                     Therapeutic Activities (98085)                     Home Management/ADL       Pt educated on supine and SL sleeping positions with focus on maintaining neutral spine; educated on positioning with sitting and reading including pillows/table under elbows; educated on body mechanics and squatting down vs flexing at spine. Handout provided on body mechanics    15 min             Neuromuscular Re-ed (33540)                                                 Manual Intervention ([id number])       Cervical distraction, suboccipital release, TPR L UT, PA glides, attempted side glides but increased pain   25 min                                             Pt. Education:  1/26 Pt educated on supine and SL sleeping positions with focus on maintaining neutral spine; educated on positioning with sitting and reading including pillows/table under elbows; educated on body mechanics and squatting down vs flexing at spine. Handout provided on body mechanics   -patient educated on diagnosis, prognosis and expectations for rehab  -all patient questions were answered    Home Exercise Program:  Access Code: [email or web address]   URL: [email or web address]   Date: 01/26/2021   Prepared by: Terrial Barrie     Exercises   · Supine Cervical Retraction with Towel - 10 reps - 1-2 sets - 5 hold - 2x daily - 7x weekly   · Seated Scapular Retraction - 10 reps - 3 sets - 2x daily - 7x weekly       Therapeutic Exercise and NMR EXR  [] (08852) Provided verbal/tactile cueing for activities related to strengthening, flexibility, endurance, ROM for improvements in  [] LE / Lumbar: LE, proximal hip, and core control with self care, mobility, lifting, ambulation.   [] UE / Cervical: cervical, postural, scapular, scapulothoracic and UE control with self care, reaching, carrying, lifting, house/yardwork, driving, computer work.  [] (87082) Provided verbal/tactile cueing for activities related to improving balance, coordination, kinesthetic sense, posture, motor skill, proprioception to assist with   [] LE / lumbar: LE, proximal hip, and core control in self care, mobility, lifting, ambulation and eccentric single leg control. [] UE / cervical: cervical, scapular, scapulothoracic and UE control with self care, reaching, carrying, lifting, house/yardwork, driving, computer work.   [] (60123) Therapist is in constant attendance of 2 or more patients providing skilled therapy interventions, but not providing any significant amount of measurable one-on-one time to either patient, for improvements in  [] LE / lumbar: LE, proximal hip, and core control in self care, mobility, lifting, ambulation and eccentric single leg control. [] UE / cervical: cervical, scapular, scapulothoracic and UE control with self care, reaching, carrying, lifting, house/yardwork, driving, computer work. NMR and Therapeutic Activities:    [] (69732 or 12422) Provided verbal/tactile cueing for activities related to improving balance, coordination, kinesthetic sense, posture, motor skill, proprioception and motor activation to allow for proper function of   [] LE: / Lumbar core, proximal hip and LE with self care and ADLs  [] UE / Cervical: cervical, postural, scapular, scapulothoracic and UE control with self care, carrying, lifting, driving, computer work.   [] (83970) Gait Re-education- Provided training and instruction to the patient for proper LE, core and proximal hip recruitment and positioning and eccentric body weight control with ambulation re-education including up and down stairs     Home Management Training / Self Care:  [x] (91092) Provided self-care/home management training related to activities of daily living and compensatory training, and/or use of adaptive equipment for improvement with: ADLs and compensatory training, meal preparation, safety procedures and instruction in use of adaptive equipment, including bathing, grooming, dressing, personal hygiene, basic household cleaning and chores.      Home Exercise Program:    [x] (92793) Reviewed/Progressed HEP activities related to strengthening, flexibility, endurance, ROM of   [] LE / Lumbar: core, proximal hip and LE for functional self-care, mobility, lifting and ambulation/stair navigation   [] UE / Cervical: cervical, postural, scapular, scapulothoracic and UE control with self care, reaching, carrying, lifting, house/yardwork, driving, computer work  [] (96301)Reviewed/Progressed HEP activities related to improving balance, coordination, kinesthetic sense, posture, motor skill, proprioception of   [] LE: core, proximal hip and LE for self care, mobility, lifting, and ambulation/stair navigation    [] UE / Cervical: cervical, postural,  scapular, scapulothoracic and UE control with self care, reaching, carrying, lifting, house/yardwork, driving, computer work    Manual Treatments:  PROM / STM / Oscillations-Mobs:  G-I, II, III, IV (PA's, Inf., Post.)  [x] (89507) Provided manual therapy to mobilize LE, proximal hip and/or LS spine soft tissue/joints for the purpose of modulating pain, promoting relaxation,  increasing ROM, reducing/eliminating soft tissue swelling/inflammation/restriction, improving soft tissue extensibility and allowing for proper ROM for normal function with   [] LE / lumbar: self care, mobility, lifting and ambulation. [x] UE / Cervical: self care, reaching, carrying, lifting, house/yardwork, driving, computer work. Modalities:  [] (82606) Vasopneumatic compression: Utilized vasopneumatic compression to decrease edema / swelling for the purpose of improving mobility and quad tone / recruitment which will allow for increased overall function including but not limited to self-care, transfers, ambulation, and ascending / descending stairs.        Modalities:      Charges:  Timed Code Treatment Minutes: 40   Total Treatment Minutes: 55     [x] EVAL - LOW (34073)   [] EVAL - MOD (50723)  [] EVAL - HIGH (55827)  [] RE-EVAL (62665)  [] NH(10035) x       [] Ionto  [] NMR (41043) x       [] Vaso  [x] Manual (86670) x  2     [] Ultrasound  [] TA x [] [facility] Traction (95092)  [] Aquatic Therapy x     [] ES (un) (43230):   [x] Home Management Training x 1  [] ES(attended) (05203)   [] Dry Needling 1-2 muscles (64293):  [] Dry Needling 3+ muscles (337127)  [] Group:      [] Other:     GOALS: GOALS:  Patient stated goal: decrease pain   []? Progressing: []? Met: []? Not Met: []? Adjusted     Therapist goals for Patient:   Short Term Goals: To be achieved in: 2 weeks  1. Independent in HEP and progression per patient tolerance, in order to prevent re-injury. []? Progressing: []? Met: []? Not Met: []? Adjusted  2. Patient will have a decrease in pain to facilitate improvement in movement, function, and ADLs as indicated by improvement with respect to Functional Deficits. []? Progressing: []? Met: []? Not Met: []? Adjusted     Long Term Goals: To be achieved in: 8 weeks  1. Disability index score of 10% or less on the NDI to assist with reaching prior level of function. []? Progressing: []? Met: []? Not Met: []? Adjusted  2. Patient will demonstrate increased AROM  to [facility], to allow for proper joint functioning to allow pt to resume household activities without increase in symptoms. []? Progressing: []? Met: []? Not Met: []? Adjusted  3. Patient will return to functional activities including laundry and cleaning without increased symptoms or restriction. []? Progressing: []? Met: []? Not Met: []? Adjusted  4. Pt will return to recreational activities, including going to the gym with friends, without pain or limitations. []? Progressing: []? Met: []? Not Met: []? Adjusted       5. Pt will sleep with no pain or nightly disturbances secondary to neck pain. []? Progressing: []? Met: []? Not Met: []? Adjusted    Overall Progression Towards Functional goals/ Treatment Progress Update:  [] Patient is progressing as expected towards functional goals listed. [] Progression is slowed due to complexities/Impairments listed.   [] Progression has been slowed

## 2021-01-28 DIAGNOSIS — E78.2 MIXED HYPERLIPIDEMIA: ICD-10-CM

## 2021-01-28 RX ORDER — ATORVASTATIN CALCIUM 20 MG/1
TABLET, FILM COATED ORAL
Qty: 30 TABLET | Refills: 0 | Status: SHIPPED | OUTPATIENT
Start: 2021-01-28 | End: 2021-01-28 | Stop reason: CLARIF
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
## 2021-01-29 ENCOUNTER — HOSPITAL ENCOUNTER (OUTPATIENT)
Dept: PHYSICAL THERAPY | Age: 46
Setting detail: THERAPIES SERIES
Discharge: HOME | End: 2021-01-29
Payer: COMMERCIAL

## 2021-01-29 PROCEDURE — 97140 MANUAL THERAPY 1/> REGIONS: CPT

## 2021-01-29 PROCEDURE — 97110 THERAPEUTIC EXERCISES: CPT

## 2021-01-29 NOTE — FLOWSHEET NOTE
[address] [facility] Physical Therapy  Phone: [phone]   Fax: [phone]    Physical Therapy Daily Treatment Note  Date:  2021    Patient Name:  Katelyn Madrid    :  1975  MRN: [id number]  Medical/Treatment Diagnosis Information:  · Diagnosis: M47.812 (ICD-10-CM) - Cervical spondylosis  · Treatment Diagnosis: Decreased cervical AROM; cervical hypomobility; headaches; postural dysfunction  Insurance/Certification information:  PT Insurance Information: [employer]  Physician Information:  Referring Practitioner: JESS Rojas  Plan of care signed (Y/N): []  Yes [x]  No     Date of Patient follow up with Physician:      Progress Report: []  Yes  [x]  No     Date Range for reporting period:  Beginnin/26  Ending:     Progress report due (10 Rx/or 30 days whichever is less): visit #10 or  (date)     Recertification due (POC duration/ or 90 days whichever is less):  (date)     Visit # Insurance Allowable Auth required? Date Range    30 PT/OT/Speech combined   (pt may need total hip later this year - wants to save enough PT visits for future THR PT tx) []  Yes  [x]  No      Latex Allergy:  [x]NO      []YES  Preferred Language for Healthcare:   [x]English       []other:    Functional Scale:        Date assessed:  NDI: raw score = 18/50; dysfunction = 36%  2021    Pain level:  0/10 - currently has headache going down into shoulders - may have done too much cleaning     SUBJECTIVE:  HEP started to hurt so she quit. States she has a headache at least once a day. No pain currently. Still having cracking and popping in neck with head movement.      OBJECTIVE:         RESTRICTIONS/PRECAUTIONS: bipolar    Exercises/Interventions:     Therapeutic Exercises (85654) Resistance / level Sets/sec Reps Notes   UBE  2 min forward, 2 min retro     Chin tucks - supine with towel roll     Scap retraction - seated with lumbar roll     Ball on wall:  CT CT with head nods  CT with head turns     1  1  1   x10  x10  x10 Added 1/29   tbands:  B ER  Hor abd   Mid Rows    Green  Green  Green    1  1  1     x10  x10  x10   Added 1/29   Thor ext over bolster in chair red 10 se cholds x6 Added 1/29                                Therapeutic Activities (38003)                     Home Management/ADL       Pt educated on supine and SL sleeping positions with focus on maintaining neutral spine; educated on positioning with sitting and reading including pillows/table under elbows; educated on body mechanics and squatting down vs flexing at spine. Handout provided on body mechanics                Neuromuscular Re-ed (99729)                                                 Manual Intervention (90157)       Cervical distraction, suboccipital release, TPR L UT, PA glides grade 2, side glides grade 1 B  23 min                                             Pt. Education:  1/26 Pt educated on supine and SL sleeping positions with focus on maintaining neutral spine; educated on positioning with sitting and reading including pillows/table under elbows; educated on body mechanics and squatting down vs flexing at spine. Handout provided on body mechanics   -patient educated on diagnosis, prognosis and expectations for rehab  -all patient questions were answered    Home Exercise Program:  Access Code: [email or web address]   URL: [employer]/   Date: 01/26/2021   Prepared by: Ad Gandhi     Exercises   · Supine Cervical Retraction with Towel - 10 reps - 1-2 sets - 5 hold - 2x daily - 7x weekly   · Seated Scapular Retraction - 10 reps - 3 sets - 2x daily - 7x weekly       Therapeutic Exercise and NMR EXR  [x] (93523) Provided verbal/tactile cueing for activities related to strengthening, flexibility, endurance, ROM for improvements in  [] LE / Lumbar: LE, proximal hip, and core control with self care, mobility, lifting, ambulation.   [x] UE / Cervical: cervical, postural, scapular, scapulothoracic and UE control with self care, reaching, carrying, lifting, house/yardwork, driving, computer work.  [] (12388) Provided verbal/tactile cueing for activities related to improving balance, coordination, kinesthetic sense, posture, motor skill, proprioception to assist with   [] LE / lumbar: LE, proximal hip, and core control in self care, mobility, lifting, ambulation and eccentric single leg control. [] UE / cervical: cervical, scapular, scapulothoracic and UE control with self care, reaching, carrying, lifting, house/yardwork, driving, computer work.   [] (85980) Therapist is in constant attendance of 2 or more patients providing skilled therapy interventions, but not providing any significant amount of measurable one-on-one time to either patient, for improvements in  [] LE / lumbar: LE, proximal hip, and core control in self care, mobility, lifting, ambulation and eccentric single leg control. [] UE / cervical: cervical, scapular, scapulothoracic and UE control with self care, reaching, carrying, lifting, house/yardwork, driving, computer work.      NMR and Therapeutic Activities:    [] (81019 or 21970) Provided verbal/tactile cueing for activities related to improving balance, coordination, kinesthetic sense, posture, motor skill, proprioception and motor activation to allow for proper function of   [] LE: / Lumbar core, proximal hip and LE with self care and ADLs  [] UE / Cervical: cervical, postural, scapular, scapulothoracic and UE control with self care, carrying, lifting, driving, computer work.   [] (19288) Gait Re-education- Provided training and instruction to the patient for proper LE, core and proximal hip recruitment and positioning and eccentric body weight control with ambulation re-education including up and down stairs     Home Management Training / Self Care:  [] (28552) Provided self-care/home management training related to activities of daily living and compensatory training, and/or use of adaptive equipment for improvement with: ADLs and compensatory training, meal preparation, safety procedures and instruction in use of adaptive equipment, including bathing, grooming, dressing, personal hygiene, basic household cleaning and chores. Home Exercise Program:    [] (27091) Reviewed/Progressed HEP activities related to strengthening, flexibility, endurance, ROM of   [] LE / Lumbar: core, proximal hip and LE for functional self-care, mobility, lifting and ambulation/stair navigation   [] UE / Cervical: cervical, postural, scapular, scapulothoracic and UE control with self care, reaching, carrying, lifting, house/yardwork, driving, computer work  [] (10682)Reviewed/Progressed HEP activities related to improving balance, coordination, kinesthetic sense, posture, motor skill, proprioception of   [] LE: core, proximal hip and LE for self care, mobility, lifting, and ambulation/stair navigation    [] UE / Cervical: cervical, postural,  scapular, scapulothoracic and UE control with self care, reaching, carrying, lifting, house/yardwork, driving, computer work    Manual Treatments:  PROM / STM / Oscillations-Mobs:  G-I, II, III, IV (PA's, Inf., Post.)  [x] (71733) Provided manual therapy to mobilize LE, proximal hip and/or LS spine soft tissue/joints for the purpose of modulating pain, promoting relaxation,  increasing ROM, reducing/eliminating soft tissue swelling/inflammation/restriction, improving soft tissue extensibility and allowing for proper ROM for normal function with   [] LE / lumbar: self care, mobility, lifting and ambulation. [x] UE / Cervical: self care, reaching, carrying, lifting, house/yardwork, driving, computer work.      Modalities:  [] (65860) Vasopneumatic compression: Utilized vasopneumatic compression to decrease edema / swelling for the purpose of improving mobility and quad tone / recruitment which will allow for increased overall function including but not limited to self-care, transfers, ambulation, and ascending / descending stairs. Modalities:    1/29: MPH to cerv spine in supine x 10 min     Charges:  Timed Code Treatment Minutes: 43   Total Treatment Minutes: 53     [] EVAL - LOW (64955)   [] EVAL - MOD (34673)  [] EVAL - HIGH (79430)  [] RE-EVAL (42687)  [x] WI(97451) x 1      [] Ionto  [] NMR (23467) x       [] Vaso  [x] Manual (84598) x  2     [] Ultrasound  [] TA x        [] Mech Traction (10337)  [] Aquatic Therapy x     [] ES (un) (54224):   [] Home Management Training x   [] ES(attended) (64268)   [] Dry Needling 1-2 muscles (13207):  [] Dry Needling 3+ muscles (761278)  [] Group:      [] Other:     GOALS: GOALS:  Patient stated goal: decrease pain   []? Progressing: []? Met: []? Not Met: []? Adjusted     Therapist goals for Patient:   Short Term Goals: To be achieved in: 2 weeks  1. Independent in HEP and progression per patient tolerance, in order to prevent re-injury. []? Progressing: []? Met: []? Not Met: []? Adjusted  2. Patient will have a decrease in pain to facilitate improvement in movement, function, and ADLs as indicated by improvement with respect to Functional Deficits. []? Progressing: []? Met: []? Not Met: []? Adjusted     Long Term Goals: To be achieved in: 8 weeks  1. Disability index score of 10% or less on the NDI to assist with reaching prior level of function. []? Progressing: []? Met: []? Not Met: []? Adjusted  2. Patient will demonstrate increased AROM  to [facility], to allow for proper joint functioning to allow pt to resume household activities without increase in symptoms. []? Progressing: []? Met: []? Not Met: []? Adjusted  3. Patient will return to functional activities including laundry and cleaning without increased symptoms or restriction. []? Progressing: []? Met: []? Not Met: []? Adjusted  4. Pt will return to recreational activities, including going to the gym with friends, without pain or limitations. []?  Progressing: []? Met: []? Not Met: []? Adjusted       5. Pt will sleep with no pain or nightly disturbances secondary to neck pain. []? Progressing: []? Met: []? Not Met: []? Adjusted    Overall Progression Towards Functional goals/ Treatment Progress Update:  [] Patient is progressing as expected towards functional goals listed. [] Progression is slowed due to complexities/Impairments listed. [] Progression has been slowed due to co-morbidities. [x] Plan just implemented, too soon to assess goals progression <30days   [] Goals require adjustment due to lack of progress  [] Patient is not progressing as expected and requires additional follow up with physician  [] Other    Persisting Functional Limitations/Impairments:  [x]Sleeping []Sitting               []Standing []Transfers        []Walking []Kneeling               []Stairs []Squatting / bending   [x]ADLs [x]Reaching  [x]Lifting  [x]Housework  []Driving []Job related tasks  [x]Sports/Recreation []Other:        ASSESSMENT:    Pt has low tolerance to gentle cerv manual techniques - gentle cerv traction progressively got more uncomfortable. Pt holds head in slight L Sbing due to avoidance of R SBing. Plan to continue with mobility and strength of cervical and thoracic spine. Treatment/Activity Tolerance:  [x] Patient able to complete tx  [] Patient limited by fatigue  [] Patient limited by pain  [] Patient limited by other medical complications  [] Other:     Prognosis: [x] Good [] Fair  [] Poor    Patient Requires Follow-up: [x] Yes  [] No    Plan for next treatment session: manual therapy to increase cervical mobility and decrease tissue tension, postural education, postural mm strengthening, MOC, emphasis on HEP bc pt wants to save as many visits as possible for after possible future THR in 2021    PLAN: See eval. PT 1x / week for 2 weeks.    [x] Continue per plan of care [] Alter current plan (see comments)  [] Plan of care initiated [] Hold pending MD visit [] Discharge    Electronically signed by: Selena Jonas PT, DPT      Note: If patient does not return for scheduled/ recommended follow up visits, this note will serve as a discharge from care along with most recent update on progress.

## 2021-02-04 ENCOUNTER — HOSPITAL ENCOUNTER (OUTPATIENT)
Dept: PHYSICAL THERAPY | Age: 46
Setting detail: THERAPIES SERIES
Discharge: HOME | End: 2021-02-04
Payer: COMMERCIAL

## 2021-02-04 NOTE — PROGRESS NOTES
Physical Therapy  Cancellation/No-show Note  Patient Name:  Steve Ayala  :  1975   Date:  2021  Cancelled visits to date: 1  No-shows to date: 0    Patient status for today's appointment patient:  [x]  Cancelled   []  Rescheduled appointment  []  No-show     Reason given by patient:  []  Patient ill  []  Conflicting appointment  []  No transportation    []  Conflict with work  []  No reason given  [x]  Other:  Grandchild sick    Comments:      Phone call information:   []  Phone call made today to patient at _ time at number provided:      []  Patient answered, conversation as follows:    []  Patient did not answer, message left as follows:  [x]  Phone call not made today    Electronically signed by:  Blaise Rolon PT, DPT

## 2021-02-08 ENCOUNTER — OFFICE VISIT (OUTPATIENT)
Dept: FAMILY MEDICINE CLINIC | Age: 46
End: 2021-02-08
Payer: COMMERCIAL

## 2021-02-08 VITALS
WEIGHT: 256 LBS | BODY MASS INDEX: 40.18 KG/M2 | TEMPERATURE: 98.6 F | DIASTOLIC BLOOD PRESSURE: 84 MMHG | SYSTOLIC BLOOD PRESSURE: 128 MMHG | HEIGHT: 67 IN

## 2021-02-08 DIAGNOSIS — F31.61 BIPOLAR DISORDER, CURRENT EPISODE MIXED, MILD (HCC): ICD-10-CM

## 2021-02-08 DIAGNOSIS — G89.29 CHRONIC NECK PAIN: ICD-10-CM

## 2021-02-08 DIAGNOSIS — G60.9 IDIOPATHIC PERIPHERAL NEUROPATHY: ICD-10-CM

## 2021-02-08 DIAGNOSIS — F41.9 ANXIETY: ICD-10-CM

## 2021-02-08 DIAGNOSIS — I10 ESSENTIAL HYPERTENSION, BENIGN: Primary | ICD-10-CM

## 2021-02-08 DIAGNOSIS — M54.2 CHRONIC NECK PAIN: ICD-10-CM

## 2021-02-08 DIAGNOSIS — Z13.1 DIABETES MELLITUS SCREENING: ICD-10-CM

## 2021-02-08 DIAGNOSIS — Z13.220 LIPID SCREENING: ICD-10-CM

## 2021-02-08 DIAGNOSIS — Z13.31 POSITIVE DEPRESSION SCREENING: ICD-10-CM

## 2021-02-08 DIAGNOSIS — F51.04 PSYCHOPHYSIOLOGICAL INSOMNIA: ICD-10-CM

## 2021-02-08 LAB
A/G RATIO: 1.9 (ref 1.1–2.2)
ALBUMIN SERPL-MCNC: 4.5 G/DL (ref 3.4–5)
ALP BLD-CCNC: 83 U/L (ref 40–129)
ALT SERPL-CCNC: 20 U/L (ref 10–40)
ANION GAP SERPL CALCULATED.3IONS-SCNC: 11 MMOL/L (ref 3–16)
AST SERPL-CCNC: 21 U/L (ref 15–37)
BILIRUB SERPL-MCNC: <0.2 MG/DL (ref 0–1)
BUN BLDV-MCNC: 11 MG/DL (ref 7–20)
CALCIUM SERPL-MCNC: 10 MG/DL (ref 8.3–10.6)
CHLORIDE BLD-SCNC: 100 MMOL/L (ref 99–110)
CHOLESTEROL, FASTING: 219 MG/DL (ref 0–199)
CO2: 24 MMOL/L (ref 21–32)
CREAT SERPL-MCNC: 0.7 MG/DL (ref 0.6–1.1)
GFR AFRICAN AMERICAN: >60
GFR NON-AFRICAN AMERICAN: >60
GLOBULIN: 2.4 G/DL
GLUCOSE BLD-MCNC: 96 MG/DL (ref 70–99)
HDLC SERPL-MCNC: 59 MG/DL (ref 40–60)
LDL CHOLESTEROL CALCULATED: 137 MG/DL
LITHIUM DOSE AMOUNT: ABNORMAL
LITHIUM LEVEL: 0.3 MMOL/L (ref 0.6–1.2)
POTASSIUM SERPL-SCNC: 4.7 MMOL/L (ref 3.5–5.1)
SODIUM BLD-SCNC: 135 MMOL/L (ref 136–145)
TOTAL PROTEIN: 6.9 G/DL (ref 6.4–8.2)
TRIGLYCERIDE, FASTING: 116 MG/DL (ref 0–150)
VLDLC SERPL CALC-MCNC: 23 MG/DL

## 2021-02-08 PROCEDURE — G8417 CALC BMI ABV UP PARAM F/U: HCPCS | Performed by: NURSE PRACTITIONER

## 2021-02-08 PROCEDURE — 1036F TOBACCO NON-USER: CPT | Performed by: NURSE PRACTITIONER

## 2021-02-08 PROCEDURE — G8427 DOCREV CUR MEDS BY ELIG CLIN: HCPCS | Performed by: NURSE PRACTITIONER

## 2021-02-08 PROCEDURE — G8484 FLU IMMUNIZE NO ADMIN: HCPCS | Performed by: NURSE PRACTITIONER

## 2021-02-08 PROCEDURE — 99214 OFFICE O/P EST MOD 30 MIN: CPT | Performed by: NURSE PRACTITIONER

## 2021-02-08 PROCEDURE — 36415 COLL VENOUS BLD VENIPUNCTURE: CPT | Performed by: NURSE PRACTITIONER

## 2021-02-08 RX ORDER — MELOXICAM 15 MG/1
15 TABLET ORAL DAILY PRN
Qty: 90 TABLET | Refills: 1 | Status: SHIPPED | OUTPATIENT
Start: 2021-02-08 | End: 2021-02-26

## 2021-02-08 RX ORDER — FLUOXETINE HYDROCHLORIDE 20 MG/1
40 CAPSULE ORAL DAILY
Qty: 180 CAPSULE | Refills: 2 | Status: SHIPPED | OUTPATIENT
Start: 2021-02-08 | End: 2021-03-08

## 2021-02-08 RX ORDER — CYCLOBENZAPRINE HCL 10 MG
10 TABLET ORAL 3 TIMES DAILY PRN
Qty: 90 TABLET | Refills: 0 | Status: SHIPPED | OUTPATIENT
Start: 2021-02-08 | End: 2021-02-09 | Stop reason: CLARIF

## 2021-02-08 RX ORDER — LITHIUM CARBONATE 300 MG/1
300 CAPSULE ORAL 2 TIMES DAILY WITH MEALS
Qty: 60 CAPSULE | Refills: 2 | Status: SHIPPED | OUTPATIENT
Start: 2021-02-08 | End: 2021-03-08 | Stop reason: SDUPTHER

## 2021-02-08 RX ORDER — GABAPENTIN 300 MG/1
300 CAPSULE ORAL 3 TIMES DAILY
Qty: 90 CAPSULE | Refills: 2 | Status: SHIPPED | OUTPATIENT
Start: 2021-02-08 | End: 2021-06-09 | Stop reason: SDUPTHER

## 2021-02-08 RX ORDER — TIZANIDINE 2 MG/1
TABLET ORAL
Qty: 30 TABLET | Refills: 11 | Status: SHIPPED | OUTPATIENT
Start: 2021-02-08 | End: 2021-02-25 | Stop reason: SDUPTHER

## 2021-02-08 RX ORDER — AMMONIUM LACTATE 12 G/100G
LOTION TOPICAL
Qty: 500 G | Refills: 0 | Status: SHIPPED | OUTPATIENT
Start: 2021-02-08

## 2021-02-08 RX ORDER — ZOLPIDEM TARTRATE 10 MG/1
10 TABLET ORAL NIGHTLY PRN
Qty: 90 TABLET | Refills: 1 | Status: SHIPPED | OUTPATIENT
Start: 2021-02-20 | End: 2021-05-21

## 2021-02-08 RX ORDER — AMLODIPINE BESYLATE 10 MG/1
TABLET ORAL
Qty: 90 TABLET | Refills: 3 | Status: SHIPPED | OUTPATIENT
Start: 2021-02-08 | End: 2022-04-19

## 2021-02-08 RX ORDER — LAMOTRIGINE 100 MG/1
TABLET ORAL
Qty: 30 TABLET | Refills: 2 | Status: SHIPPED | OUTPATIENT
Start: 2021-02-08 | End: 2021-06-18 | Stop reason: SDUPTHER

## 2021-02-08 RX ORDER — FLUOXETINE HYDROCHLORIDE 40 MG/1
80 CAPSULE ORAL DAILY
Qty: 180 CAPSULE | Refills: 0 | Status: SHIPPED | OUTPATIENT
Start: 2021-02-08 | End: 2022-01-19 | Stop reason: ALTCHOICE

## 2021-02-08 RX ORDER — ZOLPIDEM TARTRATE 10 MG/1
10 TABLET ORAL NIGHTLY PRN
Qty: 30 TABLET | Refills: 5 | Status: SHIPPED | OUTPATIENT
Start: 2021-02-20 | End: 2021-05-24 | Stop reason: DRUGHIGH

## 2021-02-08 ASSESSMENT — PATIENT HEALTH QUESTIONNAIRE - PHQ9
SUM OF ALL RESPONSES TO PHQ9 QUESTIONS 1 & 2: 2
2. FEELING DOWN, DEPRESSED OR HOPELESS: 1
SUM OF ALL RESPONSES TO PHQ QUESTIONS 1-9: 2

## 2021-02-08 NOTE — PROGRESS NOTES
Lazaro Espinoza (:  1975) is a 48 y.o. female,Established patient, here for evaluation of the following chief complaint(s):    Hypertension (HTN ROUTINE FOLLOW UP ) and Insomnia (INSOMNIA ROUTINE FOLLOW UP )    Janis Herbert was seen today for hypertension and insomnia. Diagnoses and all orders for this visit:    Essential hypertension, benign  Encouraged to monitor b/p at least 2 x weekly goal of 140/90 or less  Continue Norvasc as prescribed  Lifestyle Recommendations for High Blood Pressure:  Reduce sodium intake to less than 1500 mg daily  Include 5-7 servings of fruits and vegetables daily  Reduce weight to ideal if overweight  30 minutes of physical activity daily  Psychophysiological insomnia   zolpidem (AMBIEN) 10 MG tablet; Take 1 tablet by mouth nightly as needed for Sleep for up to 30 days. Fill       Idiopathic peripheral neuropathy   gabapentin (NEURONTIN) 300 MG capsule; Take 1 capsule by mouth 3 times daily for 30 days. Anxiety  Positive depression screening  Bipolar disorder, current episode mixed, mild (HCC)    -     lithium 300 MG capsule; Take 1 capsule by mouth 2 times daily (with meals)  -     cariprazine hcl (VRAYLAR) 3 MG CAPS capsule; Take 1 capsule by mouth daily  CONTINUE PROZAC AS PRESCRIBED  -     LITHIUM LEVEL  ENCOURAGED TO FOLLOW UP WITH THERAPIST/PSYCHIATRIST     Anxiety    Positive depression screening  -     lithium 300 MG capsule; Take 1 capsule by mouth 2 times daily (with meals)    Chronic neck pain  Diabetes mellitus screening  -     COMPREHENSIVE METABOLIC PANEL; Future  FLEXERIL AS PRESCRIBED  NEURONTIN AS PRESCRIBED  IBUPROFEN AS NEEDED  FOLLOW UP WITH ORTHO    Lipid screening  -     Lipid, Fasting;  Future  -     Lipid, Fasting        SUBJECTIVE/OBJECTIVE:  HPI Hypertension: Patient here for follow-up of elevated blood pressure. She is not exercising and is adherent to low salt diet. She does check her blood pressure  controlled at home. Cardiac symptoms none. Patient denies chest pain, chest pressure/discomfort, claudication, dyspnea, exertional chest pressure/discomfort, fatigue, irregular heart beat, lower extremity edema, near-syncope, orthopnea, palpitations, paroxysmal nocturnal dyspnea, syncope and tachypnea. Cardiovascular risk factors: dyslipidemia, hypertension, sedentary lifestyle and smoking/ tobacco exposure. Use of agents associated with hypertension: none. History of target organ damage: none. Neck pain - has had neck pain for several seen by Romel Jacome diagnosed with cervical spondylosis- has had some headaches as well      Insomnia  Ambien works well for her she has not had any issues with the medication - she sleeps throught the night - had difficulty falling and staying asleep    Bipolar disorder  feelsl her mood could be better  There are days she does nothing   She has not had any thoughts of harming self or others  Review of Systems   Cardiovascular: Negative. Psychiatric/Behavioral: Positive for dysphoric mood and sleep disturbance. The patient is nervous/anxious. Physical Exam  Vitals signs reviewed. Constitutional:       General: She is awake. Appearance: Normal appearance. She is well-developed. She is obese. She is not ill-appearing, toxic-appearing or diaphoretic. Cardiovascular:      Rate and Rhythm: Normal rate and regular rhythm. Heart sounds: Normal heart sounds, S1 normal and S2 normal. Heart sounds not distant. No murmur. No systolic murmur. No diastolic murmur. No friction rub. No gallop. No S3 or S4 sounds. Musculoskeletal:      Right lower leg: No edema. Left lower leg: No edema. Neurological:      Mental Status: She is alert and oriented to person, place, and time.    Psychiatric: Attention and Perception: Attention and perception normal.         Mood and Affect: Mood and affect normal. Affect is not labile. Speech: Speech normal.         Behavior: Behavior normal. Behavior is cooperative. Thought Content: Thought content normal.         Cognition and Memory: Cognition and memory normal.               An electronic signature was used to authenticate this note.     --Debra Strickland, ROSSANA - CNP

## 2021-02-08 NOTE — PATIENT INSTRUCTIONS
Patient Education        Insomnia: Care Instructions  Your Care Instructions     Insomnia is the inability to sleep well. It is a common problem for most people at some time. Insomnia may make it hard for you to get to sleep, stay asleep, or sleep as long as you need to. This can make you tired and grouchy during the day. It can also make you forgetful, less effective at work, and unhappy. Insomnia can be caused by conditions such as depression or anxiety. Pain can also affect your ability to sleep. When these problems are solved, the insomnia usually clears up. But sometimes bad sleep habits can cause insomnia. If insomnia is affecting your work or your enjoyment of life, you can take steps to improve your sleep. Follow-up care is a key part of your treatment and safety. Be sure to make and go to all appointments, and call your doctor if you are having problems. It's also a good idea to know your test results and keep a list of the medicines you take. How can you care for yourself at home? What to avoid   · Do not have drinks with caffeine, such as coffee or black tea, for 8 hours before bed. · Do not smoke or use other types of tobacco near bedtime. Nicotine is a stimulant and can keep you awake. · Avoid drinking alcohol late in the evening, because it can cause you to wake in the middle of the night. · Do not eat a big meal close to bedtime. If you are hungry, eat a light snack. · Do not drink a lot of water close to bedtime, because the need to urinate may wake you up during the night. · Do not read or watch TV in bed. Use the bed only for sleeping and sexual activity. What to try   · Go to bed at the same time every night, and wake up at the same time every morning. Do not take naps during the day. · Keep your bedroom quiet, dark, and cool. · Sleep on a comfortable pillow and mattress. · If watching the clock makes you anxious, turn it facing away from you so you cannot see the time. · If you worry when you lie down, start a worry book. Well before bedtime, write down your worries, and then set the book and your concerns aside. · Try meditation or other relaxation techniques before you go to bed. · If you cannot fall asleep, get up and go to another room until you feel sleepy. Do something relaxing. Repeat your bedtime routine before you go to bed again. · Make your house quiet and calm about an hour before bedtime. Turn down the lights, turn off the TV, log off the computer, and turn down the volume on music. This can help you relax after a busy day. When should you call for help? Watch closely for changes in your health, and be sure to contact your doctor if:    · Your efforts to improve your sleep do not work.     · Your insomnia gets worse.     · You have been feeling down, depressed, or hopeless or have lost interest in things that you usually enjoy. Where can you learn more? Go to [email or web address] org and sign in to your Kismet account. Enter P513 in the Opee[employer] box to learn more about \"Insomnia: Care Instructions. \"     If you do not have an account, please click on the \"Sign Up Now\" link. Current as of: December 16, 2019               Content Version: 12.6  © 2428-9603 [employer]. Care instructions adapted under license by [facility] ([facility]). If you have questions about a medical condition or this instruction, always ask your healthcare professional. [place] [address] any warranty or liability for your use of this information. Patient Education        Learning About Mood Disorders  What are mood disorders? Mood disorders are medical problems that affect how you feel. They can impact your moods, thoughts, and actions. Mood disorders include:  · Depression. This causes you to feel sad or hopeless for much of the time. · Bipolar disorder. This causes extreme mood changes from manic episodes of very high energy to extreme lows of depression. · Seasonal affective disorder (SAD). This is a type of depression that affects you during the same season each year. Most often people experience SAD during the fall and winter months when days are shorter and there is less light. What are the symptoms? Depression  You may:  · Feel sad or hopeless nearly every day. · Lose interest in or not get pleasure from most daily activities. You feel this way nearly every day. · Have low energy, changes in your appetite, or changes in how well you sleep. · Have trouble concentrating. · Think about death and suicide. Keep the numbers for these national suicide hotlines: 5-545-096-TALK (3-414.844.5708) and 9-719-QHTJEXU (7-669.472.7751). If you or someone you know talks about suicide or feeling hopeless, get help right away. Bipolar disorder  Symptoms depend on your mood swings. You may:  · Feel very happy, energetic, or on edge. · Feel like you need very little sleep. · Feel overly self-confident. · Do impulsive things, such as spending a lot of money. · Feel sad or hopeless. · Have racing thoughts or trouble thinking and making decisions. · Lose interest in things you have enjoyed in the past.  · Think about death and suicide. Keep the numbers for these national suicide hotlines: 2-352-888-TALK (0-954.898.9042) and 7-719-GUUENOX (2-465.193.8169). If you or someone you know talks about suicide or feeling hopeless, get help right away. Seasonal affective disorder (SAD)  Symptoms come and go at about the same time each year. For most people with SAD, symptoms come during the winter when there is less daylight. You may:  · Feel sad, grumpy, khoury, or anxious. · Lose interest in your usual activities. · Eat more and crave carbohydrates, such as bread and pasta. · Gain weight. · Sleep more and feel drowsy during the daytime. How are mood disorders treated? Mood disorders can be treated with medicines or counseling, or a combination of both. Medicines for depression and SAD may include antidepressants. Medicines for bipolar disorder may include:  · Mood stabilizers. · Antipsychotics. · Benzodiazepines. Counseling may involve cognitive-behavioral therapy. It teaches you how to change the ways you think and behave. This can help you stop thinking bad thoughts about yourself and your life. Light therapy is the main treatment for SAD. This therapy uses a special kind of lamp. You let the lamp shine on you at certain times, usually in the morning. This may help your symptoms during the months when there is less sunlight. Healthy lifestyle  Healthy lifestyle changes may help you feel better. · Be active often. You might try walking or strength training. · Eat a healthy diet. Include fruits, vegetables, lean proteins, and whole grains in your diet each day. · Keep a regular sleep schedule. Try for 8 hours of sleep a night. · Find ways to manage stress, such as relaxation exercises. · Avoid alcohol and illegal drugs. Follow-up care is a key part of your treatment and safety. Be sure to make and go to all appointments, and call your doctor if you are having problems. It's also a good idea to know your test results and keep a list of the medicines you take. Where can you learn more? Go to https://Geodruid.Singulex. org and sign in to your Predixion Software account. Enter S467 in the [facility] box to learn more about \"Learning About Mood Disorders. \"     If you do not have an account, please click on the \"Sign Up Now\" link. Current as of: January 31, 2020               Content Version: 12.6  © 6416-6476 [employer], [employer]orporated. Care instructions adapted under license by [facility] ([facility]). If you have questions about a medical condition or this instruction, always ask your healthcare professional. [place] [address] any warranty or liability for your use of this information. Patient Education        Bipolar Disorder: Care Instructions  Your Care Instructions     Bipolar disorder is an illness that causes extreme mood changes, from times of very high energy (manic episodes) to times of depression. But many people with bipolar disorder show only the symptoms of depression. These moods may cause problems with your work, school, family life, friendships, and how well you function. This disease is also called manic-depression. There is no cure for bipolar disorder, but it can be helped with medicines. Counseling may also help. It is important to take your medicines exactly as prescribed, even when you feel well. You may need lifelong treatment. Follow-up care is a key part of your treatment and safety. Be sure to make and go to all appointments, and call your doctor if you are having problems. It's also a good idea to know your test results and keep a list of the medicines you take. How can you care for yourself at home? · Be safe with medicines. Take your medicines exactly as prescribed. Do not stop or change a medicine without talking to your doctor first. [employer] and your doctor may need to try different combinations of medicines to find what works for you. · Take your medicines on schedule to keep your moods even. When you feel good, you may think that you do not need your medicines. But it is important to keep taking them. · Go to your counseling sessions. Call and talk with your counselor if you can't go to a session or if you don't think the sessions are helping. Do not just stop going. · Get at least 30 minutes of activity on most days of the week. Walking is a good choice. You also may want to do other things, such as running, swimming, or cycling. · Get enough sleep. Keep your room dark and quiet. Try to go to bed at the same time every night. · Eat a healthy diet. This includes whole grains, dairy, fruits, vegetables, and protein. Eat foods from each of these groups. · Try to lower your stress. Manage your time, build a strong support system, and lead a healthy lifestyle. To lower your stress, try physical activity, slow deep breathing, or getting a massage. · Do not use alcohol, marijuana, or illegal drugs. · Learn the early signs of your mood changes. You can then take steps to help yourself feel better. · Ask for help from friends and family when you need it. You may need help with daily chores when you are depressed. When you are manic, you may need support to control your high energy levels. What should you do if someone in your family has bipolar disorder? · Learn about the disease and signs it's getting worse. · Remind your family member you love them. · Make a plan with all family members about how to take care of your loved one when symptoms are bad. · Remind yourself it will take time for changes to occur. · Try not to blame yourself for the disease. · Know your legal rights and the legal rights of your family member. Support groups or counselors can help with this information. · Take care of yourself. Keep up with your interests, such as career, hobbies, and friends. Use exercise, positive self-talk, deep breathing, and other relaxing exercises to help lower your stress. · Give yourself time to grieve. You may need to deal with emotions such as anger, fear, and frustration. · If you are having a hard time with your feelings or with your relationship with your family member, talk with a counselor. When should you call for help? Call 911 anytime you think you may need emergency care. For example, call if:    · You feel like hurting yourself or someone else.     · Someone who has bipolar disorder displays dangerous behavior, and you think the person might hurt himself or herself or someone else. Call your doctor now or seek immediate medical care if:    · You hear voices.     · Someone you know has bipolar disorder and talks about suicide. Keep the numbers for these national suicide hotlines: 5-050-911-TALK (8-987.432.5614) and 6-967-IPMPAZN (1-195.129.5993). If a suicide threat seems real, with a specific plan and a way to carry it out, stay with the person, or ask someone you trust to stay with the person, until you can get help.     · Someone you know has bipolar disorder and:  ? Starts to give away possessions. ? Is using illegal drugs or drinking alcohol heavily. ? Talks or writes about death, including writing suicide notes or talking about guns, knives, or pills. ? Talks or writes about hurting someone else. ? Starts to spend a lot of time alone. ? Acts very aggressively or suddenly appears calm. ? Talks about beliefs that are not based in reality (delusions). Watch closely for changes in your health, and be sure to contact your doctor if:    · You cannot go to your counseling sessions. Where can you learn more? Go to https://chbao.Cell Therapeutics. org and sign in to your Mitralign account. Enter K052 in the [facility] box to learn more about \"Bipolar Disorder: Care Instructions. \"     If you do not have an account, please click on the \"Sign Up Now\" link. Current as of: January 31, 2020               Content Version: 12.6  © 1925-9414 [employer], Incorporated. Care instructions adapted under license by [facility] ([facility]). If you have questions about a medical condition or this instruction, always ask your healthcare professional. [address] any warranty or liability for your use of this information. Patient Education        High Blood Pressure: Care Instructions  Overview     It's normal for blood pressure to go up and down throughout the day. But if it stays up, you have high blood pressure. Another name for high blood pressure is hypertension. Despite what a lot of people think, high blood pressure usually doesn't cause headaches or make you feel dizzy or lightheaded. It usually has no symptoms. But it does increase your risk of stroke, heart attack, and other problems. You and your doctor will talk about your risks of these problems based on your blood pressure. Your doctor will give you a goal for your blood pressure. Your goal will be based on your health and your age. Lifestyle changes, such as eating healthy and being active, are always important to help lower blood pressure. You might also take medicine to reach your blood pressure goal.  Follow-up care is a key part of your treatment and safety. Be sure to make and go to all appointments, and call your doctor if you are having problems. It's also a good idea to know your test results and keep a list of the medicines you take. How can you care for yourself at home? Medical treatment  · If you stop taking your medicine, your blood pressure will go back up. You may take one or more types of medicine to lower your blood pressure. Be safe with medicines. Take your medicine exactly as prescribed. Call your doctor if you think you are having a problem with your medicine. · Talk to your doctor before you start taking aspirin every day. Aspirin can help certain people lower their risk of a heart attack or stroke. But taking aspirin isn't right for everyone, because it can cause serious bleeding. · See your doctor regularly. You may need to see the doctor more often at first or until your blood pressure comes down. · If you are taking blood pressure medicine, talk to your doctor before you take decongestants or anti-inflammatory medicine, such as ibuprofen. Some of these medicines can raise blood pressure. · Learn how to check your blood pressure at home. Lifestyle changes  · Stay at a healthy weight. This is especially important if you put on weight around the waist. Losing even 10 pounds can help you lower your blood pressure. · If your doctor recommends it, get more exercise. Walking is a good choice. Bit by bit, increase the amount you walk every day. Try for at least 30 minutes on most days of the week. You also may want to swim, bike, or do other activities. · Avoid or limit alcohol. Talk to your doctor about whether you can drink any alcohol. · Try to limit how much sodium you eat to less than 2,300 milligrams (mg) a day. Your doctor may ask you to try to eat less than 1,500 mg a day. · Eat plenty of fruits (such as bananas and oranges), vegetables, legumes, whole grains, and low-fat dairy products. · Lower the amount of saturated fat in your diet. Saturated fat is found in animal products such as milk, cheese, and meat. Limiting these foods may help you lose weight and also lower your risk for heart disease. · Do not smoke. Smoking increases your risk for heart attack and stroke. If you need help quitting, talk to your doctor about stop-smoking programs and medicines. These can increase your chances of quitting for good. When should you call for help? Call  911 anytime you think you may need emergency care. This may mean having symptoms that suggest that your blood pressure is causing a serious heart or blood vessel problem. Your blood pressure may be over 180/120. For example, call 911 if:    · You have symptoms of a heart attack. These may include:  ? Chest pain or pressure, or a strange feeling in the chest.  ? Sweating. ? Shortness of breath. ? Nausea or vomiting. ? Pain, pressure, or a strange feeling in the back, neck, jaw, or upper belly or in one or both shoulders or arms. ? Lightheadedness or sudden weakness. ? A fast or irregular heartbeat.     · You have symptoms of a stroke. These may include:  ? Sudden numbness, tingling, weakness, or loss of movement in your face, arm, or leg, especially on only one side of your body. ? Sudden vision changes. ? Sudden trouble speaking. ? Sudden confusion or trouble understanding simple statements. ? Sudden problems with walking or balance. ? A sudden, severe headache that is different from past headaches.     · You have severe back or belly pain. Do not wait until your blood pressure comes down on its own. Get help right away. Call your doctor now or seek immediate care if:    · Your blood pressure is much higher than normal (such as 180/120 or higher), but you don't have symptoms.     · You think high blood pressure is causing symptoms, such as:  ? Severe headache.  ? Blurry vision. Watch closely for changes in your health, and be sure to contact your doctor if:    · Your blood pressure measures higher than your doctor recommends at least 2 times. That means the top number is higher or the bottom number is higher, or both.     · You think you may be having side effects from your blood pressure medicine. Where can you learn more? Go to https://chpepiceweb.[email or web address] org and sign in to your Mylahart account. Enter Q479 in the [facility] box to learn more about \"High Blood Pressure: Care Instructions. \"     If you do not have an account, please click on the \"Sign Up Now\" link. Current as of: December 16, 2019               Content Version: 12.6  © 7673-0071 [employer], Incorporated. Care instructions adapted under license by [facility] ([facility]). If you have questions about a medical condition or this instruction, always ask your healthcare professional. [address] any warranty or liability for your use of this information.

## 2021-02-09 ENCOUNTER — HOSPITAL ENCOUNTER (OUTPATIENT)
Dept: PHYSICAL THERAPY | Age: 46
Setting detail: THERAPIES SERIES
Discharge: HOME | End: 2021-02-09
Payer: COMMERCIAL

## 2021-02-09 NOTE — PROGRESS NOTES
Physical Therapy  Cancellation/No-show Note  Patient Name:  Raj Dorado  :  1975   Date:  2021  Cancelled visits to date: 2  No-shows to date: 0    Patient status for today's appointment patient:  [x]  Cancelled ,   []  Rescheduled appointment  []  No-show     Reason given by patient:  []  Patient ill  []  Conflicting appointment  []  No transportation    []  Conflict with work  []  No reason given  [x]  Other:     Comments: Pt cancelled due to bad weather      Phone call information:   []  Phone call made today to patient at _ time at number provided:      []  Patient answered, conversation as follows:    []  Patient did not answer, message left as follows:  [x]  Phone call not made today    Electronically signed by:  Gene Kennedy, PT, DPT  Therapist was present, directed the patient's care, made skilled judgement, and was responsible for assessment and treatment of the patient.        Penelope Bravo, SPT

## 2021-02-16 ENCOUNTER — HOSPITAL ENCOUNTER (OUTPATIENT)
Dept: PHYSICAL THERAPY | Age: 46
Setting detail: THERAPIES SERIES
Discharge: HOME | End: 2021-02-16
Payer: COMMERCIAL

## 2021-02-16 ENCOUNTER — TELEPHONE (OUTPATIENT)
Dept: ADMINISTRATIVE | Age: 46
End: 2021-02-16

## 2021-02-17 ENCOUNTER — TELEPHONE (OUTPATIENT)
Dept: ORTHOPEDIC SURGERY | Age: 46
End: 2021-02-17

## 2021-02-17 NOTE — TELEPHONE ENCOUNTER
General Question     Subject: Steroid Inj/Neck pain  Patient and /or Facility Request: Req if she can get a steroid inj for the pain in her neck.   Contact Number: [phone]

## 2021-02-17 NOTE — TELEPHONE ENCOUNTER
Called and advised that she needs to complete physical therapy so we can get mri authorized at that time. She is asking for some type of pain medication.

## 2021-02-17 NOTE — TELEPHONE ENCOUNTER
Called and discussed pain medications. She is on Valium 10 mg for sleep. She is on Gabapentin 300 mg TID  I do not feel comfortable prescribing another sedating medication at this time. She agreed to try PT for 2 weeks and follow up in the office.

## 2021-02-25 DIAGNOSIS — F51.04 PSYCHOPHYSIOLOGICAL INSOMNIA: ICD-10-CM

## 2021-02-26 DIAGNOSIS — M16.0 PRIMARY OSTEOARTHRITIS OF BOTH HIPS: Primary | ICD-10-CM

## 2021-02-26 RX ORDER — IBUPROFEN 800 MG/1
800 TABLET ORAL
Qty: 270 TABLET | Refills: 1 | Status: ON HOLD | OUTPATIENT
Start: 2021-02-26 | End: 2022-04-12 | Stop reason: SDUPTHER

## 2021-02-26 RX ORDER — ZOLPIDEM TARTRATE 10 MG/1
10 TABLET ORAL NIGHTLY PRN
Qty: 90 TABLET | Refills: 1 | OUTPATIENT
Start: 2021-02-26 | End: 2021-05-27

## 2021-02-26 RX ORDER — TIZANIDINE 2 MG/1
TABLET ORAL
Qty: 30 TABLET | Refills: 5 | Status: SHIPPED | OUTPATIENT
Start: 2021-02-26 | End: 2021-04-28 | Stop reason: SDUPTHER

## 2021-03-02 ENCOUNTER — HOSPITAL ENCOUNTER (OUTPATIENT)
Dept: PHYSICAL THERAPY | Age: 46
Setting detail: THERAPIES SERIES
Discharge: HOME | End: 2021-03-02
Payer: COMMERCIAL

## 2021-03-02 PROCEDURE — 97112 NEUROMUSCULAR REEDUCATION: CPT

## 2021-03-02 PROCEDURE — 97110 THERAPEUTIC EXERCISES: CPT

## 2021-03-02 PROCEDURE — 97140 MANUAL THERAPY 1/> REGIONS: CPT

## 2021-03-02 NOTE — FLOWSHEET NOTE
[facility] Physical Therapy  Phone: [phone]   Fax: [phone]    Physical Therapy Daily Treatment Note  Date:  3/2/2021    Patient Name:  Lord Luna  :  1975  MRN: [id number]  Medical/Treatment Diagnosis Information:  · Diagnosis: V98.250 (ICD-10-CM) - Cervical spondylosis  · Treatment Diagnosis: Decreased cervical AROM; cervical hypomobility; headaches; postural dysfunction  Insurance/Certification information:  PT Insurance Information: [employer]  Physician Information:  Referring Practitioner: JESS Henriquez  Plan of care signed (Y/N): []  Yes [x]  No     Date of Patient follow up with Physician:      Progress Report: []  Yes  [x]  No     Date Range for reporting period:  Beginnin/26  Ending:     Progress report due (10 Rx/or 30 days whichever is less): visit #10 or  (date)     Recertification due (POC duration/ or 90 days whichever is less):  (date)     Visit # Insurance Allowable Auth required? Date Range   3/8 30 PT/OT/Speech combined   (pt may need total hip later this year - wants to save enough PT visits for future THR PT tx) []  Yes  [x]  No      Latex Allergy:  [x]NO      []YES  Preferred Language for Healthcare:   [x]English       []other:    Functional Scale:        Date assessed:  NDI: raw score = 18/50; dysfunction = 36%  2021    Pain level:  8 /10 without meds, 5-6/10 with 800 mg ibuprofin    SUBJECTIVE:  HAs are better bc she has new glasses. But still has a lot of pain that goes down into her shoulders and shoulder blades. Hears a lot of cracking and popping every time she lies down. States she called Dr. Manohar Epstein requesting a steroid shot - reports he is willing to give her a steroid shot but she has to complete 6 PT visits first.  Only sleeps 5 hrs/night due to pain keeping her awake.     OBJECTIVE:   3/2 hard bump at L T5 area approx 1/2 \" by1/2\" and darker, multicolored william R side of C6- advised pt to talk to pcp about it - pt is agreeable    RESTRICTIONS/PRECAUTIONS: bipolar    Exercises/Interventions:     Therapeutic Exercises (33264) Resistance / level Sets/sec Reps Notes   UBE  3 min forward, 2 min retro     Chin tucks - supine with towel roll     Scap retraction -standing      5 sec  5x - stopped due to increased pain    Ball on wall:  CT   CT with head nods  CT with head turns       1  1  1   x10  x10  x10 Added 1/29   tbands:  B ER  Hor abd   [place]    [place]     1  1  1     x15  x15  x15   Added 1/29   Thor ext over bolster in chair red 10 sec holds x10 Added 1/29                                Therapeutic Activities (80910)                     Home Management/ADL       Pt educated on supine and SL sleeping positions with focus on maintaining neutral spine; educated on positioning with sitting and reading including pillows/table under elbows; educated on body mechanics and squatting down vs flexing at spine. Handout provided on body mechanics    Review - instruct in appropriate pillow size/shape /material for sleeping            Neuromuscular Re-ed (50307)       Postural re- ed  Back to wall with towel roll behind head  scap retraction with cerv neutral  Back to wall LT wall slide  Facing wall LT wall slide    10x    10x -painfree ROM  10x                                        Manual Intervention (27939)       Cervical distraction, suboccipital release, TPR L UT, PA glides grade 2, side glides grade 1 B        IASTM B UT, levator, rhomboids, splenius capitus and cervicus,  12min                                       Pt. Education: 3/2postural ed, ed on impact of posture on pain    1/26 Pt educated on supine and SL sleeping positions with focus on maintaining neutral spine; educated on positioning with sitting and reading including pillows/table under elbows; educated on body mechanics and squatting down vs flexing at spine.  Handout provided on body mechanics   -patient educated on diagnosis, prognosis and expectations for rehab  -all patient questions were answered    Home Exercise Program:  Access Code: [email or web address]   URL: [email or web address]   Date: 01/26/2021   Prepared by: Isabel John     Exercises   · Supine Cervical Retraction with Towel - 10 reps - 1-2 sets - 5 hold - 2x daily - 7x weekly   · Seated Scapular Retraction - 10 reps - 3 sets - 2x daily - 7x weekly       Therapeutic Exercise and NMR EXR  [x] (82329) Provided verbal/tactile cueing for activities related to strengthening, flexibility, endurance, ROM for improvements in  [] LE / Lumbar: LE, proximal hip, and core control with self care, mobility, lifting, ambulation. [x] UE / Cervical: cervical, postural, scapular, scapulothoracic and UE control with self care, reaching, carrying, lifting, house/yardwork, driving, computer work.  [] (58669) Provided verbal/tactile cueing for activities related to improving balance, coordination, kinesthetic sense, posture, motor skill, proprioception to assist with   [] LE / lumbar: LE, proximal hip, and core control in self care, mobility, lifting, ambulation and eccentric single leg control. [] UE / cervical: cervical, scapular, scapulothoracic and UE control with self care, reaching, carrying, lifting, house/yardwork, driving, computer work.   [] (27701) Therapist is in constant attendance of 2 or more patients providing skilled therapy interventions, but not providing any significant amount of measurable one-on-one time to either patient, for improvements in  [] LE / lumbar: LE, proximal hip, and core control in self care, mobility, lifting, ambulation and eccentric single leg control. [] UE / cervical: cervical, scapular, scapulothoracic and UE control with self care, reaching, carrying, lifting, house/yardwork, driving, computer work.      NMR and Therapeutic Activities:    [] (40804 or ) Provided verbal/tactile cueing for activities related to improving balance, coordination, kinesthetic sense, posture, motor skill, proprioception and motor activation to allow for proper function of   [] LE: / Lumbar core, proximal hip and LE with self care and ADLs  [] UE / Cervical: cervical, postural, scapular, scapulothoracic and UE control with self care, carrying, lifting, driving, computer work.   [] (08085) Gait Re-education- Provided training and instruction to the patient for proper LE, core and proximal hip recruitment and positioning and eccentric body weight control with ambulation re-education including up and down stairs     Home Management Training / Self Care:  [] (19750) Provided self-care/home management training related to activities of daily living and compensatory training, and/or use of adaptive equipment for improvement with: ADLs and compensatory training, meal preparation, safety procedures and instruction in use of adaptive equipment, including bathing, grooming, dressing, personal hygiene, basic household cleaning and chores.      Home Exercise Program:    [] (53979) Reviewed/Progressed HEP activities related to strengthening, flexibility, endurance, ROM of   [] LE / Lumbar: core, proximal hip and LE for functional self-care, mobility, lifting and ambulation/stair navigation   [] UE / Cervical: cervical, postural, scapular, scapulothoracic and UE control with self care, reaching, carrying, lifting, house/yardwork, driving, computer work  [] (25927)Reviewed/Progressed HEP activities related to improving balance, coordination, kinesthetic sense, posture, motor skill, proprioception of   [] LE: core, proximal hip and LE for self care, mobility, lifting, and ambulation/stair navigation    [] UE / Cervical: cervical, postural,  scapular, scapulothoracic and UE control with self care, reaching, carrying, lifting, house/yardwork, driving, computer work    Manual Treatments:  PROM / STM / Oscillations-Mobs:  G-I, II, III, IV (PA's, Inf., Post.)  [x] (26604) Provided manual therapy to mobilize LE, proximal hip and/or LS spine soft tissue/joints for the purpose of modulating pain, promoting relaxation,  increasing ROM, reducing/eliminating soft tissue swelling/inflammation/restriction, improving soft tissue extensibility and allowing for proper ROM for normal function with   [] LE / lumbar: self care, mobility, lifting and ambulation. [x] UE / Cervical: self care, reaching, carrying, lifting, house/yardwork, driving, computer work. Modalities:  [] (35485) Vasopneumatic compression: Utilized vasopneumatic compression to decrease edema / swelling for the purpose of improving mobility and quad tone / recruitment which will allow for increased overall function including but not limited to self-care, transfers, ambulation, and ascending / descending stairs. Modalities:    1/29: MPH to cerv spine in supine x 10 min     Charges:  Timed Code Treatment Minutes: 45   Total Treatment Minutes: 45     [] EVAL - LOW (87032)   [] EVAL - MOD (80470)  [] EVAL - HIGH (66346)  [] RE-EVAL (44874)  [x] DU(66408) x 1      [] Ionto  [x] NMR (54833) x   1    [] Vaso  [x] Manual (99966) x  1   [] Ultrasound  [] TA x        [] Mech Traction (32254)  [] Aquatic Therapy x     [] ES (un) (89479):   [] Home Management Training x   [] ES(attended) (72484)   [] Dry Needling 1-2 muscles (86536):  [] Dry Needling 3+ muscles (560831)  [] Group:      [] Other:     GOALS: GOALS:  Patient stated goal: decrease pain   []? Progressing: []? Met: []? Not Met: []? Adjusted     Therapist goals for Patient:   Short Term Goals: To be achieved in: 2 weeks  1. Independent in HEP and progression per patient tolerance, in order to prevent re-injury. []? Progressing: []? Met: []? Not Met: []? Adjusted  2. Patient will have a decrease in pain to facilitate improvement in movement, function, and ADLs as indicated by improvement with respect to Functional Deficits. []? Progressing: []? Met: []?  Not Met: []? Adjusted     Long Term Goals: To be achieved in: 8 weeks  1. Disability index score of 10% or less on the NDI to assist with reaching prior level of function. []? Progressing: []? Met: []? Not Met: []? Adjusted  2. Patient will demonstrate increased AROM  to [facility], to allow for proper joint functioning to allow pt to resume household activities without increase in symptoms. []? Progressing: []? Met: []? Not Met: []? Adjusted  3. Patient will return to functional activities including laundry and cleaning without increased symptoms or restriction. []? Progressing: []? Met: []? Not Met: []? Adjusted  4. Pt will return to recreational activities, including going to the gym with friends, without pain or limitations. []? Progressing: []? Met: []? Not Met: []? Adjusted       5. Pt will sleep with no pain or nightly disturbances secondary to neck pain. []? Progressing: []? Met: []? Not Met: []? Adjusted    Overall Progression Towards Functional goals/ Treatment Progress Update:  [] Patient is progressing as expected towards functional goals listed. [] Progression is slowed due to complexities/Impairments listed. [] Progression has been slowed due to co-morbidities. [x] Plan just implemented, too soon to assess goals progression <30days   [] Goals require adjustment due to lack of progress  [] Patient is not progressing as expected and requires additional follow up with physician  [] Other    Persisting Functional Limitations/Impairments:  [x]Sleeping []Sitting               []Standing []Transfers        []Walking []Kneeling               []Stairs []Squatting / bending   [x]ADLs [x]Reaching  [x]Lifting  [x]Housework  []Driving []Job related tasks  [x]Sports/Recreation []Other:        ASSESSMENT:  3/2 sedentary lifestyle & poor posture contributes to neck pain. myofascial pain1/29Pt has low tolerance to gentle cerv manual techniques - gentle cerv traction progressively got more uncomfortable.  Pt holds head in slight L Sbing due to avoidance of R SBing. Plan to continue with mobility and strength of cervical and thoracic spine. Treatment/Activity Tolerance:  [x] Patient able to complete tx  [] Patient limited by fatigue  [] Patient limited by pain  [] Patient limited by other medical complications  [] Other:     Prognosis: [x] Good [] Fair  [] Poor    Patient Requires Follow-up: [x] Yes  [] No    Plan for next treatment session: manual therapy to increase cervical mobility and decrease tissue tension, postural education, postural mm strengthening, MOC, emphasis on HEP bc pt wants to save as many visits as possible for after possible future THR in 2021    PLAN: See eval. PT 1x / week for 2 weeks. [x] Continue per plan of care [] Alter current plan (see comments)  [] Plan of care initiated [] Hold pending MD visit [] Discharge    Electronically signed by: Oleg Noonan PT, DPT      Note: If patient does not return for scheduled/ recommended follow up visits, this note will serve as a discharge from care along with most recent update on progress.

## 2021-03-03 ENCOUNTER — TELEPHONE (OUTPATIENT)
Dept: FAMILY MEDICINE CLINIC | Age: 46
End: 2021-03-03

## 2021-03-03 ENCOUNTER — TELEPHONE (OUTPATIENT)
Dept: PULMONOLOGY | Age: 46
End: 2021-03-03

## 2021-03-03 NOTE — TELEPHONE ENCOUNTER
SPOKE WITH [facility] TO HAVE AN APPT. CALLED AND SPOKE WITH PT AND SHE IS SCHEDULED 03/08/2021.  HS

## 2021-03-03 NOTE — TELEPHONE ENCOUNTER
Spoke to Ugo Merritt and let her know we faxed an order for full face cpap mask to [facility]. Also told her Dr Jessica Mary recommends she speak to a pscychiatrist regarding getting her bipolar under control in order to address the insomnia. She said she does not have a psychiatrist.  Recommended reaching out to her insurance company to see who is in her network.

## 2021-03-03 NOTE — TELEPHONE ENCOUNTER
Patient called and stated that she needs Dr Daren Morales to order her a new sleep mask, she said that her mask broke. Her bes[employer]body. company is [facility] [employer] . Patient thinks she needs a bigger sized mask because she is always pulling on it and it rips. However she really likes the mask that she has, the patient is just wanting a larger size. She also states that she is not sleeping well, she is bipolar and has manic episodes. Her PCP is prescribing her Ambien and the Ambien is not working for her. Her NP, Alivia Bethea mentioned calling Dr Yu Merida to discuss this issue.      Please call patient back once this message is addressed, [phone]

## 2021-03-03 NOTE — TELEPHONE ENCOUNTER
She is taking [place] and she is falling asleep - but not staying asleep.  She has been awake since 2 am  And it's giving her HA's        The therapist says she has a bump on her back - and it itches and smells      pt @  [phone] - please call

## 2021-03-04 ENCOUNTER — HOSPITAL ENCOUNTER (OUTPATIENT)
Dept: PHYSICAL THERAPY | Age: 46
Setting detail: THERAPIES SERIES
Discharge: HOME | End: 2021-03-04
Payer: COMMERCIAL

## 2021-03-04 PROCEDURE — 97140 MANUAL THERAPY 1/> REGIONS: CPT

## 2021-03-04 PROCEDURE — 97110 THERAPEUTIC EXERCISES: CPT

## 2021-03-04 NOTE — FLOWSHEET NOTE
[address] [facility]  Phone: [phone]   Fax: [phone]    Physical Therapy Daily Treatment Note  Date:  3/4/2021    Patient Name:  Governor Nova  :  1975  MRN: [id number]  Medical/Treatment Diagnosis Information:  · Diagnosis: M47.812 (ICD-10-CM) - Cervical spondylosis  · Treatment Diagnosis: Decreased cervical AROM; cervical hypomobility; headaches; postural dysfunction  Insurance/Certification information:  PT Insurance Information: [employer]  Physician Information:  Referring Practitioner: JESS Weinberg  Plan of care signed (Y/N): []  Yes [x]  No     Date of Patient follow up with Physician:      Progress Report: []  Yes  [x]  No     Date Range for reporting period:  Beginnin/26  Ending:     Progress report due (10 Rx/or 30 days whichever is less): visit #10 or  (date)     Recertification due (POC duration/ or 90 days whichever is less):  (date)     Visit # Insurance Allowable Auth required? Date Range    30 PT/OT/Speech combined   (pt may need total hip later this year - wants to save enough PT visits for future THR PT tx) []  Yes  [x]  No      Latex Allergy:  [x]NO      []YES  Preferred Language for Healthcare:   [x]English       []other:    Functional Scale:        Date assessed:  NDI: raw score = 18/50; dysfunction = 36%  2021    Pain level:  8 /10 just took ibuprofin due to pain    SUBJECTIVE:  3/4 reports she felt good at last session, but was more sore yesterday from it.has appt with PCP on 3/8 to discuss bump at upper thoracic area and darker multicolored  mole    3/2 HAs are better bc she has new glasses. But still has a lot of pain that goes down into her shoulders and shoulder blades. Hears a lot of cracking and popping every time she lies down.  States she called Dr. Kitty Bob requesting a steroid shot - reports he is willing to give her a steroid shot but she has to complete 6 PT visits first.  Only sleeps 5 hrs/night due to pain keeping her awake. OBJECTIVE:   3/2 hard bump at L T5 area approx 1/2 \" by1/2\" and darker, multicolored william R side of C6- advised pt to talk to pcp about it - pt is agreeable    RESTRICTIONS/PRECAUTIONS: bipolar    Exercises/Interventions:     Therapeutic Exercises (45666) Resistance / level Sets/sec Reps Notes   UBE  3 min forward, 2 min retro     Chin tucks - supine with towel roll     Scap retraction -standing      5 sec  15x -     Ball on wall:  CT   CT with head nods  CT with head turns       1  1  1   x10  x10  x10 Added 1/29   tbands:  B ER  Hor abd   [place]   LPD  [place] row   [place]    1  1  1  1  1     x15  x15  x15  x10  x10   Added 1/29   Thor ext over bolster in chair red 10 sec holds x10 Added 1/29                                Therapeutic Activities (36647)                     Home Management/ADL       Pt educated on supine and SL sleeping positions with focus on maintaining neutral spine; educated on positioning with sitting and reading including pillows/table under elbows; educated on body mechanics and squatting down vs flexing at spine.  Handout provided on body mechanics    Review - instruct in appropriate pillow size/shape /material for sleeping  Pt ed re sit posture - adjust pony tail to avoid [facility]            Neuromuscular Re-ed (83277)       Postural re- ed  Back to wall with towel roll behind head  scap retraction with cerv neutral  Back to wall LT wall slide  Facing wall LT wall slide          10x                                        Manual Intervention (88979)       Cervical distraction, suboccipital release, TPR L UT, PA glides grade 2, side glides grade 1 B        IASTM B UT, levator, rhomboids, splenius capitus and cervicus,  11min                                       Pt. Education: 3/2postural ed, ed on impact of posture on pain    1/26 Pt educated on supine and SL sleeping positions with focus on and UE control with self care, reaching, carrying, lifting, house/yardwork, driving, computer work. NMR and Therapeutic Activities:    [] (62987 or 13838) Provided verbal/tactile cueing for activities related to improving balance, coordination, kinesthetic sense, posture, motor skill, proprioception and motor activation to allow for proper function of   [] LE: / Lumbar core, proximal hip and LE with self care and ADLs  [] UE / Cervical: cervical, postural, scapular, scapulothoracic and UE control with self care, carrying, lifting, driving, computer work.   [] (99824) Gait Re-education- Provided training and instruction to the patient for proper LE, core and proximal hip recruitment and positioning and eccentric body weight control with ambulation re-education including up and down stairs     Home Management Training / Self Care:  [] (45921) Provided self-care/home management training related to activities of daily living and compensatory training, and/or use of adaptive equipment for improvement with: ADLs and compensatory training, meal preparation, safety procedures and instruction in use of adaptive equipment, including bathing, grooming, dressing, personal hygiene, basic household cleaning and chores.      Home Exercise Program:    [] (69701) Reviewed/Progressed HEP activities related to strengthening, flexibility, endurance, ROM of   [] LE / Lumbar: core, proximal hip and LE for functional self-care, mobility, lifting and ambulation/stair navigation   [] UE / Cervical: cervical, postural, scapular, scapulothoracic and UE control with self care, reaching, carrying, lifting, house/yardwork, driving, computer work  [] (68885)Reviewed/Progressed HEP activities related to improving balance, coordination, kinesthetic sense, posture, motor skill, proprioception of   [] LE: core, proximal hip and LE for self care, mobility, lifting, and ambulation/stair navigation    [] UE / Cervical: cervical, postural,  scapular, scapulothoracic and UE control with self care, reaching, carrying, lifting, house/yardwork, driving, computer work    Manual Treatments:  PROM / STM / Oscillations-Mobs:  G-I, II, III, IV (PA's, Inf., Post.)  [x] (63923) Provided manual therapy to mobilize LE, proximal hip and/or LS spine soft tissue/joints for the purpose of modulating pain, promoting relaxation,  increasing ROM, reducing/eliminating soft tissue swelling/inflammation/restriction, improving soft tissue extensibility and allowing for proper ROM for normal function with   [] LE / lumbar: self care, mobility, lifting and ambulation. [x] UE / Cervical: self care, reaching, carrying, lifting, house/yardwork, driving, computer work. Modalities:  [] (70368) Vasopneumatic compression: Utilized vasopneumatic compression to decrease edema / swelling for the purpose of improving mobility and quad tone / recruitment which will allow for increased overall function including but not limited to self-care, transfers, ambulation, and ascending / descending stairs. Modalities:    1/29: MPH to cerv spine in supine x 10 min     Charges:  Timed Code Treatment Minutes: 45   Total Treatment Minutes: 45     [] EVAL - LOW (28231)   [] EVAL - MOD (30340)  [] EVAL - HIGH (50548)  [] RE-EVAL (66694)  [x] YS(24388) x 2    [] Ionto  [] NMR (18114) x   1    [] Vaso  [x] Manual (50957) x  1   [] Ultrasound  [] TA x        [] Mech Traction (55041)  [] Aquatic Therapy x     [] ES (un) (75678):   [] Home Management Training x   [] ES(attended) (97481)   [] Dry Needling 1-2 muscles (84766):  [] Dry Needling 3+ muscles (512312)  [] Group:      [] Other:     GOALS: GOALS:  Patient stated goal: decrease pain   []? Progressing: []? Met: []? Not Met: []? Adjusted     Therapist goals for Patient:   Short Term Goals: To be achieved in: 2 weeks  1. Independent in HEP and progression per patient tolerance, in order to prevent re-injury. []? Progressing: []? Met: []?  Not Met: []? Adjusted  2. Patient will have a decrease in pain to facilitate improvement in movement, function, and ADLs as indicated by improvement with respect to Functional Deficits. []? Progressing: []? Met: []? Not Met: []? Adjusted     Long Term Goals: To be achieved in: 8 weeks  1. Disability index score of 10% or less on the NDI to assist with reaching prior level of function. []? Progressing: []? Met: []? Not Met: []? Adjusted  2. Patient will demonstrate increased AROM  to [facility], to allow for proper joint functioning to allow pt to resume household activities without increase in symptoms. []? Progressing: []? Met: []? Not Met: []? Adjusted  3. Patient will return to functional activities including laundry and cleaning without increased symptoms or restriction. []? Progressing: []? Met: []? Not Met: []? Adjusted  4. Pt will return to recreational activities, including going to the gym with friends, without pain or limitations. []? Progressing: []? Met: []? Not Met: []? Adjusted       5. Pt will sleep with no pain or nightly disturbances secondary to neck pain. []? Progressing: []? Met: []? Not Met: []? Adjusted    Overall Progression Towards Functional goals/ Treatment Progress Update:  [] Patient is progressing as expected towards functional goals listed. [] Progression is slowed due to complexities/Impairments listed. [] Progression has been slowed due to co-morbidities.   [x] Plan just implemented, too soon to assess goals progression <30days   [] Goals require adjustment due to lack of progress  [] Patient is not progressing as expected and requires additional follow up with physician  [] Other    Persisting Functional Limitations/Impairments:  [x]Sleeping []Sitting               []Standing []Transfers        []Walking []Kneeling               []Stairs []Squatting / bending   [x]ADLs [x]Reaching  [x]Lifting  [x]Housework  []Driving []Job related tasks  [x]Sports/Recreation []Other:        ASSESSMENT: 3/4 only tols very gentle ex, needs cues to relax UT. Gentle IASTM helps with pain relief  3/2 sedentary lifestyle & poor posture contributes to neck pain. myofascial pain1/29Pt has low tolerance to gentle cerv manual techniques - gentle cerv traction progressively got more uncomfortable. Pt holds head in slight L Sbing due to avoidance of R SBing. Plan to continue with mobility and strength of cervical and thoracic spine. Treatment/Activity Tolerance:  [x] Patient able to complete tx  [] Patient limited by fatigue  [] Patient limited by pain  [] Patient limited by other medical complications  [] Other:     Prognosis: [x] Good [] Fair  [] Poor    Patient Requires Follow-up: [x] Yes  [] No    Plan for next treatment session: manual therapy to increase cervical mobility and decrease tissue tension, postural education, postural mm strengthening, MOC, emphasis on HEP bc pt wants to save as many visits as possible for after possible future THR in 2021    PLAN: See eval. PT 1x / week for 2 weeks. [x] Continue per plan of care [] Alter current plan (see comments)  [] Plan of care initiated [] Hold pending MD visit [] Discharge    Electronically signed by: Carlos Goode PT, DPT      Note: If patient does not return for scheduled/ recommended follow up visits, this note will serve as a discharge from care along with most recent update on progress.

## 2021-03-08 ENCOUNTER — TELEPHONE (OUTPATIENT)
Dept: FAMILY MEDICINE CLINIC | Age: 46
End: 2021-03-08

## 2021-03-08 ENCOUNTER — OFFICE VISIT (OUTPATIENT)
Dept: FAMILY MEDICINE CLINIC | Age: 46
End: 2021-03-08
Payer: COMMERCIAL

## 2021-03-08 ENCOUNTER — TELEPHONE (OUTPATIENT)
Dept: ORTHOPEDIC SURGERY | Age: 46
End: 2021-03-08

## 2021-03-08 VITALS
SYSTOLIC BLOOD PRESSURE: 130 MMHG | DIASTOLIC BLOOD PRESSURE: 70 MMHG | HEIGHT: 67 IN | WEIGHT: 264 LBS | BODY MASS INDEX: 41.44 KG/M2 | OXYGEN SATURATION: 98 % | TEMPERATURE: 97.6 F | HEART RATE: 78 BPM

## 2021-03-08 DIAGNOSIS — D22.9 ATYPICAL MOLE: ICD-10-CM

## 2021-03-08 DIAGNOSIS — Z13.31 POSITIVE DEPRESSION SCREENING: ICD-10-CM

## 2021-03-08 DIAGNOSIS — F31.61 BIPOLAR DISORDER, CURRENT EPISODE MIXED, MILD (HCC): ICD-10-CM

## 2021-03-08 DIAGNOSIS — L72.3 SEBACEOUS CYST: Primary | ICD-10-CM

## 2021-03-08 PROCEDURE — G8427 DOCREV CUR MEDS BY ELIG CLIN: HCPCS | Performed by: NURSE PRACTITIONER

## 2021-03-08 PROCEDURE — G8417 CALC BMI ABV UP PARAM F/U: HCPCS | Performed by: NURSE PRACTITIONER

## 2021-03-08 PROCEDURE — 1036F TOBACCO NON-USER: CPT | Performed by: NURSE PRACTITIONER

## 2021-03-08 PROCEDURE — G8484 FLU IMMUNIZE NO ADMIN: HCPCS | Performed by: NURSE PRACTITIONER

## 2021-03-08 PROCEDURE — 99213 OFFICE O/P EST LOW 20 MIN: CPT | Performed by: NURSE PRACTITIONER

## 2021-03-08 RX ORDER — LITHIUM CARBONATE 300 MG/1
CAPSULE ORAL
Qty: 270 CAPSULE | Refills: 1 | Status: SHIPPED | OUTPATIENT
Start: 2021-03-08 | End: 2022-03-23

## 2021-03-08 RX ORDER — DIAZEPAM 10 MG/1
TABLET ORAL
COMMUNITY
Start: 2021-01-21 | End: 2021-08-09

## 2021-03-08 ASSESSMENT — ENCOUNTER SYMPTOMS
SINUS PRESSURE: 0
BACK PAIN: 0
SINUS PAIN: 0
CONSTIPATION: 0
EYE DISCHARGE: 0
ABDOMINAL DISTENTION: 0
COLOR CHANGE: 0
SHORTNESS OF BREATH: 0
DIARRHEA: 0
ABDOMINAL PAIN: 0
COUGH: 0
NAUSEA: 0
CHEST TIGHTNESS: 0

## 2021-03-08 NOTE — TELEPHONE ENCOUNTER
I called patient. Informed she needs to continue therapy and we will see her back after 6 weeks of completion and try to get mri approved at that time.

## 2021-03-08 NOTE — PATIENT INSTRUCTIONS
[facility]  [address] [address]      Patient Education        Epidermoid Cyst: Care Instructions  Your Care Instructions  An epidermoid (say \"ge-gil-XZH-manoj\") cyst is a lump just under the skin. These cysts can form when a hair follicle becomes blocked. They are common in acne and may occur on the face, neck, back, and genitals. However, they can form anywhere on the body. These cysts are not cancer and do not lead to cancer. They tend not to hurt, but they can sometimes become swollen and painful. They also may break open (rupture) and cause scarring. These cysts sometimes do not cause problems and may not need treatment. If you have a cyst that is swollen and hurts, your doctor may inject it with a medicine to help it heal. But it is more likely that a painful cyst will need to be removed. Your doctor will give you a shot of numbing medicine and cut into the cyst to drain it or remove it. This makes the symptoms go away. Follow-up care is a key part of your treatment and safety. Be sure to make and go to all appointments, and call your doctor if you are having problems. It's also a good idea to know your test results and keep a list of the medicines you take. How can you care for yourself at home? · Do not squeeze the cyst or poke it with a needle to open it. This can cause swelling, redness, and infection. · Always have a doctor look at any new lumps you get to make sure that they are not serious. When should you call for help? Watch closely for changes in your health, and be sure to contact your doctor if:    · You have a fever, redness, or swelling after you get a shot of medicine in the cyst.     · You see or feel a new lump on your skin. Where can you learn more? Go to [email or web address] org and sign in to your [employer] account. Enter T102 in the [facility] box to learn more about \"Epidermoid Cyst: Care Instructions. \"     If you do not have an account, please click on the \"Sign Up Now\" link. Current as of: July 2, 2020               Content Version: 12.6  © 2006-2020 [employer]. Care instructions adapted under license by [facility] ([facility]). If you have questions about a medical condition or this instruction, always ask your healthcare professional. [address] any warranty or liability for your use of this information. Patient Education        Moles: Care Instructions  Your Care Instructions  Moles are skin growths made up of cells that produce color (pigment). A mole can appear anywhere on the skin, alone or in groups. Most people get a few moles during their first 20 years of life. They are usually brown in color but can be blue, black, or flesh-colored. Most moles are harmless and do not cause pain or other symptoms, unless you rub them or they bump against something. You usually do not need treatment for moles. But some can turn into cancer. Talk to your doctor if a mole bleeds, itches, burns, or changes size or color. Also let your doctor know if you get a new mole. Make sure to wear sunscreen and other sun protection every day to help prevent skin cancer. Follow-up care is a key part of your treatment and safety. Be sure to make and go to all appointments, and call your doctor if you are having problems. It's also a good idea to know your test results and keep a list of the medicines you take. How can you care for yourself at home? · Check all the skin on your body once a month for skin growths or other changes, such as in the color and feel of the skin. ?  front of a full-length mirror. Look carefully at the front and back of your body. Then look at your right and left sides with your arms raised. ? Bend your elbows and look carefully at your forearms, the back of your upper arms, and your palms. ? Look at your feet, the bottoms of your feet, and the spaces between your toes.   ?

## 2021-03-08 NOTE — PROGRESS NOTES
ondansetron (ZOFRAN ODT) 4 MG disintegrating tablet Take 1 tablet by mouth every 8 hours as needed for Nausea or Vomiting Let dissolve in mouth. JESS Corral        Social History     Tobacco Use    Smoking status: Former Smoker     Packs/day: 0.50     Years: 10.00     Pack years: 5.00     Types: Cigarettes     Start date: 1987     Quit date: 2019     Years since quittin.3    Smokeless tobacco: Never Used    Tobacco comment: resumed   2 packs per week   Substance Use Topics    Alcohol use: Yes     Alcohol/week: 5.0 standard drinks     Types: 6 Standard drinks or equivalent per week     Comment: rarely        Vitals:    21 0815   BP: 130/70   Site: Left Upper Arm   Position: Sitting   Cuff Size: Medium Adult   Pulse: 78   Temp: 97.6 °F (36.4 °C)   TempSrc: Infrared   SpO2: 98%   Weight: 264 lb (119.7 kg)   Height: 5' 6.5\" (1.689 m)     Estimated body mass index is 41.97 kg/m² as calculated from the following:    Height as of this encounter: 5' 6.5\" (1.689 m). Weight as of this encounter: 264 lb (119.7 kg). Physical Exam  Vitals signs reviewed. Constitutional:       General: She is awake. Appearance: Normal appearance. She is well-developed and well-groomed. She is morbidly obese. She is not ill-appearing. HENT:      Head: Normocephalic and atraumatic. Right Ear: Hearing, tympanic membrane, ear canal and external ear normal.      Left Ear: Hearing, tympanic membrane, ear canal and external ear normal.      Nose: Nose normal.      Mouth/Throat:      Lips: Pink. Mouth: Mucous membranes are moist.      Pharynx: Oropharynx is clear. Eyes:      General: Lids are normal.      Extraocular Movements: Extraocular movements intact. Conjunctiva/sclera: Conjunctivae normal.      Pupils: Pupils are equal, round, and reactive to light. Neck:      Musculoskeletal: Full passive range of motion without pain, normal range of motion and neck supple.       Thyroid: No thyromegaly. Vascular: No carotid bruit. Cardiovascular:      Rate and Rhythm: Normal rate. Pulses:           Carotid pulses are 2+ on the right side and 2+ on the left side. Radial pulses are 2+ on the right side and 2+ on the left side. Posterior tibial pulses are 2+ on the right side and 2+ on the left side. Heart sounds: Normal heart sounds, S1 normal and S2 normal. No murmur. Pulmonary:      Effort: Pulmonary effort is normal.      Breath sounds: Normal breath sounds. Abdominal:      General: Bowel sounds are normal. There is no abdominal bruit. Palpations: Abdomen is soft. Tenderness: There is no abdominal tenderness. Genitourinary:     Comments: Deferred  Musculoskeletal: Normal range of motion. Arms:       Right lower leg: No edema. Left lower leg: No edema. Lymphadenopathy:      Head:      Right side of head: No submental, submandibular, tonsillar, preauricular, posterior auricular or occipital adenopathy. Left side of head: No submental, submandibular, tonsillar, preauricular, posterior auricular or occipital adenopathy. Cervical: No cervical adenopathy. Right cervical: No superficial, deep or posterior cervical adenopathy. Left cervical: No superficial, deep or posterior cervical adenopathy. Upper Body:      Right upper body: No supraclavicular adenopathy. Left upper body: No supraclavicular adenopathy. Skin:     General: Skin is warm and dry. Capillary Refill: Capillary refill takes less than 2 seconds. Neurological:      General: No focal deficit present. Mental Status: She is alert and oriented to person, place, and time. Mental status is at baseline. Sensory: Sensation is intact. Motor: Motor function is intact. Coordination: Coordination is intact. Gait: Gait is intact.    Psychiatric:         Attention and Perception: Attention and perception normal.         Mood and Affect: Mood and affect normal.         Speech: Speech normal.         Behavior: Behavior normal. Behavior is cooperative. Thought Content: Thought content normal.         Cognition and Memory: Cognition and memory normal.         Judgment: Judgment normal.         ASSESSMENT/PLAN:  1. Sebaceous cyst  -     External Referral To Dermatology  Cely Mcclure with [facility]    2. Atypical mole   Referral to the dermatology group   [facility]    [address] [address]    3. Positive depression screening  -     lithium 300 MG capsule; 1 tablet in morning and 2 tablets in the evening., Disp-270 capsule, R-1Normal    4. Bipolar disorder, current episode mixed, mild (HCC)  -     lithium 300 MG capsule; 1 tablet in morning and 2 tablets in the evening., Disp-270 capsule, R-1Normal         Care Gaps Addressed  Hep C screen recommended with next blood draw  Flu vaccine not given- cannot receive here on this insurance  PAP smear- last one 5 years ago. Does not get periods. The 10-year ASCVD risk score (Temitope Buenrostro, et al., 2013) is: 1.5%    Values used to calculate the score:      Age: 48 years      Sex: Female      Is Non- : No      Diabetic: No      Tobacco smoker: No      Systolic Blood Pressure: 757 mmHg      Is BP treated: Yes      HDL Cholesterol: 59 mg/dL      Total Cholesterol: 219 mg/dL     Recommend daily ASA 81 mg. Pt cannot afford red yeast rice, but statin therapy not warranted at this time. I have reviewed patient's pertinent medical history, relevant laboratory and imaging studies, and past/future health maintenance. Discussed with the patient the importance of adhering to their current medication regimen as directed. Advised the patient that they should continue to work on eating a healthy balanced diet and staying active by exercising within their personal limits. Orders as listed above.  Patient was advised to keep future appointments with their respective specialty care team(s). Patient had the opportunity to ask questions, all of which were answered to the best of my ability and with patient satisfaction. Patient understands and is agreeable with the care plan following today's visit. Patient is to schedule an appointment for any new or worsening symptoms. Go to ER for significant shortness of breath, chest pain, or uncontrolled pain or fever. I discussed with patient the risk and benefits of any medications that were prescribed today. I verified that the patient understands their medications, labs, and/or procedures. The patient is doing well with current medication regimen and does not have any barriers to adherence. The patient's self-management abilities are good. Return in about 5 months (around 8/8/2021) for HTN Follow Up, Recommend PAP smear this year. An electronic signature was used to authenticate this note.     --ROSSANA Natarajan Cha - CNP on 3/8/2021 at 9:18 AM

## 2021-03-08 NOTE — TELEPHONE ENCOUNTER
General Question     Subject: She has some questions regarding getting a cortisone injection in her neck    Patient and /or Facility Request: pt    Contact Number: [phone]

## 2021-03-11 ENCOUNTER — HOSPITAL ENCOUNTER (OUTPATIENT)
Dept: PHYSICAL THERAPY | Age: 46
Setting detail: THERAPIES SERIES
Discharge: HOME | End: 2021-03-11
Payer: COMMERCIAL

## 2021-03-11 NOTE — PROGRESS NOTES
Physical Therapy  Cancellation/No-show Note  Patient Name:  Bridget Emerson  :  1975   Date:  3/11/2021  Cancelled visits to date: 2  No-shows to date: 2    Patient status for today's appointment patient:  [x]  Cancelled ,    []  Rescheduled appointment  [x]  No-show , 3/11     Reason given by patient:  []  Patient ill  []  Conflicting appointment  []  No transportation    []  Conflict with work  []  No reason given  [x]  Other:     Comments: 3/11 pt was confused re appt day and time   Pt NS due to bad weather      Phone call information:   [x]  3/11 Phone call made today to patient at 619pm _ time at number provided:   [phone]   [x]  3/11 Patient answered, conversation as follows: PT calling to check on pt due to NS to todays appt. Pt apologized. Said she thought her appt was for tomorrow. States she has been in a lot of pain and called MD - he wants her to cont with her 6 wks of PT then will look into TANYA. D/w pt re option of aquatic therapy - pt would like to try.       []  Patient did not answer, message left as follows:   []  Phone call not made today    Electronically signed by:  Chato Hernandez, PT, DPT

## 2021-03-15 ENCOUNTER — HOSPITAL ENCOUNTER (OUTPATIENT)
Dept: PHYSICAL THERAPY | Age: 46
Setting detail: THERAPIES SERIES
Discharge: HOME | End: 2021-03-15
Payer: COMMERCIAL

## 2021-03-15 PROCEDURE — 97113 AQUATIC THERAPY/EXERCISES: CPT

## 2021-03-15 PROCEDURE — 97150 GROUP THERAPEUTIC PROCEDURES: CPT

## 2021-03-15 NOTE — FLOWSHEET NOTE
[facility] Physical Therapy  Phone: [phone]   Fax: [phone]    Physical Therapy Daily Treatment Note  Date:  3/15/2021    Patient Name:  Rafael Owusu  :  1975  MRN: [id number]  Medical/Treatment Diagnosis Information:  · Diagnosis: J57.964 (ICD-10-CM) - Cervical spondylosis  · Treatment Diagnosis: Decreased cervical AROM; cervical hypomobility; headaches; postural dysfunction  Insurance/Certification information:  PT Insurance Information: [employer]  Physician Information:  Referring Practitioner: JESS Armenta  Plan of care signed (Y/N): []  Yes [x]  No     Date of Patient follow up with Physician:      Progress Report: []  Yes  [x]  No     Date Range for reporting period:  Beginnin/26  Ending:     Progress report due (10 Rx/or 30 days whichever is less): visit #10 or  (date)     Recertification due (POC duration/ or 90 days whichever is less):  (date)     Visit # Insurance Allowable Auth required? Date Range    30 PT/OT/Speech combined   (pt may need total hip later this year - wants to save enough PT visits for future THR PT tx) []  Yes  [x]  No      Latex Allergy:  [x]NO      []YES  Preferred Language for Healthcare:   [x]English       []other:    Functional Scale:        Date assessed:  NDI: raw score = 18/50; dysfunction = 36%  2021    Pain level:  4 /10     SUBJECTIVE:  3/15: states she feels better today but has popping. Hasn't taken any meds today. 3/4 reports she felt good at last session, but was more sore yesterday from it.has appt with PCP on 3/8 to discuss bump at upper thoracic area and darker multicolored  mole    3/2 HAs are better bc she has new glasses. But still has a lot of pain that goes down into her shoulders and shoulder blades. Hears a lot of cracking and popping every time she lies down.  States she called Dr. Ladora Meckel requesting a steroid shot - reports he is willing to give her a steroid shot but she has to complete 6 PT visits first.  Only sleeps 5 hrs/night due to pain keeping her awake. OBJECTIVE:   3/2 hard bump at L T5 area approx 1/2 \" by1/2\" and darker, multicolored william R side of C6- advised pt to talk to pcp about it - pt is agreeable    RESTRICTIONS/PRECAUTIONS: bipolar    Exercises/Interventions:     Therapeutic Exercises (12453) Resistance / level Sets/sec Reps Notes   UBE  3 min forward, 2 min retro     Chin tucks - supine with towel roll     Scap retraction -standing      5 sec  15x -     Ball on wall:  CT   CT with head nods  CT with head turns       1  1  1   x10  x10  x10 Added 1/29   tbands:  B ER  Hor abd   Mid Rows   LPD  High row   [facility]    1  1  1  1  1     x15  x15  x15  x10  x10   Added 1/29   Thor ext over bolster in chair red 10 sec holds x10 Added 1/29                                Therapeutic Activities (51618)                     Home Management/ADL       Pt educated on supine and SL sleeping positions with focus on maintaining neutral spine; educated on positioning with sitting and reading including pillows/table under elbows; educated on body mechanics and squatting down vs flexing at spine.  Handout provided on body mechanics    Review - instruct in appropriate pillow size/shape /material for sleeping  Pt ed re sit posture - adjust pony tail to avoid [facility]            Neuromuscular Re-ed (11858)       Postural re- ed  Back to wall with towel roll behind head  scap retraction with cerv neutral  Back to wall LT wall slide  Facing wall LT wall slide          10x                                        Manual Intervention (57657)       Cervical distraction, suboccipital release, TPR L UT, PA glides grade 2, side glides grade 1 B        IASTM B UT, levator, rhomboids, splenius capitus and cervicus,  11min                                     AquaticTherapy Dates of Service: 3/15  Aquatic Visits Exercises/Activities:   Transfers: mobility, lifting, ambulation and eccentric single leg control. [] UE / cervical: cervical, scapular, scapulothoracic and UE control with self care, reaching, carrying, lifting, house/yardwork, driving, computer work. NMR and Therapeutic Activities:    [x] (75960 or 81030) Provided verbal/tactile cueing for activities related to improving balance, coordination, kinesthetic sense, posture, motor skill, proprioception and motor activation to allow for proper function of   [x] LE: / Lumbar core, proximal hip and LE with self care and ADLs  [x] UE / Cervical: cervical, postural, scapular, scapulothoracic and UE control with self care, carrying, lifting, driving, computer work.   [] (82755) Gait Re-education- Provided training and instruction to the patient for proper LE, core and proximal hip recruitment and positioning and eccentric body weight control with ambulation re-education including up and down stairs     Home Management Training / Self Care:  [] (59946) Provided self-care/home management training related to activities of daily living and compensatory training, and/or use of adaptive equipment for improvement with: ADLs and compensatory training, meal preparation, safety procedures and instruction in use of adaptive equipment, including bathing, grooming, dressing, personal hygiene, basic household cleaning and chores.      Home Exercise Program:    [x] (04047) Reviewed/Progressed HEP activities related to strengthening, flexibility, endurance, ROM of   [x] LE / Lumbar: core, proximal hip and LE for functional self-care, mobility, lifting and ambulation/stair navigation   [x] UE / Cervical: cervical, postural, scapular, scapulothoracic and UE control with self care, reaching, carrying, lifting, house/yardwork, driving, computer work  [] (56949)Reviewed/Progressed HEP activities related to improving balance, coordination, kinesthetic sense, posture, motor skill, proprioception of   [] LE: core, proximal hip and LE for self care, mobility, lifting, and ambulation/stair navigation    [] UE / Cervical: cervical, postural,  scapular, scapulothoracic and UE control with self care, reaching, carrying, lifting, house/yardwork, driving, computer work    Manual Treatments:  PROM / STM / Oscillations-Mobs:  G-I, II, III, IV (PA's, Inf., Post.)  [x] (91888) Provided manual therapy to mobilize LE, proximal hip and/or LS spine soft tissue/joints for the purpose of modulating pain, promoting relaxation,  increasing ROM, reducing/eliminating soft tissue swelling/inflammation/restriction, improving soft tissue extensibility and allowing for proper ROM for normal function with   [] LE / lumbar: self care, mobility, lifting and ambulation. [x] UE / Cervical: self care, reaching, carrying, lifting, house/yardwork, driving, computer work. Modalities:  [] (12066) Vasopneumatic compression: Utilized vasopneumatic compression to decrease edema / swelling for the purpose of improving mobility and quad tone / recruitment which will allow for increased overall function including but not limited to self-care, transfers, ambulation, and ascending / descending stairs. Modalities:    1/29: MPH to cerv spine in supine x 10 min     Charges:  Timed Code Treatment Minutes: 42   Total Treatment Minutes: 42     [] EVAL - LOW (18616)   [] EVAL - MOD (05068)  [] EVAL - HIGH (55397)  [] RE-EVAL (91453)  [] IQ(76692) x 2    [] Ionto  [] NMR (21640) x   1    [] Vaso  [] Manual (89174) x  1   [] Ultrasound  [] TA x        [] Mech Traction (54257)  [x] Aquatic Therapy x   2  [] ES (un) (24473):   [] Home Management Training x   [] ES(attended) (34615)   [] Dry Needling 1-2 muscles (73254):  [] Dry Needling 3+ muscles (358634)  [x] Group:      [] Other:     GOALS: GOALS:  Patient stated goal: decrease pain   []? Progressing: []? Met: []? Not Met: []? Adjusted     Therapist goals for Patient:   Short Term Goals: To be achieved in: 2 weeks  1. Independent in HEP and progression per patient tolerance, in order to prevent re-injury. []? Progressing: []? Met: []? Not Met: []? Adjusted  2. Patient will have a decrease in pain to facilitate improvement in movement, function, and ADLs as indicated by improvement with respect to Functional Deficits. []? Progressing: []? Met: []? Not Met: []? Adjusted     Long Term Goals: To be achieved in: 8 weeks  1. Disability index score of 10% or less on the NDI to assist with reaching prior level of function. []? Progressing: []? Met: []? Not Met: []? Adjusted  2. Patient will demonstrate increased AROM  to [facility], to allow for proper joint functioning to allow pt to resume household activities without increase in symptoms. []? Progressing: []? Met: []? Not Met: []? Adjusted  3. Patient will return to functional activities including laundry and cleaning without increased symptoms or restriction. []? Progressing: []? Met: []? Not Met: []? Adjusted  4. Pt will return to recreational activities, including going to the gym with friends, without pain or limitations. []? Progressing: []? Met: []? Not Met: []? Adjusted       5. Pt will sleep with no pain or nightly disturbances secondary to neck pain. []? Progressing: []? Met: []? Not Met: []? Adjusted    Overall Progression Towards Functional goals/ Treatment Progress Update:  [] Patient is progressing as expected towards functional goals listed. [] Progression is slowed due to complexities/Impairments listed. [] Progression has been slowed due to co-morbidities.   [x] Plan just implemented, too soon to assess goals progression <30days   [] Goals require adjustment due to lack of progress  [] Patient is not progressing as expected and requires additional follow up with physician  [] Other    Persisting Functional Limitations/Impairments:  [x]Sleeping []Sitting               []Standing []Transfers        []Walking []Kneeling               []Stairs []Squatting / bending   [x]ADLs [x]Reaching  [x]Lifting  [x]Housework  []Driving []Job related tasks  [x]Sports/Recreation []Other:        ASSESSMENT:  3/4 only tols very gentle ex, needs cues to relax UT. Gentle IASTM helps with pain relief  3/2 sedentary lifestyle & poor posture contributes to neck pain. myofascial pain1/29Pt has low tolerance to gentle cerv manual techniques - gentle cerv traction progressively got more uncomfortable. Pt holds head in slight L Sbing due to avoidance of R SBing. Plan to continue with mobility and strength of cervical and thoracic spine. Treatment/Activity Tolerance:  [x] Patient able to complete tx  [] Patient limited by fatigue  [] Patient limited by pain  [] Patient limited by other medical complications  [] Other:     Prognosis: [x] Good [] Fair  [] Poor    Patient Requires Follow-up: [x] Yes  [] No    Plan for next treatment session: manual therapy to increase cervical mobility and decrease tissue tension, postural education, postural mm strengthening, MOC, emphasis on HEP bc pt wants to save as many visits as possible for after possible future THR in 2021    PLAN: See eval. PT 1x / week for 2 weeks. [x] Continue per plan of care [] Alter current plan (see comments)  [] Plan of care initiated [] Hold pending MD visit [] Discharge    Electronically signed by: Jesenia Villarreal PT, DPT      Note: If patient does not return for scheduled/ recommended follow up visits, this note will serve as a discharge from care along with most recent update on progress.

## 2021-03-17 ENCOUNTER — TELEPHONE (OUTPATIENT)
Dept: ORTHOPEDIC SURGERY | Age: 46
End: 2021-03-17

## 2021-03-18 ENCOUNTER — APPOINTMENT (OUTPATIENT)
Dept: PHYSICAL THERAPY | Age: 46
End: 2021-03-18
Payer: COMMERCIAL

## 2021-03-18 RX ORDER — DIAZEPAM 10 MG/1
TABLET ORAL
Qty: 90 TABLET | Refills: 0 | OUTPATIENT
Start: 2021-03-18

## 2021-03-18 NOTE — TELEPHONE ENCOUNTER
I am not comfortable with her taking both FOR INSOMNIA - the Jarome Bita was discontinued IN 11/20  BECAUSE SHE WAS USING THE VALIUM- I BELIEVE IT WAS AN OVERSIGHT ON MY PART THAT THE AMBIEN WAS REORDERED- SHE CAN USE ONE OR THE OTHER

## 2021-03-19 ENCOUNTER — HOSPITAL ENCOUNTER (OUTPATIENT)
Dept: PHYSICAL THERAPY | Age: 46
Setting detail: THERAPIES SERIES
Discharge: HOME | End: 2021-03-19
Payer: COMMERCIAL

## 2021-03-19 NOTE — PROGRESS NOTES
Physical Therapy  Cancellation/No-show Note  Patient Name:  Valeria Cheema  :  1975   Date:  3/19/2021  Cancelled visits to date: 3  No-shows to date: 2    Patient status for today's appointment patient:  [x]  Cancelled , , 3/19   []  Rescheduled appointment  [x]  No-show , 3/11     Reason given by patient:  []  Patient ill  []  Conflicting appointment  []  No transportation    []  Conflict with work  []  No reason given  [x]  Other:     Comments: 3/19: no sitter  3/11 pt was confused re appt day and time   Pt NS due to bad weather      Phone call information:   []  3/11 Phone call made today to patient at 619pm _ time at number provided:   [phone]   [x]  3/11 Patient answered, conversation as follows: PT calling to check on pt due to NS to todays appt. Pt apologized. Said she thought her appt was for tomorrow. States she has been in a lot of pain and called MD - he wants her to cont with her 6 wks of PT then will look into TANYA. D/w pt re option of aquatic therapy - pt would like to try.       []  Patient did not answer, message left as follows:   [x]  Phone call not made today    Electronically signed by:  Jonathon Aguilar, PT, DPT

## 2021-03-22 ENCOUNTER — APPOINTMENT (OUTPATIENT)
Dept: PHYSICAL THERAPY | Age: 46
End: 2021-03-22
Payer: COMMERCIAL

## 2021-03-22 RX ORDER — DIAZEPAM 10 MG/1
TABLET ORAL
Qty: 90 TABLET | Refills: 0 | OUTPATIENT
Start: 2021-03-22

## 2021-03-22 NOTE — TELEPHONE ENCOUNTER
Shanelle Underwood, ROSSANA - CNP  to [facility] [address] Staff        5:31 PM  Note     I am not comfortable with her taking both FOR INSOMNIA - the Soha Li was discontinued IN 11/20  BECAUSE SHE WAS USING THE VALIUM- I BELIEVE IT WAS AN OVERSIGHT ON MY PART THAT THE AMBIEN WAS REORDERED- SHE CAN USE ONE OR THE OTHER        THIS IS ANOTHER REQUEST FOR THIS MEDICATION!!  THE [facility]

## 2021-03-23 ENCOUNTER — HOSPITAL ENCOUNTER (OUTPATIENT)
Dept: PHYSICAL THERAPY | Age: 46
Setting detail: THERAPIES SERIES
Discharge: HOME | End: 2021-03-23
Payer: COMMERCIAL

## 2021-03-23 PROCEDURE — 97150 GROUP THERAPEUTIC PROCEDURES: CPT

## 2021-03-23 PROCEDURE — 97113 AQUATIC THERAPY/EXERCISES: CPT

## 2021-03-23 NOTE — FLOWSHEET NOTE
blades. Hears a lot of cracking and popping every time she lies down. States she called Dr. Sue Niño requesting a steroid shot - reports he is willing to give her a steroid shot but she has to complete 6 PT visits first.  Only sleeps 5 hrs/night due to pain keeping her awake. OBJECTIVE:   3/2 hard bump at L T5 area approx 1/2 \" by1/2\" and darker, multicolored william R side of C6- advised pt to talk to pcp about it - pt is agreeable    RESTRICTIONS/PRECAUTIONS: bipolar    Exercises/Interventions:     Therapeutic Exercises (07927) Resistance / level Sets/sec Reps Notes   UBE  3 min forward, 2 min retro     Chin tucks - supine with towel roll     Scap retraction -standing      5 sec  15x -     Ball on wall:  CT   CT with head nods  CT with head turns       1  1  1   x10  x10  x10 Added 1/29   tbands:  B ER  Hor abd   Mid Rows   LPD  High row   [place]    1  1  1  1  1     x15  x15  x15  x10  x10   Added 1/29   Thor ext over bolster in chair red 10 sec holds x10 Added 1/29                                Therapeutic Activities (45584)                     Home Management/ADL       Pt educated on supine and SL sleeping positions with focus on maintaining neutral spine; educated on positioning with sitting and reading including pillows/table under elbows; educated on body mechanics and squatting down vs flexing at spine.  Handout provided on body mechanics    Review - instruct in appropriate pillow size/shape /material for sleeping  Pt ed re sit posture - adjust pony tail to avoid [facility] [place]            Neuromuscular Re-ed (41431)       Postural re- ed  Back to wall with towel roll behind head  scap retraction with cerv neutral  Back to wall LT wall slide  Facing wall LT wall slide          10x                                        Manual Intervention (72031)       Cervical distraction, suboccipital release, TPR L UT, PA glides grade 2, side glides grade 1 B        IASTM B UT, levator, rhomboids, splenius capitus and cervicus,  11min                                     AquaticTherapy Dates of Service: 3/15,3/23  Aquatic Visits Exercises/Activities:   Transfers:          % Immersion:            Ambulation:   UE Exercises:       Forwards   X 1  Shoulder Shrugs  x 10    Lateral   x 1 Shoulder Circles  x 10    Retro   x 1 Scapular Retraction   x 10    Marching   Push Downs       [place]   Punching     Jog    Rowing     Multifidi walkouts with paddle   Elbow Flex/Ext       Shldr Flex/Ext X 10      Shldr aBd/aDd X 10   LE Exercises:  Shldr Horiz aBd/aDd X 10   HR/TR X 15 Shldr IR/ER X 10   Marches X 10 L hip discomfort  Arm Circles X 10   Squats  x 10 PNF Diagonals    Hamstring Curls X 15 Wall Push Ups    Hip Flexion (SLR) X 10 Figure 8's    Hip aBduction (SLR) X 10 Bilateral Pull Downs    Hip Extension (SLR) X 10      Hip aDduction (SLR)      Hip Circles X 10 Functional:    Hip IR/Er  Step up forward x   TrA Set   Step up lateral  x   Pelvic Tilts  5' x 10 Step down     Fig 8's   Lunges Forward    LE PNF  Lunges Retro      Lunges Lateral     Balance:   Lower ab curl with noodle     SLS  20' x 2      Tandem Stance 20' x 2      NBOS eyes open  Seated:     NBOS eyes closed  Ankle pumps     Hand to Opposite Knee  Ankle Circles     Fwd Step ups to SLS  Knee Flex/Ext    Lateral Step ups to SLS  Hip aBd/aDd    Stop/Go Gait   Bicycle       Ankle DF/PF      Ankle Inv/Ev    Stretching:       Gastroc/Soleus 2 x 30'     Hamstring   Noodle:     Knee Flex Stretch  Leg Press    Piriformis   Noodle Hang at [place]    Hip Flexor  Noodle Hang Deep Water    SKTC  Noodle Bicycle     DKTC       ITB      Quad  Deep Water:    Mid Back   Jog    UT  Jumping Jacks    Post Shoulder  Heel to Toe    Ladder Pull 2 x 30' Hand to Opposite Knee    Pec Stretch  Rocking Horse      [employer] Skier          Cervical:   Other: swim   AROM Flexion  Float in pool    AROM Extension      AROM Side Bending      AROM Rotation      Chin Tucks      Chin Nods        Aquatic Abbreviation Key  B= Belt DB= Dumbells T= Theratube   H= Hydrotone N= Noodles W= Weights   P= Paddles S= Speedo equipment K= Kickboard       Pt. Education: 3/2postural ed, ed on impact of posture on pain    1/26 Pt educated on supine and SL sleeping positions with focus on maintaining neutral spine; educated on positioning with sitting and reading including pillows/table under elbows; educated on body mechanics and squatting down vs flexing at spine. Handout provided on body mechanics   -patient educated on diagnosis, prognosis and expectations for rehab  -all patient questions were answered    Home Exercise Program:  Access Code: [email or web address]   URL: [email or web address]   Date: 01/26/2021   Prepared by: Kera Larson     Exercises   · Supine Cervical Retraction with Towel - 10 reps - 1-2 sets - 5 hold - 2x daily - 7x weekly   · Seated Scapular Retraction - 10 reps - 3 sets - 2x daily - 7x weekly       Therapeutic Exercise and NMR EXR  [x] (85393) Provided verbal/tactile cueing for activities related to strengthening, flexibility, endurance, ROM for improvements in  [] LE / Lumbar: LE, proximal hip, and core control with self care, mobility, lifting, ambulation. [x] UE / Cervical: cervical, postural, scapular, scapulothoracic and UE control with self care, reaching, carrying, lifting, house/yardwork, driving, computer work.  [] (07036) Provided verbal/tactile cueing for activities related to improving balance, coordination, kinesthetic sense, posture, motor skill, proprioception to assist with   [] LE / lumbar: LE, proximal hip, and core control in self care, mobility, lifting, ambulation and eccentric single leg control.    [] UE / cervical: cervical, scapular, scapulothoracic and UE control with self care, reaching, carrying, lifting, house/yardwork, driving, computer work.   [] (32479) Therapist is in constant attendance of 2 or more patients providing skilled therapy interventions, but not providing any significant amount of measurable one-on-one time to either patient, for improvements in  [] LE / lumbar: LE, proximal hip, and core control in self care, mobility, lifting, ambulation and eccentric single leg control. [] UE / cervical: cervical, scapular, scapulothoracic and UE control with self care, reaching, carrying, lifting, house/yardwork, driving, computer work. NMR and Therapeutic Activities:    [x] (99714 or 21724) Provided verbal/tactile cueing for activities related to improving balance, coordination, kinesthetic sense, posture, motor skill, proprioception and motor activation to allow for proper function of   [x] LE: / Lumbar core, proximal hip and LE with self care and ADLs  [x] UE / Cervical: cervical, postural, scapular, scapulothoracic and UE control with self care, carrying, lifting, driving, computer work.   [] (59856) Gait Re-education- Provided training and instruction to the patient for proper LE, core and proximal hip recruitment and positioning and eccentric body weight control with ambulation re-education including up and down stairs     Home Management Training / Self Care:  [] (86427) Provided self-care/home management training related to activities of daily living and compensatory training, and/or use of adaptive equipment for improvement with: ADLs and compensatory training, meal preparation, safety procedures and instruction in use of adaptive equipment, including bathing, grooming, dressing, personal hygiene, basic household cleaning and chores.      Home Exercise Program:    [x] (76969) Reviewed/Progressed HEP activities related to strengthening, flexibility, endurance, ROM of   [x] LE / Lumbar: core, proximal hip and LE for functional self-care, mobility, lifting and ambulation/stair navigation   [x] UE / Cervical: cervical, postural, scapular, scapulothoracic and UE control with self care, reaching, carrying, lifting, house/yardwork, driving, computer work  [] (99297)Reviewed/Progressed HEP activities related to improving balance, coordination, kinesthetic sense, posture, motor skill, proprioception of   [] LE: core, proximal hip and LE for self care, mobility, lifting, and ambulation/stair navigation    [] UE / Cervical: cervical, postural,  scapular, scapulothoracic and UE control with self care, reaching, carrying, lifting, house/yardwork, driving, computer work    Manual Treatments:  PROM / STM / Oscillations-Mobs:  G-I, II, III, IV (PA's, Inf., Post.)  [x] (85079) Provided manual therapy to mobilize LE, proximal hip and/or LS spine soft tissue/joints for the purpose of modulating pain, promoting relaxation,  increasing ROM, reducing/eliminating soft tissue swelling/inflammation/restriction, improving soft tissue extensibility and allowing for proper ROM for normal function with   [] LE / lumbar: self care, mobility, lifting and ambulation. [x] UE / Cervical: self care, reaching, carrying, lifting, house/yardwork, driving, computer work. Modalities:  [] (37864) Vasopneumatic compression: Utilized vasopneumatic compression to decrease edema / swelling for the purpose of improving mobility and quad tone / recruitment which will allow for increased overall function including but not limited to self-care, transfers, ambulation, and ascending / descending stairs.        Modalities:    1/29: MPH to cerv spine in supine x 10 min     Charges:  Timed Code Treatment Minutes: 35   Total Treatment Minutes: 35     [] EVAL - LOW (15808)   [] EVAL - MOD (27988)  [] EVAL - HIGH (24699)  [] RE-EVAL (90473)  [] DY(89445) x 2    [] Ionto  [] NMR (11148) x   1    [] Vaso  [] Manual (13916) x  1   [] Ultrasound  [] TA x        [] Mech Traction (87766)  [x] Aquatic Therapy x   1  [] ES (un) (02126):   [] Home Management Training x   [] ES(attended) (23731)   [] Dry Needling 1-2 muscles (77229):  [] Dry Needling 3+ muscles (470509)  [x] Group:      [] Other:     GOALS: GOALS:  Patient stated goal: decrease pain   []? Progressing: []? Met: []? Not Met: []? Adjusted     Therapist goals for Patient:   Short Term Goals: To be achieved in: 2 weeks  1. Independent in HEP and progression per patient tolerance, in order to prevent re-injury. []? Progressing: []? Met: []? Not Met: []? Adjusted  2. Patient will have a decrease in pain to facilitate improvement in movement, function, and ADLs as indicated by improvement with respect to Functional Deficits. []? Progressing: []? Met: []? Not Met: []? Adjusted     Long Term Goals: To be achieved in: 8 weeks  1. Disability index score of 10% or less on the NDI to assist with reaching prior level of function. []? Progressing: []? Met: []? Not Met: []? Adjusted  2. Patient will demonstrate increased AROM  to [facility], to allow for proper joint functioning to allow pt to resume household activities without increase in symptoms. []? Progressing: []? Met: []? Not Met: []? Adjusted  3. Patient will return to functional activities including laundry and cleaning without increased symptoms or restriction. []? Progressing: []? Met: []? Not Met: []? Adjusted  4. Pt will return to recreational activities, including going to the gym with friends, without pain or limitations. []? Progressing: []? Met: []? Not Met: []? Adjusted       5. Pt will sleep with no pain or nightly disturbances secondary to neck pain. []? Progressing: []? Met: []? Not Met: []? Adjusted    Overall Progression Towards Functional goals/ Treatment Progress Update:  [] Patient is progressing as expected towards functional goals listed. [] Progression is slowed due to complexities/Impairments listed. [] Progression has been slowed due to co-morbidities.   [x] Plan just implemented, too soon to assess goals progression <30days   [] Goals require adjustment due to lack of progress  [] Patient is not progressing as expected and requires additional follow up with physician  [] Other    Persisting Functional Limitations/Impairments:  [x]Sleeping []Sitting               []Standing []Transfers        []Walking []Kneeling               []Stairs []Squatting / bending   [x]ADLs [x]Reaching  [x]Lifting  [x]Housework  []Driving []Job related tasks  [x]Sports/Recreation []Other:        ASSESSMENT: 3/23 Pt had c/o post L shoulder pain with TE's this date and required verbal cueing to improve scapular depression and retraction during strengthening with improved symptoms expressed. Pt had shorter session in pool this date 2/2 symptoms and tolerance. 3/4 only tols very gentle ex, needs cues to relax UT. Gentle IASTM helps with pain relief  3/2 sedentary lifestyle & poor posture contributes to neck pain. myofascial pain1/29Pt has low tolerance to gentle cerv manual techniques - gentle cerv traction progressively got more uncomfortable. Pt holds head in slight L Sbing due to avoidance of R SBing. Plan to continue with mobility and strength of cervical and thoracic spine. Treatment/Activity Tolerance:  [x] Patient able to complete tx  [] Patient limited by fatigue  [x] Patient limited by pain  [] Patient limited by other medical complications  [] Other:     Prognosis: [x] Good [] Fair  [] Poor    Patient Requires Follow-up: [x] Yes  [] No    Plan for next treatment session: manual therapy to increase cervical mobility and decrease tissue tension, postural education, postural mm strengthening, MOC, emphasis on HEP bc pt wants to save as many visits as possible for after possible future THR in 2021    PLAN: See eval. PT 1x / week for 2 weeks. [x] Continue per plan of care [] Alter current plan (see comments)  [] Plan of care initiated [] Hold pending MD visit [] Discharge    Electronically signed by: Yahaira Jalloh PTA, [id number]       Note: If patient does not return for scheduled/ recommended follow up visits, this note will serve as a discharge from care along with most recent update on progress.

## 2021-03-26 ENCOUNTER — HOSPITAL ENCOUNTER (OUTPATIENT)
Dept: PHYSICAL THERAPY | Age: 46
Setting detail: THERAPIES SERIES
Discharge: HOME | End: 2021-03-26
Payer: COMMERCIAL

## 2021-03-26 PROCEDURE — 97140 MANUAL THERAPY 1/> REGIONS: CPT

## 2021-03-26 PROCEDURE — 97110 THERAPEUTIC EXERCISES: CPT

## 2021-03-26 NOTE — PLAN OF CARE
[facility]  Phone: [phone]   Fax: [phone]      . Physical Therapy Re-Certification Plan of Care    Dear Dr. Walt Spivey,     We had the pleasure of treating the following patient for physical therapy services at [facility]. A summary of our findings can be found in the updated assessment below. This includes our plan of care. If you have any questions or concerns regarding these findings, please do not hesitate to contact me at the office phone number checked above.   Thank you for the referral.     Physician Signature:________________________________Date:__________________  By signing above (or electronic signature), therapists plan is approved by physician      Functional Outcome: NDI 22, 44% impaired    Overall Response to Treatment:   []Patient is responding well to treatment and improvement is noted with regards  to goals   []Patient should continue to improve in reasonable time if they continue HEP   [x]Patient has plateaued and is no longer responding to skilled PT intervention    []Patient is getting worse and would benefit from return to referring MD   []Patient unable to adhere to initial POC   []Other:     Date range of Visits: 21-3/26/21  Total Visits: 7    Recommendation:    []Continue PT     [x]Hold PT, pending MD visit      Physical Therapy Daily Treatment Note  Date:  3/26/2021    Patient Name:  Sandrita Lamb  :  1975  MRN: [id number]  Medical/Treatment Diagnosis Information:  · Diagnosis: M47.812 (ICD-10-CM) - Cervical spondylosis  · Treatment Diagnosis: Decreased cervical AROM; cervical hypomobility; headaches; postural dysfunction  Insurance/Certification information:  PT Insurance Information: [facility] Medicaid  Physician Information:  Referring Practitioner: JESS Padilla  Plan of care signed (Y/N): [x]  Yes []  No     Date of Patient follow up with Physician: next week     Progress Report: [x]  Yes  []  No     Date Range for reporting period:  Beginnin21  Ending: 3/26/21    Progress report due (10 Rx/or 30 days whichever is less): ?    Recertification due (POC duration/ or 90 days whichever is less): ? Visit # Insurance Allowable Auth required? Date Range    30 PT/OT/Speech combined   (pt may need total hip later this year - wants to save enough PT visits for future THR PT tx) []  Yes  [x]  No      Latex Allergy:  [x]NO      []YES  Preferred Language for Healthcare:   [x]English       []other:    Functional Scale:        Date assessed:  NDI: raw score = 1850; dysfunction = 36%  2021  NDI: raw score = 2250; dysfunction = 44%  3/26/2021      Pain level:  4/10    SUBJECTIVE:  Pt reports she doesn't think she has made any progress in therapy. Has an apt with a surgeon next week. Hoping to have some options to help decrease her pain. Taking 800mg of ibuprofen everyday.      OBJECTIVE:   3/26:   ROM   Comments           Cervical flex 20 deg     Cervical ext 18 deg     Cervical SB L - 25 deg  R - 25 deg    Cervical Rot L - 28 deg  R - 25 deg          3/2 hard bump at L T5 area approx 1/2 \" by1/2\" and darker, multicolored william R side of C6- advised pt to talk to pcp about it - pt is agreeable    RESTRICTIONS/PRECAUTIONS: bipolar    Exercises/Interventions:     Therapeutic Exercises (21849) Resistance / level Sets/sec Reps Notes   UBE     Chin tucks - supine with towel roll     Scap retraction -standing         Ball on wall:  CT   CT with head nods  CT with head turns       1  1  1   x10  x10  x10 Added    tbands:  B ER  Hor abd   Mid Rows   LPD  High row  GHJ ext    Green   Green   Green  Green    1  1  1  1  1  1     x15  x15  x15  x10  x10  x10 Added    Thor ext over bolster in chair red 10 sec holds x10 Added     Pulleys flexion  Pulleys scaption  2 min   1 min   Added 3/26                        Therapeutic Activities (47407) Home Management/ADL       Pt educated on supine and SL sleeping positions with focus on maintaining neutral spine; educated on positioning with sitting and reading including pillows/table under elbows; educated on body mechanics and squatting down vs flexing at spine. Handout provided on body mechanics                Neuromuscular Re-ed (24616)       Postural re- ed  Back to wall with towel roll behind head  scap retraction with cerv neutral  Back to wall LT wall slide  Facing wall LT wall slide                                            Manual Intervention (04060)       Cervical distraction, suboccipital release, TPR L UT, PA glides grade 2, side glides grade 1 B  10 min     IASTM B UT, levator, rhomboids, splenius capitus and cervicus,                                       AquaticTherapy Dates of Service: 3/15,3/23  Aquatic Visits Exercises/Activities:   Transfers:          % Immersion:            Ambulation:   UE Exercises:       Forwards   X 1  Shoulder Shrugs  x 10    Lateral   x 1 Shoulder Circles  x 10    Retro   x 1 Scapular Retraction   x 10    Marching   Push Downs       Cariocas   Punching     Jog    Rowing     Multifidi walkouts with paddle   Elbow Flex/Ext       Shldr Flex/Ext X 10      Shldr aBd/aDd X 10   LE Exercises:  Shldr Horiz aBd/aDd X 10   HR/TR X 15 Shldr IR/ER X 10   Marches X 10 L hip discomfort  Arm Circles X 10   Squats  x 10 PNF Diagonals    Hamstring Curls X 15 Wall Push Ups    Hip Flexion (SLR) X 10 Figure 8's    Hip aBduction (SLR) X 10 Bilateral Pull Downs    Hip Extension (SLR) X 10      Hip aDduction (SLR)      Hip Circles X 10 Functional:    Hip IR/Er  Step up forward x   TrA Set   Step up lateral  x   Pelvic Tilts  5' x 10 Step down     Fig 8's   Lunges Forward    LE PNF  Lunges Retro      Lunges Lateral     Balance:   Lower ab curl with noodle     SLS  20' x 2      Tandem Stance 20' x 2      NBOS eyes open  Seated:     NBOS eyes closed  Ankle pumps     Hand to Opposite Knee  Ankle Circles     Fwd Step ups to SLS  Knee Flex/Ext    Lateral Step ups to SLS  Hip aBd/aDd    Stop/Go Gait   Bicycle       Ankle DF/PF      Ankle Inv/Ev    Stretching:       Gastroc/Soleus 2 x 30'     Hamstring   Noodle:     Knee Flex Stretch  Leg Press    Piriformis   Noodle Hang at [facility]    Hip Flexor  Noodle Hang Deep Water    SKTC  Noodle Bicycle     DKTC       ITB      Quad  Deep Water:    Mid Back   Jog    UT  Jumping Jacks    Post Shoulder  Heel to Toe    Ladder Pull 2 x 30' Hand to Opposite Knee    Pec Stretch  Rocking Horse      [employer] Skier          Cervical:   Other: swim   AROM Flexion  Float in pool    AROM Extension      AROM Side Bending      AROM Rotation      Chin Tucks      Chin Nods        Aquatic Abbreviation Key  B= Belt DB= Dumbells T= Theratube   H= Hydrotone N= Noodles W= Weights   P= Paddles S= Speedo equipment K= Kickboard       Pt. Education:   3/26: Harlan Frank goals, discussed progress made and areas remaining for improvement, issued outcome measure and reviewed new score with pt as compared to aileen, discussed returning to MD and holding therapy for now until new plan to set      3/2: postural ed, ed on impact of posture on pain    1/26 Pt educated on supine and SL sleeping positions with focus on maintaining neutral spine; educated on positioning with sitting and reading including pillows/table under elbows; educated on body mechanics and squatting down vs flexing at spine. Handout provided on body mechanics   -patient educated on diagnosis, prognosis and expectations for rehab  -all patient questions were answered    Home Exercise Program:  Access Code: [email or web address]   URL: [employer]". com/   Date: 01/26/2021   Prepared by: Sophia Sood     Exercises   · Supine Cervical Retraction with Towel - 10 reps - 1-2 sets - 5 hold - 2x daily - 7x weekly   · Seated Scapular Retraction - 10 reps - 3 sets - 2x daily - 7x weekly       Therapeutic Exercise and NMR EXR  [x] (23715) Provided verbal/tactile cueing for activities related to strengthening, flexibility, endurance, ROM for improvements in  [] LE / Lumbar: LE, proximal hip, and core control with self care, mobility, lifting, ambulation. [x] UE / Cervical: cervical, postural, scapular, scapulothoracic and UE control with self care, reaching, carrying, lifting, house/yardwork, driving, computer work.  [] (33121) Provided verbal/tactile cueing for activities related to improving balance, coordination, kinesthetic sense, posture, motor skill, proprioception to assist with   [] LE / lumbar: LE, proximal hip, and core control in self care, mobility, lifting, ambulation and eccentric single leg control. [] UE / cervical: cervical, scapular, scapulothoracic and UE control with self care, reaching, carrying, lifting, house/yardwork, driving, computer work.   [] (66364) Therapist is in constant attendance of 2 or more patients providing skilled therapy interventions, but not providing any significant amount of measurable one-on-one time to either patient, for improvements in  [] LE / lumbar: LE, proximal hip, and core control in self care, mobility, lifting, ambulation and eccentric single leg control. [] UE / cervical: cervical, scapular, scapulothoracic and UE control with self care, reaching, carrying, lifting, house/yardwork, driving, computer work.      NMR and Therapeutic Activities:    [] (54172 or 75505) Provided verbal/tactile cueing for activities related to improving balance, coordination, kinesthetic sense, posture, motor skill, proprioception and motor activation to allow for proper function of   [] LE: / Lumbar core, proximal hip and LE with self care and ADLs  [] UE / Cervical: cervical, postural, scapular, scapulothoracic and UE control with self care, carrying, lifting, driving, computer work.   [] (01132) Gait Re-education- Provided training and instruction to the patient for proper LE, core and proximal hip recruitment and positioning and eccentric body weight control with ambulation re-education including up and down stairs     Home Management Training / Self Care:  [] (45118) Provided self-care/home management training related to activities of daily living and compensatory training, and/or use of adaptive equipment for improvement with: ADLs and compensatory training, meal preparation, safety procedures and instruction in use of adaptive equipment, including bathing, grooming, dressing, personal hygiene, basic household cleaning and chores. Home Exercise Program:    [] (91295) Reviewed/Progressed HEP activities related to strengthening, flexibility, endurance, ROM of   [] LE / Lumbar: core, proximal hip and LE for functional self-care, mobility, lifting and ambulation/stair navigation   [] UE / Cervical: cervical, postural, scapular, scapulothoracic and UE control with self care, reaching, carrying, lifting, house/yardwork, driving, computer work  [] (01175)Reviewed/Progressed HEP activities related to improving balance, coordination, kinesthetic sense, posture, motor skill, proprioception of   [] LE: core, proximal hip and LE for self care, mobility, lifting, and ambulation/stair navigation    [] UE / Cervical: cervical, postural,  scapular, scapulothoracic and UE control with self care, reaching, carrying, lifting, house/yardwork, driving, computer work    Manual Treatments:  PROM / STM / Oscillations-Mobs:  G-I, II, III, IV (PA's, Inf., Post.)  [x] (00523) Provided manual therapy to mobilize LE, proximal hip and/or LS spine soft tissue/joints for the purpose of modulating pain, promoting relaxation,  increasing ROM, reducing/eliminating soft tissue swelling/inflammation/restriction, improving soft tissue extensibility and allowing for proper ROM for normal function with   [] LE / lumbar: self care, mobility, lifting and ambulation.     [x] UE / Cervical: self care, reaching, carrying, lifting, house/yardwork, driving, computer work. Modalities:  [] (48114) Vasopneumatic compression: Utilized vasopneumatic compression to decrease edema / swelling for the purpose of improving mobility and quad tone / recruitment which will allow for increased overall function including but not limited to self-care, transfers, ambulation, and ascending / descending stairs. Modalities:    1/29: MPH to cerv spine in supine x 10 min     Charges:  Timed Code Treatment Minutes: 39   Total Treatment Minutes: 39     [] EVAL - LOW (64795)   [] EVAL - MOD (09163)  [] EVAL - HIGH (37240)  [] RE-EVAL (04673)  [x] LX(71672) x 2    [] Ionto  [] NMR (79992) x       [] Vaso  [x] Manual (56585) x  1   [] Ultrasound  [] TA x        [] Mech Traction (97052)  [] Aquatic Therapy x                   [] ES (un) (76794):   [] Home Management Training x   [] ES(attended) (10290)   [] Dry Needling 1-2 muscles (41615):  [] Dry Needling 3+ muscles (427559)  [] Group:      [] Other:     GOALS: GOALS:  Patient stated goal: decrease pain   []? Progressing: []? Met: [x]? Not Met: []? Adjusted     Therapist goals for Patient:   Short Term Goals: To be achieved in: 2 weeks  1. Independent in HEP and progression per patient tolerance, in order to prevent re-injury. []? Progressing: [x]? Met: []? Not Met: []? Adjusted  2. Patient will have a decrease in pain to facilitate improvement in movement, function, and ADLs as indicated by improvement with respect to Functional Deficits. []? Progressing: []? Met: [x]? Not Met: []? Adjusted     Long Term Goals: To be achieved in: 8 weeks  1. Disability index score of 10% or less on the NDI to assist with reaching prior level of function. []? Progressing: []? Met: [x]? Not Met: []? Adjusted  2. Patient will demonstrate increased AROM  to [facility], to allow for proper joint functioning to allow pt to resume household activities without increase in symptoms. []? Progressing: []? Met: [x]?  Not Met: []? Adjusted  3. Patient will return to functional activities including laundry and cleaning without increased symptoms or restriction. - Pt able to carry laundry back up the stairs, but  still takes it down, cleans through the pain   [x]? Progressing: []? Met: []? Not Met: []? Adjusted  4. Pt will return to recreational activities, including going to the gym with friends, without pain or limitations. []? Progressing: []? Met: [x]? Not Met: []? Adjusted       5. Pt will sleep with no pain or nightly disturbances secondary to neck pain. []? Progressing: []? Met: [x]? Not Met: []? Adjusted    Overall Progression Towards Functional goals/ Treatment Progress Update:  [] Patient is progressing as expected towards functional goals listed. [] Progression is slowed due to complexities/Impairments listed. [] Progression has been slowed due to co-morbidities. [x] Plan just implemented, too soon to assess goals progression <30days   [] Goals require adjustment due to lack of progress  [] Patient is not progressing as expected and requires additional follow up with physician  [] Other    Persisting Functional Limitations/Impairments:  [x]Sleeping []Sitting               []Standing []Transfers        []Walking []Kneeling               []Stairs []Squatting / bending   [x]ADLs [x]Reaching  [x]Lifting  [x]Housework  []Driving []Job related tasks  [x]Sports/Recreation []Other:        ASSESSMENT:   Pt is a 47 y/o female who complains of neck pain. She has been seen for 7 visits as of thi date and feels she has not improved since eval. She plans to see a surgeon next week and is hoping to set a new plan to help decrease her pain. She demos a decrease in cervical AROM since eval. Pt is not longer improving from therapy and would benefit from a hold until she can see the MD and a new plan is set. If MD feels therapy would still benefit pt, please send new rx.       Treatment/Activity Tolerance:  [x] Patient able to complete tx  [] Patient limited by fatigue  [x] Patient limited by pain  [] Patient limited by other medical complications  [] Other:     Prognosis: [x] Good [] Fair  [] Poor    Patient Requires Follow-up: [] Yes  [] No    Plan for next treatment session:  Waiting on MD     PLAN:   [x] Continue per plan of care [] Alter current plan (see comments)  [] Plan of care initiated [x] Hold pending MD visit [] Discharge    Electronically signed by: Fariha Almeida PT, DPT      Note: If patient does not return for scheduled/ recommended follow up visits, this note will serve as a discharge from care along with most recent update on progress.

## 2021-03-29 ENCOUNTER — TELEPHONE (OUTPATIENT)
Dept: ORTHOPEDIC SURGERY | Age: 46
End: 2021-03-29

## 2021-03-29 ENCOUNTER — OFFICE VISIT (OUTPATIENT)
Dept: ORTHOPEDIC SURGERY | Age: 46
End: 2021-03-29
Payer: COMMERCIAL

## 2021-03-29 VITALS
HEIGHT: 66 IN | TEMPERATURE: 98.4 F | BODY MASS INDEX: 40.18 KG/M2 | HEART RATE: 78 BPM | WEIGHT: 250 LBS | DIASTOLIC BLOOD PRESSURE: 80 MMHG | SYSTOLIC BLOOD PRESSURE: 126 MMHG

## 2021-03-29 DIAGNOSIS — M50.30 DDD (DEGENERATIVE DISC DISEASE), CERVICAL: ICD-10-CM

## 2021-03-29 DIAGNOSIS — M54.12 CERVICAL RADICULAR PAIN: Primary | ICD-10-CM

## 2021-03-29 PROCEDURE — 1036F TOBACCO NON-USER: CPT | Performed by: PHYSICIAN ASSISTANT

## 2021-03-29 PROCEDURE — G8484 FLU IMMUNIZE NO ADMIN: HCPCS | Performed by: PHYSICIAN ASSISTANT

## 2021-03-29 PROCEDURE — G8427 DOCREV CUR MEDS BY ELIG CLIN: HCPCS | Performed by: PHYSICIAN ASSISTANT

## 2021-03-29 PROCEDURE — 99214 OFFICE O/P EST MOD 30 MIN: CPT | Performed by: PHYSICIAN ASSISTANT

## 2021-03-29 PROCEDURE — G8417 CALC BMI ABV UP PARAM F/U: HCPCS | Performed by: PHYSICIAN ASSISTANT

## 2021-03-29 NOTE — PROGRESS NOTES
Subjective:      Patient ID: Homar Orozco is a 48 y.o. female. Chief Complaint   Patient presents with    Follow-up     Cervical        HPI:   She is here for follow up on her cervical radicular pain. She completed physical therapy last week. No improvement of her neck radicular complaints. Pain 9/10. She denies any perceived weakness. She denies any fevers or chills. Oral medications including anti-inflammatory medications, Neurontin and Zanaflex are not helping.       Past Medical History:   Diagnosis Date    Arthritis     Bipolar disorder ([address]     Depression     Gastritis     Hypertension     Insomnia     Migraine     Neuropathy     PLMD (periodic limb movement disorder)     Sleep apnea     uses C-PAP    TMJ (temporomandibular joint syndrome)        Family History   Problem Relation Age of Onset    COPD Mother     Mult Sclerosis Father     No Known Problems Sister     High Cholesterol Brother     Diabetes Brother     Obesity Brother     Arthritis Brother     Other Maternal Grandmother         hips replacement    Obesity Maternal Grandmother     No Known Problems Maternal Grandfather     No Known Problems Paternal Grandmother     Diabetes Paternal Grandfather     Arthritis Sister        Past Surgical History:   Procedure Laterality Date    COLPOSCOPY      ENDOMETRIAL ABLATION      FOOT SURGERY      JOINT REPLACEMENT Right 02/19/2019    RIGHT LATERAL TOTAL HIP REPLACEMENT    SHOULDER ARTHROSCOPY Right 11/25/2019    RIGHT SHOULDER ARTHROSCOPY, SUBACROMIAL DECOMPRESSION,   ROTATOR CUFF REPAIR performed by Ira Sol MD at [address] [place] [address] Right 2/19/2019    RIGHT LATERAL TOTAL HIP REPLACEMENT performed by Magdalena Rosales MD at [address] [place] [address] History     Occupational History    Occupation: bank collections   Tobacco Use    Smoking status: Former Smoker     Packs/day: 0.50     Years: 10.00     Pack years: 5.00     Types: Cigarettes     Start date: 1987     Quit date: 2019     Years since quittin.4    Smokeless tobacco: Never Used    Tobacco comment: resumed   2 packs per week   Substance and Sexual Activity    Alcohol use: Yes     Alcohol/week: 5.0 standard drinks     Types: 6 Standard drinks or equivalent per week     Comment: rarely    Drug use: Not Currently     Types: Marijuana     Comment: - last smoke months ago-2019    Sexual activity: Yes       Current Outpatient Medications   Medication Sig Dispense Refill    diazePAM (VALIUM) 10 MG tablet       lithium 300 MG capsule 1 tablet in morning and 2 tablets in the evening. 270 capsule 1    tiZANidine (ZANAFLEX) 2 MG tablet TAKE 1 TABLET BY MOUTH THREE TIMES DAILY AS NEEDED (NECK PAIN/MUSCULAR) 30 tablet 5    ibuprofen (ADVIL;MOTRIN) 800 MG tablet Take 1 tablet by mouth 3 times daily (with meals) 270 tablet 1    FLUoxetine (PROZAC) 40 MG capsule Take 2 capsules by mouth daily TAKE 1 CAPSULE BY MOUTH ONCE DAILY IN ADDITION (Patient taking differently: Take 80 mg by mouth daily TAKE 2 CAPSULE BY MOUTH ONCE DAILY IN ADDITION) 180 capsule 0    lamoTRIgine (LAMICTAL) 100 MG tablet Take 1 tablet by mouth once daily 30 tablet 2    amLODIPine (NORVASC) 10 MG tablet Take 1 tablet by mouth once daily 90 tablet 3    ammonium lactate (LAC-HYDRIN) 12 % lotion Apply topically daily. 500 g 0    cariprazine hcl (VRAYLAR) 3 MG CAPS capsule Take 1 capsule by mouth daily 90 capsule 3    zolpidem (AMBIEN) 10 MG tablet Take 1 tablet by mouth nightly as needed for Sleep for up to 90 days. Fill  90 tablet 1    gabapentin (NEURONTIN) 300 MG capsule Take 1 capsule by mouth 3 times daily for 30 days. 90 capsule 2     No current facility-administered medications for this visit. Objective:     She is alert, oriented x 3, pleasant, well nourished, developed and in no   acute distress.      /80   Pulse 78   Temp 98.4 °F (36.9 °C)   Ht 5' 6\" (1.676 m) Wt 250 lb (113.4 kg)   BMI 40.35 kg/m²      Cervical Spine Exam:  There is not deformity. There is  loss of motion. There is  muscular spasm. There is  trapezius/ rhomboid tenderness. There is not spinous process tenderness. There is not cervical lymphadenopathy. Spurling Test is Negative.     Examination of the upper extremities are intact with sensation to light touch. Motor testing  5/5 in all major motor groups including hand intrinsics. Radial, Median and Ulnar nerves are intact. Santiago's Sign absent.       Examination of the upper extremities shows intact perfusion to all extremities. No cyanosis. Digits are warm to touch. Capillary refill is less than 2 seconds. There is no edema noted.      Examination of the skin over the upper extremities:  Reveals the skin to be intact without lacerations or abrasions. There are no significant erythema, rashes or skin lesions.          X Rays: not performed in the office today:   Prior x-rays reviewed dated 1/13/2021. She has significant degenerative disc disease at C6-7. Diagnosis:       ICD-10-CM    1. Cervical radicular pain  M54.12    2. DDD (degenerative disc disease), cervical  M50.30         Assessment and Plan:       Assessment:  Cervical radicular pain with x-rays demonstrating degenerative disks at C6-7. She has completed formal therapy and reports no improvement of her symptoms. She is currently on medications including anti-inflammatories, muscle relaxers and gabapentin without improvement. Pain on average 9/10 VAS. Plan:  Medications-continue with current medications. No new medications prescribed today. PT-continue home exercise program.    Further Imaging-MRI cervical spine. Failed to respond to 6 weeks of conservative treatment. Procedures-discussed possible TANYA versus surgery. Follow up-she may call after MRI to review results over the phone.   Call or return to clinic if these symptoms worsen or fail to improve as anticipated.

## 2021-03-29 NOTE — TELEPHONE ENCOUNTER
General Question     Subject: PATIENT WANTS INJECTIONS  Patient and /or Facility Request: PATIENT  Contact Number: [phone]

## 2021-04-19 ENCOUNTER — OFFICE VISIT (OUTPATIENT)
Dept: ORTHOPEDIC SURGERY | Age: 46
End: 2021-04-19
Payer: COMMERCIAL

## 2021-04-19 VITALS
WEIGHT: 250 LBS | HEART RATE: 69 BPM | HEIGHT: 66 IN | BODY MASS INDEX: 40.18 KG/M2 | DIASTOLIC BLOOD PRESSURE: 84 MMHG | SYSTOLIC BLOOD PRESSURE: 151 MMHG

## 2021-04-19 DIAGNOSIS — M54.12 CERVICAL RADICULAR PAIN: Primary | ICD-10-CM

## 2021-04-19 DIAGNOSIS — M50.30 DDD (DEGENERATIVE DISC DISEASE), CERVICAL: ICD-10-CM

## 2021-04-19 PROCEDURE — 1036F TOBACCO NON-USER: CPT | Performed by: PHYSICIAN ASSISTANT

## 2021-04-19 PROCEDURE — G8417 CALC BMI ABV UP PARAM F/U: HCPCS | Performed by: PHYSICIAN ASSISTANT

## 2021-04-19 PROCEDURE — 99214 OFFICE O/P EST MOD 30 MIN: CPT | Performed by: PHYSICIAN ASSISTANT

## 2021-04-19 PROCEDURE — G8427 DOCREV CUR MEDS BY ELIG CLIN: HCPCS | Performed by: PHYSICIAN ASSISTANT

## 2021-04-19 RX ORDER — METHYLPREDNISOLONE 4 MG/1
TABLET ORAL
Qty: 1 KIT | Refills: 0 | Status: SHIPPED | OUTPATIENT
Start: 2021-04-19 | End: 2021-08-09

## 2021-04-19 RX ORDER — HYDROCODONE BITARTRATE AND ACETAMINOPHEN 5; 325 MG/1; MG/1
1 TABLET ORAL EVERY 8 HOURS PRN
Qty: 21 TABLET | Refills: 0 | Status: SHIPPED | OUTPATIENT
Start: 2021-04-19 | End: 2021-04-29 | Stop reason: SDUPTHER

## 2021-04-19 NOTE — PROGRESS NOTES
Subjective:      Patient ID: Shea Newton is a 48 y.o. female. Chief Complaint   Patient presents with    Neck Pain        HPI:   She is here for follow up for cervical radicular pain. She has a history of degenerative disc disease at C6-7 with significant radicular pain. She has completed land physical therapy which was making her symptoms worse therefore she switched to aqua therapy. She was going to therapy 2 times a week for at least 6 weeks. She was doing a home exercise program daily and had significant increase in pain and was instructed by the physical therapist to stop doing her exercises. Treatment currently has been heating pads, ibuprofen, tizanidine, gabapentin 900 mg daily without any relief. She is now experiencing weakness in both hands as well as numbness and tingling into both hands. MRI previously requested has been denied by insurance. Review Of Systems:   A 14 point review of systems and history form completed by the patient has been reviewed. This form is scanned in the media tab of the patient's chart under 1/14/2020 date.      Past Medical History:   Diagnosis Date    Arthritis     Bipolar disorder ([facility] [address]     Depression     Gastritis     Hypertension     Insomnia     Migraine     Neuropathy     PLMD (periodic limb movement disorder)     Sleep apnea     uses C-PAP    TMJ (temporomandibular joint syndrome)        Family History   Problem Relation Age of Onset    COPD Mother     Mult Sclerosis Father     No Known Problems Sister     High Cholesterol Brother     Diabetes Brother     Obesity Brother     Arthritis Brother     Other Maternal Grandmother         hips replacement    Obesity Maternal Grandmother     No Known Problems Maternal Grandfather     No Known Problems Paternal Grandmother     Diabetes Paternal Grandfather     Arthritis Sister        Past Surgical History:   Procedure Laterality Date    COLPOSCOPY      ENDOMETRIAL ABLATION      CAPSULE BY MOUTH ONCE DAILY IN ADDITION) 180 capsule 0    lamoTRIgine (LAMICTAL) 100 MG tablet Take 1 tablet by mouth once daily 30 tablet 2    gabapentin (NEURONTIN) 300 MG capsule Take 1 capsule by mouth 3 times daily for 30 days. 90 capsule 2    amLODIPine (NORVASC) 10 MG tablet Take 1 tablet by mouth once daily 90 tablet 3    ammonium lactate (LAC-HYDRIN) 12 % lotion Apply topically daily. 500 g 0    cariprazine hcl (VRAYLAR) 3 MG CAPS capsule Take 1 capsule by mouth daily 90 capsule 3    zolpidem (AMBIEN) 10 MG tablet Take 1 tablet by mouth nightly as needed for Sleep for up to 90 days. Fill 2/20 90 tablet 1     No current facility-administered medications for this visit. Objective:     She is alert, oriented x 3, pleasant, well nourished, developed and in no   acute distress. BP (!) 151/84   Pulse 69   Ht 5' 6\" (1.676 m)   Wt 250 lb (113.4 kg)   BMI 40.35 kg/m²      Cervical Spine Exam:  There is not deformity. There is  loss of motion. There is  muscular spasm. There is  trapezius/ rhomboid tenderness. There is not spinous process tenderness. There is not cervical lymphadenopathy. Spurling Test is Positive.     Examination of the upper extremities are intact with sensation to light touch. Motor testing demonstrates weak 4/5  strength bilaterally. Santiago's Sign absent.       Exam of the bilateral Wrists: There is no obvious deformity noted. The range of motion of the hand and wrist is normal compared to the opposite extremity. There is not atrophy of the thenar aspect of the hand. There is loss of normal sensation in the medial distribution of the hand. Phalen's test is positive. Tinel's test is positive. Capillary refill is less than 2 seconds and there is no cyanosis of the digits. Skin is warm, dry and intact without abrasion, laceration or contusion.        X Rays: not performed in the office today:   Previous cervical x-ray shows cervical spondylosis with advanced degenerative disc disease at C6-7. Diagnosis:       ICD-10-CM    1. Cervical radicular pain  M54.12 EMG     MRI CERVICAL SPINE WO CONTRAST     HYDROcodone-acetaminophen (NORCO) 5-325 MG per tablet   2. DDD (degenerative disc disease), cervical  M50.30 MRI CERVICAL SPINE WO CONTRAST     HYDROcodone-acetaminophen (NORCO) 5-325 MG per tablet        Assessment and Plan:       Assessment:  Persistent cervical radicular pain with x-rays demonstrating significant degenerative disc disease at C6-7. She is now having symptoms also consistent with carpal tunnel syndrome of bilateral upper extremities. She has weakness with  strength testing in both left and right upper extremity. Physical therapy has consisted of land therapy 2 times a week and a home exercise program which she did not tolerate. She switched to aqua therapy and that also did not help her symptoms. She has had formal therapy at least twice a week for 6 weeks in addition to a home-based therapy directed by physical therapy. Her last physical therapy assessment on 3/26/2021 per physical therapy states Reuben Lerner has been seen for several visits as of this date and feels she has not improved since evaluation. She plans to see his surgeon next week and is hoping to set a new plan to help decrease her pain. She demonstrates a decrease in cervical active range of motion since evaluation. Patient is no longer improving from therapy and would benefit from a hold until she can see the MD and a new plan is set. If MD feels therapy would still benefit please send new Rx. \"    Plan:  Medications- Medrol dose pack  Norco for pain. Controlled substances monitoring: possible medication side effects, risk of tolerance and/or dependence, and alternative treatments discussed, no signs of potential drug abuse or diversion identified and [place] report reviewed today- activity consistent with treatment plan.     She was instructed to continue current medications including ibuprofen, tizanidine and gabapentin. PT-currently on hold by recommendation by physical therapy. Further Imaging-again request cervical spine MRI due to continued neck pain, cervical radicular pain, weakness with  strength testing bilaterally and failure to improve with supervised therapy. Symptoms are worsening. Now has symptoms consistent with carpal tunnel syndrome as well. Request EMG of both upper extremities. Follow up-after cervical spine MRI and EMGs have been obtained. Call or return to clinic if these symptoms worsen or fail to improve as anticipated.

## 2021-04-28 ENCOUNTER — TELEPHONE (OUTPATIENT)
Dept: ORTHOPEDIC SURGERY | Age: 46
End: 2021-04-28

## 2021-04-28 RX ORDER — TIZANIDINE 2 MG/1
TABLET ORAL
Qty: 30 TABLET | Refills: 5 | Status: ON HOLD | OUTPATIENT
Start: 2021-04-28 | End: 2022-01-11

## 2021-04-28 NOTE — TELEPHONE ENCOUNTER
PATIENT IS NEEDING THE MRI AUTHORIZATION NUMBER IN ORDER TO SCHEDULE HER MRI AND ALSO NEEDS ORDER FOR EMG SO SHE CAN SCHEDULE. PLEASE CALL TO ADVISE [phone].

## 2021-04-29 DIAGNOSIS — M54.12 CERVICAL RADICULAR PAIN: Primary | ICD-10-CM

## 2021-04-29 DIAGNOSIS — M50.30 DDD (DEGENERATIVE DISC DISEASE), CERVICAL: ICD-10-CM

## 2021-04-29 RX ORDER — HYDROCODONE BITARTRATE AND ACETAMINOPHEN 5; 325 MG/1; MG/1
1 TABLET ORAL EVERY 8 HOURS PRN
Qty: 21 TABLET | Refills: 0 | Status: SHIPPED | OUTPATIENT
Start: 2021-04-29 | End: 2021-05-11 | Stop reason: SDUPTHER

## 2021-05-03 ENCOUNTER — HOSPITAL ENCOUNTER (OUTPATIENT)
Dept: MRI IMAGING | Age: 46
Discharge: HOME | End: 2021-05-03
Payer: COMMERCIAL

## 2021-05-03 DIAGNOSIS — M54.12 CERVICAL RADICULAR PAIN: ICD-10-CM

## 2021-05-03 DIAGNOSIS — M50.30 DDD (DEGENERATIVE DISC DISEASE), CERVICAL: ICD-10-CM

## 2021-05-04 ENCOUNTER — TELEPHONE (OUTPATIENT)
Dept: ORTHOPEDIC SURGERY | Age: 46
End: 2021-05-04

## 2021-05-04 DIAGNOSIS — F40.240 CLAUSTROPHOBIA: Primary | ICD-10-CM

## 2021-05-04 RX ORDER — DIAZEPAM 10 MG/1
10 TABLET ORAL PRN
Qty: 1 TABLET | Refills: 0 | Status: SHIPPED | OUTPATIENT
Start: 2021-05-04 | End: 2021-05-25

## 2021-05-06 ENCOUNTER — TELEPHONE (OUTPATIENT)
Dept: ORTHOPEDIC SURGERY | Age: 46
End: 2021-05-06

## 2021-05-06 NOTE — TELEPHONE ENCOUNTER
General Question     Subject: PATIENT UNABLE TO COMPLETE MRI  Patient and /or Facility Request: PATIENT  Contact Number: [phone]

## 2021-05-11 DIAGNOSIS — M54.12 CERVICAL RADICULAR PAIN: ICD-10-CM

## 2021-05-11 DIAGNOSIS — M50.30 DDD (DEGENERATIVE DISC DISEASE), CERVICAL: ICD-10-CM

## 2021-05-11 RX ORDER — HYDROCODONE BITARTRATE AND ACETAMINOPHEN 5; 325 MG/1; MG/1
1 TABLET ORAL EVERY 8 HOURS PRN
Qty: 21 TABLET | Refills: 0 | Status: SHIPPED | OUTPATIENT
Start: 2021-05-11 | End: 2021-05-20 | Stop reason: SDUPTHER

## 2021-05-15 ENCOUNTER — HOSPITAL ENCOUNTER (OUTPATIENT)
Dept: MRI IMAGING | Age: 46
Discharge: HOME | End: 2021-05-15
Payer: COMMERCIAL

## 2021-05-15 PROCEDURE — 72141 MRI NECK SPINE W/O DYE: CPT

## 2021-05-19 ENCOUNTER — OFFICE VISIT (OUTPATIENT)
Dept: ORTHOPEDIC SURGERY | Age: 46
End: 2021-05-19
Payer: COMMERCIAL

## 2021-05-19 VITALS — HEIGHT: 66 IN | BODY MASS INDEX: 40.18 KG/M2 | WEIGHT: 250 LBS

## 2021-05-19 DIAGNOSIS — M48.02 CERVICAL STENOSIS OF SPINE: ICD-10-CM

## 2021-05-19 DIAGNOSIS — M50.30 DDD (DEGENERATIVE DISC DISEASE), CERVICAL: Primary | ICD-10-CM

## 2021-05-19 PROCEDURE — 99214 OFFICE O/P EST MOD 30 MIN: CPT | Performed by: PHYSICIAN ASSISTANT

## 2021-05-19 PROCEDURE — G8417 CALC BMI ABV UP PARAM F/U: HCPCS | Performed by: PHYSICIAN ASSISTANT

## 2021-05-19 PROCEDURE — 1036F TOBACCO NON-USER: CPT | Performed by: PHYSICIAN ASSISTANT

## 2021-05-19 PROCEDURE — G8427 DOCREV CUR MEDS BY ELIG CLIN: HCPCS | Performed by: PHYSICIAN ASSISTANT

## 2021-05-19 NOTE — PROGRESS NOTES
Subjective:      Patient ID: Lexie Bethea is a 48 y.o. female. Chief Complaint   Patient presents with    Results     MRI Cervical spine        HPI:   She is here for follow up on her cervical spine MRI. No new complaints. Minimal gait disturbance when first awakening in the morning. Otherwise no complaints of balance issue. Review Of Systems:   Negative for fever or chills.      Past Medical History:   Diagnosis Date    Arthritis     Bipolar disorder ([address]     Depression     Gastritis     Hypertension     Insomnia     Migraine     Neuropathy     PLMD (periodic limb movement disorder)     Sleep apnea     uses C-PAP    TMJ (temporomandibular joint syndrome)        Family History   Problem Relation Age of Onset    COPD Mother     Mult Sclerosis Father     No Known Problems Sister     High Cholesterol Brother     Diabetes Brother     Obesity Brother     Arthritis Brother     Other Maternal Grandmother         hips replacement    Obesity Maternal Grandmother     No Known Problems Maternal Grandfather     No Known Problems Paternal Grandmother     Diabetes Paternal Grandfather     Arthritis Sister        Past Surgical History:   Procedure Laterality Date    COLPOSCOPY      ENDOMETRIAL ABLATION      FOOT SURGERY      JOINT REPLACEMENT Right 02/19/2019    RIGHT LATERAL TOTAL HIP REPLACEMENT    SHOULDER ARTHROSCOPY Right 11/25/2019    RIGHT SHOULDER ARTHROSCOPY, SUBACROMIAL DECOMPRESSION,   ROTATOR CUFF REPAIR performed by Erlinda Oreilly MD at [place] [address] Right 2/19/2019    RIGHT LATERAL TOTAL HIP REPLACEMENT performed by Cody Waggoner MD at [address] [place] [address] History     Occupational History    Occupation: bank collections   Tobacco Use    Smoking status: Former Smoker     Packs/day: 0.50     Years: 10.00     Pack years: 5.00     Types: Cigarettes     Start date: 9/17/1987     Quit date: 11/2/2019     Years since quitting: disc protrusion, spinal canal stenosis. Moderate narrowing right neural foraminal narrowing.       C4-C5: There is no significant disc protrusion, spinal canal stenosis. Moderate narrowing right neural foraminal narrowing.       C5-C6: There is no significant disc protrusion, spinal canal stenosis. Moderate narrowing right neural foraminal narrowing.  Mild broad-based   posterior disc marginal osteophyte.       C6-C7: Small broad-based posterior disc marginal osteophyte encroaches upon   the cord.  Uncinate spondylosis causing moderately severe narrowing of the   neural foramina bilaterally.  Central canal measures 7 mm in the midline.       C7-T1: There is no significant disc protrusion, spinal canal stenosis or   neural foraminal narrowing.           Impression   Multilevel neural foraminal narrowing as above.  Central spinal stenosis C6-7.             Diagnosis:       ICD-10-CM    1. DDD (degenerative disc disease), cervical  M50.30 MADIE - Sridevi Noguera MD, Pain Management, [facility]   2. Cervical stenosis of spine  M48.02         Assessment and Plan:       Assessment:  Cervical canal stenosis C6-7. No evidence of myelomalacia. Multilevel neuroforaminal stenosis of the cervical spine. Plan:  Medications-continue same medications. PT-she has failed therapy. Further Imaging-not indicated at this time. Procedures-     Discussed possible ACDF if she fails to improve with epidural steroid injection. She may benefit other therapeutic options such as epidural steroid injection, facet injection or other interventional procedures. For this reason, I am going to refer to Dr. Desi Puri for Anesthesiology Interventional Pain Management for an evaluation and treatment. Follow up: She was instructed to call if she develops any gait disturbances or other complaints. Call or return to clinic if these symptoms worsen or fail to improve as anticipated.

## 2021-05-20 DIAGNOSIS — M54.12 CERVICAL RADICULAR PAIN: ICD-10-CM

## 2021-05-20 DIAGNOSIS — M50.30 DDD (DEGENERATIVE DISC DISEASE), CERVICAL: ICD-10-CM

## 2021-05-20 RX ORDER — HYDROCODONE BITARTRATE AND ACETAMINOPHEN 5; 325 MG/1; MG/1
1 TABLET ORAL EVERY 8 HOURS PRN
Qty: 21 TABLET | Refills: 0 | Status: SHIPPED | OUTPATIENT
Start: 2021-05-20 | End: 2021-05-27

## 2021-05-20 NOTE — PROGRESS NOTES
Controlled substances monitoring: possible medication side effects, risk of tolerance and/or dependence, and alternative treatments discussed and no signs of potential drug abuse or diversion identified.

## 2021-05-26 ENCOUNTER — TELEPHONE (OUTPATIENT)
Dept: ADMINISTRATIVE | Age: 46
End: 2021-05-26

## 2021-05-26 NOTE — TELEPHONE ENCOUNTER
Submitted PA for Zolpidem Tartrate ER 12.5MG er tablets  Key: [id number]   Via CMM STATUS: Approved 4/26/21-5/26/22    . Please notify patient, thank you.

## 2021-06-01 ENCOUNTER — HOSPITAL ENCOUNTER (OUTPATIENT)
Dept: NEUROLOGY | Age: 46
Discharge: HOME | End: 2021-06-01
Payer: COMMERCIAL

## 2021-06-01 DIAGNOSIS — M54.12 CERVICAL RADICULAR PAIN: ICD-10-CM

## 2021-06-01 PROCEDURE — 95886 MUSC TEST DONE W/N TEST COMP: CPT

## 2021-06-01 PROCEDURE — 95910 NRV CNDJ TEST 7-8 STUDIES: CPT

## 2021-06-01 NOTE — PROCEDURES
Test Date:  2021    Patient: Vesna Romano : 1975 Physician: Kar Rooney DO   Sex: Female ID#:  Ref Phys: Rubio MERCADO     Patient Complaints:  Patient is a 48year-old female who presents with numbness and tingling bilateral hands onset months ago. Patient History / Exam:  PMH: + diabetes, improved with weight loss, patient with history of neuropathy, no neck or arm surgery history of cervical spinal stenosis. PE: reflexes trace, + thumb opposition weakness     NCV & EMG Findings:  Evaluation of the left median (APB) motor nerve showed prolonged distal onset latency (4.8 ms) and reduced amplitude (4.7 mV). The left median sensory nerve showed prolonged distal peak latency (4.4 ms), reduced amplitude (8 µV), and decreased conduction velocity (32 m/s). The right median sensory nerve showed prolonged distal peak latency (4.3 ms) and decreased conduction velocity (33 m/s). The left ulnar sensory nerve showed reduced amplitude (14 µV). All remaining nerves (as indicated in the following tables) were within normal limits. All examined muscles (as indicated in the following table) showed no evidence of electrical instability. Impression: Study is consistent with bilateral carpal tunnel syndrome, right mild, left moderate severity. No evidence of an acute radiculopathy or other entrapment neuropathy. Thank you.           Kar Rooney DO        Nerve Conduction Studies  Motor Nerve Results      Latency Amplitude F-Lat Segment Distance CV Comment   Site (ms) Norm (mV) Norm (ms)  (cm) (m/s) Norm    Left Median (APB) Motor   Wrist 4.8  < 4.2 4.7  > 5.0         Elbow 8.6 - 2.6 -  Elbow-Wrist 20 53  > 50    Right Median (APB) Motor   Wrist 4.1  < 4.2 6.6  > 5.0         Elbow 7.7 - 0.93 -  Elbow-Wrist 20 56  > 50    Left Ulnar (ADM) Motor   Wrist 2.8  < 4.2 6.3  > 3.0         Bel Elbow 6.0 - 5.9 -  Bel Elbow-Wrist 22 69  > 50    Abv Elbow 7.2 - 5.5 -  Abv Elbow-Bel Elbow 6 50 > 48    Right Ulnar (ADM) Motor   Wrist 2.7  < 4.2 7.1  > 3.0         Bel Elbow 7.5 - 3.9 -  Bel Elbow-Wrist 25 52  > 50    Abv Elbow 8.3 - 2.8 -  Abv Elbow-Bel Elbow 6 75  > 48      Sensory Nerve Results      Latency (Peak) Amplitude (P-P) Segment Distance CV Comment   Site (ms) Norm (µV) Norm  (cm) (m/s) Norm    Left Median Sensory   Wrist-Dig II 4.4  < 3.6 8  > 10 Wrist-Dig II 14 32  > 39    Right Median Sensory   Wrist-Dig II 4.3  < 3.6 11  > 10 Wrist-Dig II 14 33  > 39    Left Ulnar Sensory   Wrist-Dig V 2.8  < 3.7 14  > 15 Wrist-Dig V 14 50  > 38    Right Ulnar Sensory   Wrist-Dig V 3.2  < 3.7 19  > 15 Wrist-Dig V 14 44  > 38        Electromyography     Side Muscle Nerve Root Ins Act Fibs Psw Amp Dur Poly Recrt Int Burnetta Anahi Comment   Right Deltoid Axillary C5-C6 Nml Nml Nml Nml Nml 0 Nml Nml    Right Biceps Musculocut C5-C6 Nml Nml Nml Nml Nml 0 Nml Nml    Right Triceps Radial C6-C8 Nml Nml Nml Nml Nml 0 Nml Nml    Right Brachiorad Radial C5-C6 Nml Nml Nml Nml Nml 0 Nml Nml    Right Pronator Teres Median C6-C7 Nml Nml Nml Nml Nml 0 Nml Nml    Right EIP Post Interosseous,  R... C7-C8 Nml Nml Nml Nml Nml 0 Nml Nml    Right APB Median C8-T1 Nml Nml Nml Nml Nml 0 Nml Nml    Right FDI Ulnar C8-T1 Nml Nml Nml Nml Nml 0 Nml Nml    Right Cervical Paraspinal (Uppe. .. Rami C1-C3 Nml Nml Nml         Right Cervical Paraspinal (Mid) Rami C4-C6 Nml Nml Nml         Right Cervical Paraspinal (Zohaib Gey. .. Rami C7-C8 Nml Nml Nml         Left Deltoid Axillary C5-C6 Nml Nml Nml Nml Nml 0 Nml Nml    Left Biceps Musculocut C5-C6 Nml Nml Nml Nml Nml 0 Nml Nml    Left Triceps Radial C6-C8 Nml Nml Nml Nml Nml 0 Nml Nml    Left Brachiorad Radial C5-C6 Nml Nml Nml Nml Nml 0 Nml Nml    Left Pronator Teres Median C6-C7 Nml Nml Nml Nml Nml 0 Nml Nml    Left EIP Post Interosseous,  R...  C7-C8 Nml Nml Nml Nml Nml 0 Nml Nml    Left APB Median C8-T1 Nml Nml Nml Nml Nml 0 Nml Nml    Left FDI Ulnar C8-T1 Nml Nml Nml Nml Nml 0 Nml Nml    Left Cervical Paraspinal (Uppe. .. Rami C1-C3 Nml Nml Nml         Left Cervical Paraspinal (Mid) Rami C4-C6 Nml Nml Nml         Left Cervical Paraspinal (Zohaib Sosa. ..  Rami C7-C8 Nml Nml Nml               Electronically signed by Sandrita Duron DO on 6/1/2021 at 11:33 AM]

## 2021-06-03 ENCOUNTER — PATIENT MESSAGE (OUTPATIENT)
Dept: ORTHOPEDIC SURGERY | Age: 46
End: 2021-06-03

## 2021-06-03 RX ORDER — AMOXICILLIN 500 MG/1
2000 CAPSULE ORAL ONCE
Qty: 4 CAPSULE | Refills: 0 | Status: SHIPPED | OUTPATIENT
Start: 2021-06-03 | End: 2021-06-03

## 2021-06-03 NOTE — TELEPHONE ENCOUNTER
From: Ron Isaac  To: Chuckie Dubin, PA  Sent: 6/3/2021 4:59 PM EDT  Subject: Non-Urgent Medical Question    I am having dental work done and was told to find out if I needed preantibiotic due to my hip replacement.

## 2021-06-09 DIAGNOSIS — G60.9 IDIOPATHIC PERIPHERAL NEUROPATHY: ICD-10-CM

## 2021-06-10 RX ORDER — GABAPENTIN 300 MG/1
300 CAPSULE ORAL 3 TIMES DAILY
Qty: 90 CAPSULE | Refills: 2 | Status: SHIPPED | OUTPATIENT
Start: 2021-06-10 | End: 2021-10-27

## 2021-06-18 RX ORDER — LAMOTRIGINE 100 MG/1
TABLET ORAL
Qty: 30 TABLET | Refills: 2 | Status: SHIPPED | OUTPATIENT
Start: 2021-06-18 | End: 2021-10-20

## 2021-06-29 NOTE — PROGRESS NOTES
PATIENT REACHED   YES____NO__X__    PREOP INSTRUCTIONS LEFT ON VM [phone]_____________      DATE__7/6/21_______ TIME__1130_______ARRIVAL__1030______PLACE__masc__________  NOTHING TO EAT OR DRINK  AFTER MIDNIGHT THE EVENING PRIOR OR AS INSTRUCTED BY YOUR DR.  Natalie Kramer NEED A RESPONSIBLE ADULT AGE 18 OR OLDER TO DRIVE YOU HOME  PLEASE BRING INSURANCE CARD. PICTURE ID AND COMPLETE LIST OF MEDS  WEAR LOOSE COMFORTABLE CLOTHING  FOLLOW ANY INSTRUCTIONS YOUR DRS OFFICE HAS GIVEN YOU,INCLUDING WHAT MEDICATIONS TO TAKE THE AM OF PROCEDURE AND WHEN AND IF YOU NEED TO STOP ANY BLOOD THINNERS. IF YOU HAVE QUESTIONS REGARDING THIS CALL THE OFFICE  THE GOAL BLOOD SUGAR THE AM OF PROCEDURE  OR LESS ABOVE THAT THE PROCEDURE MAY BE CANCELLED  ANY QUESTIONS CALL YOUR DOCTOR. ALSO,PLEASE READ THE INSTRUCTION PACKET FROM YOUR DR IF YOU RECEIVED ONE. SPINE INTERVENTION NUMBER [phone]      OTHER___________________________________      VISITOR POLICY(subject to change)      There is a one visitor policy at [facility] for all surgeries and endoscopies. Whether the visitor can stay or will be asked to wait in the car will depend on the current policy and if social distancing can be maintained. The policy is subject to change at any time. Please make sure the visitor has a cell phone that is on,charged and able to accept calls, as this may be the way that the staff communicates with them. Pain management is NO VISITOR policyThe patients ride is expected to remain in the car with a cell phone for communication. If the ride is leaving the hospital grounds please make sure they are back in time for pickup. Have the patient inform the staff on arrival what their rides plans are while the patient is in the facility. At the MAIN there is one visitor allowed. Please note that the visitor policy is subject to change.

## 2021-07-06 ENCOUNTER — APPOINTMENT (OUTPATIENT)
Dept: GENERAL RADIOLOGY | Age: 46
End: 2021-07-06
Attending: ANESTHESIOLOGY
Payer: COMMERCIAL

## 2021-07-06 ENCOUNTER — HOSPITAL ENCOUNTER (OUTPATIENT)
Age: 46
Setting detail: OUTPATIENT SURGERY
Discharge: HOME | End: 2021-07-06
Attending: ANESTHESIOLOGY | Admitting: ANESTHESIOLOGY
Payer: COMMERCIAL

## 2021-07-06 VITALS
RESPIRATION RATE: 16 BRPM | HEART RATE: 76 BPM | BODY MASS INDEX: 41.78 KG/M2 | OXYGEN SATURATION: 96 % | DIASTOLIC BLOOD PRESSURE: 75 MMHG | WEIGHT: 260 LBS | TEMPERATURE: 97.7 F | HEIGHT: 66 IN | SYSTOLIC BLOOD PRESSURE: 137 MMHG

## 2021-07-06 PROCEDURE — 99152 MOD SED SAME PHYS/QHP 5/>YRS: CPT | Performed by: ANESTHESIOLOGY

## 2021-07-06 PROCEDURE — 3209999900 FLUORO FOR SURGICAL PROCEDURES

## 2021-07-06 PROCEDURE — 2500000003 HC RX 250 WO HCPCS: Performed by: ANESTHESIOLOGY

## 2021-07-06 PROCEDURE — 6360000002 HC RX W HCPCS: Performed by: ANESTHESIOLOGY

## 2021-07-06 PROCEDURE — 2709999900 HC NON-CHARGEABLE SUPPLY: Performed by: ANESTHESIOLOGY

## 2021-07-06 PROCEDURE — 3610000054 HC PAIN LEVEL 3 BASE (NON-OR): Performed by: ANESTHESIOLOGY

## 2021-07-06 RX ORDER — MIDAZOLAM HYDROCHLORIDE 1 MG/ML
INJECTION INTRAMUSCULAR; INTRAVENOUS
Status: COMPLETED | OUTPATIENT
Start: 2021-07-06 | End: 2021-07-06

## 2021-07-06 RX ORDER — LIDOCAINE HYDROCHLORIDE 10 MG/ML
INJECTION, SOLUTION EPIDURAL; INFILTRATION; INTRACAUDAL; PERINEURAL
Status: COMPLETED | OUTPATIENT
Start: 2021-07-06 | End: 2021-07-06

## 2021-07-06 RX ORDER — FENTANYL CITRATE 50 UG/ML
INJECTION, SOLUTION INTRAMUSCULAR; INTRAVENOUS
Status: COMPLETED | OUTPATIENT
Start: 2021-07-06 | End: 2021-07-06

## 2021-07-06 RX ORDER — DEXAMETHASONE SODIUM PHOSPHATE 10 MG/ML
INJECTION, SOLUTION INTRAMUSCULAR; INTRAVENOUS
Status: COMPLETED | OUTPATIENT
Start: 2021-07-06 | End: 2021-07-06

## 2021-07-06 ASSESSMENT — PAIN SCALES - GENERAL
PAINLEVEL_OUTOF10: 4
PAINLEVEL_OUTOF10: 4

## 2021-07-06 ASSESSMENT — PAIN - FUNCTIONAL ASSESSMENT: PAIN_FUNCTIONAL_ASSESSMENT: 0-10

## 2021-07-06 ASSESSMENT — PAIN DESCRIPTION - DESCRIPTORS: DESCRIPTORS: ACHING

## 2021-07-07 NOTE — H&P
Update History & Physical    The patient's History and Physical of June 16,  was reviewed with the patient and I examined the patient. There was no change. The surgical site was confirmed by the patient and me. Plan: The risks, benefits, expected outcome, and alternative to the recommended procedure have been discussed with the patient. Patient understands and wants to proceed with the procedure.      Electronically signed by Bonny Whitehead MD on 7/7/2021 at 4:48 PM

## 2021-07-12 NOTE — OP NOTE
Operative Note      Patient: Alyson Granados  YOB: 1975  MRN: [id number]    Date of Procedure: 7/6/2021    Pre-Op Diagnosis: M50.322 OTHER CERVICAL DISC DEGENERATION AT C5-C6 LEVEL    Post-Op Diagnosis: Same       Procedure(s):  C5C6  EPIDURAL STERIOD INJECTION WITH FLUOROSCOPY    Surgeon(s):  Kajal Jones MD    Assistant:   * No surgical staff found *    Anesthesia: IV Sedation    Estimated Blood Loss (mL): Minimal    Complications: None    Specimens:   * No specimens in log *    Implants:  * No implants in log *      Drains: * No LDAs found *    Findings:     Detailed Description of Procedure:   Procedure in Detail:   The patient's chart was reviewed. After obtaining written informed consent, the patient had a 20 g IV placed, and NS was running at 30 ml/hour, the patient was placed in the supine position with the leg slightly flexed. Versed and Fentanyl was given to the patient intravenous, a NC at 4 l was placed and monitors attached. PROCEDURE: MEDICAL NECESSITY: This patient has chronic pain that has failed to respond to conservative measures as outlined in the original history and last physical exam.   The patient's symptoms include Pain and disability of a moderate to severe degree with intermittent refereed pain. The goals of treatment are to:  1) Achieve optimal pain control, recognizing that a pain-free state may not be achievable;   2) Minimize adverse outcomes;   3) Enhance functional abilities, and physical and psychological well-being; and   4) Enhance the quality of life for patients with chronic pain. The patient has documented pathology through radiographic imaging, the patient has the same or similar procedure in the past which resulted in significant improvement more than 50% reduction in the pain, as well improvement in their Activity of daily living and quality of life, and this would be the goal for the first time procedure.         PROCEDURE NOTE: After obtaining written informed consent patient was taken to the procedure room. Pre-procedure blood pressure and pulse were stable and recorded in patients clinic chart. The patient was placed in a prone position and the neck was prepped with chloroprep and draped in the usual sterile fashion. The skin over the Left C4 facet joint was infiltrated with 1% Lidocaine for local anesthesia. A 22-gauge, 3.5-inch needle was inserted into the Cervical medial branch under radiographic guidance. Both AP and lateral views were obtained. Following placement of the needle and negative aspiration, 1 ml = 2 mg of Dexamethasone followed by 1 cc of Bupivacaine 0.25% was injected. The identical procedure was performed on facet joint levels Left C 5, C 5. During the procedure there was no evidence of CSF, paresthesias or heme. After the procedure the needles were flushed with preservative free local anesthetic and removed. Skin was cleaned and a sterile dressing was applied. Following the procedure the patient's vital signs were stable. The patient was discharged home in good condition after being given discharge instructions.       Electronically signed by Crescencio Bateman MD on 7/12/2021 at 11:49 AM

## 2021-08-09 ENCOUNTER — OFFICE VISIT (OUTPATIENT)
Dept: SLEEP MEDICINE | Age: 46
End: 2021-08-09
Payer: COMMERCIAL

## 2021-08-09 ENCOUNTER — OFFICE VISIT (OUTPATIENT)
Dept: FAMILY MEDICINE CLINIC | Age: 46
End: 2021-08-09
Payer: COMMERCIAL

## 2021-08-09 VITALS
HEART RATE: 74 BPM | DIASTOLIC BLOOD PRESSURE: 88 MMHG | RESPIRATION RATE: 12 BRPM | HEIGHT: 66 IN | SYSTOLIC BLOOD PRESSURE: 150 MMHG | OXYGEN SATURATION: 97 % | BODY MASS INDEX: 44.36 KG/M2 | WEIGHT: 276 LBS

## 2021-08-09 VITALS
HEART RATE: 64 BPM | HEIGHT: 66 IN | WEIGHT: 274.6 LBS | SYSTOLIC BLOOD PRESSURE: 144 MMHG | DIASTOLIC BLOOD PRESSURE: 90 MMHG | OXYGEN SATURATION: 96 % | RESPIRATION RATE: 14 BRPM | TEMPERATURE: 97.6 F | BODY MASS INDEX: 44.13 KG/M2

## 2021-08-09 DIAGNOSIS — F31.61 BIPOLAR DISORDER, CURRENT EPISODE MIXED, MILD (HCC): ICD-10-CM

## 2021-08-09 DIAGNOSIS — G47.33 OSA ON CPAP: Primary | ICD-10-CM

## 2021-08-09 DIAGNOSIS — F51.04 PSYCHOPHYSIOLOGICAL INSOMNIA: Primary | ICD-10-CM

## 2021-08-09 DIAGNOSIS — Z99.89 DEPENDENCE ON OTHER ENABLING MACHINES AND DEVICES: ICD-10-CM

## 2021-08-09 DIAGNOSIS — E66.01 MORBID OBESITY (HCC): ICD-10-CM

## 2021-08-09 DIAGNOSIS — I10 ESSENTIAL HYPERTENSION, BENIGN: ICD-10-CM

## 2021-08-09 DIAGNOSIS — Z99.89 OSA ON CPAP: Primary | ICD-10-CM

## 2021-08-09 PROCEDURE — G8427 DOCREV CUR MEDS BY ELIG CLIN: HCPCS | Performed by: PSYCHIATRY & NEUROLOGY

## 2021-08-09 PROCEDURE — 1036F TOBACCO NON-USER: CPT | Performed by: PSYCHIATRY & NEUROLOGY

## 2021-08-09 PROCEDURE — 99214 OFFICE O/P EST MOD 30 MIN: CPT | Performed by: PSYCHIATRY & NEUROLOGY

## 2021-08-09 PROCEDURE — 99214 OFFICE O/P EST MOD 30 MIN: CPT | Performed by: NURSE PRACTITIONER

## 2021-08-09 PROCEDURE — G8417 CALC BMI ABV UP PARAM F/U: HCPCS | Performed by: PSYCHIATRY & NEUROLOGY

## 2021-08-09 PROCEDURE — G8427 DOCREV CUR MEDS BY ELIG CLIN: HCPCS | Performed by: NURSE PRACTITIONER

## 2021-08-09 PROCEDURE — G8417 CALC BMI ABV UP PARAM F/U: HCPCS | Performed by: NURSE PRACTITIONER

## 2021-08-09 PROCEDURE — 1036F TOBACCO NON-USER: CPT | Performed by: NURSE PRACTITIONER

## 2021-08-09 RX ORDER — LOSARTAN POTASSIUM 25 MG/1
25 TABLET ORAL DAILY
Qty: 90 TABLET | Refills: 1 | Status: SHIPPED | OUTPATIENT
Start: 2021-08-09 | End: 2022-03-21

## 2021-08-09 RX ORDER — ZOLPIDEM TARTRATE 12.5 MG/1
12.5 TABLET, FILM COATED, EXTENDED RELEASE ORAL NIGHTLY PRN
COMMUNITY
End: 2021-08-09

## 2021-08-09 RX ORDER — ZOLPIDEM TARTRATE 12.5 MG/1
12.5 TABLET, FILM COATED, EXTENDED RELEASE ORAL NIGHTLY PRN
Qty: 30 TABLET | Refills: 0 | Status: SHIPPED | OUTPATIENT
Start: 2021-08-09 | End: 2021-09-08

## 2021-08-09 SDOH — ECONOMIC STABILITY: FOOD INSECURITY: WITHIN THE PAST 12 MONTHS, YOU WORRIED THAT YOUR FOOD WOULD RUN OUT BEFORE YOU GOT MONEY TO BUY MORE.: NEVER TRUE

## 2021-08-09 SDOH — ECONOMIC STABILITY: TRANSPORTATION INSECURITY
IN THE PAST 12 MONTHS, HAS LACK OF TRANSPORTATION KEPT YOU FROM MEETINGS, WORK, OR FROM GETTING THINGS NEEDED FOR DAILY LIVING?: NO

## 2021-08-09 SDOH — ECONOMIC STABILITY: FOOD INSECURITY: WITHIN THE PAST 12 MONTHS, THE FOOD YOU BOUGHT JUST DIDN'T LAST AND YOU DIDN'T HAVE MONEY TO GET MORE.: NEVER TRUE

## 2021-08-09 SDOH — ECONOMIC STABILITY: TRANSPORTATION INSECURITY
IN THE PAST 12 MONTHS, HAS THE LACK OF TRANSPORTATION KEPT YOU FROM MEDICAL APPOINTMENTS OR FROM GETTING MEDICATIONS?: NO

## 2021-08-09 ASSESSMENT — SLEEP AND FATIGUE QUESTIONNAIRES
HOW LIKELY ARE YOU TO NOD OFF OR FALL ASLEEP WHILE SITTING INACTIVE IN A PUBLIC PLACE: 0
HOW LIKELY ARE YOU TO NOD OFF OR FALL ASLEEP WHEN YOU ARE A PASSENGER IN A CAR FOR AN HOUR WITHOUT A BREAK: 2
HOW LIKELY ARE YOU TO NOD OFF OR FALL ASLEEP IN A CAR, WHILE STOPPED FOR A FEW MINUTES IN TRAFFIC: 0
HOW LIKELY ARE YOU TO NOD OFF OR FALL ASLEEP WHILE SITTING AND READING: 1
HOW LIKELY ARE YOU TO NOD OFF OR FALL ASLEEP WHILE WATCHING TV: 3
ESS TOTAL SCORE: 9
HOW LIKELY ARE YOU TO NOD OFF OR FALL ASLEEP WHILE SITTING AND TALKING TO SOMEONE: 0
HOW LIKELY ARE YOU TO NOD OFF OR FALL ASLEEP WHILE LYING DOWN TO REST IN THE AFTERNOON WHEN CIRCUMSTANCES PERMIT: 3
HOW LIKELY ARE YOU TO NOD OFF OR FALL ASLEEP WHILE SITTING QUIETLY AFTER LUNCH WITHOUT ALCOHOL: 0

## 2021-08-09 ASSESSMENT — SOCIAL DETERMINANTS OF HEALTH (SDOH): HOW HARD IS IT FOR YOU TO PAY FOR THE VERY BASICS LIKE FOOD, HOUSING, MEDICAL CARE, AND HEATING?: NOT HARD AT ALL

## 2021-08-09 NOTE — PROGRESS NOTES
reference to accepted standards of medicalpractice in treatment of this patients condition.     Krystyna Bruno MD      NPI: [id number]       Order Signed Date: 08/09/21    Electronically signed by Krystyna Bruno MD on 8/9/2021 at 10:16 AM

## 2021-08-09 NOTE — PROGRESS NOTES
MD ASHKAN Dejesus Board Certified in Sleep Medicine  Certified in [address] [place] [address] Certified in Neurology [address]  Carlton Mera [address] [place], JOSÉ MIGUEL Gaona  [phone]   515 [facility], [address] [place] [address]                      [address]  382 [facility] [address] [phone]    Subjective:     Patient ID: Zohreh Lin is a 48 y.o. female. Chief Complaint   Patient presents with    Sleep Apnea    Follow-up       HPI:        Zohreh Lin is a 48 y.o. female was seen today as a follow for obstructive sleep apnea. comprehensive polysomnogram on 09/10/2019, the overnight registration revealed REM related obstructive sleep apnea with an overall apnea hypopnea index of 3.9/h and REM AHI of 13.8/h with lowest O2 saturation of 87%, patient spent about 0.4 minutes below 90%. Had PSG before the weight loss on  09/13/2013 the AHI was 37.1/h with lowest O2 of 80%. Has has gained about 16 pounds since 09/18/2019. Patient is using the PAP machine about 64% of the time, more than 4 hours a nightabout  49 %, in total average of 5:37 hours a night in last 90 days,  Was using the CPAP 90% more than 4 hours before the machine quit working July 11/2021. Currently on PAP at 9 cm (), the AHI is only 2.1 events per hour at this pressure. Patient improved regarding daytime sleepiness and fatigue, wakes up refreshed in the morning. The Patient scored Total score: 9 on [place] Sleepiness Scale ( more than 10 is indicative of daytime sleepiness)   Patient has no problem with PAP pressure or mask. Using FFM with gel and wants to change it.    Could not stay in sleep since the machine quit working  DOT/CDL - N/A        Previous Report(s)Reviewed: historical medical records         Social History     Socioeconomic History    Marital status:      Spouse name: Not on file    Number of children: Not on file    Years of education: Not on file    Highest education level: Not on file   Occupational History    Occupation: bank collections   Tobacco Use    Smoking status: Former Smoker     Packs/day: 0.50     Years: 10.00     Pack years: 5.00     Types: Cigarettes     Start date: 1987     Quit date: 2019     Years since quittin.7    Smokeless tobacco: Never Used    Tobacco comment: resumed   2 packs per week   Vaping Use    Vaping Use: Never used   Substance and Sexual Activity    Alcohol use: Yes     Alcohol/week: 5.0 standard drinks     Types: 6 Standard drinks or equivalent per week     Comment: rarely    Drug use: Not Currently     Types: Marijuana     Comment: - last smoke months ago-2019    Sexual activity: Yes   Other Topics Concern    Not on file   Social History Narrative    Not on file     Social Determinants of Health     Financial Resource Strain:     Difficulty of Paying Living Expenses:    Food Insecurity:     Worried About Running Out of Food in the Last Year:     [address] in the Last Year:    Transportation Needs:     Lack of Transportation (Medical):  Lack of Transportation (Non-Medical):    Physical Activity:     Days of Exercise per Week:     Minutes of Exercise per Session:    Stress:     Feeling of Stress :    Social Connections:     Frequency of Communication with Friends and Family:     Frequency of Social Gatherings with Friends and Family:     Attends Restoration Services:     Active Member of Clubs or Organizations:     Attends Club or Organization Meetings:     Marital Status:    Intimate Partner Violence:     Fear of Current or Ex-Partner:     Emotionally Abused:     Physically Abused:     Sexually Abused:        Prior to Admission medications    Medication Sig Start Date End Date Taking?  Authorizing Provider   lamoTRIgine (LAMICTAL) 100 MG tablet Take 1 tablet by mouth once daily 6/18/21   ROSSANA Luna CNP   gabapentin (NEURONTIN) 300 MG capsule Take 1 capsule by mouth 3 times daily for 30 days. 6/10/21 7/10/21  ROSSANA Blackmon CNP   tiZANidine (ZANAFLEX) 2 MG tablet TAKE 1 TABLET BY MOUTH THREE TIMES DAILY AS NEEDED (NECK PAIN/MUSCULAR) 4/28/21   ROSSANA Williamson CNP   methylPREDNISolone (MEDROL, SRIDHAR,) 4 MG tablet Take by mouth. 6 po day one 5 po day 2 4 po day 3 3 po day 4 2 po day 5 1 po day 6. 4/19/21   JESS Damon   diazePAM (VALIUM) 10 MG tablet  1/21/21   Historical Provider, MD   lithium 300 MG capsule 1 tablet in morning and 2 tablets in the evening. 3/8/21   ROSSANA Luna CNP   ibuprofen (ADVIL;MOTRIN) 800 MG tablet Take 1 tablet by mouth 3 times daily (with meals) 2/26/21   ROSSANA Strickland Asa, CNP   FLUoxetine (PROZAC) 40 MG capsule Take 2 capsules by mouth daily TAKE 1 CAPSULE BY MOUTH ONCE DAILY IN ADDITION  Patient taking differently: Take 80 mg by mouth daily TAKE 2 CAPSULE BY MOUTH ONCE DAILY IN ADDITION 2/8/21 7/6/21  ROSSANA Williamson CNP   amLODIPine (NORVASC) 10 MG tablet Take 1 tablet by mouth once daily 2/8/21   ROSSANA Williamson CNP   ammonium lactate (LAC-HYDRIN) 12 % lotion Apply topically daily.  2/8/21   ROSSANA Williamson CNP       Allergies as of 08/09/2021 - Fully Reviewed 08/09/2021   Allergen Reaction Noted    Imitrex [sumatriptan] Nausea Only 02/04/2013       Patient Active Problem List   Diagnosis    Migraine    Amenorrhea, secondary    Depression    Gastritis    TMJ (temporomandibular joint syndrome)    Lipoma    Grief reaction    Anxiety    Tobacco abuse    Insomnia    Bipolar affective disorder ([facility] [address] [facility]    VICTORIANO (obstructive sleep apnea)    Periodic limb movement disorder (PLMD)    Primary osteoarthritis of both hips    Left hip pain    Arthritis of right hip    Obesity, Class III, BMI 40-49.9 (morbid obesity) ([facility] [facility]    Essential hypertension, benign    Bipolar disorder with depression ([facility] [facility]    Morbid obesity ([facility] [facility]    Tear of right rotator cuff    Hypersomnia    Traumatic complete tear of right rotator cuff    History of total hip arthroplasty, right    Cervical spondylosis    Cervical radicular pain    DDD (degenerative disc disease), cervical    Claustrophobia    Cervical stenosis of spine       Past Medical History:   Diagnosis Date    Arthritis     Bipolar disorder ([facility] [address] [facility]     Depression     Gastritis     Hypertension     Insomnia     Migraine     Neuropathy     PLMD (periodic limb movement disorder)     Sleep apnea     uses C-PAP    TMJ (temporomandibular joint syndrome)        Past Surgical History:   Procedure Laterality Date    COLPOSCOPY      ENDOMETRIAL ABLATION      FOOT SURGERY      JOINT REPLACEMENT Right 02/19/2019    RIGHT LATERAL TOTAL HIP REPLACEMENT    PAIN MANAGEMENT PROCEDURE N/A 7/6/2021    C5C6  EPIDURAL STERIOD INJECTION WITH FLUOROSCOPY performed by Cheko Lyon MD at [facility] ARTHROSCOPY Right 11/25/2019    RIGHT SHOULDER ARTHROSCOPY, SUBACROMIAL DECOMPRESSION,   ROTATOR CUFF REPAIR performed by Mayur Christian MD at [place] [address] Right 2/19/2019    RIGHT LATERAL TOTAL HIP REPLACEMENT performed by Olman Patricio MD at [address] [place] [address] History   Problem Relation Age of Onset    COPD Mother     Mult Sclerosis Father     No Known Problems Sister     High Cholesterol Brother     Diabetes Brother     Obesity Brother     Arthritis Brother     Other Maternal Grandmother         hips replacement    Obesity Maternal Grandmother     No Known Problems Maternal Grandfather     No Known Problems Paternal Grandmother     Diabetes Paternal Grandfather     Arthritis Sister        Review of Systems   Constitutional: Positive for fatigue. Psychiatric/Behavioral: Positive for sleep disturbance. Objective:     Vitals:  Weight BMI Neck circumference    Wt Readings from Last 3 Encounters:   08/09/21 274 lb 9.6 oz (124.6 kg)   07/06/21 260 lb (117.9 kg)   05/19/21 250 lb (113.4 kg)    Body mass index is 44.32 kg/m². BP HR SaO2   BP Readings from Last 3 Encounters:   08/09/21 (!) 144/90   07/06/21 137/75   04/19/21 (!) 151/84    Pulse Readings from Last 3 Encounters:   08/09/21 64   07/06/21 76   04/19/21 69    SpO2 Readings from Last 3 Encounters:   08/09/21 96%   07/06/21 96%   03/08/21 98%        Themandibular molar Class :   [x]1 []2 []3      Mallampati I Airway Classification:   []1 []2 []3 [x]4      Physical Exam  Vitals and nursing note reviewed. Constitutional:       Appearance: Normal appearance. HENT:      Head: Atraumatic. Nose: Nose normal.      Mouth/Throat:      Mouth: Mucous membranes are moist.   Eyes:      Extraocular Movements: Extraocular movements intact. Cardiovascular:      Rate and Rhythm: Normal rate and regular rhythm. Heart sounds: Normal heart sounds. Pulmonary:      Effort: Pulmonary effort is normal.      Breath sounds: Normal breath sounds. Musculoskeletal:         General: Normal range of motion. Cervical back: Normal range of motion and neck supple. Skin:     General: Skin is warm. Neurological:      General: No focal deficit present. Psychiatric:         Mood and Affect: Mood normal.         :   Obstructive Sleep Apnea/Hypopnea Syndrome under good control on PAP at 9 cmwp. Diagnosis Orders   1. VICTORIANO on CPAP     2. Dependence on other enabling machines and devices     3. Morbid obesity ([address]       Plan: Will continue the PAP at 9 cmwp. Mask fitting with F30 airfit  I will order PAP supplies, mask, filters. ... We discussed the proportionality between weight and AHI. With 10% weight change, the AHI has a 27% proportionate change. With 20% weight change, the AHI has a 45-50% proportionate change.        No orders of the defined types were placed in this encounter. Return in about 9 weeks (around 10/11/2021) for Reveiwing CPAP usage and compliance report and tro.     Yung Stewart MD  Medical Director [address] [place] [address]

## 2021-08-09 NOTE — PROGRESS NOTES
Rosamaria Rojas (:  1975) is a 48 y.o. female,Established patient, here for evaluation of the following chief complaint(s):    Hypertension (6 MONTH FOLLOW UP) and Medication Check      SUBJECTIVE/OBJECTIVE:  HPI  Epic was down unale to document in chart until pt left  Routine follow up Insomnia and HTN    Insomnia  She is not sleeping well - cant get to sleep easily and when she finally does she wakes up soon after has tried Melatonin- Benadryl_ tylenol Pm and  Ambien 10 mg- seroquel- valium and flexeril and they did not help much- she does have sleep apnea- last appt     Hypertension: Patient here for follow-up of elevated blood pressure. She is not exercising and is adherent to low salt diet. She does not check her bood pressure at home. Cardiac symptoms none. Patient denies chest pain, chest pressure/discomfort, claudication, dyspnea, exertional chest pressure/discomfort, fatigue, irregular heart beat, lower extremity edema, near-syncope, orthopnea, palpitations, paroxysmal nocturnal dyspnea, syncope and tachypnea. Cardiovascular risk factors: hypertension, obesity (BMI >= 30 kg/m2) and sedentary lifestyle. Use of agents associated with hypertension: none. History of target organ damage: none. Review of Systems   Cardiovascular: Negative. Psychiatric/Behavioral: Positive for sleep disturbance. Physical Exam  Vitals reviewed. Constitutional:       General: She is awake. She is not in acute distress. Appearance: Normal appearance. She is well-developed. She is morbidly obese. She is not ill-appearing, toxic-appearing or diaphoretic. Cardiovascular:      Rate and Rhythm: Normal rate and regular rhythm. Heart sounds: Normal heart sounds, S1 normal and S2 normal. Heart sounds not distant. No murmur heard. No systolic murmur is present. No diastolic murmur is present. No friction rub. No gallop. No S3 or S4 sounds.     Pulmonary:      Effort: Pulmonary effort is normal. Breath sounds: Normal breath sounds and air entry. Musculoskeletal:      Right lower leg: No edema. Left lower leg: No edema. Neurological:      Mental Status: She is alert and oriented to person, place, and time. Psychiatric:         Attention and Perception: Attention and perception normal.         Mood and Affect: Mood and affect normal.         Speech: Speech normal.         Behavior: Behavior normal. Behavior is cooperative. Thought Content: Thought content normal.         Cognition and Memory: Cognition and memory normal.         Judgment: Judgment normal.       Kirsten Crawford was seen today for hypertension and medication check. Diagnoses and all orders for this visit:    Psychophysiological insomnia  ambien 10 mg discontinued  -     zolpidem (AMBIEN CR) 12.5 MG extended release tablet; Take 1 tablet by mouth nightly as needed for Sleep for up to 30 days. Will use good rx- se dw pt  Sleep hygiene dw pt as well- encouraged to continue decreasing caffeine  Follow up with sleep physician    Essential hypertension, benign  She has not taken her medication today  Resume  losartan (COZAAR) 25 MG tablet; Take 1 tablet by mouth daily  Encouraged to monitor  2-3 x weekly goal 140/90 or less  Continue  ENCOURAGED TO INCREASE CARDIO ACTIVITY TO AT LEAST  30 MIN3-4 X WEEKLY   Lifestyle Recommendations for High Blood Pressure:  Reduce sodium intake to less than 1500 mg daily  Include 5-7 servings of fruits and vegetables daily  Reduce weight to ideal if overweight  Quit smoking . ..  30 minutes of physical activity daily  Bipolar disorder, current episode mixed, mild (HCC)  Controlled   Continue current medication regimen  Encouraged to follow up with therapist        Colon cancer screening declined at this time  Encouraged to schedule pap      An electronic signature was used to authenticate this note.     --Dov Nieto, ROSSANA - CNP

## 2021-08-09 NOTE — PATIENT INSTRUCTIONS
Patient Education        Learning About CPAP for Sleep Apnea  What is CPAP? CPAP is a small machine that you use at home every night while you sleep. It increases air pressure in your throat to keep your airway open. When you have sleep apnea, this can help you sleep better so you feel much better. CPAP stands for \"continuous positive airway pressure. \"  The CPAP machine will have one of the following:  · A mask that covers your nose and mouth  · Prongs that fit into your nose  · A mask that covers your nose only, which is the most common type. This type is called NCPAP. The N stands for \"nasal.\"  Why is it done? CPAP is usually the best treatment for obstructive sleep apnea. It is the first treatment choice and the most widely used. CPAP:  · Helps you have more normal sleep, so you feel less sleepy and more alert during the daytime. · May help keep heart failure or other heart problems from getting worse. · May help lower your blood pressure. If you use CPAP, your bed partner may also sleep better. That's because you aren't snoring or restless. Your doctor may suggest CPAP if you have:  · Moderate to severe sleep apnea. · Sleep apnea and coronary artery disease (CAD). · Sleep apnea and heart failure. What are the side effects? Some people who use CPAP have:  · A dry or stuffy nose and a sore throat. · Irritated skin on the face. · Sore eyes. · Bloating. How can you care for yourself? If using CPAP is not comfortable, or if you have certain side effects, work with your doctor to fix them. Here are some things you can try:  · Be sure the mask or nasal prongs fit well. · See if your doctor can adjust the pressure of your CPAP. · If your nose is dry, try a humidifier. · If your nose is runny or stuffy, try decongestant medicine or a steroid nasal spray. Be safe with medicines. Read and follow all instructions on the label. Do not use the medicine longer than the label says.   If these things don't help, you might try a different type of machine. Some machines have air pressure that adjusts on its own. Others have air pressures that are different when you breathe in than when you breathe out. This may reduce discomfort caused by too much pressure in your nose. Where can you learn more? Go to https://chpepiceweb.Lumier. org and sign in to your YeHive account. Enter T763 in the VidBid box to learn more about \"Learning About CPAP for Sleep Apnea. \"     If you do not have an account, please click on the \"Sign Up Now\" link. Current as of: October 26, 2020               Content Version: 12.9  © 2006-2021 [employer]. Care instructions adapted under license by [facility] ([facility]). If you have questions about a medical condition or this instruction, always ask your healthcare professional. [address] any warranty or liability for your use of this information.

## 2021-08-09 NOTE — PATIENT INSTRUCTIONS
ibuprofen. Some of these medicines can raise blood pressure. · Learn how to check your blood pressure at home. Lifestyle changes  · Stay at a healthy weight. This is especially important if you put on weight around the waist. Losing even 10 pounds can help you lower your blood pressure. · If your doctor recommends it, get more exercise. Walking is a good choice. Bit by bit, increase the amount you walk every day. Try for at least 30 minutes on most days of the week. You also may want to swim, bike, or do other activities. · Avoid or limit alcohol. Talk to your doctor about whether you can drink any alcohol. · Try to limit how much sodium you eat to less than 2,300 milligrams (mg) a day. Your doctor may ask you to try to eat less than 1,500 mg a day. · Eat plenty of fruits (such as bananas and oranges), vegetables, legumes, whole grains, and low-fat dairy products. · Lower the amount of saturated fat in your diet. Saturated fat is found in animal products such as milk, cheese, and meat. Limiting these foods may help you lose weight and also lower your risk for heart disease. · Do not smoke. Smoking increases your risk for heart attack and stroke. If you need help quitting, talk to your doctor about stop-smoking programs and medicines. These can increase your chances of quitting for good. When should you call for help? Call 911  anytime you think you may need emergency care. This may mean having symptoms that suggest that your blood pressure is causing a serious heart or blood vessel problem. Your blood pressure may be over 180/120. For example, call 911 if:    · You have symptoms of a heart attack. These may include:  ? Chest pain or pressure, or a strange feeling in the chest.  ? Sweating. ? Shortness of breath. ? Nausea or vomiting. ? Pain, pressure, or a strange feeling in the back, neck, jaw, or upper belly or in one or both shoulders or arms. ? Lightheadedness or sudden weakness.   ? A fast or irregular heartbeat.     · You have symptoms of a stroke. These may include:  ? Sudden numbness, tingling, weakness, or loss of movement in your face, arm, or leg, especially on only one side of your body. ? Sudden vision changes. ? Sudden trouble speaking. ? Sudden confusion or trouble understanding simple statements. ? Sudden problems with walking or balance. ? A sudden, severe headache that is different from past headaches.     · You have severe back or belly pain. Do not wait until your blood pressure comes down on its own. Get help right away. Call your doctor now or seek immediate care if:    · Your blood pressure is much higher than normal (such as 180/120 or higher), but you don't have symptoms.     · You think high blood pressure is causing symptoms, such as:  ? Severe headache.  ? Blurry vision. Watch closely for changes in your health, and be sure to contact your doctor if:    · Your blood pressure measures higher than your doctor recommends at least 2 times. That means the top number is higher or the bottom number is higher, or both.     · You think you may be having side effects from your blood pressure medicine. Where can you learn more? Go to https://MiTurnopepiceweb.Visual IQ. org and sign in to your Circl account. Enter Y579 in the [employer] box to learn more about \"High Blood Pressure: Care Instructions. \"     If you do not have an account, please click on the \"Sign Up Now\" link. Current as of: August 31, 2020               Content Version: 12.9  © 2006-2021 [employer]. Care instructions adapted under license by [facility] ([facility]). If you have questions about a medical condition or this instruction, always ask your healthcare professional. [place] [address] any warranty or liability for your use of this information.          Patient Education        Insomnia: Care Instructions  Your Care Instructions     Insomnia is the inability to sleep your concerns aside. · Try meditation or other relaxation techniques before you go to bed. · If you cannot fall asleep, get up and go to another room until you feel sleepy. Do something relaxing. Repeat your bedtime routine before you go to bed again. · Make your house quiet and calm about an hour before bedtime. Turn down the lights, turn off the TV, log off the computer, and turn down the volume on music. This can help you relax after a busy day. When should you call for help? Watch closely for changes in your health, and be sure to contact your doctor if:    · Your efforts to improve your sleep do not work.     · Your insomnia gets worse.     · You have been feeling down, depressed, or hopeless or have lost interest in things that you usually enjoy. Where can you learn more? Go to https://TLBX.mepeBaroc Pubeb.One Inc.. org and sign in to your [employer] account. Enter P513 in the Mail'Inside box to learn more about \"Insomnia: Care Instructions. \"     If you do not have an account, please click on the \"Sign Up Now\" link. Current as of: August 31, 2020               Content Version: 12.9  © 1465-6909 Healthwise, Incorporated. Care instructions adapted under license by [facility] ([facility]). If you have questions about a medical condition or this instruction, always ask your healthcare professional. [address] any warranty or liability for your use of this information.

## 2021-08-30 NOTE — PROGRESS NOTES
PATIENT REACHED   YES____NO_X___    PREOP INSTRUCTIONS LEFT ON VM [facility]__[phone]_____________      DATE_8/31/21________ TIME_1115________ARRIVAL__1015______PLACE__masc__________  NOTHING TO EAT OR DRINK  AFTER MIDNIGHT THE EVENING PRIOR OR AS INSTRUCTED BY YOUR DR.  Natalie Kramer NEED A RESPONSIBLE ADULT AGE 18 OR OLDER TO DRIVE YOU HOME  PLEASE BRING INSURANCE CARD. PICTURE ID AND COMPLETE LIST OF MEDS  WEAR LOOSE COMFORTABLE CLOTHING  FOLLOW ANY INSTRUCTIONS YOUR DRS OFFICE HAS GIVEN YOU,INCLUDING WHAT MEDICATIONS TO TAKE THE AM OF PROCEDURE AND WHEN AND IF YOU NEED TO STOP ANY BLOOD THINNERS. IF YOU HAVE QUESTIONS REGARDING THIS CALL THE OFFICE  THE GOAL BLOOD SUGAR THE AM OF PROCEDURE  OR LESS ABOVE THAT THE PROCEDURE MAY BE CANCELLED  ANY QUESTIONS CALL YOUR DOCTOR. ALSO,PLEASE READ THE INSTRUCTION PACKET FROM YOUR DR IF YOU RECEIVED ONE. SPINE INTERVENTION NUMBER [phone]      OTHER___________________________________      VISITOR POLICY(subject to change)      There is a one visitor policy at [facility] for all surgeries and endoscopies. Whether the visitor can stay or will be asked to wait in the car will depend on the current policy and if social distancing can be maintained. The policy is subject to change at any time. Please make sure the visitor has a cell phone that is on,charged and able to accept calls, as this may be the way that the staff communicates with them. Pain management is NO VISITOR policyThe patients ride is expected to remain in the car with a cell phone for communication. If the ride is leaving the hospital grounds please make sure they are back in time for pickup. Have the patient inform the staff on arrival what their rides plans are while the patient is in the facility. At the MAIN there is one visitor allowed. Please note that the visitor policy is subject to change.

## 2021-08-31 ENCOUNTER — HOSPITAL ENCOUNTER (OUTPATIENT)
Age: 46
Setting detail: OUTPATIENT SURGERY
Discharge: HOME | End: 2021-08-31
Attending: ANESTHESIOLOGY | Admitting: ANESTHESIOLOGY
Payer: COMMERCIAL

## 2021-08-31 ENCOUNTER — APPOINTMENT (OUTPATIENT)
Dept: GENERAL RADIOLOGY | Age: 46
End: 2021-08-31
Attending: ANESTHESIOLOGY
Payer: COMMERCIAL

## 2021-08-31 VITALS
OXYGEN SATURATION: 97 % | DIASTOLIC BLOOD PRESSURE: 83 MMHG | SYSTOLIC BLOOD PRESSURE: 144 MMHG | HEART RATE: 78 BPM | RESPIRATION RATE: 16 BRPM | TEMPERATURE: 97 F

## 2021-08-31 PROCEDURE — 2500000003 HC RX 250 WO HCPCS: Performed by: ANESTHESIOLOGY

## 2021-08-31 PROCEDURE — 99152 MOD SED SAME PHYS/QHP 5/>YRS: CPT | Performed by: ANESTHESIOLOGY

## 2021-08-31 PROCEDURE — 6360000002 HC RX W HCPCS: Performed by: ANESTHESIOLOGY

## 2021-08-31 PROCEDURE — 3209999900 FLUORO FOR SURGICAL PROCEDURES

## 2021-08-31 PROCEDURE — 3610000058 HC PAIN LEVEL 5 BASE (NON-OR): Performed by: ANESTHESIOLOGY

## 2021-08-31 PROCEDURE — 2709999900 HC NON-CHARGEABLE SUPPLY: Performed by: ANESTHESIOLOGY

## 2021-08-31 RX ORDER — DEXAMETHASONE SODIUM PHOSPHATE 10 MG/ML
INJECTION INTRAMUSCULAR; INTRAVENOUS
Status: DISCONTINUED | OUTPATIENT
Start: 2021-08-31 | End: 2021-08-31 | Stop reason: ALTCHOICE

## 2021-08-31 RX ORDER — MIDAZOLAM HYDROCHLORIDE 1 MG/ML
INJECTION INTRAMUSCULAR; INTRAVENOUS
Status: COMPLETED | OUTPATIENT
Start: 2021-08-31 | End: 2021-08-31

## 2021-08-31 RX ORDER — FENTANYL CITRATE 50 UG/ML
INJECTION, SOLUTION INTRAMUSCULAR; INTRAVENOUS
Status: COMPLETED | OUTPATIENT
Start: 2021-08-31 | End: 2021-08-31

## 2021-08-31 RX ORDER — LIDOCAINE HYDROCHLORIDE 10 MG/ML
INJECTION, SOLUTION INFILTRATION; PERINEURAL
Status: COMPLETED | OUTPATIENT
Start: 2021-08-31 | End: 2021-08-31

## 2021-08-31 ASSESSMENT — PAIN DESCRIPTION - DESCRIPTORS: DESCRIPTORS: ACHING

## 2021-08-31 ASSESSMENT — PAIN - FUNCTIONAL ASSESSMENT: PAIN_FUNCTIONAL_ASSESSMENT: 0-10

## 2021-09-08 NOTE — OP NOTE
Operative Note      Patient: Farida Garcia  YOB: 1975  MRN: [id number]    Date of Procedure: 8/31/2021    Pre-Op Diagnosis: M48.02  SPINAL STENOSIS CERVICAL REGION    Post-Op Diagnosis: Same       Procedure(s):  BILATERAL C5, C6, C7 MEDIAL BRANCH BLOCK WITH FLUOROSCOPY (34528, 52960)-NO STEROIDS    Surgeon(s):  Nancie Davila MD    Assistant:   * No surgical staff found *    Anesthesia: IV Sedation    Estimated Blood Loss (mL): Minimal    Complications: None    Specimens:   * No specimens in log *    Implants:  * No implants in log *      Drains: * No LDAs found *    Findings:     Detailed Description of Procedure: The patient's chart was reviewed. After obtaining written informed consent, the patient had a 20 g IV placed, and NS was running at 30 ml/hour, the patient was placed in the supine position with the leg slightly flexed. Versed and Fentanyl was given to the patient intravenous, a NC at 4 l was placed and monitors attached. PROCEDURE: MEDICAL NECESSITY: This patient has chronic pain that has failed to respond to conservative measures as outlined in the original history and last physical exam.   The patient's symptoms include Pain and disability of a moderate to severe degree with intermittent refereed pain. The goals of treatment are to:  1) Achieve optimal pain control, recognizing that a pain-free state may not be achievable;   2) Minimize adverse outcomes;   3) Enhance functional abilities, and physical and psychological well-being; and   4) Enhance the quality of life for patients with chronic pain. The patient has documented pathology through radiographic imaging, the patient has the same or similar procedure in the past which resulted in significant improvement more than 50% reduction in the pain, as well improvement in their Activity of daily living and quality of life, and this would be the goal for the first time procedure.         PROCEDURE NOTE: After obtaining written informed consent patient was taken to the procedure room. Pre-procedure blood pressure and pulse were stable and recorded in patients clinic chart. The patient was placed in a prone position and the neck was prepped with chloroprep and draped in the usual sterile fashion. The skin over the Left C4 facet joint was infiltrated with 1% Lidocaine for local anesthesia. A 22-gauge, 3.5-inch needle was inserted into the Cervical medial branch under radiographic guidance. Both AP and lateral views were obtained. Following placement of the needle and negative aspiration, 1 ml = 2 mg of Dexamethasone followed by 1 cc of Bupivacaine 0.25% was injected. The identical procedure was performed on facet joint levels Left C5, Right C4 and Right C5. During the procedure there was no evidence of CSF, paresthesias or heme. After the procedure the needles were flushed with preservative free local anesthetic and removed. Skin was cleaned and a sterile dressing was applied. Following the procedure the patient's vital signs were stable. The patient was discharged home in good condition after being given discharge instructions.     Electronically signed by Teena Parks MD on 9/8/2021 at 4:39 PM

## 2021-09-08 NOTE — H&P
Update History & Physical    The patient's History and Physical of August 10,  was reviewed with the patient and I examined the patient. There was no change. The surgical site was confirmed by the patient and me. Plan: The risks, benefits, expected outcome, and alternative to the recommended procedure have been discussed with the patient. Patient understands and wants to proceed with the procedure.      Electronically signed by Damon Galo MD on 9/8/2021 at 5:08 PM

## 2021-09-23 ENCOUNTER — OFFICE VISIT (OUTPATIENT)
Dept: FAMILY MEDICINE CLINIC | Age: 46
End: 2021-09-23
Payer: COMMERCIAL

## 2021-09-23 VITALS
HEIGHT: 66 IN | HEART RATE: 66 BPM | OXYGEN SATURATION: 97 % | DIASTOLIC BLOOD PRESSURE: 80 MMHG | RESPIRATION RATE: 16 BRPM | BODY MASS INDEX: 44.23 KG/M2 | SYSTOLIC BLOOD PRESSURE: 138 MMHG | WEIGHT: 275.2 LBS | TEMPERATURE: 98.2 F

## 2021-09-23 DIAGNOSIS — I10 ESSENTIAL HYPERTENSION, BENIGN: Primary | ICD-10-CM

## 2021-09-23 DIAGNOSIS — F51.04 PSYCHOPHYSIOLOGICAL INSOMNIA: ICD-10-CM

## 2021-09-23 DIAGNOSIS — Z01.419 WELL WOMAN EXAM WITH ROUTINE GYNECOLOGICAL EXAM: ICD-10-CM

## 2021-09-23 PROCEDURE — G8417 CALC BMI ABV UP PARAM F/U: HCPCS | Performed by: NURSE PRACTITIONER

## 2021-09-23 PROCEDURE — G8427 DOCREV CUR MEDS BY ELIG CLIN: HCPCS | Performed by: NURSE PRACTITIONER

## 2021-09-23 PROCEDURE — 1036F TOBACCO NON-USER: CPT | Performed by: NURSE PRACTITIONER

## 2021-09-23 PROCEDURE — 99214 OFFICE O/P EST MOD 30 MIN: CPT | Performed by: NURSE PRACTITIONER

## 2021-09-23 RX ORDER — ZOLPIDEM TARTRATE 12.5 MG/1
12.5 TABLET, FILM COATED, EXTENDED RELEASE ORAL NIGHTLY PRN
Qty: 90 TABLET | Refills: 1 | Status: SHIPPED | OUTPATIENT
Start: 2021-09-23 | End: 2021-12-22

## 2021-09-23 NOTE — PATIENT INSTRUCTIONS
Patient Education        DASH Diet: Care Instructions  Your Care Instructions     The DASH diet is an eating plan that can help lower your blood pressure. DASH stands for Dietary Approaches to Stop Hypertension. Hypertension is high blood pressure. The DASH diet focuses on eating foods that are high in calcium, potassium, and magnesium. These nutrients can lower blood pressure. The foods that are highest in these nutrients are fruits, vegetables, low-fat dairy products, nuts, seeds, and legumes. But taking calcium, potassium, and magnesium supplements instead of eating foods that are high in those nutrients does not have the same effect. The DASH diet also includes whole grains, fish, and poultry. The DASH diet is one of several lifestyle changes your doctor may recommend to lower your high blood pressure. Your doctor may also want you to decrease the amount of sodium in your diet. Lowering sodium while following the DASH diet can lower blood pressure even further than just the DASH diet alone. Follow-up care is a key part of your treatment and safety. Be sure to make and go to all appointments, and call your doctor if you are having problems. It's also a good idea to know your test results and keep a list of the medicines you take. How can you care for yourself at home? Following the DASH diet  · Eat 4 to 5 servings of fruit each day. A serving is 1 medium-sized piece of fruit, ½ cup chopped or canned fruit, 1/4 cup dried fruit, or 4 ounces (½ cup) of fruit juice. Choose fruit more often than fruit juice. · Eat 4 to 5 servings of vegetables each day. A serving is 1 cup of lettuce or raw leafy vegetables, ½ cup of chopped or cooked vegetables, or 4 ounces (½ cup) of vegetable juice. Choose vegetables more often than vegetable juice. · Get 2 to 3 servings of low-fat and fat-free dairy each day. A serving is 8 ounces of milk, 1 cup of yogurt, or 1 ½ ounces of cheese. · Eat 6 to 8 servings of grains each day. A serving is 1 slice of bread, 1 ounce of dry cereal, or ½ cup of cooked rice, pasta, or cooked cereal. Try to choose whole-grain products as much as possible. · Limit lean meat, poultry, and fish to 2 servings each day. A serving is 3 ounces, about the size of a deck of cards. · Eat 4 to 5 servings of nuts, seeds, and legumes (cooked dried beans, lentils, and split peas) each week. A serving is 1/3 cup of nuts, 2 tablespoons of seeds, or ½ cup of cooked beans or peas. · Limit fats and oils to 2 to 3 servings each day. A serving is 1 teaspoon of vegetable oil or 2 tablespoons of salad dressing. · Limit sweets and added sugars to 5 servings or less a week. A serving is 1 tablespoon jelly or jam, ½ cup sorbet, or 1 cup of lemonade. · Eat less than 2,300 milligrams (mg) of sodium a day. If you limit your sodium to 1,500 mg a day, you can lower your blood pressure even more. · Be aware that all of these are the suggested number of servings for people who eat 1,800 to 2,000 calories a day. Your recommended number of servings may be different if you need more or fewer calories. Tips for success  · Start small. Do not try to make dramatic changes to your diet all at once. You might feel that you are missing out on your favorite foods and then be more likely to not follow the plan. Make small changes, and stick with them. Once those changes become habit, add a few more changes. · Try some of the following:  ? Make it a goal to eat a fruit or vegetable at every meal and at snacks. This will make it easy to get the recommended amount of fruits and vegetables each day. ? Try yogurt topped with fruit and nuts for a snack or healthy dessert. ? Add lettuce, tomato, cucumber, and onion to sandwiches. ? Combine a ready-made pizza crust with low-fat mozzarella cheese and lots of vegetable toppings. Try using tomatoes, squash, spinach, broccoli, carrots, cauliflower, and onions. ?  Have a variety of cut-up vegetables with a low-fat dip as an appetizer instead of chips and dip. ? Sprinkle sunflower seeds or chopped almonds over salads. Or try adding chopped walnuts or almonds to cooked vegetables. ? Try some vegetarian meals using beans and peas. Add garbanzo or kidney beans to salads. Make burritos and tacos with mashed romero beans or black beans. Where can you learn more? Go to https://D.light Designi[employer]iny Ads.[employer]. org and sign in to your [employer] account. Enter Z012 in the [employer] box to learn more about \"DASH Diet: Care Instructions. \"     If you do not have an account, please click on the \"Sign Up Now\" link. Current as of: April 29, 2021               Content Version: 13.0  © 9591-6739 [employer]. Care instructions adapted under license by [facility] ([facility]). If you have questions about a medical condition or this instruction, always ask your healthcare professional. [place] [address] any warranty or liability for your use of this information. Patient Education        High Blood Pressure: Care Instructions  Overview     It's normal for blood pressure to go up and down throughout the day. But if it stays up, you have high blood pressure. Another name for high blood pressure is hypertension. Despite what a lot of people think, high blood pressure usually doesn't cause headaches or make you feel dizzy or lightheaded. It usually has no symptoms. But it does increase your risk of stroke, heart attack, and other problems. You and your doctor will talk about your risks of these problems based on your blood pressure. Your doctor will give you a goal for your blood pressure. Your goal will be based on your health and your age. Lifestyle changes, such as eating healthy and being active, are always important to help lower blood pressure. You might also take medicine to reach your blood pressure goal.  Follow-up care is a key part of your treatment and safety.  Be sure to make and go to all appointments, and call your doctor if you are having problems. It's also a good idea to know your test results and keep a list of the medicines you take. How can you care for yourself at home? Medical treatment  · If you stop taking your medicine, your blood pressure will go back up. You may take one or more types of medicine to lower your blood pressure. Be safe with medicines. Take your medicine exactly as prescribed. Call your doctor if you think you are having a problem with your medicine. · Talk to your doctor before you start taking aspirin every day. Aspirin can help certain people lower their risk of a heart attack or stroke. But taking aspirin isn't right for everyone, because it can cause serious bleeding. · See your doctor regularly. You may need to see the doctor more often at first or until your blood pressure comes down. · If you are taking blood pressure medicine, talk to your doctor before you take decongestants or anti-inflammatory medicine, such as ibuprofen. Some of these medicines can raise blood pressure. · Learn how to check your blood pressure at home. Lifestyle changes  · Stay at a healthy weight. This is especially important if you put on weight around the waist. Losing even 10 pounds can help you lower your blood pressure. · If your doctor recommends it, get more exercise. Walking is a good choice. Bit by bit, increase the amount you walk every day. Try for at least 30 minutes on most days of the week. You also may want to swim, bike, or do other activities. · Avoid or limit alcohol. Talk to your doctor about whether you can drink any alcohol. · Try to limit how much sodium you eat to less than 2,300 milligrams (mg) a day. Your doctor may ask you to try to eat less than 1,500 mg a day. · Eat plenty of fruits (such as bananas and oranges), vegetables, legumes, whole grains, and low-fat dairy products. · Lower the amount of saturated fat in your diet.  Saturated fat is found in animal products such as milk, cheese, and meat. Limiting these foods may help you lose weight and also lower your risk for heart disease. · Do not smoke. Smoking increases your risk for heart attack and stroke. If you need help quitting, talk to your doctor about stop-smoking programs and medicines. These can increase your chances of quitting for good. When should you call for help? Call 911  anytime you think you may need emergency care. This may mean having symptoms that suggest that your blood pressure is causing a serious heart or blood vessel problem. Your blood pressure may be over 180/120. For example, call 911 if:    · You have symptoms of a heart attack. These may include:  ? Chest pain or pressure, or a strange feeling in the chest.  ? Sweating. ? Shortness of breath. ? Nausea or vomiting. ? Pain, pressure, or a strange feeling in the back, neck, jaw, or upper belly or in one or both shoulders or arms. ? Lightheadedness or sudden weakness. ? A fast or irregular heartbeat.     · You have symptoms of a stroke. These may include:  ? Sudden numbness, tingling, weakness, or loss of movement in your face, arm, or leg, especially on only one side of your body. ? Sudden vision changes. ? Sudden trouble speaking. ? Sudden confusion or trouble understanding simple statements. ? Sudden problems with walking or balance. ? A sudden, severe headache that is different from past headaches.     · You have severe back or belly pain. Do not wait until your blood pressure comes down on its own. Get help right away. Call your doctor now or seek immediate care if:    · Your blood pressure is much higher than normal (such as 180/120 or higher), but you don't have symptoms.     · You think high blood pressure is causing symptoms, such as:  ? Severe headache.  ? Blurry vision.    Watch closely for changes in your health, and be sure to contact your doctor if:    · Your blood pressure measures higher than your doctor recommends at least 2 times. That means the top number is higher or the bottom number is higher, or both.     · You think you may be having side effects from your blood pressure medicine. Where can you learn more? Go to https://porfirio.[employer]. org and sign in to your [employer] account. Enter Z217 in the Premium Store box to learn more about \"High Blood Pressure: Care Instructions. \"     If you do not have an account, please click on the \"Sign Up Now\" link. Current as of: April 29, 2021               Content Version: 13.0  © 9634-8381 [employer]. Care instructions adapted under license by [facility] ([facility]). If you have questions about a medical condition or this instruction, always ask your healthcare professional. [address] any warranty or liability for your use of this information. Patient Education        Insomnia: Care Instructions  Overview     Insomnia is the inability to sleep well. Insomnia may make it hard for you to get to sleep, stay asleep, or sleep as long as you need to. This can make you tired and grouchy during the day. It can also make you forgetful, less effective at work, and unhappy. Insomnia can be linked to many things. These include health problems, medicines, and stressful events. Treatment may include treating problems that may be linked with your insomnia. Treatment also includes behavior and lifestyle changes. This may include cognitive-behavioral therapy for insomnia (CBT-I). CBT-I uses different ways to help you change your thoughts and behaviors that may interfere with sleep. Your doctor can recommend specific things you can try. Examples include doing relaxation exercises, keeping regular bedtimes and wake times, limiting alcohol, and making healthy sleep habits. Some people decide to take medicine for a while to help with sleep. Follow-up care is a key part of your treatment and safety.  Be sure to make and go to all appointments, and call your doctor if you are having problems. It's also a good idea to know your test results and keep a list of the medicines you take. How can you care for yourself at home? Cognitive-behavioral therapy for insomnia (CBT-I)  · If your doctor recommends CBT-I, follow your treatment plan. Your doctor will give you instructions that are unique for you. · Your plan will likely include a few things that you can try at home. For example:  ? Try meditation or other relaxation techniques before you go to bed. ? Go to bed at the same time every night, and wake up at the same time every morning. Do not take naps during the day. ? Do not stay in bed awake for too long. If you can't fall asleep, or if you wake up in the middle of the night and can't get back to sleep within about 15 to 20 minutes, get out of bed and go to another room until you feel sleepy. ? If watching the clock makes you anxious, turn it facing away from you so you cannot see the time. ? If you worry when you lie down, start a worry book. Well before bedtime, write down your worries, and then set the book and your concerns aside. Healthy sleep habits  · If your doctor recommends it, try making healthy sleep habits. For example:  ? Keep your bedroom quiet, dark, and cool. ? Do not have drinks with caffeine, such as coffee or black tea, for 8 hours before bed. ? Do not smoke or use other types of tobacco near bedtime. Nicotine is a stimulant and can keep you awake. ? Avoid drinking alcohol late in the evening, because it can cause you to wake in the middle of the night. ? Do not eat a big meal close to bedtime. If you are hungry, eat a light snack. ? Do not drink a lot of water close to bedtime, because the need to urinate may wake you up during the night. ? Do not read, watch TV, or use your phone in bed. Use the bed only for sleeping and sex. Medicine  · Be safe with medicines.  Take your medicines exactly as prescribed. Call your doctor if you think you are having a problem with your medicine. · Talk with your doctor before you try an over-the-counter medicine, herbal product, or supplement to try to improve your sleep. Your doctor can recommend how much to take and when to take it. Make sure your doctor knows all of the medicines, vitamins, herbal products, and supplements you take. · You will get more details on the specific medicines your doctor prescribes. When should you call for help? Watch closely for changes in your health, and be sure to contact your doctor if:    · Your efforts to improve your sleep do not work.     · Your insomnia gets worse.     · You have been feeling down, depressed, or hopeless or have lost interest in things that you usually enjoy. Where can you learn more? Go to https://[employer]. org and sign in to your SocialChorus account. Enter P513 in the [employer] box to learn more about \"Insomnia: Care Instructions. \"     If you do not have an account, please click on the \"Sign Up Now\" link. Current as of: June 16, 2021               Content Version: 13.0  © 3158-6744 [employer]wise, [employer]orporated. Care instructions adapted under license by [facility] ([facility]). If you have questions about a medical condition or this instruction, always ask your healthcare professional. [address] any warranty or liability for your use of this information.

## 2021-09-23 NOTE — PROGRESS NOTES
Dorisann Hodgkin (:  1975) is a 48 y.o. female,Established patient, here for evaluation of the following chief complaint(s):    Hypertension (FOLLOW UP) and Other (NEEDS PAP, DENIED FLU VACCINE TODAY )      SUBJECTIVE/OBJECTIVE:  HPI       LAST PAP SEVEN YEARS AGO  SHE STOPPED THE VYVANSE BECAUSE IT WAS MAKING HER B/P WAS  HIGH   HAS BEEN OFF FOR TWO WEEKS AND IT HAS BEEN FINE  SHE CAN TELL WHEN HER B/P IS ELEVATED BECAUSE SHE GETS FLUSHED  NORVASC AND COZAAR   Hypertension: Patient here for follow-up of elevated blood pressure. She is not exercising and is adherent to low salt diet. She does not check her blood pressure  at home. Cardiac symptoms none. Patient denies chest pain, chest pressure/discomfort, claudication, dyspnea, exertional chest pressure/discomfort, fatigue, irregular heart beat, lower extremity edema, near-syncope, orthopnea, palpitations, paroxysmal nocturnal dyspnea, syncope and tachypnea. Cardiovascular risk factors: hypertension, obesity (BMI >= 30 kg/m2) and sedentary lifestyle. Use of agents associated with hypertension: none. History of target organ damage: none. INSOMNIA   THE AMBIEN IS WORKING WELL FOR HER  SHE CAN GET TO SLEEP ABOUT ONE HOUR AFTER TAKING IT  SHE WAS HAVING DIFFICULTY FALLING AND STAYING ASLEEP  BUT NOT AN ISSUE ANYMORE  HAS NOT HAD ANY ISSUES WITH THE MEDICATION  Review of Systems   Cardiovascular: Negative. Psychiatric/Behavioral: Positive for sleep disturbance. All other systems reviewed and are negative. Physical Exam  Vitals reviewed. Constitutional:       General: She is awake. She is not in acute distress. Appearance: Normal appearance. She is well-developed and well-groomed. She is obese. She is not ill-appearing, toxic-appearing or diaphoretic. Cardiovascular:      Rate and Rhythm: Normal rate and regular rhythm. Heart sounds: Normal heart sounds, S1 normal and S2 normal. Heart sounds not distant. No murmur heard. Pulmonary:      Effort: Pulmonary effort is normal.      Breath sounds: Normal breath sounds and air entry. Musculoskeletal:      Right lower leg: No edema. Left lower leg: No edema. Neurological:      Mental Status: She is alert and oriented to person, place, and time. Psychiatric:         Attention and Perception: Attention and perception normal.         Mood and Affect: Mood and affect normal.         Speech: Speech normal.         Behavior: Behavior normal. Behavior is cooperative. Thought Content: Thought content normal.         Cognition and Memory: Cognition and memory normal.         Judgment: Judgment normal.         Sandy Lee was seen today for hypertension and other. Diagnoses and all orders for this visit:    Essential hypertension, benign  CONTROLLED IN OFFICE  ENCOURAGED TO MONITOR B/P AT LEAST WEEKLY GOAL 140/90 OR LESS  CONTINUE COZAAR 25 - NORVASC 10 MG  Reduce sodium intake to less than 1500 mg daily  Include 5-7 servings of fruits and vegetables daily  Reduce weight to ideal if overweight  30 minutes of physical activity daily    Psychophysiological insomnia  DOING WELL WITH THE zolpidem (AMBIEN CR) 12.5 MG extended release tablet; Take 1 tablet by mouth nightly as needed for Sleep for up to 90 days. FILL 9/23  CONTINUE AS PRESCRIBED  FOLLOW UP IN SIX MONTHS    Well woman exam with routine gynecological exam  -     [facility] - Chelsea Ross MD, Gynecology, [facility]  ENCOURAGED TO SCHEDULE ASAP = LAST PAP 2013        An electronic signature was used to authenticate this note.     --ROSSANA Johnson - CNP

## 2021-10-13 ENCOUNTER — OFFICE VISIT (OUTPATIENT)
Dept: SLEEP MEDICINE | Age: 46
End: 2021-10-13
Payer: COMMERCIAL

## 2021-10-13 VITALS
BODY MASS INDEX: 44.68 KG/M2 | HEART RATE: 62 BPM | DIASTOLIC BLOOD PRESSURE: 74 MMHG | HEIGHT: 66 IN | TEMPERATURE: 97.3 F | WEIGHT: 278 LBS | RESPIRATION RATE: 18 BRPM | OXYGEN SATURATION: 96 % | SYSTOLIC BLOOD PRESSURE: 130 MMHG

## 2021-10-13 DIAGNOSIS — G47.33 OSA ON CPAP: Primary | ICD-10-CM

## 2021-10-13 DIAGNOSIS — E66.01 OBESITY, CLASS III, BMI 40-49.9 (MORBID OBESITY) (HCC): ICD-10-CM

## 2021-10-13 DIAGNOSIS — Z99.89 DEPENDENCE ON OTHER ENABLING MACHINES AND DEVICES: ICD-10-CM

## 2021-10-13 DIAGNOSIS — Z99.89 OSA ON CPAP: Primary | ICD-10-CM

## 2021-10-13 PROCEDURE — G8427 DOCREV CUR MEDS BY ELIG CLIN: HCPCS | Performed by: PSYCHIATRY & NEUROLOGY

## 2021-10-13 PROCEDURE — G8484 FLU IMMUNIZE NO ADMIN: HCPCS | Performed by: PSYCHIATRY & NEUROLOGY

## 2021-10-13 PROCEDURE — 4004F PT TOBACCO SCREEN RCVD TLK: CPT | Performed by: PSYCHIATRY & NEUROLOGY

## 2021-10-13 PROCEDURE — 99214 OFFICE O/P EST MOD 30 MIN: CPT | Performed by: PSYCHIATRY & NEUROLOGY

## 2021-10-13 PROCEDURE — G8417 CALC BMI ABV UP PARAM F/U: HCPCS | Performed by: PSYCHIATRY & NEUROLOGY

## 2021-10-13 ASSESSMENT — SLEEP AND FATIGUE QUESTIONNAIRES
HOW LIKELY ARE YOU TO NOD OFF OR FALL ASLEEP WHILE LYING DOWN TO REST IN THE AFTERNOON WHEN CIRCUMSTANCES PERMIT: 2
HOW LIKELY ARE YOU TO NOD OFF OR FALL ASLEEP WHILE WATCHING TV: 2
HOW LIKELY ARE YOU TO NOD OFF OR FALL ASLEEP WHILE SITTING QUIETLY AFTER LUNCH WITHOUT ALCOHOL: 2
HOW LIKELY ARE YOU TO NOD OFF OR FALL ASLEEP WHILE SITTING AND TALKING TO SOMEONE: 0
ESS TOTAL SCORE: 9
HOW LIKELY ARE YOU TO NOD OFF OR FALL ASLEEP WHILE SITTING INACTIVE IN A PUBLIC PLACE: 0
HOW LIKELY ARE YOU TO NOD OFF OR FALL ASLEEP WHILE SITTING AND READING: 1
HOW LIKELY ARE YOU TO NOD OFF OR FALL ASLEEP WHEN YOU ARE A PASSENGER IN A CAR FOR AN HOUR WITHOUT A BREAK: 2
HOW LIKELY ARE YOU TO NOD OFF OR FALL ASLEEP IN A CAR, WHILE STOPPED FOR A FEW MINUTES IN TRAFFIC: 0

## 2021-10-13 ASSESSMENT — ENCOUNTER SYMPTOMS: APNEA: 0

## 2021-10-13 NOTE — PATIENT INSTRUCTIONS
Patient Education        Learning About CPAP for Sleep Apnea  What is CPAP? CPAP is a small machine that you use at home every night while you sleep. It increases air pressure in your throat to keep your airway open. When you have sleep apnea, this can help you sleep better, feel better, and avoid future health problems. CPAP stands for \"continuous positive airway pressure. \"  The CPAP machine will have one of the following:  · A mask that covers your nose and mouth  · A mask that covers your nose only  · A nasal pillow that covers only the openings of your nose  Why is it done? CPAP is usually the best treatment for obstructive sleep apnea. It is the first treatment choice and the most widely used. CPAP:  · Helps you have more normal sleep, so you feel less sleepy and more alert during the daytime. · May help keep heart failure or other heart problems from getting worse. · May help lower your blood pressure. If you have a bed partner, they may also sleep better when you use a CPAP. That's because you aren't snoring or restless. Your doctor may suggest CPAP if you have:  · Moderate to severe sleep apnea. · Sleep apnea and coronary artery disease (CAD). · Sleep apnea and heart failure. What are the side effects? Some people who use CPAP have:  · A dry or stuffy nose and a sore throat. · Irritated skin on the face. · Bloating. How can you care for yourself? If using CPAP is not comfortable, or if you have certain side effects, work with your doctor to fix them. Here are some things you can try:  · Be sure the mask, nasal mask, or nasal pillow fits well. · See if your doctor can adjust the pressure of your CPAP. · If your nose or mouth is dry, set the machine to deliver warmer or wetter air. Or try using a humidifier. · If your nose is runny or stuffy, talk to your doctor about using a decongestant medicine or steroid nasal spray. Be safe with medicines.  Read and follow all instructions on the label. Do not use the medicine longer than the label says. · Your doctor may also help you with problems like swallowing air, bloating, or claustrophobia. Talk to your doctor if you're still having problems. If these things don't help, you might try a different type of machine. Where can you learn more? Go to [email or web address] and sign in to your [employer] account. Enter E559 in the [employer] box to learn more about \"Learning About CPAP for Sleep Apnea. \"     If you do not have an account, please click on the \"Sign Up Now\" link. Current as of: July 6, 2021               Content Version: 13.0  © 2006-2021 Healthwise, Incorporated. Care instructions adapted under license by [facility] ([facility]). If you have questions about a medical condition or this instruction, always ask your healthcare professional. [address] any warranty or liability for your use of this information.

## 2021-10-13 NOTE — PROGRESS NOTES
MD ASHKAN Padilla Board Certified in Sleep Medicine  Certified in [address] [place] [address] Certified in Neurology [address]  christina Mera [address] MIGUEL [address]  [phone]   [address] [facility], [facility] [address]                      [address]  [address] [facility] [address] [phone]    Subjective:     Patient ID: Booker Law is a 48 y.o. female. Chief Complaint   Patient presents with    Follow-up     2 month f/up       HPI:        Booker Law is a 48 y.o. female was seen today as a follow for REM related obstructive sleep apnea with an overall apnea hypopnea index of 3.9/h and REM AHI of 13.8/h with lowest O2 saturation of 87%, patient spent about 0.4 minutes below 90%. Patient is using the PAP machine about 93% of the time, more than 4 hours a nightabout  77 %, in total average of 5:59 hours a night in last 30 days. Currently on PAP at 9 cm (), the AHI is only 2.9 events per hour at this pressure. Patient improved regarding daytime sleepiness and fatigue, wakes up refreshed in the morning. The Patient scored Total score: 9 on [place] Sleepiness Scale ( more than 10 is indicative of daytime sleepiness)   Patient has no problem with PAP pressure or mask. Wakes up in the morning with dry mouth, the setting of the heated humidifier at # 4. Uses FFM. Lost 4 pounds since last visit.    DOT/CDL - N/A        Previous Report(s)Reviewed: historical medical records         Social History     Socioeconomic History    Marital status:      Spouse name: Not on file    Number of children: Not on file    Years of education: Not on file    Highest education level: Not on file   Occupational History    Occupation: bank collections   Tobacco Use    Smoking status: Current Every Day Smoker     Packs/day: 0.50 Years: 10.00     Pack years: 5.00     Types: Cigarettes     Start date: 9/17/1987    Smokeless tobacco: Never Used    Tobacco comment: resumed 1/20  2 packs per week   Vaping Use    Vaping Use: Never used   Substance and Sexual Activity    Alcohol use: Yes     Alcohol/week: 5.0 standard drinks     Types: 6 Standard drinks or equivalent per week     Comment: rarely    Drug use: Not Currently     Types: Marijuana     Comment: - last smoke months ago-1/2019    Sexual activity: Yes   Other Topics Concern    Not on file   Social History Narrative    Not on file     Social Determinants of Health     Financial Resource Strain: Low Risk     Difficulty of Paying Living Expenses: Not hard at all   Food Insecurity: No Food Insecurity    Worried About Running Out of Food in the Last Year: Never true    Chip of Food in the Last Year: Never true   Transportation Needs: No Transportation Needs    Lack of Transportation (Medical): No    Lack of Transportation (Non-Medical): No   Physical Activity:     Days of Exercise per Week:     Minutes of Exercise per Session:    Stress:     Feeling of Stress :    Social Connections:     Frequency of Communication with Friends and Family:     Frequency of Social Gatherings with Friends and Family:     Attends [employer] Services:     Active Member of Clubs or Organizations:     Attends Club or Organization Meetings:     Marital Status:    Intimate Partner Violence:     Fear of Current or Ex-Partner:     Emotionally Abused:     Physically Abused:     Sexually Abused:        Prior to Admission medications    Medication Sig Start Date End Date Taking? Authorizing Provider   zolpidem (AMBIEN CR) 12.5 MG extended release tablet Take 1 tablet by mouth nightly as needed for Sleep for up to 90 days.  FILL 9/23 9/23/21 12/22/21 Yes ROSSANA Seras CNP   losartan (COZAAR) 25 MG tablet Take 1 tablet by mouth daily 8/9/21  Yes ROSSANA Alexandra - CNP lamoTRIgine (LAMICTAL) 100 MG tablet Take 1 tablet by mouth once daily 6/18/21  Yes ROSSANA Coronel CNP   tiZANidine (ZANAFLEX) 2 MG tablet TAKE 1 TABLET BY MOUTH THREE TIMES DAILY AS NEEDED (NECK PAIN/MUSCULAR) 4/28/21  Yes ROSSANA Sanchez CNP   lithium 300 MG capsule 1 tablet in morning and 2 tablets in the evening. Patient taking differently: Take 300 mg by mouth every morning  3/8/21  Yes ROSSANA Coronel CNP   ibuprofen (ADVIL;MOTRIN) 800 MG tablet Take 1 tablet by mouth 3 times daily (with meals) 2/26/21  Yes ROSSANA Bryant CNP   amLODIPine (NORVASC) 10 MG tablet Take 1 tablet by mouth once daily 2/8/21  Yes ROSSANA Pro CNP   ammonium lactate (LAC-HYDRIN) 12 % lotion Apply topically daily. 2/8/21  Yes ROSSANA Sanchez CNP   gabapentin (NEURONTIN) 300 MG capsule Take 1 capsule by mouth 3 times daily for 30 days.  6/10/21 9/23/21  ROSSANA Sanchez CNP   FLUoxetine (PROZAC) 40 MG capsule Take 2 capsules by mouth daily TAKE 1 CAPSULE BY MOUTH ONCE DAILY IN ADDITION  Patient taking differently: Take 80 mg by mouth daily TAKE 2 CAPSULE BY MOUTH ONCE DAILY IN ADDITION 2/8/21 9/23/21  ROSSANA Mulligan CNP       Allergies as of 10/13/2021 - Fully Reviewed 10/13/2021   Allergen Reaction Noted    Imitrex [sumatriptan] Nausea Only 02/04/2013       Patient Active Problem List   Diagnosis    Migraine    Amenorrhea, secondary    Depression    Gastritis    TMJ (temporomandibular joint syndrome)    Lipoma    Grief reaction    Anxiety    Tobacco abuse    Insomnia    Bipolar affective disorder [facility]    VICTORIANO (obstructive sleep apnea)    Periodic limb movement disorder (PLMD)    Primary osteoarthritis of both hips    Left hip pain    Arthritis of right hip    Obesity, Class III, BMI 40-49.9 (morbid obesity) ([facility]    Essential hypertension, benign    Bipolar disorder with depression ([facility] [facility]    Morbid obesity (HCC)    Tear of right rotator cuff    Hypersomnia    Traumatic complete tear of right rotator cuff    History of total hip arthroplasty, right    Cervical spondylosis    Cervical radicular pain    DDD (degenerative disc disease), cervical    Claustrophobia    Cervical stenosis of spine       Past Medical History:   Diagnosis Date    Arthritis     Bipolar disorder ([address]     Depression     Gastritis     Hypertension     Insomnia     Migraine     Neuropathy     PLMD (periodic limb movement disorder)     Sleep apnea     uses C-PAP    TMJ (temporomandibular joint syndrome)        Past Surgical History:   Procedure Laterality Date    COLPOSCOPY      ENDOMETRIAL ABLATION      FOOT SURGERY      JOINT REPLACEMENT Right 02/19/2019    RIGHT LATERAL TOTAL HIP REPLACEMENT    PAIN MANAGEMENT PROCEDURE N/A 7/6/2021    C5C6  EPIDURAL STERIOD INJECTION WITH FLUOROSCOPY performed by Andrew Yu MD at [address] Bilateral 8/31/2021    BILATERAL C5, C6, C7 MEDIAL BRANCH BLOCK WITH FLUOROSCOPY (95558, 19720)-NO STEROIDS performed by Andrew Yu MD at [facility] ARTHROSCOPY Right 11/25/2019    RIGHT SHOULDER ARTHROSCOPY, SUBACROMIAL DECOMPRESSION,   ROTATOR CUFF REPAIR performed by Yolanda Carranza MD at [place] [address] Right 2/19/2019    RIGHT LATERAL TOTAL HIP REPLACEMENT performed by Zach Larios MD at [address] [place] [address] History   Problem Relation Age of Onset    COPD Mother     Mult Sclerosis Father     No Known Problems Sister     High Cholesterol Brother     Diabetes Brother     Obesity Brother     Arthritis Brother     Other Maternal Grandmother         hips replacement    Obesity Maternal Grandmother     No Known Problems Maternal Grandfather     No Known Problems Paternal Grandmother     Diabetes Paternal Grandfather     Arthritis Sister        Review of Systems   Constitutional: Negative for fatigue. Respiratory: Negative for apnea. Cardiovascular: Negative for leg swelling. Genitourinary: Negative for frequency. Neurological: Negative for headaches. Objective:     Vitals:  Weight BMI Neck circumference    Wt Readings from Last 3 Encounters:   10/13/21 278 lb (126.1 kg)   09/23/21 275 lb 3.2 oz (124.8 kg)   08/09/21 276 lb (125.2 kg)    Body mass index is 44.87 kg/m². BP HR SaO2   BP Readings from Last 3 Encounters:   10/13/21 130/74   09/23/21 138/80   08/31/21 (!) 144/83    Pulse Readings from Last 3 Encounters:   10/13/21 62   09/23/21 66   08/31/21 78    SpO2 Readings from Last 3 Encounters:   10/13/21 96%   09/23/21 97%   08/31/21 97%        Themandibular molar Class :   [x]1 []2 []3      Mallampati I Airway Classification:   []1 []2 []3 [x]4      Physical Exam  Vitals and nursing note reviewed. Constitutional:       Appearance: Normal appearance. HENT:      Head: Atraumatic. Nose: Nose normal.      Mouth/Throat:      Mouth: Mucous membranes are moist.   Eyes:      Extraocular Movements: Extraocular movements intact. Cardiovascular:      Rate and Rhythm: Normal rate and regular rhythm. Heart sounds: Normal heart sounds. Pulmonary:      Effort: Pulmonary effort is normal.      Breath sounds: Normal breath sounds. Musculoskeletal:         General: Normal range of motion. Cervical back: Normal range of motion and neck supple. Skin:     General: Skin is warm. Neurological:      General: No focal deficit present. Psychiatric:         Mood and Affect: Mood normal.         :   Mild Obstructive Sleep Apnea/Hypopnea Syndrome under good control on PAP at 9 cmwp. Diagnosis Orders   1. VICTORIANO on CPAP     2. Dependence on other enabling machines and devices     3. Obesity, Class III, BMI 40-49.9 (morbid obesity) ([address]  Ambulatory referral to Bariatrics     Plan: Will continue the PAP at 9 cmwp.     I educated the Patient about the PAP machine including how to change the heated humidifier. I will order PAP supplies, mask, filters. We discussed the proportionality between weight and AHI. With 10% weight change, the AHI has a 27% proportionate change. With 20% weight change, the AHI has a 45-50% proportionate change. Orders Placed This Encounter   Procedures    Ambulatory referral to Bariatrics       No follow-ups on file.     Davina Maradiaga MD  Medical Director [address] [place] [address]

## 2021-10-20 RX ORDER — LAMOTRIGINE 100 MG/1
TABLET ORAL
Qty: 30 TABLET | Refills: 2 | Status: SHIPPED | OUTPATIENT
Start: 2021-10-20 | End: 2022-02-08

## 2021-10-21 NOTE — PROGRESS NOTES
PATIENT REACHED   YES_x___NO____    PREOP INSTRUCTIONS LEFT ON VM NUMBER_______________      DATE___10/26/2021______ TIME___0830____ARRIVAL____0730____PLACE____________  NOTHING TO EAT OR DRINK  AFTER MIDNIGHT THE EVENING PRIOR OR AS INSTRUCTED BY YOUR DR.  Frank Quintana NEED A RESPONSIBLE ADULT AGE 18 OR OLDER TO DRIVE YOU HOME  PLEASE BRING INSURANCE CARD. PICTURE ID AND COMPLETE LIST OF MEDS  WEAR LOOSE COMFORTABLE CLOTHING  FOLLOW ANY INSTRUCTIONS YOUR DRS OFFICE HAS GIVEN YOU,INCLUDING WHAT MEDICATIONS TO TAKE THE AM OF PROCEDURE AND WHEN AND IF YOU NEED TO STOP ANY BLOOD THINNERS. IF YOU HAVE QUESTIONS REGARDING THIS CALL THE OFFICE  THE GOAL BLOOD SUGAR THE AM OF PROCEDURE  OR LESS ABOVE THAT THE PROCEDURE MAY BE CANCELLED  ANY QUESTIONS CALL YOUR DOCTOR. ALSO,PLEASE READ THE INSTRUCTION PACKET FROM YOUR DR IF YOU RECEIVED ONE. SPINE INTERVENTION NUMBER [phone]      OTHER___________________________________      VISITOR POLICY(subject to change)      There is a one visitor policy at [facility] for all surgeries and endoscopies. Whether the visitor can stay or will be asked to wait in the car will depend on the current policy and if social distancing can be maintained. The policy is subject to change at any time. Please make sure the visitor has a cell phone that is on,charged and able to accept calls, as this may be the way that the staff communicates with them. Pain management is NO VISITOR policyThe patients ride is expected to remain in the car with a cell phone for communication. If the ride is leaving the hospital grounds please make sure they are back in time for pickup. Have the patient inform the staff on arrival what their rides plans are while the patient is in the facility. At the MAIN there is one visitor allowed. Please note that the visitor policy is subject to change.

## 2021-10-26 ENCOUNTER — HOSPITAL ENCOUNTER (OUTPATIENT)
Age: 46
Setting detail: OUTPATIENT SURGERY
Discharge: HOME | End: 2021-10-26
Attending: ANESTHESIOLOGY | Admitting: ANESTHESIOLOGY
Payer: COMMERCIAL

## 2021-10-26 ENCOUNTER — APPOINTMENT (OUTPATIENT)
Dept: GENERAL RADIOLOGY | Age: 46
End: 2021-10-26
Attending: ANESTHESIOLOGY
Payer: COMMERCIAL

## 2021-10-26 VITALS
TEMPERATURE: 97.8 F | SYSTOLIC BLOOD PRESSURE: 130 MMHG | HEIGHT: 66 IN | WEIGHT: 275 LBS | HEART RATE: 73 BPM | DIASTOLIC BLOOD PRESSURE: 74 MMHG | OXYGEN SATURATION: 95 % | BODY MASS INDEX: 44.2 KG/M2 | RESPIRATION RATE: 16 BRPM

## 2021-10-26 PROCEDURE — 6360000002 HC RX W HCPCS: Performed by: ANESTHESIOLOGY

## 2021-10-26 PROCEDURE — 99152 MOD SED SAME PHYS/QHP 5/>YRS: CPT | Performed by: ANESTHESIOLOGY

## 2021-10-26 PROCEDURE — 3610000056 HC PAIN LEVEL 4 BASE (NON-OR): Performed by: ANESTHESIOLOGY

## 2021-10-26 PROCEDURE — 2709999900 HC NON-CHARGEABLE SUPPLY: Performed by: ANESTHESIOLOGY

## 2021-10-26 PROCEDURE — 2500000003 HC RX 250 WO HCPCS: Performed by: ANESTHESIOLOGY

## 2021-10-26 PROCEDURE — 77003 FLUOROGUIDE FOR SPINE INJECT: CPT

## 2021-10-26 RX ORDER — BUPIVACAINE HYDROCHLORIDE 2.5 MG/ML
INJECTION, SOLUTION EPIDURAL; INFILTRATION; INTRACAUDAL
Status: COMPLETED | OUTPATIENT
Start: 2021-10-26 | End: 2021-10-26

## 2021-10-26 RX ORDER — LIDOCAINE HYDROCHLORIDE 10 MG/ML
INJECTION, SOLUTION INFILTRATION; PERINEURAL
Status: COMPLETED | OUTPATIENT
Start: 2021-10-26 | End: 2021-10-26

## 2021-10-26 RX ORDER — FENTANYL CITRATE 50 UG/ML
INJECTION, SOLUTION INTRAMUSCULAR; INTRAVENOUS
Status: COMPLETED | OUTPATIENT
Start: 2021-10-26 | End: 2021-10-26

## 2021-10-26 RX ORDER — MIDAZOLAM HYDROCHLORIDE 1 MG/ML
INJECTION INTRAMUSCULAR; INTRAVENOUS
Status: COMPLETED | OUTPATIENT
Start: 2021-10-26 | End: 2021-10-26

## 2021-10-26 ASSESSMENT — PAIN SCALES - GENERAL: PAINLEVEL_OUTOF10: 0

## 2021-10-26 ASSESSMENT — PAIN DESCRIPTION - DESCRIPTORS: DESCRIPTORS: SHARP

## 2021-10-26 NOTE — H&P
Update History & Physical    The patient's History and Physical of September 30, 2021 was reviewed with the patient and I examined the patient. There was no change. The surgical site was confirmed by the patient and me. Plan: The risks, benefits, expected outcome, and alternative to the recommended procedure have been discussed with the patient. Patient understands and wants to proceed with the procedure.      Electronically signed by Josh Karimi MD on 10/26/2021 at 4:42 PM

## 2021-10-26 NOTE — OP NOTE
Operative Note      Patient: Larwence Babinski  YOB: 1975  MRN: [id number]    Date of Procedure: 10/26/2021    Pre-Op Diagnosis: M50.30 CERVICAL DISC DEGENERATION    Post-Op Diagnosis: Same       Procedure(s):  BILATERAL C5-C7 MEDIAL BRANCH BLOCK WITH FLUOROSCOPY    Surgeon(s):  Jessie Ty MD    Assistant:   * No surgical staff found *    Anesthesia: IV Sedation    Estimated Blood Loss (mL): Minimal    Complications: None    Specimens:   * No specimens in log *    Implants:  * No implants in log *      Drains: * No LDAs found *    Findings:     Detailed Description of Procedure: The patient's chart was reviewed. After obtaining written informed consent, the patient had a 20 g IV placed, and NS was running at 30 ml/hour, the patient was placed in the supine position with the leg slightly flexed. Versed and Fentanyl was given to the patient intravenous, a NC at 4 l was placed and monitors attached. PROCEDURE: MEDICAL NECESSITY: This patient has chronic pain that has failed to respond to conservative measures as outlined in the original history and last physical exam.   The patient's symptoms include Pain and disability of a moderate to severe degree with intermittent refereed pain. The goals of treatment are to:  1) Achieve optimal pain control, recognizing that a pain-free state may not be achievable;   2) Minimize adverse outcomes;   3) Enhance functional abilities, and physical and psychological well-being; and   4) Enhance the quality of life for patients with chronic pain. The patient has documented pathology through radiographic imaging, the patient has the same or similar procedure in the past which resulted in significant improvement more than 50% reduction in the pain, as well improvement in their Activity of daily living and quality of life, and this would be the goal for the first time procedure.         PROCEDURE NOTE: After obtaining written informed consent patient was taken to the procedure room. Pre-procedure blood pressure and pulse were stable and recorded in patients clinic chart. The patient was placed in a prone position and the neck was prepped with chloroprep and draped in the usual sterile fashion. The skin over the Left C5 facet joint was infiltrated with 1% Lidocaine for local anesthesia. A 22-gauge, 3.5-inch needle was inserted into the Cervical medial branch under radiographic guidance. Both AP and lateral views were obtained. Following placement of the needle and negative aspiration, 1 ml = 2 mg of Dexamethasone followed by 1 cc of Bupivacaine 0.25% was injected. The identical procedure was performed on facet joint levels Left C6, right C5 and Right C6  During the procedure there was no evidence of CSF, paresthesias or heme. After the procedure the needles were flushed with preservative free local anesthetic and removed. Skin was cleaned and a sterile dressing was applied. Following the procedure the patient's vital signs were stable. The patient was discharged home in good condition after being given discharge instructions.     Electronically signed by Michael Mcqueen MD on 10/26/2021 at 4:42 PM

## 2021-10-27 DIAGNOSIS — G60.9 IDIOPATHIC PERIPHERAL NEUROPATHY: ICD-10-CM

## 2021-10-27 RX ORDER — GABAPENTIN 300 MG/1
CAPSULE ORAL
Qty: 90 CAPSULE | Refills: 2 | Status: SHIPPED | OUTPATIENT
Start: 2021-10-27 | End: 2021-12-17 | Stop reason: ALTCHOICE

## 2021-10-29 LAB
PAP SMEAR, EXTERNAL: NEGATIVE
PAP SMEAR, EXTERNAL: NORMAL
PAP SMEAR, EXTERNAL: NORMAL

## 2021-12-16 RX ORDER — FLUOXETINE HYDROCHLORIDE 20 MG/1
CAPSULE ORAL
Qty: 180 CAPSULE | Refills: 0 | Status: SHIPPED | OUTPATIENT
Start: 2021-12-16 | End: 2022-03-23

## 2021-12-17 ENCOUNTER — OFFICE VISIT (OUTPATIENT)
Dept: BARIATRICS/WEIGHT MGMT | Age: 46
End: 2021-12-17

## 2021-12-17 VITALS
DIASTOLIC BLOOD PRESSURE: 82 MMHG | SYSTOLIC BLOOD PRESSURE: 126 MMHG | BODY MASS INDEX: 44.16 KG/M2 | HEIGHT: 66 IN | WEIGHT: 274.8 LBS | HEART RATE: 85 BPM

## 2021-12-17 DIAGNOSIS — E66.01 MORBID OBESITY WITH BMI OF 40.0-44.9, ADULT (HCC): Primary | ICD-10-CM

## 2021-12-17 PROCEDURE — 99999 PR OFFICE/OUTPT VISIT,PROCEDURE ONLY: CPT

## 2021-12-17 RX ORDER — GABAPENTIN 300 MG/1
300 CAPSULE ORAL 3 TIMES DAILY
COMMUNITY
End: 2022-01-28

## 2021-12-17 RX ORDER — BACLOFEN 10 MG/1
10 TABLET ORAL 3 TIMES DAILY
COMMUNITY
End: 2022-03-30 | Stop reason: ALTCHOICE

## 2021-12-17 NOTE — PROGRESS NOTES
Trever Sharp is a 48 y.o. female with a date of birth of 1975. Vitals:    12/17/21 1047   BP: 126/82   Pulse: 85   Weight: 274 lb 12.8 oz (124.6 kg)   Height: 5' 6\" (1.676 m)    BMI: Body mass index is 44.35 kg/m². Obesity Classification: Class III    Weight History: Wt Readings from Last 3 Encounters:   12/17/21 274 lb 12.8 oz (124.6 kg)   10/26/21 275 lb (124.7 kg)   10/13/21 278 lb (126.1 kg)     Patient's lowest adult weight was 165 lb at age until early 27's. Patient's highest adult weight was 290 lb at age 41. Patient has participated in the following weight loss programs: herbalife, calorie restriction, low carb and keto. Patient has participated in meal replacement/liquid diets. Slimfast  Patient has participated in weight loss medications. Not sure what she was on    Patient is not lactose intolerant. Patient does not have Roman Catholic/cultural food concerns. Patient does not have food allergies. 24 hour recall/food frequency chart:  Breakfast: 9 AM: 2 sausage shabbir/cheese/english muffin + 2 24 oz iced coffees OR skips  Snack: none  Lunch: 2:30 PM: 3 cups chili OR skips  Snack: none  Dinner: 6:30 PM: 2 bowls chix noodle soup  Snack: \"I binge at night\" 7 chocolate wafers + brownie + few bites soup   Drinks throughout the day: water, sf tea, iced coffee black, small amount pop  Do you drink alcohol? yes. How often/how much alcohol do you drink: 2 Glasses of wine per 3-4 times/year. Patient does meet the criteria for binge eating disorder. Patient does not have grazing. Patient has history of have night eating ~5-6 years ago. Patient does have a history of emotional eating or eating out of boredom. Activity is limited d/t back, plans for nerve block soon.        Goals  Weight: 180 lbs  Health Improvement: improve back pain, increase energy and feel better over    Assessment  Nutritional Needs: RMR=(9.99 x 124.6) + (6.25 x 167.6) - (4.92 x 46 y.o.) -161= 1896 kcal x 1.3 (sedentary activity factor)= 2465 kcal - 1000 (for 2 lb weight loss/week)= 1465 kcal.    Plan  Plan/Recommendations: General weight loss/lifestyle modification strategies discussed (elicit support from others; identify saboteurs; non-food rewards, etc). Diet interventions: 1500 kcal LC. Regular aerobic exercise program discussed. Optifast:  Not Interested   Diet Medications:  Intersected     PES Statement: Overweight/Obesity related to lack of exercise, sedentary lifestyle, unhealthy eating habits, and unsuccessful diet attempts as evidenced by BMI. Body mass index is 44.35 kg/m². Will follow up as necessary.     Jodi Hobson RD, LD

## 2021-12-18 ENCOUNTER — TELEMEDICINE (OUTPATIENT)
Dept: BARIATRICS/WEIGHT MGMT | Age: 46
End: 2021-12-18
Payer: COMMERCIAL

## 2021-12-18 DIAGNOSIS — Z71.3 DIETARY COUNSELING AND SURVEILLANCE: ICD-10-CM

## 2021-12-18 DIAGNOSIS — E66.01 MORBID OBESITY WITH BMI OF 40.0-44.9, ADULT (HCC): Primary | ICD-10-CM

## 2021-12-18 PROCEDURE — 99203 OFFICE O/P NEW LOW 30 MIN: CPT | Performed by: FAMILY MEDICINE

## 2021-12-18 PROCEDURE — G8427 DOCREV CUR MEDS BY ELIG CLIN: HCPCS | Performed by: FAMILY MEDICINE

## 2021-12-18 ASSESSMENT — ENCOUNTER SYMPTOMS
COUGH: 0
DIARRHEA: 0
ABDOMINAL PAIN: 0
WHEEZING: 0
VOMITING: 0
CHEST TIGHTNESS: 0
BLOOD IN STOOL: 0
APNEA: 0
NAUSEA: 0
CHOKING: 0
ABDOMINAL DISTENTION: 0
EYE PAIN: 0
SHORTNESS OF BREATH: 0
CONSTIPATION: 0
PHOTOPHOBIA: 0

## 2021-12-18 NOTE — PROGRESS NOTES
Patient: Jazlyn Found     Encounter Date: 12/18/2021    YOB: 1975               Age: 48 y.o. Patient identification was verified at the start of the visit. No flowsheet data found. BP Readings from Last 1 Encounters:   12/17/21 126/82       BMI Readings from Last 1 Encounters:   12/17/21 44.35 kg/m²       Pulse Readings from Last 1 Encounters:   12/17/21 85                                               Chief Complaint   Patient presents with    Bariatric, Initial Visit     HILARY-NP       HPI:    48 y.o. female presents to establish care via video visit. The patient's medical history is significant for class III obesity. The patient has a long-standing history of obesity which started gradually. The problem is severe. The patient has been gaining weight. Risk factors include annual weight gain of >2 lbs (1 kg)/ year and sedentary lifestyle. Aggravating factors include poor diet and lack of physical activity. The patient has tried various diet/exercise plans which have been ineffective in the long-run. She is motivated to start losing weight to help improve her overall health. When did you become overweight? [] Childhood   [] Teens   [x] Adulthood   [] Pregnancy   [] Menopause    Highest adult weight: 290 pounds at age 41    Triggers for weight gain? [] Stress   [] Illness   [] Medications   [] Travel  []Injury     [] Nightshift work   [] Insomnia  [x] No specific triggers   [] Other    Food triggers:   [x] Stress   [x] Boredom   [x] Fast food   [x] Eating out   [] Seeking reward   [] Social     Have you ever taken medications to help you lose weight? [x] Yes  [] No    Have you ever been on a meal replacement program?  [] Yes  [x] No    How many hours of sleep do you get each night?  [] <6 hours  [x] 6-8 hours  [] >8 hours      Do you have frequent awakenings, difficulty falling asleep, feeling fatigued, unrefreshed sleep, daytime sleepiness?   [] Yes  [x] No     The patient denies any significant cardiac or psychiatric disease. The patient denies a history of glaucoma. The patient denies a history of seizure disorders/epiliepsy. Dietitian's assessment reviewed and addressed with patient. Reviewed:  [x] Nutrition and the importance of regular protein intake  [x] Hidden CHO/carbohydrate sources  [x] Alcohol use  [x] Tobacco use  [x] Drug use- Denies   [x] Importance of exercise and reducing sedentary time        Controlled Substance Monitoring:     RX Monitoring 5/11/2021   Attestation -   Periodic Controlled Substance Monitoring Possible medication side effects, risk of tolerance/dependence & alternative treatments discussed. ;No signs of potential drug abuse or diversion identified. Allergies   Allergen Reactions    Imitrex [Sumatriptan] Nausea Only     nausea         Current Outpatient Medications:     gabapentin (NEURONTIN) 300 MG capsule, Take 300 mg by mouth 3 times daily. , Disp: , Rfl:     baclofen (LIORESAL) 10 MG tablet, Take 10 mg by mouth 3 times daily, Disp: , Rfl:     FLUoxetine (PROZAC) 20 MG capsule, Take 2 capsules by mouth once daily, Disp: 180 capsule, Rfl: 0    lamoTRIgine (LAMICTAL) 100 MG tablet, Take 1 tablet by mouth once daily, Disp: 30 tablet, Rfl: 2    losartan (COZAAR) 25 MG tablet, Take 1 tablet by mouth daily, Disp: 90 tablet, Rfl: 1    tiZANidine (ZANAFLEX) 2 MG tablet, TAKE 1 TABLET BY MOUTH THREE TIMES DAILY AS NEEDED (NECK PAIN/MUSCULAR) (Patient not taking: Reported on 12/17/2021), Disp: 30 tablet, Rfl: 5    lithium 300 MG capsule, 1 tablet in morning and 2 tablets in the evening.  (Patient taking differently: Take 300 mg by mouth every morning ), Disp: 270 capsule, Rfl: 1    ibuprofen (ADVIL;MOTRIN) 800 MG tablet, Take 1 tablet by mouth 3 times daily (with meals), Disp: 270 tablet, Rfl: 1    FLUoxetine (PROZAC) 40 MG capsule, Take 2 capsules by mouth daily TAKE 1 CAPSULE BY MOUTH ONCE DAILY IN ADDITION (Patient taking differently: Take 80 mg by mouth daily TAKE 2 CAPSULE BY MOUTH ONCE DAILY IN ADDITION), Disp: 180 capsule, Rfl: 0    amLODIPine (NORVASC) 10 MG tablet, Take 1 tablet by mouth once daily, Disp: 90 tablet, Rfl: 3    ammonium lactate (LAC-HYDRIN) 12 % lotion, Apply topically daily. , Disp: 500 g, Rfl: 0    Patient Active Problem List   Diagnosis    Migraine    Amenorrhea, secondary    Depression    Gastritis    TMJ (temporomandibular joint syndrome)    Lipoma    Grief reaction    Anxiety    Tobacco abuse    Insomnia    Bipolar affective disorder ([address]    VICTORIANO (obstructive sleep apnea)    Periodic limb movement disorder (PLMD)    Primary osteoarthritis of both hips    Left hip pain    Arthritis of right hip    Obesity, Class III, BMI 40-49.9 (morbid obesity) ([address]    Essential hypertension, benign    Bipolar disorder with depression ([address]    Morbid obesity ([address]    Tear of right rotator cuff    Hypersomnia    Traumatic complete tear of right rotator cuff    History of total hip arthroplasty, right    Cervical spondylosis    Cervical radicular pain    DDD (degenerative disc disease), cervical    Claustrophobia    Cervical stenosis of spine       Past Medical History:   Diagnosis Date    Arthritis     Back pain     Bipolar disorder (HCC)     Depression     Gastritis     Hypertension     Insomnia     Migraine     Neuropathy     PLMD (periodic limb movement disorder)     Preoperative clearance     Sleep apnea     uses C-PAP    TMJ (temporomandibular joint syndrome)        Past Surgical History:   Procedure Laterality Date    COLPOSCOPY      ENDOMETRIAL ABLATION      FOOT SURGERY      HIP SURGERY      JOINT REPLACEMENT Right 02/19/2019    RIGHT LATERAL TOTAL HIP REPLACEMENT    NERVE BLOCK N/A 10/26/2021    BILATERAL C5-C7 MEDIAL BRANCH BLOCK WITH FLUOROSCOPY performed by Lizeth Monaco MD at [facility] N/A 7/6/2021    C5C6  EPIDURAL STERIOD INJECTION WITH FLUOROSCOPY performed by Daria Chiang MD at [address] Bilateral 8/31/2021    BILATERAL C5, C6, C7 MEDIAL BRANCH BLOCK WITH FLUOROSCOPY (10492, 11292)-NO STEROIDS performed by Daria Chiang MD at [address] ARTHROSCOPY Right 11/25/2019    RIGHT SHOULDER ARTHROSCOPY, SUBACROMIAL DECOMPRESSION,   ROTATOR CUFF REPAIR performed by Cynthia Eid MD at [facility] [address] Right 2/19/2019    RIGHT LATERAL TOTAL HIP REPLACEMENT performed by Booker Cat MD at [address] [place] [address] History   Problem Relation Age of Onset    COPD Mother     Alcohol Abuse Mother     Mult Sclerosis Father     Alcohol Abuse Sister     High Cholesterol Brother     Diabetes Brother     Obesity Brother     Arthritis Brother     Hypertension Brother     Elevated Lipids Brother     COPD Brother     Other Maternal Grandmother         hips replacement    Obesity Maternal Grandmother     Diabetes Maternal Grandmother     Breast Cancer Maternal Grandmother     No Known Problems Maternal Grandfather     No Known Problems Paternal Grandmother     Diabetes Paternal Grandfather     Arthritis Sister     Depression Daughter        Review of Systems   Constitutional: Negative for fatigue. Eyes: Negative for photophobia, pain and visual disturbance. Respiratory: Negative for apnea, cough, choking, chest tightness, shortness of breath and wheezing. Cardiovascular: Negative for chest pain, palpitations and leg swelling. Gastrointestinal: Negative for abdominal distention, abdominal pain, blood in stool, constipation, diarrhea, nausea and vomiting. Endocrine: Negative for cold intolerance and heat intolerance. Musculoskeletal: Negative for arthralgias and myalgias. Skin: Negative for rash. Neurological: Negative for dizziness, tremors, syncope, weakness, numbness and headaches.    Psychiatric/Behavioral: Negative for agitation, confusion, decreased concentration, dysphoric mood, hallucinations, sleep disturbance and suicidal ideas. The patient is not nervous/anxious and is not hyperactive. Physical Exam  Constitutional:       Appearance: She is well-developed. HENT:      Head: Normocephalic. Eyes:      Conjunctiva/sclera: Conjunctivae normal.   Abdominal:      General: Abdomen is protuberant. Musculoskeletal:         General: No swelling. Neurological:      Mental Status: She is alert and oriented to person, place, and time. Psychiatric:         Mood and Affect: Mood normal.         Behavior: Behavior normal.         Thought Content: Thought content normal.         Judgment: Judgment normal.         Office Visit on 02/08/2021   Component Date Value Ref Range Status    Sodium 02/08/2021 135* 136 - 145 mmol/L Final    Potassium 02/08/2021 4.7  3.5 - 5.1 mmol/L Final    Chloride 02/08/2021 100  99 - 110 mmol/L Final    CO2 02/08/2021 24  21 - 32 mmol/L Final    Anion Gap 02/08/2021 11  3 - 16 Final    Glucose 02/08/2021 96  70 - 99 mg/dL Final    BUN 02/08/2021 11  7 - 20 mg/dL Final    CREATININE 02/08/2021 0.7  0.6 - 1.1 mg/dL Final    GFR Non- 02/08/2021 >60  >60 Final    Comment: >60 mL/min/1.73m2 EGFR, calc. for ages 25 and older using the  MDRD formula (not corrected for weight), is valid for stable  renal function.  GFR  02/08/2021 >60  >60 Final    Comment: Chronic Kidney Disease: less than 60 ml/min/1.73 sq.m. Kidney Failure: less than 15 ml/min/1.73 sq.m. Results valid for patients 18 years and older.       Calcium 02/08/2021 10.0  8.3 - 10.6 mg/dL Final    Total Protein 02/08/2021 6.9  6.4 - 8.2 g/dL Final    Albumin 02/08/2021 4.5  3.4 - 5.0 g/dL Final    Albumin/Globulin Ratio 02/08/2021 1.9  1.1 - 2.2 Final    Total Bilirubin 02/08/2021 <0.2  0.0 - 1.0 mg/dL Final    Alkaline Phosphatase 02/08/2021 83  40 - 129 U/L Final    ALT 02/08/2021 20  10 - 40 U/L Final    AST 02/08/2021 21  15 - 37 U/L Final    Globulin 02/08/2021 2.4  g/dL Final    Cholesterol, Fasting 02/08/2021 219* 0 - 199 mg/dL Final    Triglyceride, Fasting 02/08/2021 116  0 - 150 mg/dL Final    HDL 02/08/2021 59  40 - 60 mg/dL Final    LDL Calculated 02/08/2021 137* <100 mg/dL Final    VLDL Cholesterol Calculated 02/08/2021 23  Not Established mg/dL Final    Lithium Lvl 02/08/2021 0.3* 0.6 - 1.2 mmol/L Final    Lithium Dose Amount 02/08/2021 Unknown   Final         Assessment and Plan:    1. Morbid obesity with BMI of 40.0-44.9, adult ([address]    Heavily counseled on the importance of therapeutic lifestyle changes through diet and exercise. The patient understands that the goal of treatment is to reach and stay at a healthy weight. The initial treatment goal is to lose at least 5-10% of her body weight in 12 weeks. This will require changes in eating habits, increased physical activity, and behavior changes. Counseled on low carb/stephanie diet. Patient handouts and education material provided and reviewed in detail with the patient. All questions answered. Encouraged patient to keep a food journal and to bring it to her next visit. Discussed available treatment options in addition to lifestyle changes including medications (Saxenda or [facility]) or OPTIFAST. She may be interested in aom if covered by her insurance. Explained that medications are most effective as part of a comprehensive treatment plan that includes nutrition, physical activity, and behavior modification. The patient also understands that she will need close follow-ups every 2-4 weeks if she starts treatment. Depending on the patient's success with these changes, she may also be a good candidate for bariatric surgery down the line. Follow-up in 2 weeks to discuss lab results and treatment plan. Patient advised that it is her responsibility to follow up on any ordered tests/lab results.  Patient understands and agrees with the plan. - TSH with Reflex; Future  - Vitamin B12 & Folate; Future  - Vitamin D 25 Hydroxy; Future  - Comprehensive Metabolic Panel; Future  - EKG 12 Lead; Future  - Hemoglobin A1C; Future  - Lipid Panel; Future  - CBC; Future    2. Dietary counseling and surveillance  [id number]/low carb meal plan           Nutrition:  [] LCHF/Ketogenic [x] Low carb/low-calorie diet [] Low-calorie diet     []Maintenance        FITTE:   [x] Cardio [] Resistance/stength exercises   [x] ACSM recommendations (150 minutes/week)           Behavior:   [x] Motivational interviewing performed    [] Referral for counseling  [x] Discussed strategies to overcome habits/challenges for focus      [x] Stress management   [x] Stimulus control  [x] Sleep hygiene      Orders Placed This Encounter   Procedures    TSH with Reflex     Standing Status:   Future     Standing Expiration Date:   12/18/2022    Vitamin B12 & Folate     Standing Status:   Future     Standing Expiration Date:   12/18/2022    Vitamin D 25 Hydroxy     Standing Status:   Future     Standing Expiration Date:   12/18/2022    Comprehensive Metabolic Panel     Standing Status:   Future     Standing Expiration Date:   12/18/2022    Hemoglobin A1C     Standing Status:   Future     Standing Expiration Date:   12/18/2022    Lipid Panel     Standing Status:   Future     Standing Expiration Date:   1/18/2022     Order Specific Question:   Is Patient Fasting?/# of Hours     Answer:   8 hours    CBC     Standing Status:   Future     Standing Expiration Date:   12/18/2022    EKG 12 Lead     Standing Status:   Future     Standing Expiration Date:   12/18/2022     Order Specific Question:   Reason for Exam?     Answer: Other       No follow-ups on file. Donna Zuluaga is a 48 y.o. female being evaluated by a Virtual Visit (video visit) encounter to address concerns as mentioned above. A caregiver was present when appropriate.  Due to this being a TeleHealth encounter (During UTVRG-67 public health emergency), evaluation of the following organ systems was limited: Vitals/Constitutional/EENT/Resp/CV/GI//MS/Neuro/Skin/Heme-Lymph-Imm. Pursuant to the emergency declaration under the [address] [place] [address], [address] [place] [address] and the [employer] and [employer] Act, this Virtual Visit was conducted with patient's (and/or legal guardian's) consent, to reduce the patient's risk of exposure to COVID-19 and provide necessary medical care. The patient (and/or legal guardian) has also been advised to contact this office for worsening conditions or problems, and seek emergency medical treatment and/or call 911 if deemed necessary. Services were provided through a video synchronous discussion virtually to substitute for in-person clinic visit. Patient and provider were located at their individual homes. --Arun Rudd MD on 1/1/2022 at 1:59 PM    An electronic signature was used to authenticate this note.

## 2021-12-21 ENCOUNTER — TELEPHONE (OUTPATIENT)
Dept: BARIATRICS/WEIGHT MGMT | Age: 46
End: 2021-12-21

## 2022-01-04 NOTE — PROGRESS NOTES
PATIENT REACHED   YES_X___NO____    PREOP INSTRUCTIONS LEFT ON VM NUMBER_______________      DATE_1/11/2022________ TIME_0830________ARRIVAL_0730_______PLACE_[id number]___________  NOTHING TO EAT OR DRINK  AFTER MIDNIGHT THE EVENING PRIOR OR AS INSTRUCTED BY YOUR DR.  Mario Morales NEED A RESPONSIBLE ADULT AGE 18 OR OLDER TO DRIVE YOU HOME  PLEASE BRING INSURANCE CARD. PICTURE ID AND COMPLETE LIST OF MEDS  WEAR LOOSE COMFORTABLE CLOTHING  FOLLOW ANY INSTRUCTIONS YOUR DRS OFFICE HAS GIVEN YOU,INCLUDING WHAT MEDICATIONS TO TAKE THE AM OF PROCEDURE AND WHEN AND IF YOU NEED TO STOP ANY BLOOD THINNERS. IF YOU HAVE QUESTIONS REGARDING THIS CALL THE OFFICE  THE GOAL BLOOD SUGAR THE AM OF PROCEDURE  OR LESS ABOVE THAT THE PROCEDURE MAY BE CANCELLED  ANY QUESTIONS CALL YOUR DOCTOR. ALSO,PLEASE READ THE INSTRUCTION PACKET FROM YOUR DR IF YOU RECEIVED ONE. SPINE INTERVENTION NUMBER [phone]      OTHER___________________________________      VISITOR POLICY(subject to change)      There is a one visitor policy at [facility] for all surgeries and endoscopies. Whether the visitor can stay or will be asked to wait in the car will depend on the current policy and if social distancing can be maintained. The policy is subject to change at any time. Please make sure the visitor has a cell phone that is on,charged and able to accept calls, as this may be the way that the staff communicates with them. Pain management is NO VISITOR policyThe patients ride is expected to remain in the car with a cell phone for communication. If the ride is leaving the hospital grounds please make sure they are back in time for pickup. Have the patient inform the staff on arrival what their rides plans are while the patient is in the facility. At the MAIN there is one visitor allowed. Please note that the visitor policy is subject to change.

## 2022-01-11 ENCOUNTER — HOSPITAL ENCOUNTER (OUTPATIENT)
Age: 47
Setting detail: OUTPATIENT SURGERY
Discharge: HOME | End: 2022-01-11
Attending: ANESTHESIOLOGY | Admitting: ANESTHESIOLOGY
Payer: COMMERCIAL

## 2022-01-11 ENCOUNTER — APPOINTMENT (OUTPATIENT)
Dept: GENERAL RADIOLOGY | Age: 47
End: 2022-01-11
Attending: ANESTHESIOLOGY
Payer: COMMERCIAL

## 2022-01-11 VITALS
HEART RATE: 68 BPM | DIASTOLIC BLOOD PRESSURE: 92 MMHG | BODY MASS INDEX: 45 KG/M2 | WEIGHT: 280 LBS | TEMPERATURE: 97 F | OXYGEN SATURATION: 98 % | HEIGHT: 66 IN | RESPIRATION RATE: 16 BRPM | SYSTOLIC BLOOD PRESSURE: 162 MMHG

## 2022-01-11 PROCEDURE — 3209999900 FLUORO FOR SURGICAL PROCEDURES

## 2022-01-11 PROCEDURE — 3610000056 HC PAIN LEVEL 4 BASE (NON-OR): Performed by: ANESTHESIOLOGY

## 2022-01-11 PROCEDURE — 6360000002 HC RX W HCPCS: Performed by: ANESTHESIOLOGY

## 2022-01-11 PROCEDURE — 2709999900 HC NON-CHARGEABLE SUPPLY: Performed by: ANESTHESIOLOGY

## 2022-01-11 PROCEDURE — 99153 MOD SED SAME PHYS/QHP EA: CPT | Performed by: ANESTHESIOLOGY

## 2022-01-11 PROCEDURE — 99152 MOD SED SAME PHYS/QHP 5/>YRS: CPT | Performed by: ANESTHESIOLOGY

## 2022-01-11 PROCEDURE — 2500000003 HC RX 250 WO HCPCS: Performed by: ANESTHESIOLOGY

## 2022-01-11 PROCEDURE — 3610000057 HC PAIN LEVEL 4 ADDL 15 MIN (NON-OR): Performed by: ANESTHESIOLOGY

## 2022-01-11 RX ORDER — BUPIVACAINE HYDROCHLORIDE 2.5 MG/ML
INJECTION, SOLUTION INFILTRATION; PERINEURAL
Status: COMPLETED | OUTPATIENT
Start: 2022-01-11 | End: 2022-01-11

## 2022-01-11 RX ORDER — ZOLPIDEM TARTRATE 10 MG/1
TABLET ORAL NIGHTLY
COMMUNITY
End: 2022-03-09 | Stop reason: DRUGHIGH

## 2022-01-11 RX ORDER — FENTANYL CITRATE 50 UG/ML
INJECTION, SOLUTION INTRAMUSCULAR; INTRAVENOUS
Status: COMPLETED | OUTPATIENT
Start: 2022-01-11 | End: 2022-01-11

## 2022-01-11 RX ORDER — LIDOCAINE HYDROCHLORIDE 10 MG/ML
INJECTION, SOLUTION EPIDURAL; INFILTRATION; INTRACAUDAL; PERINEURAL
Status: COMPLETED | OUTPATIENT
Start: 2022-01-11 | End: 2022-01-11

## 2022-01-11 ASSESSMENT — PAIN SCALES - GENERAL
PAINLEVEL_OUTOF10: 3
PAINLEVEL_OUTOF10: 3

## 2022-01-11 ASSESSMENT — PAIN DESCRIPTION - LOCATION
LOCATION: NECK;SHOULDER
LOCATION: NECK;SHOULDER

## 2022-01-11 ASSESSMENT — PAIN DESCRIPTION - PAIN TYPE
TYPE: CHRONIC PAIN
TYPE: CHRONIC PAIN

## 2022-01-11 ASSESSMENT — PAIN DESCRIPTION - ORIENTATION: ORIENTATION: LEFT

## 2022-01-11 ASSESSMENT — PAIN - FUNCTIONAL ASSESSMENT: PAIN_FUNCTIONAL_ASSESSMENT: 0-10

## 2022-01-11 NOTE — PROGRESS NOTES
Brought to phase 2 recovery via chair. Pain level 3/10 which is better than admission. resp even and unlabored. Is awake and alert and answering questions approp. Has two puncture sites to the left side of the lower neck with band aids in place and no drainage note.

## 2022-01-13 NOTE — OP NOTE
Operative Note      Patient: Horacio Lowry  YOB: 1975  MRN: [id number]    Date of Procedure: 1/11/2022    Pre-Op Diagnosis: M50.30  CERVICAL DISC DEGENERATION    Post-Op Diagnosis: Same       Procedure(s):  LEFT C4, C5, C6 RADIOFREQUENCY ABLATION WITH FLUOROSCOPY    Surgeon(s):  Lauren Lynn MD    Assistant:   * No surgical staff found *    Anesthesia: IV Sedation    Estimated Blood Loss (mL): Minimal    Complications: None    Specimens:   * No specimens in log *    Implants:  * No implants in log *      Drains: * No LDAs found *    Findings:     Detailed Description of Procedure:   MEDICAL NECESSITY: This patient has chronic pain that has failed to respond to conservative measures as outlined in the original history and last physical exam.   The patient's symptoms include Pain and disability of a moderate to severe degree with intermittent refereed pain. The goals of treatment are to   1) Achieve optimal pain control, recognizing that a pain-free state may not be achievable;   2) Minimize adverse outcomes;   3) Enhance functional abilities, and physical and psychological well-being; and   4) Enhance the quality of life for patients with chronic pain. The patient has documented pathology through radiographic imaging, the patient has the same or similar procedure in the past which resulted in significant improvement more than 50% reduction in the pain, as well improvement in their Activity of daily living and quality of life, and this would be the goal for the first time procedure. PROCEDURE NOTE:  After obtaining written informed consent patient was taken to the procedure room. Pre-procedure blood pressure and pulse were stable and recorded in patients clinic chart. Patient had a 20 g IV placed, and NS was running at 30 ml/hour, the patient was placed in the supine position with the leg slightly flexed.  Versed and Fentanyl was given to the patient intravenous, a NC at 4 l was placed and monitors attached. PROCEDURE DETAILS:    The patient was placed in a prone position and the Left cervical area under strict Aseptic technique was prepped, and draped in the usual sterile fashion. The skin over the Left C 2 facet joint was infiltrated with 1% Lidocaine for local anesthesia. A 22-gauge, 3.5-inch RFA Insulated needle with 5 mm active tip was inserted into the cervical medial branch under radiographic guidance. Both AP and lateral views were obtained. Following placement of the needle sensory stimulation at 50 Hz and motor stimulation at 2 Hz was carried out. After confirmation of needle placement by the patient reporting reproduction of pain or sensation in the area of symptoms and denying extremity motor sensations RFL was carried out in lesion mode. The settings were 80 C, and 180 seconds for lesion mode after 1 ml of Bupivacaine 0.25% at each level. The identical procedure was performed on facet joint levels Left C 3, and C4  During the procedure there was no evidence of CSF, paresthesia or heme. After the procedure the needles were flushed with preservative free local anesthetic and removed. Skin was cleaned and a sterile dressing was applied. Following the procedure the patient's vital signs were stable. The patient was discharged home in good condition after being given discharge instructions.     Electronically signed by Loy Bass MD on 1/13/2022 at 11:25 AM

## 2022-01-19 ENCOUNTER — TELEMEDICINE (OUTPATIENT)
Dept: FAMILY MEDICINE CLINIC | Age: 47
End: 2022-01-19
Payer: COMMERCIAL

## 2022-01-19 DIAGNOSIS — B96.89 ACUTE BACTERIAL SINUSITIS: Primary | ICD-10-CM

## 2022-01-19 DIAGNOSIS — J01.90 ACUTE BACTERIAL SINUSITIS: Primary | ICD-10-CM

## 2022-01-19 PROCEDURE — G8427 DOCREV CUR MEDS BY ELIG CLIN: HCPCS | Performed by: NURSE PRACTITIONER

## 2022-01-19 PROCEDURE — 99213 OFFICE O/P EST LOW 20 MIN: CPT | Performed by: NURSE PRACTITIONER

## 2022-01-19 RX ORDER — PREDNISONE 10 MG/1
TABLET ORAL
Qty: 20 TABLET | Refills: 0 | Status: SHIPPED | OUTPATIENT
Start: 2022-01-19 | End: 2022-03-09 | Stop reason: ALTCHOICE

## 2022-01-19 RX ORDER — LEVOFLOXACIN 500 MG/1
500 TABLET, FILM COATED ORAL DAILY
Qty: 7 TABLET | Refills: 0 | Status: SHIPPED | OUTPATIENT
Start: 2022-01-19 | End: 2022-01-26

## 2022-01-19 ASSESSMENT — ENCOUNTER SYMPTOMS: SORE THROAT: 1

## 2022-01-25 ENCOUNTER — HOSPITAL ENCOUNTER (OUTPATIENT)
Age: 47
Setting detail: OUTPATIENT SURGERY
Discharge: HOME | End: 2022-01-25
Attending: ANESTHESIOLOGY | Admitting: ANESTHESIOLOGY
Payer: COMMERCIAL

## 2022-01-25 ENCOUNTER — APPOINTMENT (OUTPATIENT)
Dept: GENERAL RADIOLOGY | Age: 47
End: 2022-01-25
Attending: ANESTHESIOLOGY
Payer: COMMERCIAL

## 2022-01-25 VITALS
DIASTOLIC BLOOD PRESSURE: 86 MMHG | SYSTOLIC BLOOD PRESSURE: 151 MMHG | BODY MASS INDEX: 45 KG/M2 | RESPIRATION RATE: 18 BRPM | HEIGHT: 66 IN | TEMPERATURE: 97.4 F | HEART RATE: 70 BPM | OXYGEN SATURATION: 96 % | WEIGHT: 280 LBS

## 2022-01-25 LAB
GLUCOSE BLD-MCNC: 113 MG/DL (ref 70–99)
PERFORMED ON: ABNORMAL

## 2022-01-25 PROCEDURE — 3209999900 FLUORO FOR SURGICAL PROCEDURES

## 2022-01-25 PROCEDURE — 99153 MOD SED SAME PHYS/QHP EA: CPT | Performed by: ANESTHESIOLOGY

## 2022-01-25 PROCEDURE — 2500000003 HC RX 250 WO HCPCS: Performed by: ANESTHESIOLOGY

## 2022-01-25 PROCEDURE — 99152 MOD SED SAME PHYS/QHP 5/>YRS: CPT | Performed by: ANESTHESIOLOGY

## 2022-01-25 PROCEDURE — 6360000002 HC RX W HCPCS: Performed by: ANESTHESIOLOGY

## 2022-01-25 PROCEDURE — 3610000056 HC PAIN LEVEL 4 BASE (NON-OR): Performed by: ANESTHESIOLOGY

## 2022-01-25 PROCEDURE — 3610000057 HC PAIN LEVEL 4 ADDL 15 MIN (NON-OR): Performed by: ANESTHESIOLOGY

## 2022-01-25 PROCEDURE — 2709999900 HC NON-CHARGEABLE SUPPLY: Performed by: ANESTHESIOLOGY

## 2022-01-25 RX ORDER — DEXAMETHASONE SODIUM PHOSPHATE 10 MG/ML
INJECTION INTRAMUSCULAR; INTRAVENOUS
Status: COMPLETED | OUTPATIENT
Start: 2022-01-25 | End: 2022-01-25

## 2022-01-25 RX ORDER — LIDOCAINE HYDROCHLORIDE 10 MG/ML
INJECTION, SOLUTION EPIDURAL; INFILTRATION; INTRACAUDAL; PERINEURAL
Status: COMPLETED | OUTPATIENT
Start: 2022-01-25 | End: 2022-01-25

## 2022-01-25 RX ORDER — FENTANYL CITRATE 50 UG/ML
INJECTION, SOLUTION INTRAMUSCULAR; INTRAVENOUS
Status: COMPLETED | OUTPATIENT
Start: 2022-01-25 | End: 2022-01-25

## 2022-01-25 RX ORDER — BUPIVACAINE HYDROCHLORIDE 2.5 MG/ML
INJECTION, SOLUTION INFILTRATION; PERINEURAL
Status: COMPLETED | OUTPATIENT
Start: 2022-01-25 | End: 2022-01-25

## 2022-01-25 RX ORDER — LIDOCAINE HYDROCHLORIDE 20 MG/ML
INJECTION, SOLUTION EPIDURAL; INFILTRATION; INTRACAUDAL; PERINEURAL
Status: COMPLETED | OUTPATIENT
Start: 2022-01-25 | End: 2022-01-25

## 2022-01-25 RX ORDER — MIDAZOLAM HYDROCHLORIDE 1 MG/ML
INJECTION INTRAMUSCULAR; INTRAVENOUS
Status: COMPLETED | OUTPATIENT
Start: 2022-01-25 | End: 2022-01-25

## 2022-01-25 ASSESSMENT — PAIN DESCRIPTION - LOCATION
LOCATION: NECK
LOCATION: NECK

## 2022-01-25 ASSESSMENT — PAIN DESCRIPTION - PAIN TYPE
TYPE: CHRONIC PAIN
TYPE: CHRONIC PAIN

## 2022-01-25 ASSESSMENT — PAIN - FUNCTIONAL ASSESSMENT: PAIN_FUNCTIONAL_ASSESSMENT: 0-10

## 2022-01-25 ASSESSMENT — PAIN SCALES - GENERAL
PAINLEVEL_OUTOF10: 9
PAINLEVEL_OUTOF10: 8

## 2022-01-25 ASSESSMENT — PAIN DESCRIPTION - DESCRIPTORS
DESCRIPTORS: CONSTANT;ACHING
DESCRIPTORS: THROBBING

## 2022-01-25 ASSESSMENT — PAIN DESCRIPTION - ORIENTATION
ORIENTATION: MID
ORIENTATION: MID

## 2022-01-26 NOTE — OP NOTE
Operative Note      Patient: Shira Ortega  YOB: 1975  MRN: [id number]    Date of Procedure: 1/25/2022    Pre-Op Diagnosis: M50.30 Cervical disk degeneration    Post-Op Diagnosis: Same       Procedure(s):  RIGHT C-4-C-6 NERVE RADIOFREQUENCY ABLATION WITH FLUOROSCOPY    Surgeon(s):  Dorothea Leone MD    Assistant:   * No surgical staff found *    Anesthesia: IV Sedation    Estimated Blood Loss (mL): Minimal    Complications: None    Specimens:   * No specimens in log *    Implants:  * No implants in log *      Drains: * No LDAs found *    Findings:     Detailed Description of Procedure:   MEDICAL NECESSITY: This patient has chronic pain that has failed to respond to conservative measures as outlined in the original history and last physical exam.   The patient's symptoms include Pain and disability of a moderate to severe degree with intermittent refereed pain. The goals of treatment are to 1) achieve optimal pain control, recognizing that a pain-free state may not be achievable; 2) minimize adverse outcomes; 3) enhance functional abilities, and physical and psychological well-being; and 4) enhance the quality of life for patients with chronic pain. The patient has documented pathology through radiographic imaging, the patient has the same or similar procedure in the past which resulted in significant improvement more than 50% reduction in the pain, as well improvement in their Activity of daily living and quality of life, and this would be the goal for the first time procedure. PROCEDURE NOTE:  After obtaining written informed consent patient was taken to the procedure room. Pre-procedure blood pressure and pulse were stable and recorded in patients clinic chart. PROCEDURE DETAILS:    The patient was placed in a prone position and the Right cervical area under strict Aseptic technique was prepped, and draped in the usual sterile fashion.  The skin over the Right C 4 facet joint was infiltrated with 1% Lidocaine for local anesthesia. A 22-gauge, 3.5-inch RFA Insulated needle with 5 mm active tip was inserted into the Cervical medial branch under radiographic guidance. Both AP and lateral views were obtained. Following placement of the needle sensory stimulation at 50 Hz and motor stimulation at 2 Hz was carried out. After confirmation of needle placement by the patient reporting reproduction of pain or sensation in the area of symptoms and denying extremity motor sensations RFL was carried out in lesion mode. The settings were 80 C, and 180 seconds for lesion mode after 1 ml of Bupivacaine 0.25% at each level. The identical procedure was performed on facet joint levels Right C 6, and C7  During the procedure there was no evidence of CSF, paresthesia or heme. After the procedure the needles were flushed with preservative free local anesthetic and removed. Skin was cleaned and a sterile dressing was applied. Following the procedure the patient's vital signs were stable. The patient was discharged home in good condition after being given discharge instructions.     Electronically signed by Lorice Mcburney, MD on 1/26/2022 at 1:16 PM

## 2022-01-28 RX ORDER — GABAPENTIN 300 MG/1
CAPSULE ORAL
Qty: 90 CAPSULE | Refills: 1 | Status: SHIPPED | OUTPATIENT
Start: 2022-01-28 | End: 2022-04-07 | Stop reason: ALTCHOICE

## 2022-03-09 ENCOUNTER — TELEMEDICINE (OUTPATIENT)
Dept: FAMILY MEDICINE CLINIC | Age: 47
End: 2022-03-09
Payer: COMMERCIAL

## 2022-03-09 DIAGNOSIS — F31.61 BIPOLAR DISORDER, CURRENT EPISODE MIXED, MILD (HCC): ICD-10-CM

## 2022-03-09 DIAGNOSIS — J06.9 PROTRACTED URI: Primary | ICD-10-CM

## 2022-03-09 PROCEDURE — 99213 OFFICE O/P EST LOW 20 MIN: CPT | Performed by: NURSE PRACTITIONER

## 2022-03-09 PROCEDURE — G8427 DOCREV CUR MEDS BY ELIG CLIN: HCPCS | Performed by: NURSE PRACTITIONER

## 2022-03-09 RX ORDER — ZOLPIDEM TARTRATE 12.5 MG/1
1 TABLET, FILM COATED, EXTENDED RELEASE ORAL NIGHTLY
COMMUNITY
Start: 2022-03-08 | End: 2022-04-11

## 2022-03-09 RX ORDER — PREDNISONE 10 MG/1
TABLET ORAL
Qty: 20 TABLET | Refills: 0 | Status: SHIPPED | OUTPATIENT
Start: 2022-03-09 | End: 2022-03-17

## 2022-03-09 RX ORDER — AZITHROMYCIN 250 MG/1
250 TABLET, FILM COATED ORAL SEE ADMIN INSTRUCTIONS
Qty: 6 TABLET | Refills: 0 | Status: SHIPPED | OUTPATIENT
Start: 2022-03-09 | End: 2022-03-14

## 2022-03-09 ASSESSMENT — PATIENT HEALTH QUESTIONNAIRE - PHQ9
1. LITTLE INTEREST OR PLEASURE IN DOING THINGS: 1
2. FEELING DOWN, DEPRESSED OR HOPELESS: 1
SUM OF ALL RESPONSES TO PHQ9 QUESTIONS 1 & 2: 2
SUM OF ALL RESPONSES TO PHQ QUESTIONS 1-9: 2

## 2022-03-09 NOTE — PROGRESS NOTES
Taz Reyes (:  1975) is a Established patient, here for evaluation of the following:      Subjective      Pt persistent cough for the last six days comes in spurts - SOB noted with activity - no chest pain - but hard to get motivated  Because of cough and mood no fevers or sore throat  Or ear pain- negative covid test  Mucinex has helped      Pt states he has not been  Motivated has no desire to do things - she has to get up  She would not  Her mood comes and goes there is a lot going on in her family the past  Three weeks have been really bad - denies SI/HI- she is no longer seeing a therapist had [facility] - they did mindfullness and journaling which did not help  Review of Systems   Constitutional: Negative for fever. HENT: Negative for postnasal drip and sore throat. Respiratory: Positive for cough and shortness of breath. Negative for apnea, choking, chest tightness, wheezing and stridor. Psychiatric/Behavioral:        Decreased motivation   All other systems reviewed and are negative.   Objective   Patient-Reported Vitals  Patient-Reported Weight: 260 LB  Patient-Reported Height: 66\"       Physical Exam  [INSTRUCTIONS:  \"[x]\" Indicates a positive item  \"[]\" Indicates a negative item  -- DELETE ALL ITEMS NOT EXAMINED]    Constitutional: [x] Appears well-developed and well-nourished [x] No apparent distress      [] Abnormal -     Mental status: [x] Alert and awake  [x] Oriented to person/place/time [x] Able to follow commands    [] Abnormal -     Eyes:   EOM    [x]  Normal    [] Abnormal -   Sclera  [x]  Normal    [] Abnormal -          Discharge [x]  None visible   [] Abnormal -     HENT: [x] Normocephalic, atraumatic  [] Abnormal -   [x] Mouth/Throat: Mucous membranes are moist    External Ears [x] Normal  [] Abnormal -    Neck: [x] No visualized mass [] Abnormal -     Pulmonary/Chest: [x] Respiratory effort normal   [x] No visualized signs of a state where the provider was licensed to provide care.        --Aliza Roberts, APRN - CNP

## 2022-03-09 NOTE — PATIENT INSTRUCTIONS
Instructions for Respiratory Infections (SAVE THIS SHEET)   For the first 7-14 days of symptoms follow instructions below, even before being seen in the office or even during treatment with antibiotics, until symptom free. 1. Water: Drink 1 ounce of water for every 2 pounds of body weight for adults need 64 Ounces of water per day. This will loosen mucus in the head and chest & improve the weak feeling of dehydration, allow the body to get germ fighting resources to the infection. Half can be juice or sugar free Crystal Light. Don't count drinks with caffeine or carbonation. Infants can have Pedialyte liquid or freezer pops. Avoid salt if you have high Blood Pressure, swelling in the feet or ankles or have heart problems. 2. Humidity: Humidify the air to 35-50% ( or until the windows fog over slightly). Can use a humidifier, vaporizer, boil water on the stove or put a coffee can full of water on the heater vents. This will loosen mucus from infections and allergies. 3. Sleep: Get 8-10 hours a night and rest during the evening after work or school. If you have trouble sleeping, adults can take Melatonin 3mg up to 3 tabs at bedtime ( not for children or pregnant women). If Mono is suspected then sleep during 9PM to 9AM time span (if possible.)   4. Cough: Take cough medicines with Guaifenesin ( to loosen chest or head congestion) and Dextromethorphan ( to decrease excess cough). Robitussin D.M. Syrup every 4-6 hrs or Mucinex D.M. Pills twice a day. Use the pediatric formulations for children over 6 months making sure they are alcohol & sugar free for children, pregnant women, and diabetics. 5. Pain And Fevers: Take Acetaminophen ( Tylenol) for fevers, aches, and headaches. 2-500 mg every 8 hours for adults. Appropriate doses at bedtime for children may help them sleep better. If pregnant take 1 -500 mg. (Tylenol) every 8 hours as needed.  Ibuprofen may be used if not pregnant, but should be given with food to avoid nausea. Avoid Ibuprofen if you have high blood pressure, CHF, or kidney problems. 6.Gargle: Gargle in the back of the throat with the head tilted back and to the sides with a strong mouthwash ( Listerine or Scope) after meals and at bedtime at least 4 -5 times a day. This helps kill bacteria and viruses in the back of the throat and will shorten the duration and decrease the severity of your symptoms: sore throat, cough, ear popping,/ear pain, and possibly dizziness. 7. Smoking: Avoid smoking or exposure to second hand smoke. 8. Zinc: Zinc lozenges such as Cold Filemon, or Basic will help shorten the duration and lessen symptoms such as sore throat, cough, nasal congestion, runny nose, and post nasal drip. Use 1 lozenge every 2-4 hours ( after meals if stomach is sensitive). Children can use 10-15 mg. Or less 3-4 times a day or Zinc lollypops. In pregnancy limit to 50-60 mg. A day for 7 days as prenatals have Zinc also. With diarrhea use zinc pills 50 mg 1/2 to 1 pill 2x/day. 9. Vitamins: Vitamin C 500 mg. With breakfast and dinner. Children and pregnant women should drink citrus juices. This speeds healing and strengthens immune system. 10. Chest Symptoms: Vicks Vapor rub to the chest at bedtime. 11. Decongestants: Avoid all decongestants and antihistamine cold preparations in children. Try all of the above starting with day 1 of symptoms. If Strep throat symptoms appear call to be seen in the office as soon as possible and don't gargle on that day. Newborns, infants, or anyone with earaches or influenza may need to be seen quickly. Adults with fevers over 103 degrees or shortness of breath should call the office immediately. - doxycycline (ADOXA) 100 MG tablet; Take 1 tablet by mouth 2 times daily for 7 days. Use if not getting better. Tobacco abuse   Cut back as much as you can.

## 2022-03-14 ENCOUNTER — TELEPHONE (OUTPATIENT)
Dept: FAMILY MEDICINE CLINIC | Age: 47
End: 2022-03-14

## 2022-03-14 DIAGNOSIS — F43.21 GRIEF REACTION: Primary | ICD-10-CM

## 2022-03-14 RX ORDER — LORAZEPAM 1 MG/1
1 TABLET ORAL 3 TIMES DAILY PRN
Qty: 21 TABLET | Refills: 0 | Status: SHIPPED | OUTPATIENT
Start: 2022-03-14 | End: 2022-03-21

## 2022-03-14 NOTE — TELEPHONE ENCOUNTER
Mishel her brother has passed away and she needs something to help her cope    [place]), [place] [address]  [phone] - F [phone]    Pt @  [phone]

## 2022-03-17 ENCOUNTER — OFFICE VISIT (OUTPATIENT)
Dept: ORTHOPEDIC SURGERY | Age: 47
End: 2022-03-17
Payer: COMMERCIAL

## 2022-03-17 ENCOUNTER — OFFICE VISIT (OUTPATIENT)
Dept: FAMILY MEDICINE CLINIC | Age: 47
End: 2022-03-17
Payer: COMMERCIAL

## 2022-03-17 VITALS — HEIGHT: 65 IN | WEIGHT: 270 LBS | BODY MASS INDEX: 44.98 KG/M2

## 2022-03-17 VITALS
BODY MASS INDEX: 43.87 KG/M2 | DIASTOLIC BLOOD PRESSURE: 78 MMHG | SYSTOLIC BLOOD PRESSURE: 126 MMHG | HEIGHT: 66 IN | WEIGHT: 273 LBS | TEMPERATURE: 98 F | HEART RATE: 78 BPM | OXYGEN SATURATION: 96 %

## 2022-03-17 DIAGNOSIS — Z13.220 LIPID SCREENING: ICD-10-CM

## 2022-03-17 DIAGNOSIS — F31.9 BIPOLAR DISORDER WITH DEPRESSION (HCC): Primary | ICD-10-CM

## 2022-03-17 DIAGNOSIS — Z96.641 HISTORY OF TOTAL HIP ARTHROPLASTY, RIGHT: Primary | ICD-10-CM

## 2022-03-17 DIAGNOSIS — M16.12 PRIMARY OSTEOARTHRITIS OF LEFT HIP: ICD-10-CM

## 2022-03-17 DIAGNOSIS — I10 ESSENTIAL HYPERTENSION, BENIGN: ICD-10-CM

## 2022-03-17 DIAGNOSIS — Z13.1 DIABETES MELLITUS SCREENING: ICD-10-CM

## 2022-03-17 PROCEDURE — G8427 DOCREV CUR MEDS BY ELIG CLIN: HCPCS | Performed by: ORTHOPAEDIC SURGERY

## 2022-03-17 PROCEDURE — G8417 CALC BMI ABV UP PARAM F/U: HCPCS | Performed by: ORTHOPAEDIC SURGERY

## 2022-03-17 PROCEDURE — 4004F PT TOBACCO SCREEN RCVD TLK: CPT | Performed by: NURSE PRACTITIONER

## 2022-03-17 PROCEDURE — G8484 FLU IMMUNIZE NO ADMIN: HCPCS | Performed by: NURSE PRACTITIONER

## 2022-03-17 PROCEDURE — 99214 OFFICE O/P EST MOD 30 MIN: CPT | Performed by: NURSE PRACTITIONER

## 2022-03-17 PROCEDURE — 36415 COLL VENOUS BLD VENIPUNCTURE: CPT | Performed by: NURSE PRACTITIONER

## 2022-03-17 PROCEDURE — G8427 DOCREV CUR MEDS BY ELIG CLIN: HCPCS | Performed by: NURSE PRACTITIONER

## 2022-03-17 PROCEDURE — G8484 FLU IMMUNIZE NO ADMIN: HCPCS | Performed by: ORTHOPAEDIC SURGERY

## 2022-03-17 PROCEDURE — G8417 CALC BMI ABV UP PARAM F/U: HCPCS | Performed by: NURSE PRACTITIONER

## 2022-03-17 PROCEDURE — 4004F PT TOBACCO SCREEN RCVD TLK: CPT | Performed by: ORTHOPAEDIC SURGERY

## 2022-03-17 PROCEDURE — 99213 OFFICE O/P EST LOW 20 MIN: CPT | Performed by: ORTHOPAEDIC SURGERY

## 2022-03-17 ASSESSMENT — PATIENT HEALTH QUESTIONNAIRE - PHQ9
7. TROUBLE CONCENTRATING ON THINGS, SUCH AS READING THE NEWSPAPER OR WATCHING TELEVISION: 3
3. TROUBLE FALLING OR STAYING ASLEEP: 3
6. FEELING BAD ABOUT YOURSELF - OR THAT YOU ARE A FAILURE OR HAVE LET YOURSELF OR YOUR FAMILY DOWN: 3
2. FEELING DOWN, DEPRESSED OR HOPELESS: 3
SUM OF ALL RESPONSES TO PHQ QUESTIONS 1-9: 25
5. POOR APPETITE OR OVEREATING: 3
9. THOUGHTS THAT YOU WOULD BE BETTER OFF DEAD, OR OF HURTING YOURSELF: 2
SUM OF ALL RESPONSES TO PHQ QUESTIONS 1-9: 25
8. MOVING OR SPEAKING SO SLOWLY THAT OTHER PEOPLE COULD HAVE NOTICED. OR THE OPPOSITE, BEING SO FIGETY OR RESTLESS THAT YOU HAVE BEEN MOVING AROUND A LOT MORE THAN USUAL: 2
SUM OF ALL RESPONSES TO PHQ QUESTIONS 1-9: 23
1. LITTLE INTEREST OR PLEASURE IN DOING THINGS: 3
SUM OF ALL RESPONSES TO PHQ9 QUESTIONS 1 & 2: 6
4. FEELING TIRED OR HAVING LITTLE ENERGY: 3
SUM OF ALL RESPONSES TO PHQ QUESTIONS 1-9: 25
10. IF YOU CHECKED OFF ANY PROBLEMS, HOW DIFFICULT HAVE THESE PROBLEMS MADE IT FOR YOU TO DO YOUR WORK, TAKE CARE OF THINGS AT HOME, OR GET ALONG WITH OTHER PEOPLE: 2

## 2022-03-17 ASSESSMENT — COLUMBIA-SUICIDE SEVERITY RATING SCALE - C-SSRS
6. HAVE YOU EVER DONE ANYTHING, STARTED TO DO ANYTHING, OR PREPARED TO DO ANYTHING TO END YOUR LIFE?: NO
1. WITHIN THE PAST MONTH, HAVE YOU WISHED YOU WERE DEAD OR WISHED YOU COULD GO TO SLEEP AND NOT WAKE UP?: YES
2. HAVE YOU ACTUALLY HAD ANY THOUGHTS OF KILLING YOURSELF?: NO

## 2022-03-17 NOTE — PATIENT INSTRUCTIONS
Patient Education        Bipolar Disorder: Care Instructions  Your Care Instructions     Bipolar disorder is an illness that causes extreme mood changes, from times of very high energy (manic episodes) to times of depression. But many people with bipolar disorder show only the symptoms of depression. These moods may cause problems with your work, school, family life, friendships, and how well you function. This disease is also called manic-depression. There is no cure for bipolar disorder, but it can be helped with medicines. Counseling may also help. It is important to take your medicines exactly as prescribed, even when you feel well. You may need lifelong treatment. Follow-up care is a key part of your treatment and safety. Be sure to make and go to all appointments, and call your doctor if you are having problems. It's also a good idea to know your test results and keep a list of the medicines you take. How can you care for yourself at home? · Be safe with medicines. Take your medicines exactly as prescribed. Do not stop or change a medicine without talking to your doctor first. Keara Bishop and your doctor may need to try different combinations of medicines to find what works for you. · Take your medicines on schedule to keep your moods even. When you feel good, you may think that you do not need your medicines. But it is important to keep taking them. · Go to your counseling sessions. Call and talk with your counselor if you can't go to a session or if you don't think the sessions are helping. Do not just stop going. · Get at least 30 minutes of activity on most days of the week. Walking is a good choice. You also may want to do other things, such as running, swimming, or cycling. · Get enough sleep. Keep your room dark and quiet. Try to go to bed at the same time every night. · Eat a healthy diet. This includes whole grains, dairy, fruits, vegetables, and protein.  Eat foods from each of these groups. · Try to lower your stress. Manage your time, build a strong support system, and lead a healthy lifestyle. To lower your stress, try physical activity, slow deep breathing, or getting a massage. · Do not use alcohol, marijuana, or illegal drugs. · Learn the early signs of your mood changes. You can then take steps to help yourself feel better. · Ask for help from friends and family when you need it. You may need help with daily chores when you are depressed. When you are manic, you may need support to control your high energy levels. What should you do if someone in your family has bipolar disorder? · Learn about the disease and signs it's getting worse. · Remind your family member you love them. · Make a plan with all family members about how to take care of your loved one when symptoms are bad. · Remind yourself it will take time for changes to occur. · Try not to blame yourself for the disease. · Know your legal rights and the legal rights of your family member. Support groups or counselors can help with this information. · Take care of yourself. Keep up with your interests, such as career, hobbies, and friends. Use exercise, positive self-talk, deep breathing, and other relaxing exercises to help lower your stress. · Give yourself time to grieve. You may need to deal with emotions such as anger, fear, and frustration. · If you are having a hard time with your feelings or with your relationship with your family member, talk with a counselor. When should you call for help? Call 911 anytime you think you may need emergency care. For example, call if:    · You feel like hurting yourself or someone else.     · Someone who has bipolar disorder displays dangerous behavior, and you think the person might hurt himself or herself or someone else. Call your doctor now or seek immediate medical care if:    · You hear voices.     · Someone you know has bipolar disorder and talks about suicide.  Keep the numbers for these national suicide hotlines: 2-369-691-TALK (2-477.309.5375) and 2-324-MINHBXO (6-263.337.5516). If a suicide threat seems real, with a specific plan and a way to carry it out, stay with the person, or ask someone you trust to stay with the person, until you can get help.     · Someone you know has bipolar disorder and:  ? Starts to give away possessions. ? Is using illegal drugs or drinking alcohol heavily. ? Talks or writes about death, including writing suicide notes or talking about guns, knives, or pills. ? Talks or writes about hurting someone else. ? Starts to spend a lot of time alone. ? Acts very aggressively or suddenly appears calm. ? Talks about beliefs that are not based in reality (delusions). Watch closely for changes in your health, and be sure to contact your doctor if:    · You cannot go to your counseling sessions. Where can you learn more? Go to https://Emissary.Ariel Way. org and sign in to your [employer] account. Enter K052 in the [employer] box to learn more about \"Bipolar Disorder: Care Instructions. \"     If you do not have an account, please click on the \"Sign Up Now\" link. Current as of: June 16, 2021               Content Version: 13.1  © 2784-2847 Healthwise, Incorporated. Care instructions adapted under license by [facility] ([facility]). If you have questions about a medical condition or this instruction, always ask your healthcare professional. [place] [address] any warranty or liability for your use of this information. Patient Education        Depression and Chronic Disease: Care Instructions  Your Care Instructions     A chronic disease is one that you have for a long time. Some chronic diseases can be controlled, but they usually cannot be cured. Depression is common in people with chronic diseases, but it often goes unnoticed. Many people have concerns about seeking treatment for a mental health problem.  You may think it's a sign of weakness, or you don't want people to know about it. It's important to overcome these reasons for not seeking treatment. Treating depression or anxiety is good for your health. Follow-up care is a key part of your treatment and safety. Be sure to make and go to all appointments, and call your doctor if you are having problems. It's also a good idea to know your test results and keep a list of the medicines you take. How can you care for yourself at home? Watch for symptoms of depression  The symptoms of depression are often subtle at first. You may think they are caused by your disease rather than depression. Or you may think it is normal to be depressed when you have a chronic disease. If you are depressed you may:  · Feel sad or hopeless. · Feel guilty or worthless. · Not enjoy the things you used to enjoy. · Feel hopeless, as though life is not worth living. · Have trouble thinking or remembering. · Have low energy, and you may not eat or sleep well. · Pull away from others. · Think often about death or killing yourself. (Keep the numbers for these national suicide hotlines: 2-211-440-TALK [1-248.247.7562] and 8-454-RKTFBOA [1-484.325.9072]. )  Get treatment  By treating your depression, you can feel more hopeful and have more energy. If you feel better, you may take better care of yourself, so your health may improve. · Talk to your doctor if you have any changes in mood during treatment for your disease. · Ask your doctor for help. Counseling, antidepressant medicine, or a combination of the two can help most people with depression. Often a combination works best. Counseling can also help you cope with having a chronic disease. When should you call for help? Call 911 anytime you think you may need emergency care. For example, call if:    · You feel like hurting yourself or someone else.     · Someone you know has depression and is about to attempt or is attempting suicide. Call your doctor now or seek immediate medical care if:    · You hear voices.     · Someone you know has depression and:  ? Starts to give away his or her possessions. ? Uses illegal drugs or drinks alcohol heavily. ? Talks or writes about death, including writing suicide notes or talking about guns, knives, or pills. ? Starts to spend a lot of time alone. ? Acts very aggressively or suddenly appears calm. Watch closely for changes in your health, and be sure to contact your doctor if:    · You do not get better as expected. Where can you learn more? Go to https://ME911pePlasmoneb.Cannae. org and sign in to your [employer] account. Enter S722 in the [employer] box to learn more about \"Depression and Chronic Disease: Care Instructions. \"     If you do not have an account, please click on the \"Sign Up Now\" link. Current as of: June 16, 2021               Content Version: 13.1  © 2006-2021 Healthwise, [facility]. Care instructions adapted under license by [facility] ([facility]). If you have questions about a medical condition or this instruction, always ask your healthcare professional. [place] [address] any warranty or liability for your use of this information.

## 2022-03-17 NOTE — PROGRESS NOTES
Donna Zuluaga (:  1975) is a 50 y.o. female,Established patient, here for evaluation of the following chief complaint(s): Annual Exam (PHYSICAL AND FASTING LABS )      SUBJECTIVE/OBJECTIVE:  HPI  PT STATES SHE HAS FELT REALLY BAD DEPRESSED FOR AWHILE   SHE HAS GOOD DAYS AND BAD DAYS  THINKS ABOUT HER LIFE IF HER  DIES HE IS 6YEARS OLDER THT HER AND SHE WORRIES ABOUT BEING A BURDEN ON HER KIDS IF HE   SHE HAS THOUGHTS OF WHAT IF I WASN'T HERE BUT IT WOULD TEAR HER KIDS APART  HER BROTHER JUST  AND SHE THOUGHT   IF THAT WAS HER SHE WOULD BE IN A BETTER PLACE- EVEN WHEN SHE HAS A GOOD DAY THE THOUGHT THAT IF SHE WASN'T HERE IS ALWAYS PRESENT -   SHE BABYSITS  AND THINKS WHAT  COULD SHE DO BUT THEN SHE KNOWS SHE WOULD JUST SLEEP  HER HOUSE NEEDS SOME ATTENTION - BUT SHE HAS NO DESIRE TO DO ANYTHING BUT THE BARE MINIMUM  SHE MISSES THE HIGHS OF BEING HIGH IN REGARDS TO HER MOOD NOW SHE IS JUST DOWN  STATES SHE WOULD NEVER HARM - KILL HERSELF BECAUSE SHE LOVES HER CHILDREN TOO MUCH  SHE IS TAKING LAMICTAL - LITHIUM - PROZAC AS PRESCRIBED  [facility] DID NOT HELP MUCH - IT JUST MADE HER WANT TO EAT  SHE IS DOING BETTER IN REGARDS TO MOURNING THE LOSS OF HER BROTHER -  3/15 WAS HIS BURIAL SHE WAS WITH HER WHOLE FAMILY SHE FELT LIKE SHE COULD BREATH       Hypertension: Patient here for follow-up of elevated blood pressure. She is not exercising and is adherent to low salt diet. She does not check her blood pressureat home. Cardiac symptoms none. Patient denies chest pain, chest pressure/discomfort, claudication, dyspnea, exertional chest pressure/discomfort, fatigue, irregular heart beat, lower extremity edema, near-syncope, orthopnea, palpitations, paroxysmal nocturnal dyspnea, syncope and tachypnea. Cardiovascular risk factors: dyslipidemia, hypertension, obesity (BMI >= 30 kg/m2) and sedentary lifestyle. Use of agents associated with hypertension: none.  History of target organ damage: none.    Review of Systems   Psychiatric/Behavioral: Positive for decreased concentration, dysphoric mood and sleep disturbance. Negative for self-injury and suicidal ideas. Physical Exam  Vitals reviewed. Constitutional:       General: She is awake. She is not in acute distress. Appearance: Normal appearance. She is well-developed and well-groomed. She is morbidly obese. She is not ill-appearing, toxic-appearing or diaphoretic. Cardiovascular:      Rate and Rhythm: Normal rate and regular rhythm. Heart sounds: Normal heart sounds, S1 normal and S2 normal. Heart sounds not distant. No murmur heard. No systolic murmur is present. No diastolic murmur is present. No friction rub. No gallop. No S3 or S4 sounds. Pulmonary:      Effort: Pulmonary effort is normal.      Breath sounds: Normal breath sounds and air entry. Musculoskeletal:      Right lower leg: No edema. Left lower leg: No edema. Neurological:      Mental Status: She is alert and oriented to person, place, and time. Psychiatric:         Attention and Perception: Attention and perception normal.         Mood and Affect: Mood normal. Affect is tearful. Speech: Speech normal.         Behavior: Behavior normal. Behavior is cooperative. Thought Content: Thought content normal.         Cognition and Memory: Cognition normal.         Judgment: Judgment normal.           Grayson Hugo was seen today for annual exam.    Diagnoses and all orders for this visit:    Bipolar disorder with depression ([facility] [address]  [facility] AFTER RESULTS REVIEWED   ENCOURAGED TO RECONSIDER THERAPY  WILL DISCUSS ADHD AFTER MOOD IS STABILIZED    -     Lithium Level; Future  -     Lamotrigine Level;  Future  -     Lithium Level  -     Lamotrigine Level    Essential hypertension, benign  WELL CONTROLLED   CONTINUE COZAAR - NORVASC  ENCOURAGED TO MONITOR  B/P    Lifestyle Recommendations for High Blood Pressure:  Reduce sodium intake to less than 1500 mg daily  Include 5-7 servings of fruits and vegetables daily  Reduce weight to ideal if overweight  30 minutes of physical activity daily      Lipid screening  -     Lipid Panel; Future  -     Lipid Panel    Diabetes mellitus screening  -     Comprehensive Metabolic Panel; Future  -     Comprehensive Metabolic Panel            An electronic signature was used to authenticate this note.     --Deepti Colon, APRN - CNP

## 2022-03-17 NOTE — PROGRESS NOTES
History and Physical      Ata Santana  YOB: 1975    Date of Service:  3/17/2022    Chief Complaint:   Ata Santana is a 50 y.o. female who presents for complete physical examination. HPI: ***    Wt Readings from Last 3 Encounters:   01/25/22 280 lb (127 kg)   01/11/22 280 lb (127 kg)   12/17/21 274 lb 12.8 oz (124.6 kg)     BP Readings from Last 3 Encounters:   01/25/22 (!) 151/86   01/11/22 (!) 162/92   12/17/21 126/82       Patient Active Problem List   Diagnosis    Migraine    Amenorrhea, secondary    Depression    Gastritis    TMJ (temporomandibular joint syndrome)    Lipoma    Grief reaction    Anxiety    Tobacco abuse    Insomnia    Bipolar affective disorder (HCC)    VICTORIANO (obstructive sleep apnea)    Periodic limb movement disorder (PLMD)    Primary osteoarthritis of both hips    Left hip pain    Arthritis of right hip    Obesity, Class III, BMI 40-49.9 (morbid obesity) ([address]    Essential hypertension, benign    Bipolar disorder with depression ([address]    Morbid obesity ([address]    Tear of right rotator cuff    Hypersomnia    Traumatic complete tear of right rotator cuff    History of total hip arthroplasty, right    Cervical spondylosis    Cervical radicular pain    DDD (degenerative disc disease), cervical    Claustrophobia    Cervical stenosis of spine       Allergies   Allergen Reactions    Imitrex [Sumatriptan] Nausea Only     nausea     Outpatient Medications Marked as Taking for the 3/17/22 encounter (Office Visit) with ROSSANA Vernon CNP   Medication Sig Dispense Refill    LORazepam (ATIVAN) 1 MG tablet Take 1 tablet by mouth 3 times daily as needed for Anxiety for up to 7 days. 21 tablet 0    zolpidem (AMBIEN CR) 12.5 MG extended release tablet Take 1 tablet by mouth at bedtime.       lamoTRIgine (LAMICTAL) 100 MG tablet Take 1 tablet by mouth once daily 30 tablet 1    gabapentin (NEURONTIN) 300 MG capsule TAKE 1 CAPSULE BY MOUTH THREE TIMES DAILY 90 capsule 1    baclofen (LIORESAL) 10 MG tablet Take 10 mg by mouth 3 times daily      FLUoxetine (PROZAC) 20 MG capsule Take 2 capsules by mouth once daily 180 capsule 0    losartan (COZAAR) 25 MG tablet Take 1 tablet by mouth daily 90 tablet 1    lithium 300 MG capsule 1 tablet in morning and 2 tablets in the evening. (Patient taking differently: Take 300 mg by mouth every morning ) 270 capsule 1    ibuprofen (ADVIL;MOTRIN) 800 MG tablet Take 1 tablet by mouth 3 times daily (with meals) 270 tablet 1    amLODIPine (NORVASC) 10 MG tablet Take 1 tablet by mouth once daily 90 tablet 3    ammonium lactate (LAC-HYDRIN) 12 % lotion Apply topically daily.  500 g 0       Past Medical History:   Diagnosis Date    Arthritis     Back pain     Bipolar disorder ([facility]     Depression     Gastritis     Hypertension     Insomnia     Migraine     Neuropathy     PLMD (periodic limb movement disorder)     Preoperative clearance     Sleep apnea     uses C-PAP    TMJ (temporomandibular joint syndrome)      Past Surgical History:   Procedure Laterality Date    COLPOSCOPY      ENDOMETRIAL ABLATION      FOOT SURGERY      HIP SURGERY      JOINT REPLACEMENT Right 02/19/2019    RIGHT LATERAL TOTAL HIP REPLACEMENT    NERVE BLOCK N/A 10/26/2021    BILATERAL C5-C7 MEDIAL BRANCH BLOCK WITH FLUOROSCOPY performed by Xiomara Esqueda MD at [address] [place] [address] N/A 7/6/2021    C5C6  EPIDURAL STERIOD INJECTION WITH FLUOROSCOPY performed by Xiomara Esqueda MD at [address] [place] [address] Bilateral 8/31/2021    BILATERAL C5, C6, C7 MEDIAL BRANCH BLOCK WITH FLUOROSCOPY (77553, 01210)-NO STEROIDS performed by Xiomara Esqueda MD at [address] [place] [address] Left 1/11/2022    LEFT C4, C5, C6 RADIOFREQUENCY ABLATION WITH FLUOROSCOPY performed by Xiomara Eqsueda MD at [address] [place] [address] Right 1/25/2022    RIGHT C-4-C-6 NERVE RADIOFREQUENCY ABLATION WITH FLUOROSCOPY performed by Lauren Charles MD at [address] [facility] ARTHROSCOPY Right 11/25/2019    RIGHT SHOULDER ARTHROSCOPY, SUBACROMIAL DECOMPRESSION,   ROTATOR CUFF REPAIR performed by Christina Spencer MD at [facility] Right 2/19/2019    RIGHT LATERAL TOTAL HIP REPLACEMENT performed by Kiki Richards MD at [facility] History   Problem Relation Age of Onset    COPD Mother     Alcohol Abuse Mother     Mult Sclerosis Father     Alcohol Abuse Sister     High Cholesterol Brother     Diabetes Brother     Obesity Brother     Arthritis Brother     Hypertension Brother     Elevated Lipids Brother     COPD Brother     Other Maternal Grandmother         hips replacement    Obesity Maternal Grandmother     Diabetes Maternal Grandmother     Breast Cancer Maternal Grandmother     No Known Problems Maternal Grandfather     No Known Problems Paternal Grandmother     Diabetes Paternal Grandfather     Arthritis Sister     Depression Daughter      Social History     Socioeconomic History    Marital status:      Spouse name: Not on file    Number of children: Not on file    Years of education: Not on file    Highest education level: Not on file   Occupational History    Occupation: bank collections   Tobacco Use    Smoking status: Current Every Day Smoker     Packs/day: 0.50     Years: 10.00     Pack years: 5.00     Types: Cigarettes     Start date: 9/17/1987    Smokeless tobacco: Never Used    Tobacco comment: resumed 1/20  2 packs per week   Vaping Use    Vaping Use: Never used   Substance and Sexual Activity    Alcohol use:  Yes     Alcohol/week: 5.0 standard drinks     Types: 6 Standard drinks or equivalent per week     Comment: rarely    Drug use: Not Currently     Types: Marijuana Aman Sanabria     Comment: - last smoke months ago-1/2019    Sexual activity: Yes   Other Topics Concern    Not on file   Social History Narrative    Not on file     Social Determinants of Health     Financial Resource Strain: Low Risk     Difficulty of Paying Living Expenses: Not hard at all   Food Insecurity: No Food Insecurity    Worried About Running Out of Food in the Last Year: Never true    [address] in the Last Year: Never true   Transportation Needs: No Transportation Needs    Lack of Transportation (Medical): No    Lack of Transportation (Non-Medical): No   Physical Activity:     Days of Exercise per Week: Not on file    Minutes of Exercise per Session: Not on file   Stress:     Feeling of Stress : Not on file   Social Connections:     Frequency of Communication with Friends and Family: Not on file    Frequency of Social Gatherings with Friends and Family: Not on file    Attends [employer] Services: Not on file    Active Member of [address] [place] [address] [employer] or Organizations: Not on file    Attends Club or Organization Meetings: Not on file    Marital Status: Not on file   Intimate Partner Violence:     Fear of Current or Ex-Partner: Not on file    Emotionally Abused: Not on file    Physically Abused: Not on file    Sexually Abused: Not on file   Housing Stability:     Unable to Pay for Housing in the Last Year: Not on file    Number of [place] in the Last Year: Not on file    Unstable Housing in the Last Year: Not on file       Review of Systems:  {ROS Comprehensive:95702::\"A comprehensive review of systems was negative except for what was noted in the HPI. \"}     Physical Exam:   Vitals:    03/17/22 1029   BP: 126/78   Site: Left Upper Arm   Position: Sitting   Cuff Size: Large Adult   Pulse: 78   Temp: 98 °F (36.7 °C)   TempSrc: Temporal   SpO2: 96%   Weight: 273 lb (123.8 kg)   Height: 5' 6\" (1.676 m)     Body mass index is 44.06 kg/m². Constitutional: She is oriented to person, place, and time. She appears well-developed and well-nourished. No distress. HEENT:   Head: Normocephalic and atraumatic.    Right Ear: Tympanic membrane, external ear and ear canal normal.   Left Ear: Tympanic membrane, external ear and ear canal normal.   Mouth/Throat: Oropharynx is clear and moist, and mucous membranes are normal.  There is no cervical adenopathy. Eyes: Conjunctivae and extraocular motions are normal. Pupils are equal, round, and reactive to light. Neck: Supple. No JVD present. Carotid bruit is not present. No mass and no thyromegaly present. Cardiovascular: Normal rate, regular rhythm, normal heart sounds and intact distal pulses. Exam reveals no gallop and no friction rub. No murmur heard. Pulmonary/Chest: Effort normal and breath sounds normal. No respiratory distress. She has no wheezes, rhonchi or rales. Abdominal: Soft, non-tender. Bowel sounds and aorta are normal. She exhibits no organomegaly, mass or bruit. Genitourinary: {Pelvic General Physical exam:32801::\"normal external genitalia, vulva, vagina, cervix, uterus and adnexa\"}. Breast exam:  {pe breast exam:534114::\"breasts appear normal, no suspicious masses, no skin or nipple changes or axillary nodes\"}. Musculoskeletal: Normal range of motion, no synovitis. She exhibits no edema. Neurological: She is alert and oriented to person, place, and time. She has normal reflexes. No cranial nerve deficit. Coordination normal.   Skin: Skin is warm and dry. There is no rash or erythema. No suspicious lesions noted. Psychiatric: She has a normal mood and affect.  Her speech is normal and behavior is normal. Judgment, cognition and memory are normal.     Preventive Care:  Health Maintenance   Topic Date Due    Pneumococcal 0-64 years Vaccine (1 of 2 - PPSV23) Never done    Flu vaccine (1) 09/01/2021    COVID-19 Vaccine (3 - Booster for Pfizer series) 09/30/2021    Potassium monitoring  02/08/2022    Creatinine monitoring  02/08/2022    DTaP/Tdap/Td vaccine (3 - Td or Tdap) 03/26/2022    Depression Monitoring  03/09/2023    Colorectal Cancer Screen  08/27/2024    Cervical cancer screen  10/29/2024    Lipid screen  2026    HIV screen  Completed    Hepatitis A vaccine  Aged Out    Hepatitis B vaccine  Aged Out    Hib vaccine  Aged Out    Meningococcal (ACWY) vaccine  Aged Out    Hepatitis C screen  Discontinued      Hx abnormal PAP: {NO/YES:555173927::\"no\"}  Sexual activity: {Persons; sexual partners:705}   Self-breast exams: {yes no:017159}  Last eye exam: ***,{NORMAL/ABNORMAL:168144845::\"normal\"}   Exercise: {EXERCISE ZHTN:780584848}  Seatbelt use: ***       Preventive plan of care for Ying Pedro        3/17/2022           Preventive Measures Status       Recommendations for screening   Colon Cancer Screen   Last colonoscopy: *** {Screenin:p}   Breast Cancer Screen  Last mammogram: ***  {Screenin:p}   Cervical Cancer Screen   Last PAP smear: *** {Screenin:p}   Osteoporosis Screen   Last DXA scan: *** {Screenin:p}   Diabetes Screen  Glucose (mg/dL)   Date Value   2021 96    {Screenin:p}   Cholesterol Screen  Lab Results   Component Value Date    CHOL 248 (H) 2019    TRIG 154 (H) 2019    HDL 59 2021    LDLCALC 137 (H) 2021    {Screenin:p}   Aspirin for Cardiovascular Prevention   {YES / NO:21229} {CARDIOVASCULAR PREVENTION, OUQRCIS:75854}   Weight: Body mass index is 44.06 kg/m².   5' 6\" (1.676 m)273 lb (123.8 kg)    {WEIGHT SCREENIN:p}   Living Will: {YES / RN:08664}   {LIVING WILL SCREENIN}    Recommended Immunizations    Immunization History   Administered Date(s) Administered    COVID-19, Pfizer Purple top, DILUTE for use, 12+ yrs, 30mcg/0.3mL dose 2021, 2021    Influenza Vaccine, unspecified formulation 2015, 10/21/2016    Influenza Virus Vaccine 2015, 10/21/2016    Tdap (Boostrix, Adacel) 2010, 2012        {RECOMMENDED VACCINES:82331:p}         Other Recommendations ·   {PLAN OF CARE OTHER RECOMMENDATIONS:64830:p} Assessment/Plan:    There are no diagnoses linked to this encounter.

## 2022-03-18 LAB
A/G RATIO: 1.8 (ref 1.1–2.2)
ALBUMIN SERPL-MCNC: 4.2 G/DL (ref 3.4–5)
ALP BLD-CCNC: 83 U/L (ref 40–129)
ALT SERPL-CCNC: 23 U/L (ref 10–40)
ANION GAP SERPL CALCULATED.3IONS-SCNC: 13 MMOL/L (ref 3–16)
AST SERPL-CCNC: 16 U/L (ref 15–37)
BILIRUB SERPL-MCNC: <0.2 MG/DL (ref 0–1)
BUN BLDV-MCNC: 14 MG/DL (ref 7–20)
CALCIUM SERPL-MCNC: 9 MG/DL (ref 8.3–10.6)
CHLORIDE BLD-SCNC: 106 MMOL/L (ref 99–110)
CHOLESTEROL, TOTAL: 169 MG/DL (ref 0–199)
CO2: 21 MMOL/L (ref 21–32)
CREAT SERPL-MCNC: 0.7 MG/DL (ref 0.6–1.1)
GFR AFRICAN AMERICAN: >60
GFR NON-AFRICAN AMERICAN: >60
GLUCOSE BLD-MCNC: 85 MG/DL (ref 70–99)
HDLC SERPL-MCNC: 39 MG/DL (ref 40–60)
LDL CHOLESTEROL CALCULATED: 113 MG/DL
LITHIUM DOSE AMOUNT: ABNORMAL
LITHIUM LEVEL: 0.3 MMOL/L (ref 0.6–1.2)
POTASSIUM SERPL-SCNC: 4.4 MMOL/L (ref 3.5–5.1)
SODIUM BLD-SCNC: 140 MMOL/L (ref 136–145)
TOTAL PROTEIN: 6.5 G/DL (ref 6.4–8.2)
TRIGL SERPL-MCNC: 87 MG/DL (ref 0–150)
VLDLC SERPL CALC-MCNC: 17 MG/DL

## 2022-03-18 NOTE — PROGRESS NOTES
[facility]  Office Visit    Chief Complaint: Follow-up s/p right total hip arthroplasty; left hip pain    HPI:  Ron Isaac is a 50 y.o. who is here in follow-up of right total hip arthroplasty performed on February 19, 2019 by Dr. Bonnie Iniguez. This was performed via lateral approach. She comes in today for 3-year follow-up. She reports the right hip is doing well. However, she does report worsening left hip pain. She is now having left hip pain on a daily basis. She describes this is anterior in her groin and going down her thigh. There is no history of injury or surgery. The patient has difficulty putting on socks and shoes, difficulty getting out of a car, difficulty with ambulation and doing activities of daily living including pain at night. Pain at rest is 7 and with activities 9-10/10. She has attempted home therapy exercises but continues to have pain. Medical history is significant for tobacco use (1/3 pack/day of cigarettes). She denies metal allergy. There is no history of DVT or anticoagulation. She does have obstructive sleep apnea. BMI is 44.       Patient Active Problem List   Diagnosis    Migraine    Amenorrhea, secondary    Depression    Gastritis    TMJ (temporomandibular joint syndrome)    Lipoma    Grief reaction    Anxiety    Tobacco abuse    Insomnia    [facility] affective disorder ([facility])    VICTORIANO (obstructive sleep apnea)    Periodic limb movement disorder (PLMD)    Primary osteoarthritis of both hips    Left hip pain    Arthritis of right hip    Obesity, Class III, BMI 40-49.9 (morbid obesity) ([facility])    Essential hypertension, benign    Bipolar disorder with depression ([facility])    Morbid obesity ([facility])    Tear of right rotator cuff    Hypersomnia    Traumatic complete tear of right rotator cuff    History of total hip arthroplasty, right    Cervical spondylosis    Cervical radicular pain    DDD (degenerative disc disease), cervical    Claustrophobia    Cervical stenosis of spine       ROS:  Constitutional: denies fever, chills, weight loss  MSK: denies pain in other joints, muscle aches  Neurological: denies numbness, tingling, weakness    Exam:  Appearance: sitting in exam room chair, appears to be in no acute distress, awake and alert  Resp: unlabored breathing on room air  Skin: warm, dry and intact with out erythema or significant increased temperature  Neuro: grossly intact both lower extremities. Intact sensation to light touch. Motor exam 4+ to 5/5 in all major motor groups. Right hip: Examination demonstrates negative logroll and negative Stinchfield. There is brisk capillary refill. Strength is 5/5 in hamstrings, quads, hip flexors. Left hip: Examination demonstrates pain with logroll and Stinchfield. There is brisk capillary refill. Strength is 5/5 in hamstrings, quads, hip flexors. Imaging:  3 views of the right hip were performed and reviewed today. Significant for total hip arthroplasty prosthesis in place with no signs of osteolysis, loosening, fracture, or dislocation. There is heterotopic ossification present that is stable compared to prior radiographs. 3 views of the left hip were performed and interpreted today. She has joint space narrowing associated with periarticular osteophytes and subchondral sclerosis. She has a cam deformity of the femoral head. Assessment:  S/p right total hip arthroplasty  Left hip osteoarthritis    Plan:  Right hip is doing well clinically and radiographically. However, she continues to have issues due to left hip osteoarthritis. We discussed treatment options to include left total hip arthroplasty. The operative procedure, alternatives, and risks were discussed in detail with the patient. The risks include but are not limited to:  Infection, vessel injury, nerve injury, DVT, pulmonary embolism, implant loosening, need for revision surgery, leg length discrepancy, dislocation, lateral femoral cutaneous nerve palsy, intraoperative fracture. Despite these risks the patient would like to proceed. All questions have been answered and no guarantees were made. I discussed with the patient the diagnosis in detail and answered all questions. The patient verbalized understanding of the plan as it has been described above and is in agreement. Plan for anterior left total hip arthroplasty. We did discuss that BMI over 40 with increased risk of complications including infection. We also discussed tobacco cessation preoperatively. Total time spent on today's encounter was at least 24 minutes. This time included reviewing prior notes, radiographs, and lab results when available, reviewing history obtained by medical assistant, performing history and physical exam, reviewing tests/radiographs with the patient, counseling the patient, ordering medications or tests, documentation in the electronic health record, and coordination of care. This dictation was done with Dragon dictation and may contain mechanical errors related to translation.

## 2022-03-19 LAB — LAMOTRIGINE LEVEL: 3 UG/ML (ref 3–15)

## 2022-03-21 RX ORDER — LOSARTAN POTASSIUM 25 MG/1
TABLET ORAL
Qty: 90 TABLET | Refills: 0 | Status: SHIPPED | OUTPATIENT
Start: 2022-03-21 | End: 2022-06-20

## 2022-03-22 ENCOUNTER — TELEPHONE (OUTPATIENT)
Dept: ORTHOPEDIC SURGERY | Age: 47
End: 2022-03-22

## 2022-03-23 ENCOUNTER — PATIENT MESSAGE (OUTPATIENT)
Dept: ORTHOPEDIC SURGERY | Age: 47
End: 2022-03-23

## 2022-03-23 DIAGNOSIS — M16.12 PRIMARY OSTEOARTHRITIS OF LEFT HIP: Primary | ICD-10-CM

## 2022-03-23 DIAGNOSIS — Z13.31 POSITIVE DEPRESSION SCREENING: ICD-10-CM

## 2022-03-23 DIAGNOSIS — F31.61 BIPOLAR DISORDER, CURRENT EPISODE MIXED, MILD (HCC): ICD-10-CM

## 2022-03-23 RX ORDER — FLUOXETINE HYDROCHLORIDE 20 MG/1
CAPSULE ORAL
Qty: 180 CAPSULE | Refills: 0 | Status: SHIPPED | OUTPATIENT
Start: 2022-03-23 | End: 2022-07-06

## 2022-03-23 RX ORDER — LITHIUM CARBONATE 300 MG/1
CAPSULE ORAL
Qty: 270 CAPSULE | Refills: 0 | Status: SHIPPED | OUTPATIENT
Start: 2022-03-23 | End: 2022-10-07

## 2022-03-23 NOTE — TELEPHONE ENCOUNTER
From: Dayana Castillo  To: Chema Barbour  Sent: 3/23/2022 5:21 AM EDT  Subject: Pain    My hip is getting worse by the day. I am using ibuprofen and ice. It is swelling and painful.  Can I have more for the pain till my surgery on the 14th

## 2022-03-23 NOTE — TELEPHONE ENCOUNTER
LV 3/17/22 FOR PHYSICAL WITH [facility] 3/30/22    Component Ref Range & Units 3/17/22 1112 2/8/21 1609 1/14/20 1421 9/19/19 1048 12/19/18 0843   Lithium Lvl 0.6 - 1.2 mmol/L 0.3 Low   0.3 Low   0.5 Low   0.7  0.7    Lithium Dose Amount  Unknown  Unknown  Unknown  Unknown  Unknown

## 2022-03-24 RX ORDER — TRAMADOL HYDROCHLORIDE 50 MG/1
50 TABLET ORAL EVERY 6 HOURS PRN
Qty: 20 TABLET | Refills: 0 | Status: SHIPPED | OUTPATIENT
Start: 2022-03-24 | End: 2022-03-29

## 2022-03-24 NOTE — TELEPHONE ENCOUNTER
CALLED AND SPOKE WITH PT AND ADVISED.  HS
CALLED AND SPOKE WITH PT. THE REFERRAL WE GAVE TO THE PT THEY DO NOT TAKE HER INSURANCE.  HS
Pt was seen in office for this today and given referral
She will need to call her insurance and find one.  And if they need a referral, give us the name and number
THIS MESSAGE CAME IN FROM THE CALL CENTER.   [address] [facility]
calm

## 2022-03-25 ENCOUNTER — TELEPHONE (OUTPATIENT)
Dept: FAMILY MEDICINE CLINIC | Age: 47
End: 2022-03-25

## 2022-03-25 NOTE — TELEPHONE ENCOUNTER
I DO NOT SEE RESULTS HAVE YOU SEEN THEM? PT AWARE WE WILL CALL BACK ON Monday WHEN KIESHA RETURNS. Rickey Perico

## 2022-03-25 NOTE — TELEPHONE ENCOUNTER
----- Message from Radha Allen sent at 3/25/2022  1:10 PM EDT -----  Subject: Results Request    QUESTIONS  Which lab or imaging result is the patient calling about? gene test   Which provider ordered the test? [address]   At what location was the test performed? Date the test was performed? 2022-03-14  Additional Information for Provider? Patient is wanting results to this   test   ---------------------------------------------------------------------------  --------------  CALL BACK INFO  What is the best way for the office to contact you? OK to leave message on   voicemail  Preferred Call Back Phone Number?  [phone]

## 2022-03-30 ENCOUNTER — TELEPHONE (OUTPATIENT)
Dept: ADMINISTRATIVE | Age: 47
End: 2022-03-30

## 2022-03-30 ENCOUNTER — OFFICE VISIT (OUTPATIENT)
Dept: FAMILY MEDICINE CLINIC | Age: 47
End: 2022-03-30
Payer: COMMERCIAL

## 2022-03-30 VITALS
HEART RATE: 88 BPM | SYSTOLIC BLOOD PRESSURE: 126 MMHG | TEMPERATURE: 98.5 F | WEIGHT: 275 LBS | HEIGHT: 66 IN | DIASTOLIC BLOOD PRESSURE: 74 MMHG | OXYGEN SATURATION: 99 % | BODY MASS INDEX: 44.2 KG/M2

## 2022-03-30 DIAGNOSIS — F31.61 BIPOLAR DISORDER, CURRENT EPISODE MIXED, MILD (HCC): ICD-10-CM

## 2022-03-30 DIAGNOSIS — F90.2 ATTENTION DEFICIT HYPERACTIVITY DISORDER (ADHD), COMBINED TYPE: ICD-10-CM

## 2022-03-30 DIAGNOSIS — M16.12 ARTHRITIS OF LEFT HIP: Primary | ICD-10-CM

## 2022-03-30 DIAGNOSIS — Z01.818 PREOP EXAMINATION: ICD-10-CM

## 2022-03-30 DIAGNOSIS — Z72.0 NICOTINE ABUSE: ICD-10-CM

## 2022-03-30 DIAGNOSIS — I10 ESSENTIAL HYPERTENSION, BENIGN: ICD-10-CM

## 2022-03-30 PROCEDURE — 99214 OFFICE O/P EST MOD 30 MIN: CPT | Performed by: NURSE PRACTITIONER

## 2022-03-30 PROCEDURE — G8417 CALC BMI ABV UP PARAM F/U: HCPCS | Performed by: NURSE PRACTITIONER

## 2022-03-30 PROCEDURE — G8427 DOCREV CUR MEDS BY ELIG CLIN: HCPCS | Performed by: NURSE PRACTITIONER

## 2022-03-30 PROCEDURE — G8484 FLU IMMUNIZE NO ADMIN: HCPCS | Performed by: NURSE PRACTITIONER

## 2022-03-30 PROCEDURE — 4004F PT TOBACCO SCREEN RCVD TLK: CPT | Performed by: NURSE PRACTITIONER

## 2022-03-30 RX ORDER — TRAMADOL HYDROCHLORIDE 50 MG/1
50 TABLET ORAL EVERY 6 HOURS PRN
COMMUNITY
End: 2022-04-07 | Stop reason: SDUPTHER

## 2022-03-30 RX ORDER — DESVENLAFAXINE 25 MG/1
25 TABLET, EXTENDED RELEASE ORAL DAILY
Qty: 60 TABLET | Refills: 0 | Status: SHIPPED | OUTPATIENT
Start: 2022-03-30 | End: 2022-06-09 | Stop reason: SDUPTHER

## 2022-03-30 NOTE — LETTER
CONTROLLED SUBSTANCE MEDICATION AGREEMENT     Patient Name: Omar Ayala  Patient YOB: 1975   I understand, that controlled substance medications may be used to help better manage my symptoms and to improve my ability to function at home, work and in social settings. However, I also understand that these medications do have risks, which have been discussed with me, including possible development of physical or psychological dependence. I understand that successful treatment requires mutual trust and honesty between me and my provider. I understand and agree that following this Medication Agreement is necessary in continuing my provider-patient relationship and the success of my treatment plan. Explanation from my Provider: Benefits and Goals of Controlled Substance Medications: There are two potential goals for your treatment: (1) decreased pain and suffering (2) improved daily life functions. There are many possible treatments for your chronic condition(s). Alternatives such as physical therapy, yoga, massage, home daily exercise, meditation, relaxation techniques, injections, chiropractic manipulations, surgery, cognitive therapy, hypnosis and many medications that are not habit-forming may be used. Use of controlled substance medications may be helpful, but they are unlikely to resolve all symptoms or restore all function. Explanation from my Provider: Risks of Controlled Substance Medications:  Opioid pain medications: These medications can lead to problems such as addiction/dependence, sedation, lightheadedness/dizziness, memory issues, falls, constipation, nausea, or vomiting. They may also impair the ability to drive or operate machinery. Additionally, these medications may lower testosterone levels, leading to loss of bone strength, stamina and sex drive.   They may cause problems with breathing, sleep apnea and reduced coughing, which is especially dangerous for patients with lung disease. Overdose or dangerous interactions with alcohol and other medications may occur, leading to death. Hyperalgesia may develop, which means patients receiving opioids for the treatment of pain may become more sensitive to certain painful stimuli, and in some cases, experience pain from ordinarily non-painful stimuli. Women between the ages of 14-53 who could become pregnant should carefully weigh the risks and benefits of opioids with their physicians, as these medications increase the risk of pregnancy complications, including miscarriage,  delivery and stillbirth. It is also possible for babies to be born addicted to opioids. Opioid dependence withdrawal symptoms may include; feelings of uneasiness, increased pain, irritability, belly pain, diarrhea, sweats and goose-flesh. Benzodiazepines and non-benzodiazepine sleep medications: These medications can lead to problems such as addiction/dependence, sedation, fatigue, lightheadedness, dizziness, incoordination, falls, depression, hallucinations, and impaired judgment, memory and concentration. The ability to drive and operate machinery may also be affected. Abnormal sleep-related behaviors have been reported, including sleepwalking, driving, making telephone calls, eating, or having sex while not fully awake. These medications can suppress breathing and worsen sleep apnea, particularly when combined with alcohol or other sedating medications, potentially leading to death. Dependence withdrawal symptoms may include tremors, anxiety, hallucinations and seizures. Stimulants:  Common adverse effects include addiction/dependence, increased blood  pressure and heart rate, decreased appetite, nausea, involuntary weight loss, insomnia,                                                                                                                     Initials:_______   irritability, and headaches.   These risks may increase when these medications are combined with other stimulants, such as caffeine pills or energy drinks, certain weight loss supplements and oral decongestants. Dependence withdrawal symptoms may include depressed mood, loss of interest, suicidal thoughts, anxiety, fatigue, appetite changes and agitation. Testosterone replacement therapy:  Potential side effects include increased risk of stroke and heart attack, blood clots, increased blood pressure, increased cholesterol, enlarged prostate, sleep apnea, irritability/aggression and other mood disorders, and decreased fertility. I agree and understand that I and my prescriber have the following rights and responsibilities regarding my treatment plan:     1. MY RIGHTS:  To be informed of my treatment and medication plan. To be an active participant in my health and wellbeing. 2. MY RESPONSIBILITY AND UNDERSTANDING FOR USE OF MEDICATIONS   I will take medications at the dose and frequency as directed. For my safety, I will not increase or change how I take my medications without the recommendation of my healthcare provider.  I will actively participate in any program recommended by my provider which may improve function, including social, physical, psychological programs.  I will not take my medications with alcohol or other drugs not prescribed to me. I understand that drinking alcohol with my medications increases the chances of side effects, including reduced breathing rate and could lead to personal injury when operating machinery.  I understand that if I have a history of substance use disorders, including alcohol or other illicit drugs, that I may be at increased risk of addiction to my medications.  I agree to notify my provider immediately if I should become pregnant so that my treatment plan can be adjusted.    I agree and understand that I shall only receive controlled substance medications from the prescriber that signed this agreement unless there is written agreement among other prescribers of controlled substances outlining the responsibility of the medications being prescribed.  I understand that the if the controlled medication is not helping to achieve goals, the dosage may be tapered and no longer prescribed. 3. MY RESPONSIBILITY FOR COMMUNICATION / PRESCRIPTION RENEWALS   I agree that all controlled substance medications that I take will be prescribed only by my provider. If another healthcare provider prescribes me medication in an emergency, I will notify my provider within seventy-two (72) hours.  I will arrange for refills at the prescribed interval ONLY during regular office hours. I will not ask for refills earlier than agreed, after-hours, on holidays or weekends. Refills may take up to 72 hours for processing and prescriptions to reach the pharmacy.  I will inform my other health care providers that I am taking these medications and of the existence of this [id number]. In the event of an emergency, I will provide the same information to the emergency department prescribers.  I will keep my provider updated on the pharmacy I am using for controlled medication prescription filling. Initials:_______  4. MY RESPONSIBILITY FOR PROTECTING MEDICATIONS   I will protect my prescriptions and medications. I understand that lost or misplaced prescriptions will not be replaced.  I will keep medications only for my own use and will not share them with others. I will keep all medications away from children.  I agree that if my medications are adjusted or discontinued, I will properly dispose of any remaining medications. I understand that I will be required to dispose of any remaining controlled medications as, directed by my prescriber, prior to being provided with any prescriptions for other controlled medications.   Medication drop box locations can be found at: [email or web address]    5. MY RESPONSIBILITY WITH ILLEGAL DRUGS    I will not use illegal or street drugs or another person's prescription medications not prescribed to me.  If there are identified addiction type symptoms, then referral to a program may be provided by my provider and I agree to follow through with this recommendation. 6. MY RESPONSIBILITY FOR COOPERATION WITH INVESTIGATIONS   I understand that my provider will comply with any applicable law and may discuss my use and/or possible misuse/abuse of controlled substances and alcohol, as appropriate, with any health care provider involved in my care, pharmacist, or legal authority.  I authorize my provider and pharmacy to cooperate fully with law enforcement agencies (as permitted by law) in the investigation of any possible misuse, sale, or other diversion of my controlled substances.  I agree to waive any applicable privilege or right of privacy or confidentiality with respect to these authorizations. 7. PROVIDERS RIGHT TO MONITOR FOR SAFETY: PRESCRIPTION MONITORING / DRUG TESTING   I consent to drug/toxicology screening and will submit to a drug screen upon my providers request to assure I am only taking the prescribed drugs for my safety monitoring. I understand that a drug screen is a laboratory test in which a sample of my urine, blood or saliva is checked to see what drugs I have been taking. This may entail an observed urine specimen, which means that a nurse or other health care provider may watch me provide urine, and I will cooperate if I am asked to provide an observed specimen.  I understand that my provider will check a copy of my State Prescription Monitoring Program () Report in order to safely prescribe medications.  Pill Counts: I consent to pill counts when requested.   I may be asked to bring all my prescribed controlled substance medications, in their original bottles, to all of my scheduled appointments. In addition, my provider may ask me to come to the practice at any time for a random pill count. 8. TERMINATION OF THIS AGREEMENT  For my safety, my prescriber has the right to stop prescribing controlled substance medications and may end this agreement. Initials:_______   Conditions that may result in termination of this agreement:  a. I do not show any improvement in pain, or my activity has not improved. b. I develop rapid tolerance or loss of improvement, as described in my treatment plan.  c. I develop significant side effects from the medication. d. My behavior is not consistent with the responsibilities outlined above, thereby causing safety concerns to continue prescribing controlled substance medications. e. I fail to follow the terms of this agreement. f. Other:____________________________       UNDERSTANDING THIS MEDICATION AGREEMENT:    I have read the above and have had all my questions answered. For chronic disease management, I know that my symptoms can be managed with many types of treatments. A chronic medication trial may be part of my treatment, but I must be an active participant in my care. Medication therapy is only one part of my symptom management plan. In some cases, there may be limited scientific evidence to support the chronic use of certain medications to improve symptoms and daily function. Furthermore, in certain circumstances, there may be scientific information that suggests that the use of chronic controlled substances may worsen my symptoms and increase my risk of unintentional death directly related to this medication therapy. I know that if my provider feels my risk from controlled medications is greater than my benefit, I will have my controlled substance medication(s) compassionately lowered or removed altogether.      I further agree to allow this office to contact my HIPAA contact if there are concerns about my safety and use of the controlled medications. I have agreed to use the prescribed controlled substance medications to me as instructed by my provider and as stated in this Medication Agreement. My initial on each page and my signature below shows that I have read each page and I have had the opportunity to ask questions with answers provided by my provider.     Patient Name (Printed): _____________________________________  Patient Signature:  ______________________   Date: _____________    Prescriber Name (Printed): ___________________________________  Prescriber Signature: _____________________  Date: _____________

## 2022-03-30 NOTE — PROGRESS NOTES
[facility] Consultation      Alexa Boss  YOB: 1975    Date of Service:  3/30/2022    There were no vitals filed for this visit. Wt Readings from Last 2 Encounters:   03/17/22 270 lb (122.5 kg)   03/17/22 273 lb (123.8 kg)     BP Readings from Last 3 Encounters:   03/17/22 126/78   01/25/22 (!) 151/86   01/11/22 (!) 162/92        No chief complaint on file. Allergies   Allergen Reactions    Imitrex [Sumatriptan] Nausea Only     nausea     No outpatient medications have been marked as taking for the 3/30/22 encounter (Appointment) with ROSSANA Menard CNP. This patient presents to the office today for a preoperative consultation at the request of surgeon, Dr. Simona Carey, who plans on performing LEFT ANTERIOR TOTAL HIP REPLACEMENT on April 12 at [facility].  The current problem began 2 years ago, and symptoms have been worsening with time. Conservative therapy: N/A.     SHE WOULD LIKE TO  RESUME VYVANSE THIS WORKED WELL IN THE PAST FOR HER  SHE HAD TO STOP BECAUSE HER NEW PSYCHIATRIST STOPPED IT - THIS WAS WAS A FEW YEARS AGO SHE WAS ON OTHER MEDS AT THE TIME  HAS DIFFICULTY FOCUSING STAYING ON TRACK  SHE IS ALL OVER THE PLACE FEELS LKE A SQUIRELL MIND WANDER -       GENESIGHT TEST REVIEWD WILL TAPER PROZAC AND START PRISTIQ  Planned anesthesia: General   Known anesthesia problems: HAS HAD DIFFICULTY WAKING UP AFTER SURGERY   Bleeding risk: No recent or remote history of abnormal bleeding  Personal or FH of DVT/PE: No    Patient objection to receiving blood products: No    Patient Active Problem List   Diagnosis    Migraine    Amenorrhea, secondary    Depression    Gastritis    TMJ (temporomandibular joint syndrome)    Lipoma    Grief reaction    Anxiety    Tobacco abuse    Insomnia    Bipolar affective disorder ([address]    VICTORIANO (obstructive sleep apnea)    Periodic limb movement disorder (PLMD)    Primary osteoarthritis of both hips    Left hip pain    Arthritis of right hip    Obesity, Class III, BMI 40-49.9 (morbid obesity) (HCC)    Essential hypertension, benign    Bipolar disorder with depression (HCC)    Morbid obesity (HCC)    Tear of right rotator cuff    Hypersomnia    Traumatic complete tear of right rotator cuff    History of total hip arthroplasty, right    Cervical spondylosis    Cervical radicular pain    DDD (degenerative disc disease), cervical    Claustrophobia    Cervical stenosis of spine       Past Medical History:   Diagnosis Date    Arthritis     Back pain     Bipolar disorder (HCC)     Depression     Gastritis     Hypertension     Insomnia     Migraine     Neuropathy     PLMD (periodic limb movement disorder)     PATIENT UNAWARE    Preoperative clearance     Prolonged emergence from general anesthesia     Sleep apnea     uses C-PAP    TMJ (temporomandibular joint syndrome)      Past Surgical History:   Procedure Laterality Date    COLPOSCOPY      ENDOMETRIAL ABLATION      FOOT SURGERY      JOINT REPLACEMENT Right 02/19/2019    RIGHT LATERAL TOTAL HIP REPLACEMENT    NERVE BLOCK N/A 10/26/2021    BILATERAL C5-C7 MEDIAL BRANCH BLOCK WITH FLUOROSCOPY performed by Emilie Maya MD at [facility] N/A 7/6/2021    C5C6  EPIDURAL STERIOD INJECTION WITH FLUOROSCOPY performed by Emilie Maya MD at [address] [place] [address] Bilateral 8/31/2021    BILATERAL C5, C6, C7 MEDIAL BRANCH BLOCK WITH FLUOROSCOPY (39058, [id number])-NO STEROIDS performed by Emilie Maya MD at [facility] Left 1/11/2022    LEFT C4, C5, C6 RADIOFREQUENCY ABLATION WITH FLUOROSCOPY performed by Emilie Maya MD at [facility] Right 1/25/2022    RIGHT C-4-C-6 NERVE RADIOFREQUENCY ABLATION WITH FLUOROSCOPY performed by Emilie Maya MD at [facility] ARTHROSCOPY Right 11/25/2019    RIGHT SHOULDER ARTHROSCOPY, Expenses: Not hard at all   Food Insecurity: No Food Insecurity    Worried About [facility] in the Last Year: Never true    Chip of Food in the Last Year: Never true   Transportation Needs: No Transportation Needs    Lack of Transportation (Medical): No    Lack of Transportation (Non-Medical): No   Physical Activity:     Days of Exercise per Week: Not on file    Minutes of Exercise per Session: Not on file   Stress:     Feeling of Stress : Not on file   Social Connections:     Frequency of Communication with Friends and Family: Not on file    Frequency of Social Gatherings with Friends and Family: Not on file    Attends [employer] Services: Not on file    Active Member of [address] [place] [address] or Organizations: Not on file    Attends Club or Organization Meetings: Not on file    Marital Status: Not on file   Intimate Partner Violence:     Fear of Current or Ex-Partner: Not on file    Emotionally Abused: Not on file    Physically Abused: Not on file    Sexually Abused: Not on file   Housing Stability:     Unable to Pay for Housing in the Last Year: Not on file    Number of [place] in the Last Year: Not on file    Unstable Housing in the Last Year: Not on file       Review of Systems  A comprehensive review of systems was negative except for what was noted in the HPI. Physical Exam   Constitutional: She is oriented to person, place, and time. She appears well-developed and well-nourished. No distress. HENT:   Head: Normocephalic and atraumatic. Mouth/Throat: Uvula is midline, oropharynx is clear and moist and mucous membranes are normal.   Eyes: Conjunctivae and EOM are normal. Pupils are equal, round, and reactive to light. Neck: Trachea normal and normal range of motion. Neck supple. No JVD present. Carotid bruit is not present. No mass and no thyromegaly present. Cardiovascular: Normal rate, regular rhythm, normal heart sounds and intact distal pulses.   Exam reveals no gallop and no friction rub. No murmur heard. Pulmonary/Chest: Effort normal and breath sounds normal. No respiratory distress. She has no wheezes. She has no rales. Abdominal: Soft. Normal aorta and bowel sounds are normal. She exhibits no distension and no mass. There is no hepatosplenomegaly. No tenderness. Musculoskeletal: She exhibits tenderness left hip and DROM  Neurological: She is alert and oriented to person, place, and time. She has normal strength. No cranial nerve deficit or sensory deficit. Coordination and gait normal.   Skin: Skin is warm and dry. No rash noted. No erythema. Psychiatric: She has a normal mood and affect. Her behavior is normal.     EKG Interpretation:    Lab Review         Assessment:       50 y.o. patient with planned surgery as above.     Known risk factors for perioperative complications: Hypertension  Current medications which may produce withdrawal symptoms if withheld perioperatively: none   Caitlyn  was seen today for pre-op exam.    Diagnoses and all orders for this visit:    Arthritis of left hip  Preop examination  PRE OP TESTING TO BE COMPLETED AT [facility]  CLEARED FOR SURGERY PENDING REVIEW OF EKG- LABS BY DR London Mosquera OR ASSOCIATE    Essential hypertension, benign  CONTROLLED ON CURRENT MEDICATIONS  CONTINUE COZAAR  Bipolar disorder, current episode mixed, mild (HCC)  GENESIGHT  RESULTS REVIEWED - PROZAC DISCONTINUED- SHE HAS BEEN ON THIS FOR SEVERAL YEARS  PRISTIQ 25 MG SE DW PT   INSTRUCTED TO TAPER OFF PROZAC WHILE TAKING  THE PRISTIQ-  prozac taper 60 mg every other day  x 14 days - then 40 mg x every other day x 14  Days then 30 mg every three days x 14 days then 20 mg every 4 days  X 14 days - then 10 mg  Daily then stop  LITHIUM INCREASED TO TWO TABS IN AM AND PM  Attention deficit hyperactivity disorder (ADHD), combined type  Controlled substances monitoring: possible medication side effects, risk of tolerance and/or dependence, and alternative treatments discussed, OARRS report reviewed today- activity consistent with treatment plan and medication contract signed today. WILL COMPLETE UDS 5/9 WHEN SHE COMES FOR HER MAMMOGRAM-   -     lisdexamfetamine (VYVANSE) 50 MG capsule; Take 1 capsule by mouth every morning for 30 days. FILL 3/30 SE DE PT  REQUIRED FOLLOW UP DW PT AS WELL - EVERY THREE MONTHS    Nicotine abuse  ENCOURAGED TO STOP SMOKING-SHE JUST STARTED BACK AFTER TWO YEARS- DEATH F HER BROTHER - SHE HAS A PLAN TO STOP  DECLINES JARAD MEDICATION AS SHE DID IT ON HER OWN LAST TIME         Plan:     1. Preoperative workup as follows: ECG, hemoglobin, hematocrit, electrolytes, creatinine, urinalysis (urinary tract instrumentation planned), VIT D- TYPE AND SCREEN - MRSA DNA PROBE- PROTIME -INR COVID TEST - TO BE COMPLETED IN [facility]  2. Change in medication regimen before surgery: Take LAMICTAL- LITHIUM  on morning of surgery with sip of water, and hold all other medications until after surgery  3. Prophylaxis for cardiac events with perioperative beta-blockers: Not indicated  ACC/AHA indications for pre-operative beta-blocker use:    · Vascular surgery with history of postitive stress test  · Intermediate or high risk surgery with history of CAD   · Intermediate or high risk surgery with multiple clinical predictors of CAD- 2 of the following: history of compensated or prior heart failure, history of cerebrovascular disease, DM, or renal insufficiency    Routine administration of higher-dose, long-acting metoprolol in beta-blocker-naïve patients on the day of surgery, and in the absence of dose titration is associated with an overall increase in mortality.   Beta-blockers should be started days to weeks prior to surgery and titrated to pulse < 70.  4. Deep vein thrombosis prophylaxis: regimen to be chosen by surgical team  5. . Surgery should be delayed until LABS- EKG REVIEWED BY DR Christy Vela LEFT

## 2022-03-30 NOTE — PROGRESS NOTES
DOS 2022   71    PATIENT SEEING KIESHA CHEUNG -CNP ON 3/30/2022 @ 1PM FOR PRE-OP H&P    PATIENT GOING TO [facility] ON 2022-PLEASE REVIEW LABS AND MAKE SURE ALL IS COMPLETED    22 Pt. Had H&P done at PCP by CNP on 3/30/22-cleared for surgery. 22 went to [facility]. Labs done. NO critical labs. EKG done.   Orders per

## 2022-03-30 NOTE — PROGRESS NOTES
C-Difficile admission screening and protocol:       * Admitted with diarrhea? [] YES    [x]  NO     *Prior history of C-Diff. In last 3 months? [] YES    []  NO     *Antibiotic use in the past 6-8 weeks? [x]  NO    []  YES                 If yes, which ANTIBIOTIC AND REASON______     *Prior hospitalization or nursing home in the last month? []  YES    [x]  NO    PRE-OP INSTRUCTIONS     · Do not eat or drink anything after 12:00 midnight prior to surgery. This includes water, chewing gum, mints and ice chips. You may brush your teeth and gargle the morning of surgery but DO  NOT SWALLOW THE WATER. Take the following medications with a small sip of water on the morning of surgery: Follow your MD/Surgeons pre-procedure instructions regarding your medications    · If you use an inhaler, please use it the morning of surgery and bring with you to hospital. N/A    · You may be asked to stop blood thinners such as:  Coumadin, Plavix, Fragmin and lovenox. Please check with your doctor before stopping these or any other medications. · Aspirin, ibuprofen, advil and naproxen, any anti-inflammatory products should be stopped for a week prior to your surgery. MAY TAKE TYLENOL    · Do not smoke and do not drink any alcoholic beverages 24 hours prior to your surgery. · Please do not wear any jewelry or body piercings on the day of surgery. · Please wear something simple, loose fitting clothing to the hospital.  Do not wear any make-up(including eye make-up) or nail polish on your fingers and toes. · As part of our patient safety program to minimize surgical infections, we ask you to do the followin. Please notify your surgeon if you develop any illness between now                          and the day of your surgery.   This includes a cough, cold, fever, sore                       throat, nausea, vomiting, diarrhea, etc.  Also please notify your surgeon if you experience dizziness, shortness of breath or                       Blurred vision between now and the time of your surgery. 2.  Please notify your surgeon of any open or redden areas that may                        look infected                     3.  DO NOT shave your operative site 96 hours(four days) prior to                                  surgery. 4.  Shower the week before surgery with an antibacterial soap, such as                       dial, safeguard, etc.                         5.  Three(3) days prior to your surgery, cleanse the operative site with    4/9,4/10,4/11                      Hibiclens(anti-microbial soap). This soap may dry your skin, please                          do not apply any oils or lotions     · Please bring your insurance card and picture ID day of surgery    · If you have a living will or durable power of attorny. Please bring in a copy of your advanced directives. · If you have dentures, they will be removed before going to the OR, we will provide you with a container. If you wear contact lenses or glasses, they will be removes, please bring a case for them. · Have you seen your family doctor for a pre-op history and physical.      · Surgery scheduler will call you 48 hours prior to your surgery to notify you of the time of your surgery and the time you will need to be at hospital...patients are asked to arrive 21/2 hours prior to surgery. ·  Please call Pre-Admission testing if you have any further questions. [address] [facility] testing phone number:  [phone]      Thank You for choosing [facility]!!    SAFETY FIRST. .call before you fall

## 2022-03-30 NOTE — PATIENT INSTRUCTIONS
Patient Education        Attention Deficit Hyperactivity Disorder (ADHD) in Adults: Care Instructions  Your Care Instructions     Attention deficit hyperactivity disorder, or ADHD, is a condition that makes it hard to pay attention. So you may have problems when you try to focus, get organized, and finish tasks. It might make you more active than other people. Or you might do things without thinking first.  ADHD is very common. It usually starts in early childhood. Many adults don't realize they have it until their children are diagnosed. Then they become awareof their own symptoms. Doctors don't know what causes ADHD. But it often runs in families. ADHD can be treated with medicines, behavior training, and counseling. Treatment can improve your life. Follow-up care is a key part of your treatment and safety. Be sure to make and go to all appointments, and call your doctor if you are having problems. It's also a good idea to know your test results and keep alist of the medicines you take. How can you care for yourself at home?  Learn all you can about ADHD. This will help you and your family understand it better.  Take your medicines exactly as prescribed. Call your doctor if you think you are having a problem with your medicine. You will get more details on the specific medicines your doctor prescribes.  If you miss a dose of your medicine, do not take an extra dose.  If your doctor suggests counseling, find a counselor you like and trust. Talk openly and honestly. Be willing to make some changes. Alanna Chaudhry Find a support group for adults with ADHD. Talking to others with the same problems can help you feel better. It can also give you ideas about how to best cope with the condition.  Get rid of distractions at your work space. Keep your desk clean. Try not to face a window or busy hallway.  Use files, planners, and other tools to keep you organized.  Limit use of alcohol, and do not use illegal drugs. People with ADHD tend to develop substance use disorder more easily than others. Tell your doctor if you need help to quit. Counseling, support groups, and sometimes medicines can help you stay free of alcohol or drugs.  Get at least 30 minutes of physical activity on most days of the week. Exercise has been shown to help people cope with ADHD. Walking is a good choice. You also may want to do other activities, such as running, swimming, cycling, or playing tennis or team sports. When should you call for help? Watch closely for changes in your health, and be sure to contact your doctor if:     You feel sad a lot or cry all the time.      You have trouble sleeping, or you sleep too much.      You find it hard to concentrate, make decisions, or remember things.      You change how you normally eat.      You feel guilty for no reason. Where can you learn more? Go to https://Samplify Systemspesandraeweb.[employer]. org and sign in to your Auto Load Logic account. Enter B196 in the VuMedi box to learn more about \"Attention Deficit Hyperactivity Disorder (ADHD) in Adults: Care Instructions. \"     If you do not have an account, please click on the \"Sign Up Now\" link. Current as of: June 16, 2021               Content Version: 13.2  © 2006-2022 [employer]. Care instructions adapted under license by [facility] ([facility]). If you have questions about a medical condition or this instruction, always ask your healthcare professional. [place] [address] any warranty or liability for your use of this information. Patient Education        Learning About Total Hip Replacement Surgery  What is total hip replacement surgery? During total hip replacement surgery, your doctor replaces the worn parts ofyour hip joint with artificial parts made of metal, ceramic, or plastic. You may want this surgery if you have hip pain and trouble moving that you can't treat in other ways.  Osteoarthritis or rheumatoid arthritis can causethese types of problems. Another cause is bone loss due to a poor blood supply. Hip replacement is sometimes done after a hip fracture. How is this surgery done? Hip replacement surgery is done through one or two cuts (incisions). The cuts may be toward the front (anterior) of your hip, or they may be on the side or toward the back (posterior). You and your doctor can discuss which surgery isbest for you. You may have anesthesia to block pain and medicine to make you drowsy. Or youmay get medicine to make you sleep. After making the incision, your doctor will:   Remove worn bone tissue and cartilage from the hip joint.  Replace the ball at the upper end of your thighbone (femur).  Replace your hip socket with a shell and liner.  Fit the ball into the shell and liner to make a new hip joint. There are two kinds of replacement joints.  Cemented joints. The cement fits between the new joint and the bone.  Uncemented joints. These have a metal coating with many small openings. The bone is shaped to fit the new joint almost perfectly. At first, there will be some small spaces between the bone and the new joint. Over time, the bone grows to fill these small openings. Sometimes a doctor uses a cemented ball and an uncemented socket. Your doctor can tell you which type of new hip joint is best for you. What can you expect after a total hip replacement? On the day of surgery, you'll learn how to get in and out of bed. Arjun Flores also learn how to walk with a walker, crutches, or a cane. By the time you leave the hospital, you'll be able to safely sit down and stand up, dress yourself, usethe toilet, and bathe. Arjun Flores also start physical therapy. Your therapist will teach you exercises to help you get stronger. You'll learn ways to move your body without dislocatingyour hip. During the first week or so after surgery, you will need less and less pain medicine.  For a few weeks after surgery, you will probably take medicine toprevent blood clots. Your doctor will tell you when you can walk on your own, drive, return to work,and get back to other activities. It usually takes a few months to get back to full activity. Follow-up care is a key part of your treatment and safety. Be sure to make and go to all appointments, and call your doctor if you are having problems. It's also a good idea to know your test results and keep alist of the medicines you take. Where can you learn more? Go to https://chpepiceweb.OrangeSoda. org and sign in to your Qwalytics account. Enter M388 in the Revionics box to learn more about \"Learning About Total Hip Replacement Surgery. \"     If you do not have an account, please click on the \"Sign Up Now\" link. Current as of: July 1, 2021               Content Version: 13.2  © 2006-2022 [employer]. Care instructions adapted under license by [facility] ([facility]). If you have questions about a medical condition or this instruction, always ask your healthcare professional. [place] [address] any warranty or liability for your use of this information. Patient Education        Bipolar Disorder: Care Instructions  Your Care Instructions     Bipolar disorder is an illness that causes extreme mood changes, from times of very high energy (manic episodes) to times of depression. But many people with bipolar disorder show only the symptoms of depression. These moods may cause problems with your work, school, family life, friendships, and how well youfunction. This disease is also called manic-depression. There is no cure for bipolar disorder, but it can be helped with medicines. Counseling may also help. It is important to take your medicines exactly asprescribed, even when you feel well. You may need lifelong treatment. Follow-up care is a key part of your treatment and safety.  Be sure to make and go to all appointments, and call your doctor if you are having problems. It's also a good idea to know your test results and keep alist of the medicines you take. How can you care for yourself at home?  Be safe with medicines. Take your medicines exactly as prescribed. Do not stop or change a medicine without talking to your doctor first. Danny Penny and your doctor may need to try different combinations of medicines to find what works for you.  Take your medicines on schedule to keep your moods even. When you feel good, you may think that you do not need your medicines. But it is important to keep taking them.  Go to your counseling sessions. Call and talk with your counselor if you can't go to a session or if you don't think the sessions are helping. Do not just stop going.  Get at least 30 minutes of activity on most days of the week. Walking is a good choice. You also may want to do other things, such as running, swimming, or cycling.  Get enough sleep. Keep your room dark and quiet. Try to go to bed at the same time every night.  Eat a healthy diet. This includes whole grains, dairy, fruits, vegetables, and protein. Eat foods from each of these groups.  Try to lower your stress. Manage your time, build a strong support system, and lead a healthy lifestyle. To lower your stress, try physical activity, slow deep breathing, or getting a massage.  Do not use alcohol, marijuana, or illegal drugs.  Learn the early signs of your mood changes. You can then take steps to help yourself feel better.  Ask for help from friends and family when you need it. You may need help with daily chores when you are depressed. When you are manic, you may need support to control your high energy levels. What should you do if someone in your family has bipolar disorder?  Learn about the disease and signs it's getting worse.  Remind your family member you love them.    Make a plan with all family members about how to take care of your loved one when symptoms are bad.  Remind yourself it will take time for changes to occur.  Try not to blame yourself for the disease.  Know your legal rights and the legal rights of your family member. Support groups or counselors can help with this information.  Take care of yourself. Keep up with your interests, such as career, hobbies, and friends. Use exercise, positive self-talk, deep breathing, and other relaxing exercises to help lower your stress.  Give yourself time to grieve. You may need to deal with emotions such as anger, fear, and frustration.  If you are having a hard time with your feelings or with your relationship with your family member, talk with a counselor. When should you call for help? Call 911 anytime you think you may need emergency care. For example, call if:     You feel like hurting yourself or someone else.      Someone who has bipolar disorder displays dangerous behavior, and you think the person might hurt himself or herself or someone else. Call your doctor now or seek immediate medical care if:     You hear voices.      Someone you know has bipolar disorder and talks about suicide. Keep the numbers for these national suicide hotlines: 7-237-671-TALK (8-519.533.5353) and 5-566-GEISUCH (1-199.271.4580). If a suicide threat seems real, with a specific plan and a way to carry it out, stay with the person, or ask someone you trust to stay with the person, until you can get help.      Someone you know has bipolar disorder and:  ? Starts to give away possessions. ? Is using illegal drugs or drinking alcohol heavily. ? Talks or writes about death, including writing suicide notes or talking about guns, knives, or pills. ? Talks or writes about hurting someone else. ? Starts to spend a lot of time alone. ? Acts very aggressively or suddenly appears calm. ? Talks about beliefs that are not based in reality (delusions).    Watch closely for changes in your health, and be sure to contact your doctor if:     You cannot go to your counseling sessions. Where can you learn more? Go to https://chpepiceweb.Graphicly. org and sign in to your Inotrem account. Enter K052 in the [facility] box to learn more about \"Bipolar Disorder: Care Instructions. \"     If you do not have an account, please click on the \"Sign Up Now\" link. Current as of: June 16, 2021               Content Version: 13.2  © 0389-1016 [employer]. Care instructions adapted under license by [facility] ([facility]). If you have questions about a medical condition or this instruction, always ask your healthcare professional. [address] any warranty or liability for your use of this information.        prozac taper 60 mg every other day  x 14 days - then 40 mg x every other day x 14  Days then 30 mg every three days x 14 days then 20 mg every 4 days  X 14 days - then 10 mg  Daily then stop

## 2022-04-04 ENCOUNTER — PATIENT MESSAGE (OUTPATIENT)
Dept: FAMILY MEDICINE CLINIC | Age: 47
End: 2022-04-04

## 2022-04-04 ENCOUNTER — HOSPITAL ENCOUNTER (OUTPATIENT)
Dept: PREADMISSION TESTING | Age: 47
Discharge: HOME | End: 2022-04-08
Payer: COMMERCIAL

## 2022-04-04 DIAGNOSIS — M16.12 PRIMARY OSTEOARTHRITIS OF LEFT HIP: ICD-10-CM

## 2022-04-04 LAB
ABO/RH: NORMAL
ALBUMIN SERPL-MCNC: 4.6 G/DL (ref 3.4–5)
ANION GAP SERPL CALCULATED.3IONS-SCNC: 14 MMOL/L (ref 3–16)
ANTIBODY SCREEN: NORMAL
APTT: 35.7 SEC (ref 26.2–38.6)
BACTERIA: ABNORMAL /HPF
BASOPHILS ABSOLUTE: 0.1 K/UL (ref 0–0.2)
BASOPHILS RELATIVE PERCENT: 1.3 %
BILIRUBIN URINE: NEGATIVE
BLOOD, URINE: NEGATIVE
BUN BLDV-MCNC: 12 MG/DL (ref 7–20)
CALCIUM SERPL-MCNC: 9.9 MG/DL (ref 8.3–10.6)
CHLORIDE BLD-SCNC: 102 MMOL/L (ref 99–110)
CLARITY: CLEAR
CO2: 23 MMOL/L (ref 21–32)
COLOR: YELLOW
CREAT SERPL-MCNC: 0.8 MG/DL (ref 0.6–1.1)
EKG ATRIAL RATE: 79 BPM
EKG DIAGNOSIS: NORMAL
EKG P AXIS: 27 DEGREES
EKG P-R INTERVAL: 146 MS
EKG Q-T INTERVAL: 370 MS
EKG QRS DURATION: 68 MS
EKG QTC CALCULATION (BAZETT): 424 MS
EKG R AXIS: 9 DEGREES
EKG T AXIS: 44 DEGREES
EKG VENTRICULAR RATE: 79 BPM
EOSINOPHILS ABSOLUTE: 0.1 K/UL (ref 0–0.6)
EOSINOPHILS RELATIVE PERCENT: 0.9 %
EPITHELIAL CELLS, UA: 12 /HPF (ref 0–5)
ESTIMATED AVERAGE GLUCOSE: 122.6 MG/DL
GFR AFRICAN AMERICAN: >60
GFR NON-AFRICAN AMERICAN: >60
GLUCOSE BLD-MCNC: 121 MG/DL (ref 70–99)
GLUCOSE URINE: NEGATIVE MG/DL
HBA1C MFR BLD: 5.9 %
HCT VFR BLD CALC: 45.9 % (ref 36–48)
HEMOGLOBIN: 15.2 G/DL (ref 12–16)
HYALINE CASTS: 2 /LPF (ref 0–8)
INR BLD: 0.91 (ref 0.88–1.12)
KETONES, URINE: NEGATIVE MG/DL
LEUKOCYTE ESTERASE, URINE: ABNORMAL
LYMPHOCYTES ABSOLUTE: 2.8 K/UL (ref 1–5.1)
LYMPHOCYTES RELATIVE PERCENT: 32.4 %
MCH RBC QN AUTO: 29.6 PG (ref 26–34)
MCHC RBC AUTO-ENTMCNC: 33 G/DL (ref 31–36)
MCV RBC AUTO: 89.6 FL (ref 80–100)
MICROSCOPIC EXAMINATION: YES
MONOCYTES ABSOLUTE: 0.7 K/UL (ref 0–1.3)
MONOCYTES RELATIVE PERCENT: 7.6 %
NEUTROPHILS ABSOLUTE: 5.1 K/UL (ref 1.7–7.7)
NEUTROPHILS RELATIVE PERCENT: 57.8 %
NITRITE, URINE: NEGATIVE
PDW BLD-RTO: 13.6 % (ref 12.4–15.4)
PH UA: 6 (ref 5–8)
PLATELET # BLD: 349 K/UL (ref 135–450)
PMV BLD AUTO: 7.7 FL (ref 5–10.5)
POTASSIUM SERPL-SCNC: 4.5 MMOL/L (ref 3.5–5.1)
PREALBUMIN: 23 MG/DL (ref 20–40)
PROTEIN UA: NEGATIVE MG/DL
PROTHROMBIN TIME: 10.3 SEC (ref 9.9–12.7)
RBC # BLD: 5.13 M/UL (ref 4–5.2)
RBC UA: 2 /HPF (ref 0–4)
SODIUM BLD-SCNC: 139 MMOL/L (ref 136–145)
SPECIFIC GRAVITY UA: <=1.005 (ref 1–1.03)
URINE REFLEX TO CULTURE: ABNORMAL
URINE TYPE: ABNORMAL
UROBILINOGEN, URINE: 0.2 E.U./DL
VITAMIN D 25-HYDROXY: 31 NG/ML
WBC # BLD: 8.8 K/UL (ref 4–11)
WBC UA: 7 /HPF (ref 0–5)

## 2022-04-04 PROCEDURE — 86901 BLOOD TYPING SEROLOGIC RH(D): CPT

## 2022-04-04 PROCEDURE — 93010 ELECTROCARDIOGRAM REPORT: CPT | Performed by: INTERNAL MEDICINE

## 2022-04-04 PROCEDURE — 83036 HEMOGLOBIN GLYCOSYLATED A1C: CPT

## 2022-04-04 PROCEDURE — 80048 BASIC METABOLIC PNL TOTAL CA: CPT

## 2022-04-04 PROCEDURE — 85025 COMPLETE CBC W/AUTO DIFF WBC: CPT

## 2022-04-04 PROCEDURE — 82306 VITAMIN D 25 HYDROXY: CPT

## 2022-04-04 PROCEDURE — 81001 URINALYSIS AUTO W/SCOPE: CPT

## 2022-04-04 PROCEDURE — 86900 BLOOD TYPING SEROLOGIC ABO: CPT

## 2022-04-04 PROCEDURE — 85730 THROMBOPLASTIN TIME PARTIAL: CPT

## 2022-04-04 PROCEDURE — 93005 ELECTROCARDIOGRAM TRACING: CPT

## 2022-04-04 PROCEDURE — 85610 PROTHROMBIN TIME: CPT

## 2022-04-04 PROCEDURE — 86850 RBC ANTIBODY SCREEN: CPT

## 2022-04-04 PROCEDURE — 82040 ASSAY OF SERUM ALBUMIN: CPT

## 2022-04-04 PROCEDURE — 87641 MR-STAPH DNA AMP PROBE: CPT

## 2022-04-04 PROCEDURE — 84134 ASSAY OF PREALBUMIN: CPT

## 2022-04-04 NOTE — PROGRESS NOTES
Patient attended JET class on 4/4/2022. Patient verified surgery for Total hip replacement. Patient received patient information and educational JET folder including the following handouts: jet powerpoint, covid-19 restrictions, ERAS, incentive spirometry including purpose and how to perform, case management contact information, hand hygiene, preventing constipation, home health care agency list, skilled nursing facility list, pre-operative showering techniques and the use of anti-septic 3 days before surgery. Interviews completed by PT, OT, and PAT. Labs and Tests completed as ordered/necessary. Anti-septic bottle given to patient to take home. Patient states no further questions or concerns. Patient provided orthopedic office and nurse navigator contact information. DOS: 4/12/2022  Dr Kurt Rouse: home with partner Denis Lujan and [facility]- open to any agency ;if applicable. Transportation:Td  DME needs:denies, already has a rolling walker. Per Patient Will see/Saw PCP on 3/30/2022.  Electronically signed by Moo Farnsworth RN on 4/4/2022 at 4:20 PM

## 2022-04-04 NOTE — TELEPHONE ENCOUNTER
INPATIENT DENIED.   AUTHORIZATION IS FOR OUTPATIENT ONLY    Auth: # [id number]    Date: 04/12/22 thru 07/12/22  Type of SX:  Outpatient only  Location: [facility]  CPT: 56007  DX Code: M16.12  SX area: [address] [place] [address]  Insurance: [employer]

## 2022-04-05 LAB — MRSA SCREEN RT-PCR: NORMAL

## 2022-04-05 NOTE — TELEPHONE ENCOUNTER
From: Dean Ball  To: Pepito Currie  Sent: 4/4/2022 11:01 PM EDT  Subject: [facility] script    My new antidepressant is at [facility] waiting on  authorization

## 2022-04-05 NOTE — CARE COORDINATION
DATE OF JET PHONE INTERVIEW: 4/5/22    HISTORY OF JOINT REPLACEMENT(S):  Yes  R. Hip 2018    DC TRANSPORTATION:  Danitza Negron  Ph:  [phone]    STEPS INTO HOME:  2 steps    STEPS TO BATHROOM/BEDROOM:  none    DME NEEDS:  none    HOME ASSISTANCE - WHO WILL BE STAYING WITH YOU AT HOME FOR FIRST SEVERAL DAYS?  will be home. LENGTH OF STAY HAS BEEN DISCUSSED WITH THE PT, APPROPRIATE TO HIS/ HER PROCEDURE. PT HAS BEEN INFORMED THAT THEY WILL BE DISCHARGED WHEN THE PHYSICIAN DEEMS THEM MEDICALLY READY. MOST PATIENTS CAN EXPECT  TO BE IN THE HOSPITAL ONE NIGHT AS AN OBSERVATION ONLY, OR 1-2 DAYS AS AN ADMISSION FOR THOSE WITH MEDICAL HEALTH  ISSUES/COMPLICATIONS. CHOICES FOR HHC, DME VENDORS AND SKILLED/ REHAB FACILITIES PROVIDED TO PT DURING THIS INTERVIEW. HOME CARE CHOICE(S):  [address]    SNF/REHAB CHOICES (S):  n/a    MEDS TO BEDS FROM [facility]: Yes    Contact information for case management provided to pt. Will follow with therapies and social service. Bev Vann Sr.  Administrative Assist, Case Management  [phone]  Electronically signed by Bev Vann on 4/5/2022 at 2:12 PM

## 2022-04-06 DIAGNOSIS — F51.04 PSYCHOPHYSIOLOGICAL INSOMNIA: Primary | ICD-10-CM

## 2022-04-07 ENCOUNTER — PREP FOR PROCEDURE (OUTPATIENT)
Dept: ORTHOPEDICS UNIT | Age: 47
End: 2022-04-07

## 2022-04-07 ENCOUNTER — OFFICE VISIT (OUTPATIENT)
Dept: ORTHOPEDIC SURGERY | Age: 47
End: 2022-04-07
Payer: COMMERCIAL

## 2022-04-07 VITALS — HEIGHT: 66 IN | WEIGHT: 275 LBS | RESPIRATION RATE: 16 BRPM | BODY MASS INDEX: 44.2 KG/M2

## 2022-04-07 DIAGNOSIS — M16.12 PRIMARY OSTEOARTHRITIS OF LEFT HIP: Primary | ICD-10-CM

## 2022-04-07 PROCEDURE — G8427 DOCREV CUR MEDS BY ELIG CLIN: HCPCS | Performed by: ORTHOPAEDIC SURGERY

## 2022-04-07 PROCEDURE — 4004F PT TOBACCO SCREEN RCVD TLK: CPT | Performed by: ORTHOPAEDIC SURGERY

## 2022-04-07 PROCEDURE — G8417 CALC BMI ABV UP PARAM F/U: HCPCS | Performed by: ORTHOPAEDIC SURGERY

## 2022-04-07 PROCEDURE — 99214 OFFICE O/P EST MOD 30 MIN: CPT | Performed by: ORTHOPAEDIC SURGERY

## 2022-04-07 RX ORDER — TRAMADOL HYDROCHLORIDE 50 MG/1
50 TABLET ORAL EVERY 6 HOURS PRN
Qty: 20 TABLET | Refills: 0 | Status: ON HOLD | OUTPATIENT
Start: 2022-04-07 | End: 2022-04-12 | Stop reason: HOSPADM

## 2022-04-07 NOTE — DISCHARGE INSTR - COC
[facility] ([facility]) Continuity of Care Form    Patient Name:  Zachariah Galeana  : 1975    MRN:  [id number]    Admit date:  (Not on file)  Discharge date:          Code Status Order: Prior  Advance Directives: No    Admitting Physician: Jairo Huber MD  PCP: Delfino Gaitan, ROSSANA - CNP    Discharging Nurse: [facility] Unit/Room#: No information available for this encounter.   Discharging Unit Phone Number: [phone]    Emergency Contact:        Past Surgical History:  Past Surgical History:   Procedure Laterality Date    COLPOSCOPY      ENDOMETRIAL ABLATION      FOOT SURGERY      JOINT REPLACEMENT Right 2019    RIGHT LATERAL TOTAL HIP REPLACEMENT    NERVE BLOCK N/A 10/26/2021    BILATERAL C5-C7 MEDIAL BRANCH BLOCK WITH FLUOROSCOPY performed by Yamileth Parsons MD at [address] [place] [address] N/A 2021    C5C6  EPIDURAL STERIOD INJECTION WITH FLUOROSCOPY performed by Yamileth Parsons MD at [address] [place] [address] Bilateral 2021    BILATERAL C5, C6, C7 MEDIAL BRANCH BLOCK WITH FLUOROSCOPY (14191, 55937)-NO STEROIDS performed by Yamileth Parsons MD at [address] [place] [address] Left 2022    LEFT C4, C5, C6 RADIOFREQUENCY ABLATION WITH FLUOROSCOPY performed by Yamileth Parsons MD at [address] [place] [address] Right 2022    RIGHT C-4-C-6 NERVE RADIOFREQUENCY ABLATION WITH FLUOROSCOPY performed by Yamileth Parsons MD at [place] [address] ARTHROSCOPY Right 2019    RIGHT SHOULDER ARTHROSCOPY, SUBACROMIAL DECOMPRESSION,   ROTATOR CUFF REPAIR performed by Javier Escobar MD at [address] Right 2019    RIGHT LATERAL TOTAL HIP REPLACEMENT performed by Darryl Jeffries MD at [place] [address]       Immunization History:   Immunization History   Administered Date(s) Administered    COVID-19, Pfizer Purple top, DILUTE for use, 12+ yrs, 30mcg/0.3mL dose 2021, 2021 Influenza Vaccine, unspecified formulation 11/01/2015, 10/21/2016    Influenza Virus Vaccine 11/01/2015, 10/21/2016    Tdap (Boostrix, Adacel) 03/01/2010, 03/26/2012       Active Problems:  Active Problems:    * No active hospital problems. *  Resolved Problems:    * No resolved hospital problems. *      Isolation/Infection:       Nurse Assessment:  Last Vital Signs:Ht 5' 6\" (1.676 m)   Wt 265 lb (120.2 kg)   BMI 42.77 kg/m²   Last documented pain score (0-10 scale):    Last Weight:   Wt Readings from Last 1 Encounters:   04/07/22 275 lb (124.7 kg)     Mental Status:  oriented and alert     IV Access:  - None    Nursing Mobility/ADLs:  Walking   Assisted  Transfer  Assisted  Bathing  Assisted  Dressing  Assisted  Toileting  Assisted  Feeding  Independent  Med Admin  Independent  Med Delivery   whole    Wound Care Documentation and Therapy:  Keep glued Prineo dressing intact. DO NOT remove. This is waterproof for showering. Doctor will evaluate wound at office visit 2 weeks after surgery to discuss removal.   Interdry to left abdominal pannus, change daily and prn saturation. Elimination:  Urinary Catheter: None   Colostomy/Ileostomy: No  Continence: Bowel: Yes  Bladder: Yes  Date of Last BM: 4/11/2022    Intake/Output Summary (Last 24 hours)   No intake or output data in the 24 hours ending 04/07/22 1340  Safety Concerns: At Risk for Falls    Impairments/Disabilities:      Vision    Nutrition Therapy:  Current Nutrition Therapy: general  Routes of Feeding: Oral  Liquids: No Restrictions  Daily Fluid Restriction: no  Last Modified Barium Swallow with Video (Video Swallowing Test): not done    Treatments at the Time of Hospital Discharge:   Respiratory Treatments:   Oxygen Therapy:  is not on home oxygen therapy.   Ventilator:    - No ventilator support    Lab orders for discharge:        Rehab Therapies: Physical Therapy, Occupational Therapy and nursing care  Weight Bearing Status/Restrictions: No weight bearing restrictions, anterior hip precautions, NO straight leg raises  Other Medical Equipment (for information only, NOT a DME order):  Rolling walker  Other Treatments: Eliquis bid for 30 total days after discharge from hospital for DVT prophylaxis , bilateral knee high LO hose for 2 weeks after surgery  HOME HEALTH CARE: [facility] [address] [place] [address] to establish plan of care for patient over 60 day period   Nursing  Initial home SN evaluation visit to occur within 24-48 hours for:  medication management  VS and clinical assessment  S&S chronic disease exacerbation education + when to contact MD / NP  care coordination  Medication Reconciliation during 1st SN visit       PT/OT   Evaluate with goal of regaining prior level of functioning   OT to evaluate if patient has [address] needs for personal care      PCP Visit scheduled within 7 days of hospital discharge       Patient's personal belongings (please select all that are sent with patient):  Dentures partials    RN SIGNATURE:  Electronically signed by Emmanuel Burns RN on 4/13/22 at 8:44 AM EDT    PHYSICIAN SECTION    Prognosis: Good    Condition at Discharge: Stable    Rehab Potential (if transferring to Rehab): Good    Physician Certification: I certify the above orders, information, and transfer of Katie Escoto is necessary for the continuing treatment of the diagnosis listed and that he requires [address] for less 30 days.      Update Admission H&P: No change in H&P    PHYSICIAN SIGNATURE:  Electronically signed by ROSSANA Rosas CNP on 4/7/22 at 1:40 PM EDT/ Dr Aixa Hull in office 2 weeks after surgery   [facility] and Sports Medicine, [address] [place] [address] ,   [phone]    CASE MANAGEMENT/SOCIAL WORK SECTION    Inpatient Status Date: 4/12/22-OBS    [facility] Readmission Risk Assessment Score:    Discharging to Facility/ Agency   Name:  [facility] care    Address: [address] [place] [address] Ad Flood  Phone: [phone]  Fax: [phone]     / signature: Electronically signed by Agus Briones on 4/12/22 at 1:49 PM EDT      Activity:  Elevate your leg if swelling occurs in your ankle. Use elastic wraps/hose until swelling decreases. Continue the exercise program as prescribed by physical therapists. Take frequent walks. Use walker, crutches, or cane with weight bearing instructions as indicated by the physical therapists. Take rest periods often. Elevate leg during rest period. Avoid sitting in low chairs or toilets without raised seats. Keep knees apart. Sleep with a pillow between your legs. Do not cross your legs, especially when putting on shoes and socks. Wound Care:  Cover the wound with a sterile gauze dressing and change daily as long as there is drainage. Do not scrub wound. Pat it dry with a soft towel. Dont apply any lotions or creams to your wound. Check the incision every day for redness, swelling, or increase in drainage. Diet:  You can resume your normal diet. There are no limits on your diet due to your surgery. Pain pills and activity changes may lead to constipation. To prevent this, use prune juice or bran cereals liberally. You may need to use a laxative such as Dulcolax, Senokot, or Milk of Magnesia. Drink plenty of fluids. Medications: Take pain pills if needed to maintain comfort. Never drive while taking pain medicine. Avoid over the counter medications until checking with your doctor. Resume previous medications as instructed by your doctor. You will be on Aspirin or Eliquis  for 30 days only. Stay off other anti-inflammatory medications (except Celebrex) while on blood thinners  Call Your Doctor If:  You have increased pain not controlled by medications. Excessive swelling in your ankle. You develop numbness, tingling, or decreased movement.   You have a fever greater than 100 degrees for a day or over 101 degrees at any one time. Your wound becomes more reddened, starts draining, or opens. If you fall. You have any questions about your recovery. Inform your family doctor/dentist or any other doctor who cares for you in the future that you have a joint replacement. You may need antibiotics for dental or surgical procedures if there is any evidence of infection present. If you have required the use of insulin to control your blood sugar after surgery, follow up with your family doctor. Call your surgeons office to schedule your appointment to be seen after surgery. Make your appointment to continue physical therapy per doctors orders. Smoking cessation assistance can be obtained from your family doctor or by [address] @ [phone]    _______________________________   _____   _______________________  ____                Patient Signature              Date      Witness                               Date          Anterior Approach  The main positions and movements to avoid after an anterior approach include bending the hip back, turning your hip and leg out, or spreading your leg outward. Don't stretch your hip back. Walk with short steps. Taking a longer step when leading with your nonoperated hip stretches the surgical hip back. Don't kneel only on one knee. Kneeling only on the surgical hip stretches the hip back. Use both knees when you must kneel down. Don't turn your foot out. Place a pillow next to your hip and leg to keep your leg from turning or rolling out while lying on your back in bed. Don't twist your body away from your operated hip. This means don't stand with your toes pointed out. Keep the toes of your affected leg pointed forward when you stand, sit, or walk. If you turn your body away from your surgical hip without pivoting your foot, your hip will be placed in an unsafe position.  Remember to lift and turn your foot as you turn.  Don't swing your leg outward away from your body. This means scooting to the side in bed by supporting your surgical leg. Don't put your leg in a straddling position, as though you are mounting a horse. This means preventing your leg from bending up and out when getting in or out of the bathtub.  Instead, hold your leg, and lift it straight up and over the edge of the tub

## 2022-04-07 NOTE — PROGRESS NOTES
[facility]  Office Visit    Chief Complaint: Left hip pain    HPI:  Dorisann Hodgkin is a 50 y.o. who is here in follow-up of her left hip. She has left total hip arthroplasty scheduled next week. For review, she is having left hip pain on a daily basis. She describes this is anterior in her groin and going down her thigh. There is no history of injury or surgery. The patient has difficulty putting on socks and shoes, difficulty getting out of a car, difficulty with ambulation and doing activities of daily living including pain at night. Pain at rest is 7 and with activities 9-10/10. She has attempted home therapy exercises but continues to have pain. Medical history is significant for tobacco use (1/3 pack/day of cigarettes). She plans to quit starting tomorrow. She denies metal allergy. There is no history of DVT or anticoagulation. She does have obstructive sleep apnea. BMI is 44. She has a history of lateral right total hip arthroplasty with Dr. Garcia Salas.       Patient Active Problem List   Diagnosis    Migraine    Amenorrhea, secondary    Depression    Gastritis    TMJ (temporomandibular joint syndrome)    Lipoma    Grief reaction    Anxiety    Tobacco abuse    Insomnia    Bipolar affective disorder ([address]    VICTORIANO (obstructive sleep apnea)    Periodic limb movement disorder (PLMD)    Primary osteoarthritis of both hips    Left hip pain    Arthritis of right hip    Obesity, Class III, BMI 40-49.9 (morbid obesity) ([address]    Essential hypertension, benign    Bipolar disorder with depression ([address]    Morbid obesity ([address]    Tear of right rotator cuff    Hypersomnia    Traumatic complete tear of right rotator cuff    History of total hip arthroplasty, right    Cervical spondylosis    Cervical radicular pain    DDD (degenerative disc disease), cervical    Claustrophobia    Cervical stenosis of spine       ROS:  Constitutional: denies fever, cessation preoperatively. Total time spent on today's encounter was at least 31 minutes. This time included reviewing prior notes, radiographs, and lab results when available, reviewing history obtained by medical assistant, performing history and physical exam, reviewing tests/radiographs with the patient, counseling the patient, ordering medications or tests, documentation in the electronic health record, and coordination of care. This dictation was done with [employer] dictation and may contain mechanical errors related to translation.

## 2022-04-11 ENCOUNTER — ANESTHESIA EVENT (OUTPATIENT)
Dept: OPERATING ROOM | Age: 47
End: 2022-04-11
Payer: COMMERCIAL

## 2022-04-11 RX ORDER — TRANEXAMIC ACID 650 1/1
1950 TABLET ORAL ONCE
Status: CANCELLED | OUTPATIENT
Start: 2022-04-11 | End: 2022-04-11

## 2022-04-11 RX ORDER — OXYCODONE HYDROCHLORIDE 10 MG/1
10 TABLET ORAL ONCE
Status: CANCELLED | OUTPATIENT
Start: 2022-04-11 | End: 2022-04-11

## 2022-04-11 RX ORDER — DESVENLAFAXINE 25 MG/1
25 TABLET, EXTENDED RELEASE ORAL DAILY
Status: CANCELLED | OUTPATIENT
Start: 2022-04-13

## 2022-04-11 RX ORDER — MELOXICAM 7.5 MG/1
7.5 TABLET ORAL ONCE
Status: CANCELLED | OUTPATIENT
Start: 2022-04-11 | End: 2022-04-11

## 2022-04-11 RX ORDER — ZOLPIDEM TARTRATE 12.5 MG/1
TABLET, FILM COATED, EXTENDED RELEASE ORAL
Qty: 90 TABLET | Refills: 0 | Status: SHIPPED | OUTPATIENT
Start: 2022-04-11 | End: 2022-07-21

## 2022-04-12 ENCOUNTER — APPOINTMENT (OUTPATIENT)
Dept: GENERAL RADIOLOGY | Age: 47
End: 2022-04-12
Attending: ORTHOPAEDIC SURGERY
Payer: COMMERCIAL

## 2022-04-12 ENCOUNTER — ANESTHESIA (OUTPATIENT)
Dept: OPERATING ROOM | Age: 47
End: 2022-04-12
Payer: COMMERCIAL

## 2022-04-12 ENCOUNTER — HOSPITAL ENCOUNTER (OUTPATIENT)
Age: 47
Setting detail: OBSERVATION
Discharge: HOME | End: 2022-04-13
Attending: ORTHOPAEDIC SURGERY | Admitting: ORTHOPAEDIC SURGERY
Payer: COMMERCIAL

## 2022-04-12 VITALS
DIASTOLIC BLOOD PRESSURE: 53 MMHG | RESPIRATION RATE: 7 BRPM | TEMPERATURE: 96.8 F | SYSTOLIC BLOOD PRESSURE: 88 MMHG | OXYGEN SATURATION: 100 %

## 2022-04-12 DIAGNOSIS — M16.12 ARTHRITIS OF LEFT HIP: ICD-10-CM

## 2022-04-12 DIAGNOSIS — M16.0 PRIMARY OSTEOARTHRITIS OF BOTH HIPS: ICD-10-CM

## 2022-04-12 LAB
ABO/RH: NORMAL
ANTIBODY SCREEN: NORMAL
GLUCOSE BLD-MCNC: 123 MG/DL (ref 70–99)
GLUCOSE BLD-MCNC: 163 MG/DL (ref 70–99)
PERFORMED ON: ABNORMAL
PERFORMED ON: ABNORMAL

## 2022-04-12 PROCEDURE — 2709999900 HC NON-CHARGEABLE SUPPLY: Performed by: ORTHOPAEDIC SURGERY

## 2022-04-12 PROCEDURE — 2580000003 HC RX 258: Performed by: ANESTHESIOLOGY

## 2022-04-12 PROCEDURE — 7100000000 HC PACU RECOVERY - FIRST 15 MIN: Performed by: ORTHOPAEDIC SURGERY

## 2022-04-12 PROCEDURE — 86900 BLOOD TYPING SEROLOGIC ABO: CPT

## 2022-04-12 PROCEDURE — 2700000000 HC OXYGEN THERAPY PER DAY

## 2022-04-12 PROCEDURE — 3700000000 HC ANESTHESIA ATTENDED CARE: Performed by: ORTHOPAEDIC SURGERY

## 2022-04-12 PROCEDURE — 88311 DECALCIFY TISSUE: CPT

## 2022-04-12 PROCEDURE — 6360000002 HC RX W HCPCS: Performed by: ORTHOPAEDIC SURGERY

## 2022-04-12 PROCEDURE — 88304 TISSUE EXAM BY PATHOLOGIST: CPT

## 2022-04-12 PROCEDURE — 6360000002 HC RX W HCPCS: Performed by: NURSE ANESTHETIST, CERTIFIED REGISTERED

## 2022-04-12 PROCEDURE — 3209999900 FLUORO FOR SURGICAL PROCEDURES

## 2022-04-12 PROCEDURE — 97530 THERAPEUTIC ACTIVITIES: CPT

## 2022-04-12 PROCEDURE — 2500000003 HC RX 250 WO HCPCS: Performed by: NURSE ANESTHETIST, CERTIFIED REGISTERED

## 2022-04-12 PROCEDURE — 6360000002 HC RX W HCPCS: Performed by: ANESTHESIOLOGY

## 2022-04-12 PROCEDURE — 27130 TOTAL HIP ARTHROPLASTY: CPT | Performed by: ORTHOPAEDIC SURGERY

## 2022-04-12 PROCEDURE — 2720000010 HC SURG SUPPLY STERILE: Performed by: ORTHOPAEDIC SURGERY

## 2022-04-12 PROCEDURE — 94760 N-INVAS EAR/PLS OXIMETRY 1: CPT

## 2022-04-12 PROCEDURE — 6370000000 HC RX 637 (ALT 250 FOR IP): Performed by: ORTHOPAEDIC SURGERY

## 2022-04-12 PROCEDURE — C1776 JOINT DEVICE (IMPLANTABLE): HCPCS | Performed by: ORTHOPAEDIC SURGERY

## 2022-04-12 PROCEDURE — G0378 HOSPITAL OBSERVATION PER HR: HCPCS

## 2022-04-12 PROCEDURE — 73501 X-RAY EXAM HIP UNI 1 VIEW: CPT

## 2022-04-12 PROCEDURE — 7100000001 HC PACU RECOVERY - ADDTL 15 MIN: Performed by: ORTHOPAEDIC SURGERY

## 2022-04-12 PROCEDURE — A4217 STERILE WATER/SALINE, 500 ML: HCPCS | Performed by: ORTHOPAEDIC SURGERY

## 2022-04-12 PROCEDURE — 3600000005 HC SURGERY LEVEL 5 BASE: Performed by: ORTHOPAEDIC SURGERY

## 2022-04-12 PROCEDURE — 97535 SELF CARE MNGMENT TRAINING: CPT

## 2022-04-12 PROCEDURE — 3700000001 HC ADD 15 MINUTES (ANESTHESIA): Performed by: ORTHOPAEDIC SURGERY

## 2022-04-12 PROCEDURE — 86850 RBC ANTIBODY SCREEN: CPT

## 2022-04-12 PROCEDURE — 97166 OT EVAL MOD COMPLEX 45 MIN: CPT

## 2022-04-12 PROCEDURE — 2580000003 HC RX 258: Performed by: ORTHOPAEDIC SURGERY

## 2022-04-12 PROCEDURE — 3600000015 HC SURGERY LEVEL 5 ADDTL 15MIN: Performed by: ORTHOPAEDIC SURGERY

## 2022-04-12 PROCEDURE — 86901 BLOOD TYPING SEROLOGIC RH(D): CPT

## 2022-04-12 PROCEDURE — 27130 TOTAL HIP ARTHROPLASTY: CPT | Performed by: PHYSICIAN ASSISTANT

## 2022-04-12 PROCEDURE — 97162 PT EVAL MOD COMPLEX 30 MIN: CPT

## 2022-04-12 PROCEDURE — 97116 GAIT TRAINING THERAPY: CPT

## 2022-04-12 DEVICE — LINER ACET OD52MM ID36MM +4MM OFFSET HIP POLYETH MTL ON: Type: IMPLANTABLE DEVICE | Site: HIP | Status: FUNCTIONAL

## 2022-04-12 DEVICE — STEM FEM SZ 3 L101MM 12/14 TAPR HI OFFSET HIP DUOFIX CLLRD: Type: IMPLANTABLE DEVICE | Site: HIP | Status: FUNCTIONAL

## 2022-04-12 DEVICE — HEAD FEM DIA36MM +1.5MM OFFSET 12/14 TAPR HIP CERAMIC: Type: IMPLANTABLE DEVICE | Site: HIP | Status: FUNCTIONAL

## 2022-04-12 DEVICE — CUP ACET DIA52MM HIP GRIPTION PRI CEMENTLESS FIX SECT SER: Type: IMPLANTABLE DEVICE | Site: HIP | Status: FUNCTIONAL

## 2022-04-12 RX ORDER — INSULIN GLARGINE 100 [IU]/ML
0.25 INJECTION, SOLUTION SUBCUTANEOUS NIGHTLY
Status: DISCONTINUED | OUTPATIENT
Start: 2022-04-12 | End: 2022-04-13

## 2022-04-12 RX ORDER — MELOXICAM 7.5 MG/1
7.5 TABLET ORAL ONCE
Status: COMPLETED | OUTPATIENT
Start: 2022-04-12 | End: 2022-04-12

## 2022-04-12 RX ORDER — MELOXICAM 7.5 MG/1
7.5 TABLET ORAL DAILY
Status: DISCONTINUED | OUTPATIENT
Start: 2022-04-13 | End: 2022-04-13 | Stop reason: HOSPADM

## 2022-04-12 RX ORDER — SODIUM CHLORIDE 9 MG/ML
25 INJECTION, SOLUTION INTRAVENOUS PRN
Status: DISCONTINUED | OUTPATIENT
Start: 2022-04-12 | End: 2022-04-13 | Stop reason: HOSPADM

## 2022-04-12 RX ORDER — DEXAMETHASONE SODIUM PHOSPHATE 4 MG/ML
INJECTION, SOLUTION INTRA-ARTICULAR; INTRALESIONAL; INTRAMUSCULAR; INTRAVENOUS; SOFT TISSUE PRN
Status: DISCONTINUED | OUTPATIENT
Start: 2022-04-12 | End: 2022-04-12 | Stop reason: SDUPTHER

## 2022-04-12 RX ORDER — OXYCODONE HYDROCHLORIDE 10 MG/1
10 TABLET ORAL PRN
Status: DISCONTINUED | OUTPATIENT
Start: 2022-04-12 | End: 2022-04-12

## 2022-04-12 RX ORDER — FENTANYL CITRATE 50 UG/ML
50 INJECTION, SOLUTION INTRAMUSCULAR; INTRAVENOUS EVERY 5 MIN PRN
Status: COMPLETED | OUTPATIENT
Start: 2022-04-12 | End: 2022-04-12

## 2022-04-12 RX ORDER — MEPERIDINE HYDROCHLORIDE 25 MG/ML
12.5 INJECTION INTRAMUSCULAR; INTRAVENOUS; SUBCUTANEOUS EVERY 5 MIN PRN
Status: DISCONTINUED | OUTPATIENT
Start: 2022-04-12 | End: 2022-04-12

## 2022-04-12 RX ORDER — LITHIUM CARBONATE 300 MG/1
600 CAPSULE ORAL EVERY EVENING
Status: DISCONTINUED | OUTPATIENT
Start: 2022-04-12 | End: 2022-04-13 | Stop reason: HOSPADM

## 2022-04-12 RX ORDER — ONDANSETRON 2 MG/ML
INJECTION INTRAMUSCULAR; INTRAVENOUS PRN
Status: DISCONTINUED | OUTPATIENT
Start: 2022-04-12 | End: 2022-04-12 | Stop reason: SDUPTHER

## 2022-04-12 RX ORDER — FENTANYL CITRATE 50 UG/ML
INJECTION, SOLUTION INTRAMUSCULAR; INTRAVENOUS PRN
Status: DISCONTINUED | OUTPATIENT
Start: 2022-04-12 | End: 2022-04-12 | Stop reason: SDUPTHER

## 2022-04-12 RX ORDER — SODIUM CHLORIDE 0.9 % (FLUSH) 0.9 %
5-40 SYRINGE (ML) INJECTION EVERY 12 HOURS SCHEDULED
Status: DISCONTINUED | OUTPATIENT
Start: 2022-04-12 | End: 2022-04-12

## 2022-04-12 RX ORDER — LOSARTAN POTASSIUM 25 MG/1
25 TABLET ORAL DAILY
Status: DISCONTINUED | OUTPATIENT
Start: 2022-04-13 | End: 2022-04-13 | Stop reason: HOSPADM

## 2022-04-12 RX ORDER — MIDAZOLAM HYDROCHLORIDE 1 MG/ML
INJECTION INTRAMUSCULAR; INTRAVENOUS PRN
Status: DISCONTINUED | OUTPATIENT
Start: 2022-04-12 | End: 2022-04-12 | Stop reason: SDUPTHER

## 2022-04-12 RX ORDER — MORPHINE SULFATE 2 MG/ML
2 INJECTION, SOLUTION INTRAMUSCULAR; INTRAVENOUS
Status: DISCONTINUED | OUTPATIENT
Start: 2022-04-12 | End: 2022-04-13 | Stop reason: HOSPADM

## 2022-04-12 RX ORDER — GLYCOPYRROLATE 0.2 MG/ML
INJECTION INTRAMUSCULAR; INTRAVENOUS PRN
Status: DISCONTINUED | OUTPATIENT
Start: 2022-04-12 | End: 2022-04-12 | Stop reason: SDUPTHER

## 2022-04-12 RX ORDER — LITHIUM CARBONATE 300 MG/1
300 CAPSULE ORAL EVERY MORNING
Status: DISCONTINUED | OUTPATIENT
Start: 2022-04-13 | End: 2022-04-13 | Stop reason: HOSPADM

## 2022-04-12 RX ORDER — NICOTINE POLACRILEX 4 MG
15 LOZENGE BUCCAL PRN
Status: DISCONTINUED | OUTPATIENT
Start: 2022-04-12 | End: 2022-04-13

## 2022-04-12 RX ORDER — KETAMINE HCL IN NACL, ISO-OSM 100MG/10ML
SYRINGE (ML) INJECTION PRN
Status: DISCONTINUED | OUTPATIENT
Start: 2022-04-12 | End: 2022-04-12 | Stop reason: SDUPTHER

## 2022-04-12 RX ORDER — OXYCODONE HYDROCHLORIDE 10 MG/1
10 TABLET ORAL EVERY 4 HOURS PRN
Status: DISCONTINUED | OUTPATIENT
Start: 2022-04-12 | End: 2022-04-13 | Stop reason: HOSPADM

## 2022-04-12 RX ORDER — DIPHENHYDRAMINE HCL 25 MG
25 TABLET ORAL EVERY 4 HOURS PRN
Status: DISCONTINUED | OUTPATIENT
Start: 2022-04-12 | End: 2022-04-13 | Stop reason: HOSPADM

## 2022-04-12 RX ORDER — SODIUM CHLORIDE 9 MG/ML
25 INJECTION, SOLUTION INTRAVENOUS PRN
Status: DISCONTINUED | OUTPATIENT
Start: 2022-04-12 | End: 2022-04-12

## 2022-04-12 RX ORDER — LAMOTRIGINE 100 MG/1
100 TABLET ORAL DAILY
Status: DISCONTINUED | OUTPATIENT
Start: 2022-04-13 | End: 2022-04-13 | Stop reason: HOSPADM

## 2022-04-12 RX ORDER — ROCURONIUM BROMIDE 10 MG/ML
INJECTION, SOLUTION INTRAVENOUS PRN
Status: DISCONTINUED | OUTPATIENT
Start: 2022-04-12 | End: 2022-04-12 | Stop reason: SDUPTHER

## 2022-04-12 RX ORDER — PREGABALIN 75 MG/1
75 CAPSULE ORAL 2 TIMES DAILY
Qty: 28 CAPSULE | Refills: 0 | Status: SHIPPED | OUTPATIENT
Start: 2022-04-12 | End: 2022-07-06

## 2022-04-12 RX ORDER — FENTANYL CITRATE 50 UG/ML
25 INJECTION, SOLUTION INTRAMUSCULAR; INTRAVENOUS EVERY 5 MIN PRN
Status: DISCONTINUED | OUTPATIENT
Start: 2022-04-12 | End: 2022-04-12

## 2022-04-12 RX ORDER — SODIUM CHLORIDE 9 MG/ML
INJECTION, SOLUTION INTRAVENOUS PRN
Status: DISCONTINUED | OUTPATIENT
Start: 2022-04-12 | End: 2022-04-12

## 2022-04-12 RX ORDER — PROPOFOL 10 MG/ML
INJECTION, EMULSION INTRAVENOUS PRN
Status: DISCONTINUED | OUTPATIENT
Start: 2022-04-12 | End: 2022-04-12 | Stop reason: SDUPTHER

## 2022-04-12 RX ORDER — ONDANSETRON 2 MG/ML
4 INJECTION INTRAMUSCULAR; INTRAVENOUS
Status: DISCONTINUED | OUTPATIENT
Start: 2022-04-12 | End: 2022-04-12

## 2022-04-12 RX ORDER — SODIUM CHLORIDE 450 MG/100ML
INJECTION, SOLUTION INTRAVENOUS CONTINUOUS
Status: DISCONTINUED | OUTPATIENT
Start: 2022-04-12 | End: 2022-04-13 | Stop reason: HOSPADM

## 2022-04-12 RX ORDER — FLUOXETINE HYDROCHLORIDE 20 MG/1
40 CAPSULE ORAL DAILY
Status: DISCONTINUED | OUTPATIENT
Start: 2022-04-13 | End: 2022-04-13 | Stop reason: HOSPADM

## 2022-04-12 RX ORDER — AMLODIPINE BESYLATE 5 MG/1
10 TABLET ORAL DAILY
Status: DISCONTINUED | OUTPATIENT
Start: 2022-04-13 | End: 2022-04-13 | Stop reason: HOSPADM

## 2022-04-12 RX ORDER — OXYCODONE HYDROCHLORIDE 5 MG/1
5 TABLET ORAL EVERY 4 HOURS PRN
Status: DISCONTINUED | OUTPATIENT
Start: 2022-04-12 | End: 2022-04-13 | Stop reason: HOSPADM

## 2022-04-12 RX ORDER — DEXTROSE MONOHYDRATE 50 MG/ML
100 INJECTION, SOLUTION INTRAVENOUS PRN
Status: DISCONTINUED | OUTPATIENT
Start: 2022-04-12 | End: 2022-04-13

## 2022-04-12 RX ORDER — DEXTROSE MONOHYDRATE 25 G/50ML
12.5 INJECTION, SOLUTION INTRAVENOUS PRN
Status: DISCONTINUED | OUTPATIENT
Start: 2022-04-12 | End: 2022-04-13

## 2022-04-12 RX ORDER — OXYCODONE HYDROCHLORIDE 5 MG/1
5-10 TABLET ORAL EVERY 6 HOURS PRN
Qty: 40 TABLET | Refills: 0 | Status: SHIPPED | OUTPATIENT
Start: 2022-04-12 | End: 2022-04-18 | Stop reason: SDUPTHER

## 2022-04-12 RX ORDER — SUCCINYLCHOLINE/SOD CL,ISO/PF 200MG/10ML
SYRINGE (ML) INTRAVENOUS PRN
Status: DISCONTINUED | OUTPATIENT
Start: 2022-04-12 | End: 2022-04-12 | Stop reason: SDUPTHER

## 2022-04-12 RX ORDER — ACETAMINOPHEN 325 MG/1
650 TABLET ORAL EVERY 6 HOURS SCHEDULED
Status: DISCONTINUED | OUTPATIENT
Start: 2022-04-12 | End: 2022-04-13 | Stop reason: HOSPADM

## 2022-04-12 RX ORDER — PHENYLEPHRINE HCL IN 0.9% NACL 1 MG/10 ML
SYRINGE (ML) INTRAVENOUS PRN
Status: DISCONTINUED | OUTPATIENT
Start: 2022-04-12 | End: 2022-04-12 | Stop reason: SDUPTHER

## 2022-04-12 RX ORDER — OXYCODONE HYDROCHLORIDE 10 MG/1
10 TABLET ORAL ONCE
Status: COMPLETED | OUTPATIENT
Start: 2022-04-12 | End: 2022-04-12

## 2022-04-12 RX ORDER — SODIUM CHLORIDE 0.9 % (FLUSH) 0.9 %
5-40 SYRINGE (ML) INJECTION PRN
Status: DISCONTINUED | OUTPATIENT
Start: 2022-04-12 | End: 2022-04-12

## 2022-04-12 RX ORDER — SODIUM CHLORIDE 0.9 % (FLUSH) 0.9 %
10 SYRINGE (ML) INJECTION PRN
Status: DISCONTINUED | OUTPATIENT
Start: 2022-04-12 | End: 2022-04-13 | Stop reason: HOSPADM

## 2022-04-12 RX ORDER — ONDANSETRON 2 MG/ML
4 INJECTION INTRAMUSCULAR; INTRAVENOUS EVERY 6 HOURS PRN
Status: DISCONTINUED | OUTPATIENT
Start: 2022-04-12 | End: 2022-04-13 | Stop reason: HOSPADM

## 2022-04-12 RX ORDER — MAGNESIUM HYDROXIDE 1200 MG/15ML
LIQUID ORAL CONTINUOUS PRN
Status: COMPLETED | OUTPATIENT
Start: 2022-04-12 | End: 2022-04-12

## 2022-04-12 RX ORDER — ONDANSETRON 4 MG/1
4 TABLET, ORALLY DISINTEGRATING ORAL EVERY 8 HOURS PRN
Status: DISCONTINUED | OUTPATIENT
Start: 2022-04-12 | End: 2022-04-13 | Stop reason: HOSPADM

## 2022-04-12 RX ORDER — MORPHINE SULFATE 4 MG/ML
4 INJECTION, SOLUTION INTRAMUSCULAR; INTRAVENOUS
Status: DISCONTINUED | OUTPATIENT
Start: 2022-04-12 | End: 2022-04-13 | Stop reason: HOSPADM

## 2022-04-12 RX ORDER — LIDOCAINE HYDROCHLORIDE 20 MG/ML
INJECTION, SOLUTION EPIDURAL; INFILTRATION; INTRACAUDAL; PERINEURAL PRN
Status: DISCONTINUED | OUTPATIENT
Start: 2022-04-12 | End: 2022-04-12 | Stop reason: SDUPTHER

## 2022-04-12 RX ORDER — OXYCODONE HYDROCHLORIDE 5 MG/1
5 TABLET ORAL PRN
Status: DISCONTINUED | OUTPATIENT
Start: 2022-04-12 | End: 2022-04-12

## 2022-04-12 RX ORDER — TRANEXAMIC ACID 650 1/1
1950 TABLET ORAL ONCE
Status: COMPLETED | OUTPATIENT
Start: 2022-04-12 | End: 2022-04-12

## 2022-04-12 RX ORDER — PREGABALIN 75 MG/1
75 CAPSULE ORAL 2 TIMES DAILY
Status: DISCONTINUED | OUTPATIENT
Start: 2022-04-12 | End: 2022-04-13 | Stop reason: HOSPADM

## 2022-04-12 RX ORDER — SODIUM CHLORIDE 9 MG/ML
INJECTION, SOLUTION INTRAVENOUS CONTINUOUS
Status: DISCONTINUED | OUTPATIENT
Start: 2022-04-12 | End: 2022-04-12

## 2022-04-12 RX ORDER — IBUPROFEN 800 MG/1
800 TABLET ORAL
Qty: 270 TABLET | Refills: 1
Start: 2022-04-12 | End: 2022-09-26

## 2022-04-12 RX ORDER — KETOROLAC TROMETHAMINE 15 MG/ML
15 INJECTION, SOLUTION INTRAMUSCULAR; INTRAVENOUS
Status: COMPLETED | OUTPATIENT
Start: 2022-04-12 | End: 2022-04-12

## 2022-04-12 RX ORDER — SODIUM CHLORIDE 0.9 % (FLUSH) 0.9 %
10 SYRINGE (ML) INJECTION EVERY 12 HOURS SCHEDULED
Status: DISCONTINUED | OUTPATIENT
Start: 2022-04-12 | End: 2022-04-13 | Stop reason: HOSPADM

## 2022-04-12 RX ADMIN — Medication 15 MG: at 08:55

## 2022-04-12 RX ADMIN — ROCURONIUM BROMIDE 45 MG: 10 SOLUTION INTRAVENOUS at 09:02

## 2022-04-12 RX ADMIN — ACETAMINOPHEN 650 MG: 325 TABLET ORAL at 23:02

## 2022-04-12 RX ADMIN — HYDROMORPHONE HYDROCHLORIDE 0.25 MG: 1 INJECTION, SOLUTION INTRAMUSCULAR; INTRAVENOUS; SUBCUTANEOUS at 10:02

## 2022-04-12 RX ADMIN — MORPHINE SULFATE 4 MG: 4 INJECTION, SOLUTION INTRAMUSCULAR; INTRAVENOUS at 17:42

## 2022-04-12 RX ADMIN — HYDROMORPHONE HYDROCHLORIDE 0.25 MG: 1 INJECTION, SOLUTION INTRAMUSCULAR; INTRAVENOUS; SUBCUTANEOUS at 10:06

## 2022-04-12 RX ADMIN — DIPHENHYDRAMINE HCL 25 MG: 25 TABLET ORAL at 21:01

## 2022-04-12 RX ADMIN — DEXAMETHASONE SODIUM PHOSPHATE 8 MG: 4 INJECTION, SOLUTION INTRAMUSCULAR; INTRAVENOUS at 09:02

## 2022-04-12 RX ADMIN — LIDOCAINE HYDROCHLORIDE 80 MG: 20 INJECTION, SOLUTION EPIDURAL; INFILTRATION; INTRACAUDAL; PERINEURAL at 08:55

## 2022-04-12 RX ADMIN — HYDROMORPHONE HYDROCHLORIDE 0.25 MG: 1 INJECTION, SOLUTION INTRAMUSCULAR; INTRAVENOUS; SUBCUTANEOUS at 10:14

## 2022-04-12 RX ADMIN — FENTANYL CITRATE 50 MCG: 50 INJECTION, SOLUTION INTRAMUSCULAR; INTRAVENOUS at 10:28

## 2022-04-12 RX ADMIN — FENTANYL CITRATE 50 MCG: 50 INJECTION, SOLUTION INTRAMUSCULAR; INTRAVENOUS at 11:18

## 2022-04-12 RX ADMIN — PROPOFOL 200 MG: 10 INJECTION, EMULSION INTRAVENOUS at 08:55

## 2022-04-12 RX ADMIN — ACETAMINOPHEN 650 MG: 325 TABLET ORAL at 17:48

## 2022-04-12 RX ADMIN — MELOXICAM 7.5 MG: 7.5 TABLET ORAL at 07:52

## 2022-04-12 RX ADMIN — TRANEXAMIC ACID 1950 MG: 650 TABLET ORAL at 07:52

## 2022-04-12 RX ADMIN — SODIUM CHLORIDE: 9 INJECTION, SOLUTION INTRAVENOUS at 08:51

## 2022-04-12 RX ADMIN — OXYCODONE HYDROCHLORIDE 10 MG: 10 TABLET ORAL at 07:52

## 2022-04-12 RX ADMIN — HYDROMORPHONE HYDROCHLORIDE 0.25 MG: 1 INJECTION, SOLUTION INTRAMUSCULAR; INTRAVENOUS; SUBCUTANEOUS at 10:10

## 2022-04-12 RX ADMIN — KETOROLAC TROMETHAMINE 15 MG: 15 INJECTION, SOLUTION INTRAMUSCULAR; INTRAVENOUS at 21:55

## 2022-04-12 RX ADMIN — LITHIUM CARBONATE 600 MG: 300 CAPSULE, GELATIN COATED ORAL at 17:48

## 2022-04-12 RX ADMIN — PREGABALIN 75 MG: 75 CAPSULE ORAL at 20:59

## 2022-04-12 RX ADMIN — Medication 50 MCG: at 09:14

## 2022-04-12 RX ADMIN — ACETAMINOPHEN 650 MG: 325 TABLET ORAL at 12:35

## 2022-04-12 RX ADMIN — Medication 3000 MG: at 17:48

## 2022-04-12 RX ADMIN — MORPHINE SULFATE 4 MG: 4 INJECTION, SOLUTION INTRAMUSCULAR; INTRAVENOUS at 23:02

## 2022-04-12 RX ADMIN — ONDANSETRON 4 MG: 2 INJECTION INTRAMUSCULAR; INTRAVENOUS at 09:02

## 2022-04-12 RX ADMIN — MORPHINE SULFATE 4 MG: 4 INJECTION, SOLUTION INTRAMUSCULAR; INTRAVENOUS at 13:36

## 2022-04-12 RX ADMIN — Medication 3000 MG: at 08:52

## 2022-04-12 RX ADMIN — PROPOFOL 50 MG: 10 INJECTION, EMULSION INTRAVENOUS at 09:22

## 2022-04-12 RX ADMIN — HYDROMORPHONE HYDROCHLORIDE 0.5 MG: 1 INJECTION, SOLUTION INTRAMUSCULAR; INTRAVENOUS; SUBCUTANEOUS at 11:45

## 2022-04-12 RX ADMIN — Medication 15 MG: at 09:07

## 2022-04-12 RX ADMIN — SODIUM CHLORIDE: 4.5 INJECTION, SOLUTION INTRAVENOUS at 12:41

## 2022-04-12 RX ADMIN — PROPOFOL 50 MG: 10 INJECTION, EMULSION INTRAVENOUS at 09:45

## 2022-04-12 RX ADMIN — FENTANYL CITRATE 50 MCG: 50 INJECTION, SOLUTION INTRAMUSCULAR; INTRAVENOUS at 10:40

## 2022-04-12 RX ADMIN — ROCURONIUM BROMIDE 5 MG: 10 SOLUTION INTRAVENOUS at 08:55

## 2022-04-12 RX ADMIN — OXYCODONE HYDROCHLORIDE 10 MG: 10 TABLET ORAL at 12:36

## 2022-04-12 RX ADMIN — SUGAMMADEX 200 MG: 100 INJECTION, SOLUTION INTRAVENOUS at 10:14

## 2022-04-12 RX ADMIN — Medication 10 ML: at 20:59

## 2022-04-12 RX ADMIN — OXYCODONE HYDROCHLORIDE 10 MG: 10 TABLET ORAL at 20:59

## 2022-04-12 RX ADMIN — FENTANYL CITRATE 50 MCG: 50 INJECTION, SOLUTION INTRAMUSCULAR; INTRAVENOUS at 10:50

## 2022-04-12 RX ADMIN — OXYCODONE HYDROCHLORIDE 10 MG: 10 TABLET ORAL at 16:35

## 2022-04-12 RX ADMIN — ROCURONIUM BROMIDE 20 MG: 10 SOLUTION INTRAVENOUS at 09:40

## 2022-04-12 RX ADMIN — Medication 120 MG: at 08:55

## 2022-04-12 RX ADMIN — GLYCOPYRROLATE 0.2 MG: 0.2 INJECTION, SOLUTION INTRAMUSCULAR; INTRAVENOUS at 08:52

## 2022-04-12 RX ADMIN — MIDAZOLAM 2 MG: 1 INJECTION INTRAMUSCULAR; INTRAVENOUS at 08:52

## 2022-04-12 RX ADMIN — FENTANYL CITRATE 100 MCG: 50 INJECTION INTRAMUSCULAR; INTRAVENOUS at 08:55

## 2022-04-12 ASSESSMENT — PULMONARY FUNCTION TESTS
PIF_VALUE: 25
PIF_VALUE: 23
PIF_VALUE: 28
PIF_VALUE: 27
PIF_VALUE: 33
PIF_VALUE: 27
PIF_VALUE: 29
PIF_VALUE: 25
PIF_VALUE: 29
PIF_VALUE: 19
PIF_VALUE: 0
PIF_VALUE: 19
PIF_VALUE: 30
PIF_VALUE: 29
PIF_VALUE: 28
PIF_VALUE: 32
PIF_VALUE: 30
PIF_VALUE: 27
PIF_VALUE: 24
PIF_VALUE: 27
PIF_VALUE: 27
PIF_VALUE: 26
PIF_VALUE: 22
PIF_VALUE: 19
PIF_VALUE: 27
PIF_VALUE: 29
PIF_VALUE: 27
PIF_VALUE: 27
PIF_VALUE: 28
PIF_VALUE: 26
PIF_VALUE: 29
PIF_VALUE: 4
PIF_VALUE: 29
PIF_VALUE: 23
PIF_VALUE: 28
PIF_VALUE: 19
PIF_VALUE: 0
PIF_VALUE: 27
PIF_VALUE: 28
PIF_VALUE: 28
PIF_VALUE: 27
PIF_VALUE: 30
PIF_VALUE: 30
PIF_VALUE: 19
PIF_VALUE: 26
PIF_VALUE: 28
PIF_VALUE: 26
PIF_VALUE: 19
PIF_VALUE: 29
PIF_VALUE: 28
PIF_VALUE: 27
PIF_VALUE: 19
PIF_VALUE: 3
PIF_VALUE: 0
PIF_VALUE: 30
PIF_VALUE: 19
PIF_VALUE: 12
PIF_VALUE: 28
PIF_VALUE: 29
PIF_VALUE: 29
PIF_VALUE: 30
PIF_VALUE: 28
PIF_VALUE: 28
PIF_VALUE: 27
PIF_VALUE: 19
PIF_VALUE: 26
PIF_VALUE: 1
PIF_VALUE: 3
PIF_VALUE: 28
PIF_VALUE: 5
PIF_VALUE: 21
PIF_VALUE: 1
PIF_VALUE: 19
PIF_VALUE: 26
PIF_VALUE: 26
PIF_VALUE: 28
PIF_VALUE: 28
PIF_VALUE: 25
PIF_VALUE: 19
PIF_VALUE: 27
PIF_VALUE: 29
PIF_VALUE: 19
PIF_VALUE: 19
PIF_VALUE: 28
PIF_VALUE: 27
PIF_VALUE: 25

## 2022-04-12 ASSESSMENT — PAIN DESCRIPTION - PAIN TYPE
TYPE: SURGICAL PAIN

## 2022-04-12 ASSESSMENT — PAIN DESCRIPTION - ORIENTATION
ORIENTATION: LEFT

## 2022-04-12 ASSESSMENT — PAIN DESCRIPTION - PROGRESSION
CLINICAL_PROGRESSION: NOT CHANGED
CLINICAL_PROGRESSION: GRADUALLY WORSENING
CLINICAL_PROGRESSION: GRADUALLY WORSENING
CLINICAL_PROGRESSION: NOT CHANGED
CLINICAL_PROGRESSION: GRADUALLY IMPROVING
CLINICAL_PROGRESSION: NOT CHANGED
CLINICAL_PROGRESSION: GRADUALLY IMPROVING
CLINICAL_PROGRESSION: NOT CHANGED
CLINICAL_PROGRESSION: NOT CHANGED

## 2022-04-12 ASSESSMENT — LIFESTYLE VARIABLES: SMOKING_STATUS: 1

## 2022-04-12 ASSESSMENT — PAIN SCALES - GENERAL
PAINLEVEL_OUTOF10: 8
PAINLEVEL_OUTOF10: 6
PAINLEVEL_OUTOF10: 9
PAINLEVEL_OUTOF10: 10
PAINLEVEL_OUTOF10: 8
PAINLEVEL_OUTOF10: 0
PAINLEVEL_OUTOF10: 8
PAINLEVEL_OUTOF10: 8
PAINLEVEL_OUTOF10: 10
PAINLEVEL_OUTOF10: 9
PAINLEVEL_OUTOF10: 10
PAINLEVEL_OUTOF10: 9
PAINLEVEL_OUTOF10: 9
PAINLEVEL_OUTOF10: 8
PAINLEVEL_OUTOF10: 0
PAINLEVEL_OUTOF10: 8
PAINLEVEL_OUTOF10: 10
PAINLEVEL_OUTOF10: 9
PAINLEVEL_OUTOF10: 10
PAINLEVEL_OUTOF10: 9
PAINLEVEL_OUTOF10: 5
PAINLEVEL_OUTOF10: 10
PAINLEVEL_OUTOF10: 8
PAINLEVEL_OUTOF10: 8

## 2022-04-12 ASSESSMENT — PAIN DESCRIPTION - DESCRIPTORS
DESCRIPTORS: ACHING
DESCRIPTORS: ACHING;THROBBING
DESCRIPTORS: ACHING;SHOOTING
DESCRIPTORS: ACHING;THROBBING
DESCRIPTORS: ACHING;THROBBING;SHOOTING
DESCRIPTORS: ACHING
DESCRIPTORS: ACHING;THROBBING
DESCRIPTORS: ACHING
DESCRIPTORS: ACHING;THROBBING
DESCRIPTORS: ACHING;THROBBING

## 2022-04-12 ASSESSMENT — PAIN DESCRIPTION - FREQUENCY
FREQUENCY: CONTINUOUS

## 2022-04-12 ASSESSMENT — PAIN DESCRIPTION - LOCATION
LOCATION: HIP

## 2022-04-12 ASSESSMENT — PAIN DESCRIPTION - ONSET
ONSET: ON-GOING

## 2022-04-12 ASSESSMENT — PAIN - FUNCTIONAL ASSESSMENT
PAIN_FUNCTIONAL_ASSESSMENT: PREVENTS OR INTERFERES SOME ACTIVE ACTIVITIES AND ADLS
PAIN_FUNCTIONAL_ASSESSMENT: 0-10
PAIN_FUNCTIONAL_ASSESSMENT: PREVENTS OR INTERFERES SOME ACTIVE ACTIVITIES AND ADLS

## 2022-04-12 ASSESSMENT — PAIN DESCRIPTION - DIRECTION
RADIATING_TOWARDS: LEFT BUTTOCK

## 2022-04-12 ASSESSMENT — ENCOUNTER SYMPTOMS: SHORTNESS OF BREATH: 0

## 2022-04-12 NOTE — PROGRESS NOTES
Patient arrived on unit in bed. Patient currently having pain at a 10 of 10. Pain medications given as ordered. Pain to be reassessed for relief in 30-60 minutes. VSS at admission to the unit. Patient has no other issues or complaints at this time. Notified she can order food whenever she is ready to eat. Patient verbalized understanding.  Electronically signed by Gaurav Swan RN on 4/12/2022 at 12:52 PM

## 2022-04-12 NOTE — PROGRESS NOTES
[facility] Orthopedic Surgery   Progress Note      S/P :  SUBJECTIVE  In bed when seen. Alert and oriented. . Pain is   described in left hip and with the intensity of moderate. Pain is described as aching. OBJECTIVE              Physical                      VITALS:  /63   Pulse 62   Temp 97.9 °F (36.6 °C) (Oral)   Resp 18   Ht 5' 6\" (1.676 m)   Wt 268 lb 13.6 oz (122 kg)   SpO2 90%   BMI 43.39 kg/m²                     MUSCULOSKELETAL:  left foot NVI. Wiggles toes to command. Able to plantarflex and dorsiflex ankle Pedal pulses are palpable. NEUROLOGIC:                                  Sensory:  Touch:  Left Lower Extremity:  normal                                                 Surgical wound appears clean and dry left hip with dressing.  LO hose on    Data       CBC:   Lab Results   Component Value Date    WBC 8.8 04/04/2022    RBC 5.13 04/04/2022    HGB 15.2 04/04/2022    HCT 45.9 04/04/2022    MCV 89.6 04/04/2022    MCH 29.6 04/04/2022    MCHC 33.0 04/04/2022    RDW 13.6 04/04/2022     04/04/2022    MPV 7.7 04/04/2022        WBC:    Lab Results   Component Value Date    WBC 8.8 04/04/2022        Hemoglobin/Hematocrit:    Lab Results   Component Value Date    HGB 15.2 04/04/2022    HCT 45.9 04/04/2022        PT/INR:    Lab Results   Component Value Date    PROTIME 10.3 04/04/2022    INR 0.91 04/04/2022              Current Inpatient Medications             Current Facility-Administered Medications: [START ON 4/13/2022] amLODIPine (NORVASC) tablet 10 mg, 10 mg, Oral, Daily  [START ON 4/13/2022] FLUoxetine (PROZAC) capsule 40 mg, 40 mg, Oral, Daily  [START ON 4/13/2022] lamoTRIgine (LAMICTAL) tablet 100 mg, 100 mg, Oral, Daily  [START ON 4/13/2022] lithium capsule 300 mg, 300 mg, Oral, QAM  lithium capsule 600 mg, 600 mg, Oral, QPM  [START ON 4/13/2022] losartan (COZAAR) tablet 25 mg, 25 mg, Oral, Daily  insulin glargine (LANTUS) injection vial 31 Units, 0.25 Units/kg, SubCUTAneous, Nightly  insulin lispro (HUMALOG) injection vial 10 Units, 0.08 Units/kg, SubCUTAneous, TID WC  insulin lispro (HUMALOG) injection vial 0-6 Units, 0-6 Units, SubCUTAneous, TID WC  insulin lispro (HUMALOG) injection vial 0-3 Units, 0-3 Units, SubCUTAneous, Nightly  glucose (GLUTOSE) 40 % oral gel 15 g, 15 g, Oral, PRN  dextrose 50 % IV solution, 12.5 g, IntraVENous, PRN  glucagon (rDNA) injection 1 mg, 1 mg, IntraMUSCular, PRN  dextrose 5 % solution, 100 mL/hr, IntraVENous, PRN  0.45 % sodium chloride infusion, , IntraVENous, Continuous  sodium chloride flush 0.9 % injection 10 mL, 10 mL, IntraVENous, 2 times per day  sodium chloride flush 0.9 % injection 10 mL, 10 mL, IntraVENous, PRN  0.9 % sodium chloride infusion, 25 mL, IntraVENous, PRN  ceFAZolin (ANCEF) 3000 mg in dextrose 5 % 100 mL IVPB, 3,000 mg, IntraVENous, Q8H  acetaminophen (TYLENOL) tablet 650 mg, 650 mg, Oral, 4 times per day  oxyCODONE (ROXICODONE) immediate release tablet 5 mg, 5 mg, Oral, Q4H PRN **OR** oxyCODONE HCl (OXY-IR) immediate release tablet 10 mg, 10 mg, Oral, Q4H PRN  morphine (PF) injection 2 mg, 2 mg, IntraVENous, Q3H PRN **OR** morphine (PF) injection 4 mg, 4 mg, IntraVENous, Q3H PRN  ondansetron (ZOFRAN-ODT) disintegrating tablet 4 mg, 4 mg, Oral, Q8H PRN **OR** ondansetron (ZOFRAN) injection 4 mg, 4 mg, IntraVENous, Q6H PRN  [START ON 4/13/2022] apixaban (ELIQUIS) tablet 2.5 mg, 2.5 mg, Oral, BID  ketorolac (TORADOL) injection 15 mg, 15 mg, IntraVENous, Once PRN  [START ON 4/13/2022] meloxicam (MOBIC) tablet 7.5 mg, 7.5 mg, Oral, Daily  pregabalin (LYRICA) capsule 75 mg, 75 mg, Oral, BID    ASSESSMENT AND PLAN    Post left HUA, stable exam  DVT prophylaxis ordered, Eliquis bid for 30 total days after discharge from hospital for DVT prophylaxis.  Pt is \"high\" risk with BMI>40  PT OT for ADL's and ambulation as tolerated, anterior hip precautions  SS for DC planning, home with home care tomorrow  IV or PO pain med as ordered. Lyrica  added for postop pain control in an effort to reduce narcotic use per Dr Zhang Summers protocol.        Tawny Atwood, APRN - CNP  4/12/2022  3:14 PM

## 2022-04-12 NOTE — H&P
Update History & Physical    The patient's History and Physical of March 30, 2022 was reviewed with the patient and I examined the patient. There was no change. The surgical site was confirmed by the patient and me. Plan: The risks, benefits, expected outcome, and alternative to the recommended procedure have been discussed with the patient. Patient understands and wants to proceed with the procedure.      Electronically signed by Sharee Byrne MD on 4/12/2022 at 8:40 AM

## 2022-04-12 NOTE — OP NOTE
fascia ruthann. Incision into the fascia of the TFL was made and dissection was carried medial to the tensor and lateral to the rectus femoris and sartorius. The anterior femoral circumflex vessels were identified and coagulated. The anterior capsule of the hip was exposed and an anterior capsulotomy was made. The femoral neck was exposed by two cobra retractors. A proximal femoral osteotomy was performed and the femoral head was removed. The acetabulum was exposed and debrided of labral and capsular structures. Osteophytes and other acetabular debris were removed. The acetabulum was reamed under direct fluroscopic evaluation. A press fit 52 mm cup was impacted into place in the appropriate abduction and anteversion. This was checked with the C arm and was found to be in satisfacfory position. There were no screws used to secure the fixation of the implant to the floor of the acetabulum. A +4.0 mm 36 mm liner was placed. The proximal femur was exposed by using the hook from the Henderson table, externally rotating, extending, and adducting the leg. The proximal femur was prepared to accept a 3 high offset trial stem. The hip was reduced after placing the +1.5 mm 36 mm head segment over the Ragena Papa taper of the trial implant. The C arm was brought in to confirm satisfactory hardware placement and leg length equality. The hip was dislocated and the trial implants were removed. Again using the hook from the Henderson table and extending, externally rotating, and adducting the hip, the number 3 high offset Actis stem was hammered into place in the appropriate anteversion. The +1.5 mm 36 mm ceramic head segment was placed over the Ragena Papa taper. The hip was reduced. The C arm was brought in and confirmed satisfactory postion of the implants and leg length equity. The wound was irrigated and hemostasis was obtained. The wound was injected with local anesthetic. The wound was bathed with betadine.  The wound was closed in layers, a dressing was applied, and the patient was brought to recovery in satisfactory condition. Yanci Bolds, PA was essential in patient positioning, surgical assistance during the arthroplasty, and in wound closure. Due to the patient's morbid obesity (BMI 43), additional time was needed for patient positioning, performance of the arthroplasty, and wound closure.     Antoinette Brannon MD  4/12/2022

## 2022-04-12 NOTE — PROGRESS NOTES
4 Eyes Skin Assessment     NAME:  Valerie Hutchins  YOB: 1975  MEDICAL RECORD NUMBER:  [id number]    The patient is being assess for  Admission    I agree that 2 RN's have performed a thorough Head to Toe Skin Assessment on the patient. ALL assessment sites listed below have been assessed. Areas assessed by both nurses:    Head, Face, Ears, Shoulders, Back, Chest, Arms, Elbows, Hands, Sacrum. Buttock, Coccyx, Ischium and Legs. Feet and Heels        Does the Patient have a Wound?  No noted wound(s)       Daryn Prevention initiated:  No   Wound Care Orders initiated:  No    Pressure Injury (Stage 3,4, Unstageable, DTI, NWPT, and Complex wounds) if present place consult order under [de-identified] No    New and Established Ostomies if present place consult order under : No      Nurse 1 eSignature: Electronically signed by Donna Warner RN on 4/12/22 at 6:07 PM EDT    **SHARE this note so that the co-signing nurse is able to place an eSignature**    Nurse 2 eSignature: Electronically signed by Nanci Holland RN on 4/12/22 at [address] PM EDT

## 2022-04-12 NOTE — PROGRESS NOTES
Occupational Therapy   Occupational Therapy Initial Assessment  Date: 2022   Patient Name: Rosamaria Rojas  MRN: [id number]     : 1975    Date of Service: 2022    Discharge Recommendations:  2-3 sessions per week,Patient would benefit from continued therapy after discharge,Home with Home health OT,24 hour supervision or assist  OT Equipment Recommendations  Equipment Needed: No    Rosamaria Rojas scored a 18/24 on the AM-PAC ADL Inpatient form. Current research shows that an AM-PAC score of 18 or greater is typically associated with a discharge to the patient's home setting. Based on the patient's AM-PAC score, and their current ADL deficits, it is recommended that the patient have 2-3 sessions per week of Occupational Therapy at d/c to increase the patient's independence. At this time, this patient demonstrates the endurance and safety to discharge home with [address] [place] [address] (home vs OP services) and a follow up treatment frequency of 2-3x/wk. Please see assessment section for further patient specific details. If patient discharges prior to next session this note will serve as a discharge summary. Please see below for the latest assessment towards goals. Assessment   Performance deficits / Impairments: Decreased functional mobility ; Decreased endurance;Decreased ADL status; Decreased high-level IADLs;Decreased balance  Assessment: 49 yo female admitted  for elective L HUA- anterior percautions and WBAT. PMH: HTN, R HUA 2019, Bipolar, various back and neck chronic pain management. PTA, pt lives with spouse and fully IND. Today, pt functioning below baseline most limited by L hip pain. Pt on 2L throughout (SpO2 92%). Pt required Min A bed mobility, Min/CGA fxl tx and CGA fxl mobility with RW household distances. Pt require Mod A donning pants. Anticipate Mod A LB and set up seated UB ADL based on balance, endurance, cognition, pain and PLOF. Cont acute OT to address above deficits.  Pt has shower chair (unsure if it'll fit, pt will sponge bathe until home therapy can check). Rec OT 2-3x/week to facilitate safe return home with spouse for 24 hr SUP/assist  Treatment Diagnosis: impaired ADL/fxl mobility  Prognosis: Good  Decision Making: Medium Complexity  OT Education: OT Role;Transfer Training;Plan of Care;Precautions  Patient Education: use of shower chair (pt owns one, unsure in tub transfers bench)- plans to sponge bathe until home therapy can see if it will fit in pt's shower  REQUIRES OT FOLLOW UP: Yes  Activity Tolerance  Activity Tolerance: Patient Tolerated treatment well  Safety Devices  Safety Devices in place: Yes  Type of devices: Nurse notified; Patient at risk for falls;Gait belt;Call light within reach; Chair alarm in place; Left in chair         Patient Diagnosis(es): Diagnoses of Arthritis of left hip and Primary osteoarthritis of both hips were pertinent to this visit. has a past medical history of Arthritis, Back pain, Bipolar disorder ([address] Depression, Gastritis, Hypertension, Insomnia, Migraine, Neuropathy, PLMD (periodic limb movement disorder), Preoperative clearance, Prolonged emergence from general anesthesia, Sleep apnea, and TMJ (temporomandibular joint syndrome). has a past surgical history that includes Tonsillectomy; Foot surgery; Colposcopy; Endometrial ablation; Total hip arthroplasty (Right, 2/19/2019); Shoulder arthroscopy (Right, 11/25/2019); Pain management procedure (N/A, 7/6/2021); Pain management procedure (Bilateral, 8/31/2021); Nerve Block (N/A, 10/26/2021); Rotator cuff repair; Pain management procedure (Left, 1/11/2022); Pain management procedure (Right, 1/25/2022); joint replacement (Right, 02/19/2019); and Total hip arthroplasty (Left, 4/12/2022).     Treatment Diagnosis: impaired ADL/fxl mobility      Restrictions  Restrictions/Precautions  Restrictions/Precautions: Weight Bearing,Fall Risk,ROM Restrictions  Lower Extremity Weight Bearing Restrictions  Left Lower Extremity Weight Bearing: Weight Bearing As Tolerated  Position Activity Restriction  Hip Precautions: No hip extension,No hip external rotation (no straight leg raises)    Subjective   General  Chart Reviewed: Yes  Patient assessed for rehabilitation services?: Yes  Additional Pertinent Hx: 51 yo female admitted 4/12 for elective L HUA- anterior percautions and WBAT. PMH: HTN, R HUA 2019, Bipolar, various back and neck chronic pain management  Family / Caregiver Present: Yes  Referring Practitioner: Minh Parks MD  Diagnosis: L HUA  Subjective  Subjective: Pt resting in bed upon arrival and agreeable to OT/PT eval. pt with min anxiety. Pt reports 4/10 L hip pain  General Comment  Comments: RN ok to see  Patient Currently in Pain: Yes  Pain Assessment  Pain Assessment: 0-10  Pain Level: 5  Patient's Stated Pain Goal: 5  Pain Type: Surgical pain  Pain Location: Hip  Pain Orientation: Left  Pain Descriptors: Aching  Pain Frequency: Continuous  Pain Onset: On-going  Clinical Progression: Gradually improving  Functional Pain Assessment: Prevents or interferes some active activities and ADLs  Non-Pharmaceutical Pain Intervention(s): Cold applied;Distraction; Emotional support; Rest  Response to Pain Intervention: Patient Satisfied (declined need for further pain medication)  Multiple Pain Sites: No  Vital Signs  Patient Currently in Pain: Yes  Height and Weight  Height: 5' 6\" (167.6 cm)    Social/Functional History  Social/Functional History  Lives With: Significant other  Type of Home: House  Home Layout: One level,Able to Live on Main level with bedroom/bathroom  Home Access: Stairs to enter without rails  Entrance Stairs - Number of Steps: 2 steps to enter without rail  Bathroom Shower/Tub: Tub/Shower unit,Curtain  Bathroom Toilet: Standard (sink close)  [employer]:  (has shower chair (may be tub transfer bench))  Bathroom Accessibility: Accessible  Home Equipment: Standard walker,Rolling walker  ADL Assistance: Independent  Homemaking Assistance: Independent  Ambulation Assistance: Independent  Transfer Assistance: Independent  Additional Comments: Here for elective L THR: R THR 3 years ago       Objective   Vision: Within Functional Limits  Hearing: Within functional limits    Orientation  Overall Orientation Status: Within Functional Limits     Balance  Sitting Balance: Stand by assistance (at EOB in prep for tx and donning underpants)  Standing Balance: Contact guard assistance (pant management with unilateral or no (BLE braced against bed) support on RW)  Functional Mobility  Functional - Mobility Device: Rolling Walker  Activity:  (EOB>recliner (short distances)>curtain>recliner)  Assist Level: Contact guard assistance  Functional Mobility Comments: min unsteadiness, no LOB. Appropriate RW use  ADL  LE Dressing: Moderate assistance (assist threading and thoroughness with pant management)  Additional Comments: Anticipate Mod A LB and set up seated UB ADL based on balance, endurance, cognition, pain and PLOF  Tone RUE  RUE Tone: Normotonic  Tone LUE  LUE Tone: Normotonic  Coordination  Movements Are Fluid And Coordinated: Yes        Transfers  Sit to stand: Minimal assistance;Contact guard assistance  Stand to sit: Contact guard assistance  Transfer Comments: RW. cues hand placement.  for sit>stand 1x pt with LOB placing LUE on RW abruptly requiring Min a for steadying     Cognition  Overall Cognitive Status: WFL        Sensation  Overall Sensation Status: WFL        LUE AROM (degrees)  LUE AROM : WFL  RUE AROM (degrees)  RUE AROM : WFL  LUE Strength  Gross LUE Strength: WFL  RUE Strength  Gross RUE Strength: WFL                Plan   Plan  Times per week: 2-3 sessions  Current Treatment Recommendations: Strengthening,Endurance Training,Patient/Caregiver Education & Training,ROM,Self-Care / Amparo Bobby Mobility Training,Safety Education & Training,Positioning    AM-PAC Score        AM-PAC Inpatient Daily Activity Raw Score: 18 (04/12/22 1544)  AM-PAC Inpatient ADL T-Scale Score : 38.66 (04/12/22 1544)  ADL Inpatient CMS 0-100% Score: 46.65 (04/12/22 1544)  ADL Inpatient CMS G-Code Modifier : CK (04/12/22 1544)    Goals  Short term goals  Time Frame for Short term goals: prior to d/c  Short term goal 1: toileting SUP  Short term goal 2: LB dressing with AE SUP  Short term goal 3: tolerate 5 min fxl standing task SUP  Short term goal 4: UB dressing seated set up  Short term goal 5: fxl tx and mobility with RW household distances SUP  Patient Goals   Patient goals : return home       Therapy Time   Individual Concurrent Group Co-treatment   Time In 1500         Time Out 1540         Minutes 40         Timed Code Treatment Minutes: 25 Minutes (15 eval. 10 ADL.  15 TA)       Dulce Maria Hayes, SALEEM, OTR/L

## 2022-04-12 NOTE — ANESTHESIA PRE PROCEDURE
Department of Anesthesiology  Preprocedure Note       Name:  Rosamaria Rojas   Age:  50 y.o.  :  1975                                          MRN:  [id number]         Date:  2022      Surgeon: Whit Geronimo):  Alanna Guaman MD    Procedure: Procedure(s):  LEFT ANTERIOR TOTAL HIP REPLACEMENT    Medications prior to admission:   Prior to Admission medications    Medication Sig Start Date End Date Taking? Authorizing Provider   zolpidem (AMBIEN CR) 12.5 MG extended release tablet TAKE 1 TABLET BY MOUTH NIGHTLY AS NEEDED FOR SLEEP 4/11/22 7/10/22  ROSSANA Powell CNP   traMADol (ULTRAM) 50 MG tablet Take 1 tablet by mouth every 6 hours as needed for Pain for up to 5 days. 22  Alanna Guaman MD   desvenlafaxine succinate (PRISTIQ) 25 MG TB24 extended release tablet Take 1 tablet by mouth daily 3/30/22 5/29/22  ROSSANA Hoffmann CNP   FLUoxetine (PROZAC) 20 MG capsule Take 2 capsules by mouth once daily 3/23/22   ROSSANA Hoffmann CNP   lithium 300 MG capsule TAKE 1 CAPSULE BY MOUTH IN THE MORNING AND 2 IN THE EVENING 3/23/22   ROSSANA Pinon CNP   losartan (COZAAR) 25 MG tablet Take 1 tablet by mouth once daily 3/21/22   ROSSANA Hoffmann CNP   lamoTRIgine (LAMICTAL) 100 MG tablet Take 1 tablet by mouth once daily 22   ROSSANA Hoffmann CNP   ibuprofen (ADVIL;MOTRIN) 800 MG tablet Take 1 tablet by mouth 3 times daily (with meals) 21   ROSSANA Paredes CNP   amLODIPine (NORVASC) 10 MG tablet Take 1 tablet by mouth once daily 21   ROSSANA Hoffmann CNP   ammonium lactate (LAC-HYDRIN) 12 % lotion Apply topically daily.   Patient taking differently: Apply topically as needed Apply topically daily PRN. 21   ROSSANA Hoffmann CNP       Current medications:    Current Facility-Administered Medications   Medication Dose Route Frequency Provider Last Rate Last Admin    0.9 % sodium chloride infusion   IntraVENous Continuous Reg Green MD        sodium chloride flush 0.9 % injection 5-40 mL  5-40 mL IntraVENous 2 times per day Reg Green MD        sodium chloride flush 0.9 % injection 5-40 mL  5-40 mL IntraVENous PRN Reg Green MD        0.9 % sodium chloride infusion  25 mL IntraVENous PRN Reg Green MD        ceFAZolin (ANCEF) 2000 mg in dextrose 5 % 100 mL IVPB  2,000 mg IntraVENous Once Tristen Alvarez MD        meloxicam LATESHA OLIVAREZ [facility]) tablet 7.5 mg  7.5 mg Oral Once Tristen Alvarez MD        oxyCODONE HCl (OXY-IR) immediate release tablet 10 mg  10 mg Oral Once Tristen Alvarez MD        tranexamic acid (LYSTEDA) tablet 1,950 mg  1,950 mg Oral Once Tristen Alvarez MD           Allergies: Allergies   Allergen Reactions    Imitrex [Sumatriptan] Nausea Only     nausea       Problem List:    Patient Active Problem List   Diagnosis Code    Migraine G43.909    Amenorrhea, secondary N91.1    Depression F32. A    Gastritis K29.70    TMJ (temporomandibular joint syndrome) M26.609    Lipoma D17.9    Grief reaction F43.21    Anxiety F41.9    Tobacco abuse Z72.0    Insomnia G47.00    Bipolar affective disorder ([facility]) F31.9    VICTORIANO (obstructive sleep apnea) G47.33    Periodic limb movement disorder (PLMD) G47.61    Primary osteoarthritis of both hips M16.0    Left hip pain M25.552    Arthritis of right hip M16.11    Obesity, Class III, BMI 40-49.9 (morbid obesity) ([facility]) E66.01    Essential hypertension, benign I10    Bipolar disorder with depression ([facility]) F31.9    Morbid obesity ([facility]) E66.01    Tear of right rotator cuff M75.101    Hypersomnia G47.10    Traumatic complete tear of right rotator cuff S46.011A    History of total hip arthroplasty, right Z96.641    Cervical spondylosis M47.812    Cervical radicular pain M54.12    DDD (degenerative disc disease), cervical M50.30    Claustrophobia F40.240    Cervical stenosis of spine M48.02       Past Medical History:        Diagnosis Date    Arthritis     Back pain     Bipolar disorder ([address]     Depression     Gastritis     Hypertension     Insomnia     Migraine     Neuropathy     PLMD (periodic limb movement disorder)     PATIENT UNAWARE    Preoperative clearance     Prolonged emergence from general anesthesia     Sleep apnea     uses C-PAP    TMJ (temporomandibular joint syndrome)        Past Surgical History:        Procedure Laterality Date    COLPOSCOPY      ENDOMETRIAL ABLATION      FOOT SURGERY      JOINT REPLACEMENT Right 02/19/2019    RIGHT LATERAL TOTAL HIP REPLACEMENT    NERVE BLOCK N/A 10/26/2021    BILATERAL C5-C7 MEDIAL BRANCH BLOCK WITH FLUOROSCOPY performed by Shon Gaffney MD at [address] [place] [address] N/A 7/6/2021    C5C6  EPIDURAL STERIOD INJECTION WITH FLUOROSCOPY performed by Shon Gaffney MD at [address] [place] [address] Bilateral 8/31/2021    BILATERAL C5, C6, C7 MEDIAL BRANCH BLOCK WITH FLUOROSCOPY (69670, [address])-NO STEROIDS performed by Shon Gaffney MD at [address] [place] [address] Left 1/11/2022    LEFT C4, C5, C6 RADIOFREQUENCY ABLATION WITH FLUOROSCOPY performed by Shon Gaffney MD at [address] [place] [address] Right 1/25/2022    RIGHT C-4-C-6 NERVE RADIOFREQUENCY ABLATION WITH FLUOROSCOPY performed by Shon Gaffney MD at [address] [place] [address] ARTHROSCOPY Right 11/25/2019    RIGHT SHOULDER ARTHROSCOPY, SUBACROMIAL DECOMPRESSION,   ROTATOR CUFF REPAIR performed by Kalani Whyte MD at [place] [address] Right 2/19/2019    RIGHT LATERAL TOTAL HIP REPLACEMENT performed by Jh Molina MD at [address] [place] [address] History:    Social History     Tobacco Use    Smoking status: Current Every Day Smoker     Packs/day: 0.50     Years: 10.00     Pack years: 5.00     Types: Cigarettes     Start date: 9/17/1987    Smokeless tobacco: Never Used   [employer] Tobacco comment: RESUMED THE LAST TWO WEEKS DUE TO ANXIETY- DEPRESSION DEATH OF BROTHER- HAS A PLAN TO STOP   Substance Use Topics    Alcohol use: Not Currently     Alcohol/week: 5.0 standard drinks     Types: 6 Standard drinks or equivalent per week     Comment: rarely                                Ready to quit: Not Answered  Counseling given: Not Answered  Comment: RESUMED THE LAST TWO WEEKS DUE TO ANXIETY- DEPRESSION DEATH OF BROTHER- HAS A PLAN TO STOP      Vital Signs (Current):   Vitals:    03/30/22 1120 04/12/22 0727   BP:  (!) 146/83   Pulse:  68   Resp:  14   Temp:  96.8 °F (36 °C)   TempSrc:  Temporal   SpO2:  96%   Weight: 265 lb (120.2 kg) 268 lb 13.6 oz (122 kg)   Height: 5' 6\" (1.676 m)                                               BP Readings from Last 3 Encounters:   04/12/22 (!) 146/83   03/30/22 126/74   03/17/22 126/78       NPO Status:                                                                                 BMI:   Wt Readings from Last 3 Encounters:   04/12/22 268 lb 13.6 oz (122 kg)   04/07/22 275 lb (124.7 kg)   03/30/22 275 lb (124.7 kg)     Body mass index is 43.39 kg/m². CBC:   Lab Results   Component Value Date    WBC 8.8 04/04/2022    RBC 5.13 04/04/2022    HGB 15.2 04/04/2022    HCT 45.9 04/04/2022    MCV 89.6 04/04/2022    RDW 13.6 04/04/2022     04/04/2022       CMP:   Lab Results   Component Value Date     04/04/2022    K 4.5 04/04/2022    K 4.2 07/16/2020     04/04/2022    CO2 23 04/04/2022    BUN 12 04/04/2022    CREATININE 0.8 04/04/2022    GFRAA >60 04/04/2022    GFRAA >60 05/17/2012    AGRATIO 1.8 03/17/2022    LABGLOM >60 04/04/2022    GLUCOSE 121 04/04/2022    PROT 6.5 03/17/2022    PROT 6.9 05/17/2012    CALCIUM 9.9 04/04/2022    BILITOT <0.2 03/17/2022    ALKPHOS 83 03/17/2022    AST 16 03/17/2022    ALT 23 03/17/2022       POC Tests: No results for input(s): POCGLU, POCNA, POCK, POCCL, POCBUN, POCHEMO, POCHCT in the last 72 hours.     Coags: Lab Results   Component Value Date    PROTIME 10.3 04/04/2022    INR 0.91 04/04/2022    APTT 35.7 04/04/2022       HCG (If Applicable): No results found for: PREGTESTUR, PREGSERUM, HCG, HCGQUANT     ABGs: No results found for: PHART, PO2ART, LUF3SHV, JUB3SZQ, BEART, Z0QJAYQT     Type & Screen (If Applicable):  No results found for: LABABO, LABRH    Drug/Infectious Status (If Applicable):  No results found for: HIV, HEPCAB    COVID-19 Screening (If Applicable): No results found for: COVID19        Anesthesia Evaluation  Patient summary reviewed and Nursing notes reviewed no history of anesthetic complications:   Airway: Mallampati: III  TM distance: >3 FB   Neck ROM: full  Mouth opening: > = 3 FB Dental:    (+) partials  Comment: Upper partial    Pulmonary:   (+) sleep apnea: on CPAP,  current smoker    (-) pneumonia, COPD, asthma, shortness of breath and recent URI          Patient did not smoke on day of surgery. Cardiovascular:  Exercise tolerance: Limited by hip pain  (+) hypertension:,     (-) pacemaker, valvular problems/murmurs, past MI, CAD, CABG/stent, dysrhythmias,  angina,  CHF, orthopnea, PND,  JARRETT, no pulmonary hypertension and no hyperlipidemia      Rhythm: regular                      Neuro/Psych:   (+) headaches: migraine headaches, psychiatric history:   (-) seizures, neuromuscular disease, TIA, CVA and depression/anxiety             ROS comment: cervical stenosis GI/Hepatic/Renal:   (+) morbid obesity     (-) hiatal hernia, GERD, PUD, liver disease, no renal disease and bowel prep       Endo/Other:    (+) : arthritis: OA., .                 Abdominal:             Vascular:     - PVD, DVT and PE. Other Findings:           Anesthesia Plan      general     ASA 3       Induction: intravenous. MIPS: Postoperative opioids intended and Prophylactic antiemetics administered. Anesthetic plan and risks discussed with patient.       Plan discussed with Jessica Sweeney MD   4/12/2022        This pre-anesthesia assessment may be used as a history and physical.    DOS STAFF ADDENDUM:    Pt seen and examined, chart reviewed (including anesthesia, drug and allergy history). No interval changes to history and physical examination. Anesthetic plan, risks, benefits, alternatives, and personnel involved discussed with patient. Patient verbalized an understanding and agrees to proceed.       Zenaida Ojeda MD  April 12, 2022  7:30 AM

## 2022-04-12 NOTE — PROGRESS NOTES
Met with patient at bedside, patient is alert and orientedx4, having moderate pain but states is improving. discussed role of nurse navigator. Reviewed reasons to call with questions or concerns, importance of TEDS, Incentive spirometer, pain medication, and physical and occupational therapy. 2/4 bed rails up, bed in lowest position, fall precautions in place, call light within reach. Pulses present bilaterally +2 pedal, no drainage or odor noted at surgical dressing left hip. dry Dressing clean, dry, and intact. Ice in place. Gagan and scds on BLEs. Neurovascular checks performed and weak dorsi and plantar flexions noted, patient denies numbness, states her bilateral feet have chronic tingling that is at her baseline. DC Plan: home with signifiant other dan and [facility]. dan to transport patient. DME needs:denies, already has a rolling walker and shower chair.     Andree Hughes  Orthopedic Nurse Navigator  Phone number: [phone]    Future Appointments   Date Time Provider Harjit Ferrari   4/25/2022 10:30 AM Joselyn Licea MD [facility]   5/9/2022 10:00 AM [facility] [employer]   10/13/2022  9:00 AM Zulay Ruano MD [facility]     Electronically signed by Jackie Serraot RN on 4/12/2022 at 2:29 PM

## 2022-04-12 NOTE — PROGRESS NOTES
To pacu from OR. Pt asleep with oral airway in place. Dressing to left hip dry and intact. Ice to left hip. Pedal pulses palpable. IV infusing. Monitor in sinus rhythm.

## 2022-04-12 NOTE — PLAN OF CARE
Problem: Cardiac:  Goal: Hemodynamic stability will improve  Description: Hemodynamic stability will improve  Outcome: Ongoing     Problem: Tobacco Use:  Goal: Inpatient tobacco use cessation counseling participation  Description: Inpatient tobacco use cessation counseling participation  Outcome: Ongoing     Problem: Discharge Planning:  Goal: Knowledge of discharge instructions  Description: Knowledge of discharge instructions  Outcome: Ongoing     Problem: Infection - Surgical Site:  Goal: Signs of wound healing will improve  Description: Signs of wound healing will improve  Outcome: Ongoing  Note: Pt is free of signs and symptoms of infection. Incision and dressing are clean, dry and intact. Vital signs stable. Will monitor. Problem: Mobility - Impaired:  Goal: Achieve maximum mobility level  Description: Achieve maximum mobility level  Outcome: Ongoing  Note: Pt mobility is increasing. Pt up to chair with PT. Pt is SBA x1 with walker. Will monitor. Problem: Pain - Acute:  Goal: Pain level will decrease  Description: Pain level will decrease  Outcome: Ongoing  Note: Pain managed with pharmacologic and non-pharmacologic interventions during this shift. Will continue to monitor and assess for needs and change in patient condition. Problem: Falls - Risk of:  Goal: Will remain free from falls  Description: Will remain free from falls  Outcome: Ongoing  Note: Patient remains free from falls during this shift. Fall precautions remain in place. Patient educated on the need to implement call light use prior to ambulation. Will continue to monitor and assess. Goal: Absence of physical injury  Description: Absence of physical injury  Outcome: Ongoing     Problem: Pain:  Goal: Pain level will decrease  Description: Pain level will decrease  Outcome: Ongoing  Note: Pain managed with pharmacologic and non-pharmacologic interventions during this shift.  Will continue to monitor and assess for needs and change in patient condition.     Goal: Control of acute pain  Description: Control of acute pain  Outcome: Ongoing  Goal: Control of chronic pain  Description: Control of chronic pain  Outcome: Ongoing

## 2022-04-12 NOTE — ANESTHESIA POSTPROCEDURE EVALUATION
Department of Anesthesiology  Postprocedure Note    Patient: Joanne Gibson  MRN: [id number]  YOB: 1975  Date of evaluation: 4/12/2022  Time:  12:37 PM     Procedure Summary     Date: 04/12/22 Room / Location: [facility] [address] [place] [address]    Anesthesia Start: 9149 Anesthesia Stop: 7914    Procedure: LEFT ANTERIOR TOTAL HIP REPLACEMENT (Left Hip) Diagnosis:       Arthritis of left hip      (LEFT HIP ARTHRITIS)    Surgeons: Paula Reyes MD Responsible Provider: Sury Davidson MD    Anesthesia Type: general ASA Status: 3          Anesthesia Type: general    Gilbert Phase I: Gilbert Score: 9    Gilbert Phase II:      Last vitals: Reviewed and per EMR flowsheets.        Anesthesia Post Evaluation    Patient location during evaluation: PACU  Patient participation: complete - patient participated  Level of consciousness: awake and alert  Airway patency: patent  Nausea & Vomiting: no nausea and no vomiting  Complications: no  Cardiovascular status: blood pressure returned to baseline  Respiratory status: acceptable  Hydration status: euvolemic  Comments: Complete pain control is hard to achieve in PACU, she is agreeable to leave PACU and go to her room and continue to take pain meds there

## 2022-04-12 NOTE — PROGRESS NOTES
Physical Therapy    Facility/Department: [address] ORTHOPEDICS  Initial Assessment  This note serves as patient discharge summary if pt discharges prior to next PT visit      NAME: Farida Garcia  : 1975  MRN: [id number]    Date of Service: 2022    Discharge Recommendations:  S Level 1,24 hour supervision or assist   PT Equipment Recommendations  Equipment Needed: No  Farida Garcia scored a 17/24 on the AM-PAC short mobility form. Current research shows that an AM-PAC score of 18 or greater is typically associated with a discharge to the patient's home setting. Based on the patient's AM-PAC score and their current functional mobility deficits, it is recommended that the patient have 2-3 sessions per week of Physical Therapy at d/c to increase the patient's independence. At this time, this patient demonstrates the endurance and safety to discharge home with home health PT and a follow up treatment frequency of 2-3x/wk. Please see assessment section for further patient specific details. Assessment   Body structures, Functions, Activity limitations: Decreased functional mobility ; Decreased ROM; Decreased strength;Decreased endurance;Decreased ADL status; Decreased balance; Increased pain  Assessment: Pt is a 49 yo. female who presents to hospital for elective L HUA. Additional Hx includes R HUA in 2019. PTA pt was independent with all ADLs and homemaking responsibilities. Status 22: Pt performed bed mobility from supine to sitting EOB with Min A at L LE. Pt performed sit <> stand to RW with CGA-Min A. Pt ambulated with RW and CGA. Pt reports having a RW at home. Recommending ongoing PT level 1 following discharge and initial 24-hr supervision.   Prognosis: Good  Decision Making: Low Complexity  Clinical Presentation: Stable  PT Education: Goals;PT Role;Plan of Care;General Safety;Transfer Training;Weight-bearing Education;Gait Training  Activity Tolerance  Activity Tolerance: Patient Tolerated treatment well;Patient limited by pain; Patient limited by fatigue  Activity Tolerance: Pt's SpO2 level at 92% at beginning of session and remained on 2L O2 throughout session. Patient Diagnosis(es): Diagnoses of Arthritis of left hip and Primary osteoarthritis of both hips were pertinent to this visit. has a past medical history of Arthritis, Back pain, Bipolar disorder ([address] Depression, Gastritis, Hypertension, Insomnia, Migraine, Neuropathy, PLMD (periodic limb movement disorder), Preoperative clearance, Prolonged emergence from general anesthesia, Sleep apnea, and TMJ (temporomandibular joint syndrome). has a past surgical history that includes Tonsillectomy; Foot surgery; Colposcopy; Endometrial ablation; Total hip arthroplasty (Right, 2/19/2019); Shoulder arthroscopy (Right, 11/25/2019); Pain management procedure (N/A, 7/6/2021); Pain management procedure (Bilateral, 8/31/2021); Nerve Block (N/A, 10/26/2021); Rotator cuff repair; Pain management procedure (Left, 1/11/2022); Pain management procedure (Right, 1/25/2022); joint replacement (Right, 02/19/2019); and Total hip arthroplasty (Left, 4/12/2022). Restrictions  Restrictions/Precautions  Restrictions/Precautions: Weight Bearing,Fall Risk,ROM Restrictions  Lower Extremity Weight Bearing Restrictions  Left Lower Extremity Weight Bearing: Weight Bearing As Tolerated  Position Activity Restriction  Hip Precautions: No hip extension,No hip external rotation (no straight leg raises)     Vision/Hearing  Vision: Within Functional Limits  Hearing: Within functional limits       Subjective  General  Chart Reviewed: Yes  Patient assessed for rehabilitation services?: Yes  Additional Pertinent Hx: Pt is a 51 yo. female who presents to hospital for elective L HUA on 4/12/22. Additional PMHx as noted, including R HUA in 2019.   Response To Previous Treatment: Not applicable  Family / Caregiver Present: No  Referring Practitioner: Tristen Alvarez MD  Referral Date : 04/12/22  Subjective  Subjective: Pt reports being hesitant/nervous to move and being \"itchy\" all over. Pain Screening  Patient Currently in Pain: Yes     Orientation  Orientation  Overall Orientation Status: Within Normal Limits     Social/Functional History  Social/Functional History  Lives With: Significant other  Type of Home: House  Home Layout: One level,Able to Live on Main level with bedroom/bathroom  Home Access: Stairs to enter without rails  Entrance Stairs - Number of Steps: 2 steps to enter without rail  Bathroom Shower/Tub: Tub/Shower unit,Curtain  Bathroom Toilet: Standard (sink close)  [employer]:  (has shower chair (may be tub transfer bench))  Bathroom Accessibility: Accessible  Home Equipment: Standard walker,Rolling walker  ADL Assistance: Independent  Homemaking Assistance: Independent  Ambulation Assistance: Independent  Transfer Assistance: Independent  Additional Comments: Here for elective L THR: R THR 3 years ago     Cognition   Cognition  Overall Cognitive Status: WFL    Objective   AROM RLE (degrees)  RLE AROM: WFL  AROM LLE (degrees)  LLE AROM : WFL  LLE General AROM: within anterior hip precautions  AROM RUE (degrees)  RUE AROM : WFL  AROM LUE (degrees)  LUE AROM : WFL  Strength RLE  Strength RLE: WFL  Strength LLE  Strength LLE: WFL  Comment: for limited ambulation and transfers  Strength RUE  Strength RUE: WFL  Strength LUE  Strength LUE: WFL   Sensation  Overall Sensation Status: WFL  Bed mobility  Supine to Sit: Minimal assistance Supporting L LE  Sit to Supine: Unable to assess  Scooting: Contact guard assistance   Transfers  Sit to Stand: Contact guard assistance;Minimal Assistance  Stand to sit: Contact guard assistance  Comment: On pt's second sit-to-stand to RW from BS chair, pt had a slight loss of balance when bringing hands to RW; pt required min A to maintain balance and correct.   Ambulation  Ambulation?: Yes  WB Status: WBAT  Ambulation 1  Surface: level tile  Device: Rolling Walker  Assistance: Contact guard assistance  Gait Deviations: Slow Kaitlin;Decreased step length  Distance: 25'  Stairs/Curb  Stairs?: No   Balance  Posture: Good  Sitting - Static: Good  Sitting - Dynamic: Good  Standing - Static: Good (At RW)  Standing - Dynamic: Good (at RW)      Plan   Plan  Times per week: 1-3 days  Current Treatment Recommendations: Strengthening,Functional Mobility Training,Transfer Training,Gait Training,Stair training,Endurance Training,Balance Training  Safety Devices  Type of devices: Gait belt,Patient at risk for falls,Left in chair,Nurse notified,Call light within reach,Chair alarm in place     AM-PAC Score  AM-PAC Inpatient Mobility Raw Score : 17 (04/12/22 1546)  AM-PAC Inpatient T-Scale Score : 42.13 (04/12/22 1546)  Mobility Inpatient CMS 0-100% Score: 50.57 (04/12/22 1546)  Mobility Inpatient CMS G-Code Modifier : CK (04/12/22 1546)      Goals  Short term goals  Time Frame for Short term goals: 1-3 days  Short term goal 1: Pt will be independent with all bed mobility  Short term goal 2: Pt will ambulate 76' with CGA and RW to demonstrate improved functional mobility. Short term goal 3: Pt will demonstrate verbal understanding of anterior hip precautions  Short term goal 4: Pt will perform 2 stairs without handrail and with Min A  Patient Goals   Patient goals :  To return home and recover     Therapy Time   Individual Concurrent Group Co-treatment   Time In 1500         Time Out 1540         Minutes [id number] [facility], Student Physical Therapist  I attest that I was present for and made a skilled & mindful clinical judgement during the evaluation and/or treatment of this patient on 4/12/2022    Electronically signed by Liz Joe PT on 4/12/2022 at 4:03 PM

## 2022-04-12 NOTE — PROGRESS NOTES
Patient back in bed. Ambulated to bathroom from chair, tolerated well with assist X1 and walker. Patient c/o of continued pain and provided PRN and scheduled pain medications throughout the afternoon for relief. See MAR. . Ice applied to the left hip in bed. Patient came to the unit on 2L of O2 via NC. Patient ambulated on room air and remained at 95%. Will remain on Room air for oxygenation needs. IV infusing without issue, dressing clean,dry, and intact. No other needs or concerns at this time. Standard Safety Precautions in place.  Electronically signed by Corbin Courtney RN on 4/12/2022 at 7:23 PM

## 2022-04-12 NOTE — TELEPHONE ENCOUNTER
PEr Charlee, they didn't received other case. I resubmitted PA for Desvenlafaxine Succinate ER 25MG er tablets. Via CMM Key: [id number]. STATUS PENDING.

## 2022-04-13 VITALS
DIASTOLIC BLOOD PRESSURE: 77 MMHG | BODY MASS INDEX: 43.86 KG/M2 | HEART RATE: 67 BPM | RESPIRATION RATE: 16 BRPM | TEMPERATURE: 98.1 F | OXYGEN SATURATION: 96 % | HEIGHT: 66 IN | SYSTOLIC BLOOD PRESSURE: 124 MMHG | WEIGHT: 272.93 LBS

## 2022-04-13 LAB
ANION GAP SERPL CALCULATED.3IONS-SCNC: 11 MMOL/L (ref 3–16)
BUN BLDV-MCNC: 14 MG/DL (ref 7–20)
CALCIUM SERPL-MCNC: 9.3 MG/DL (ref 8.3–10.6)
CHLORIDE BLD-SCNC: 102 MMOL/L (ref 99–110)
CO2: 22 MMOL/L (ref 21–32)
CREAT SERPL-MCNC: 0.8 MG/DL (ref 0.6–1.1)
GFR AFRICAN AMERICAN: >60
GFR NON-AFRICAN AMERICAN: >60
GLUCOSE BLD-MCNC: 122 MG/DL (ref 70–99)
GLUCOSE BLD-MCNC: 127 MG/DL (ref 70–99)
PERFORMED ON: ABNORMAL
POTASSIUM SERPL-SCNC: 4.4 MMOL/L (ref 3.5–5.1)
SODIUM BLD-SCNC: 135 MMOL/L (ref 136–145)

## 2022-04-13 PROCEDURE — 97535 SELF CARE MNGMENT TRAINING: CPT

## 2022-04-13 PROCEDURE — 6370000000 HC RX 637 (ALT 250 FOR IP): Performed by: ORTHOPAEDIC SURGERY

## 2022-04-13 PROCEDURE — 97110 THERAPEUTIC EXERCISES: CPT

## 2022-04-13 PROCEDURE — 80048 BASIC METABOLIC PNL TOTAL CA: CPT

## 2022-04-13 PROCEDURE — G0378 HOSPITAL OBSERVATION PER HR: HCPCS

## 2022-04-13 PROCEDURE — 94760 N-INVAS EAR/PLS OXIMETRY 1: CPT

## 2022-04-13 PROCEDURE — 6360000002 HC RX W HCPCS: Performed by: ORTHOPAEDIC SURGERY

## 2022-04-13 PROCEDURE — 97530 THERAPEUTIC ACTIVITIES: CPT

## 2022-04-13 PROCEDURE — 36415 COLL VENOUS BLD VENIPUNCTURE: CPT

## 2022-04-13 PROCEDURE — 2580000003 HC RX 258: Performed by: ORTHOPAEDIC SURGERY

## 2022-04-13 RX ADMIN — OXYCODONE HYDROCHLORIDE 10 MG: 10 TABLET ORAL at 06:59

## 2022-04-13 RX ADMIN — OXYCODONE HYDROCHLORIDE 10 MG: 10 TABLET ORAL at 03:11

## 2022-04-13 RX ADMIN — APIXABAN 2.5 MG: 2.5 TABLET, FILM COATED ORAL at 09:15

## 2022-04-13 RX ADMIN — MELOXICAM 7.5 MG: 7.5 TABLET ORAL at 05:42

## 2022-04-13 RX ADMIN — ACETAMINOPHEN 650 MG: 325 TABLET ORAL at 05:42

## 2022-04-13 RX ADMIN — LITHIUM CARBONATE 300 MG: 300 CAPSULE, GELATIN COATED ORAL at 09:15

## 2022-04-13 RX ADMIN — Medication 10 ML: at 09:15

## 2022-04-13 RX ADMIN — FLUOXETINE 40 MG: 20 CAPSULE ORAL at 09:15

## 2022-04-13 RX ADMIN — Medication 3000 MG: at 00:26

## 2022-04-13 RX ADMIN — LOSARTAN POTASSIUM 25 MG: 25 TABLET, FILM COATED ORAL at 09:15

## 2022-04-13 RX ADMIN — LAMOTRIGINE 100 MG: 100 TABLET ORAL at 09:14

## 2022-04-13 RX ADMIN — AMLODIPINE BESYLATE 10 MG: 5 TABLET ORAL at 09:14

## 2022-04-13 RX ADMIN — PREGABALIN 75 MG: 75 CAPSULE ORAL at 09:15

## 2022-04-13 ASSESSMENT — PAIN DESCRIPTION - FREQUENCY
FREQUENCY: CONTINUOUS

## 2022-04-13 ASSESSMENT — PAIN DESCRIPTION - LOCATION
LOCATION: HIP

## 2022-04-13 ASSESSMENT — PAIN DESCRIPTION - DESCRIPTORS
DESCRIPTORS: ACHING

## 2022-04-13 ASSESSMENT — PAIN DESCRIPTION - ORIENTATION
ORIENTATION: LEFT

## 2022-04-13 ASSESSMENT — PAIN DESCRIPTION - PAIN TYPE
TYPE: SURGICAL PAIN

## 2022-04-13 ASSESSMENT — PAIN DESCRIPTION - ONSET
ONSET: ON-GOING

## 2022-04-13 ASSESSMENT — PAIN - FUNCTIONAL ASSESSMENT
PAIN_FUNCTIONAL_ASSESSMENT: PREVENTS OR INTERFERES SOME ACTIVE ACTIVITIES AND ADLS

## 2022-04-13 ASSESSMENT — PAIN SCALES - GENERAL
PAINLEVEL_OUTOF10: 7
PAINLEVEL_OUTOF10: 5
PAINLEVEL_OUTOF10: 7
PAINLEVEL_OUTOF10: 6
PAINLEVEL_OUTOF10: 0

## 2022-04-13 ASSESSMENT — PAIN DESCRIPTION - PROGRESSION
CLINICAL_PROGRESSION: NOT CHANGED
CLINICAL_PROGRESSION: NOT CHANGED
CLINICAL_PROGRESSION: GRADUALLY IMPROVING
CLINICAL_PROGRESSION: NOT CHANGED

## 2022-04-13 NOTE — PLAN OF CARE
Problem: Cardiac:  Goal: Hemodynamic stability will improve  Description: Hemodynamic stability will improve  4/13/2022 0229 by Ellie Wheeler RN  Outcome: Ongoing  Note: Pt remains hemodynamically stable on this shift. Problem: Tobacco Use:  Goal: Inpatient tobacco use cessation counseling participation  Description: Inpatient tobacco use cessation counseling participation  4/13/2022 0229 by Ellie Wheeler RN  Outcome: Ongoing  Note: Pt counseled on tobacco cessation. Will continue to reinforce. Problem: Discharge Planning:  Goal: Knowledge of discharge instructions  Description: Knowledge of discharge instructions  4/13/2022 0229 by Ellie Wheeler RN  Outcome: Ongoing  Note: Patient educated on discharge plan and assessed to determine that needs are being met. Questions answered for patient. Patient encouraged to ask questions and voice concerns/comments in regards to barriers for discharge and any other questions about the plan of care. Problem: Infection - Surgical Site:  Goal: Signs of wound healing will improve  Description: Signs of wound healing will improve  4/13/2022 0229 by Ellie Wheeler RN  Outcome: Ongoing  Note: Monitoring pt for signs and symptoms of infection. Will observe for any redness, warmth, or pus. Problem: Mobility - Impaired:  Goal: Achieve maximum mobility level  Description: Achieve maximum mobility level  4/13/2022 0229 by Ellie Wheeler RN  Outcome: Ongoing  Note: Pt able to tolerate ambulation with walker x1. Problem: Pain - Acute:  Goal: Pain level will decrease  Description: Pain level will decrease  4/13/2022 0229 by Ellie Wheeler RN  Outcome: Ongoing  Note: Assessing and monitoring pt's pain. PRN pain medication being given if ordered. Educating pt on nonpharmacologic interventions for pain control. Will continue to monitor and reassess.       Problem: Falls - Risk of:  Goal: Will remain free from falls  Description: Will remain free from falls  4/13/2022 0229 by Shauna Reina RN  Outcome: Ongoing  Note: Fall risk assessment completed. Fall precautions in place. Bed in lowest position, wheels locked, bed/chair exit alarm in place, call light within reach, and non skid footwear on. Walkway free of clutter. Pt alert and oriented and able to make needs known. Pt educated to use call light when needing to get up, and pt utilizes call light to make needs known. Will continue to monitor. Problem: Falls - Risk of:  Goal: Absence of physical injury  Description: Absence of physical injury  4/13/2022 0229 by Shauna Reina RN  Outcome: Ongoing     Problem: Pain:  Goal: Pain level will decrease  Description: Pain level will decrease  4/13/2022 0229 by Shauna Reina RN  Outcome: Ongoing  Note: Assessing and monitoring pt's pain. PRN pain medication being given if ordered. Educating pt on nonpharmacologic interventions for pain control. Will continue to monitor and reassess.       Problem: Pain:  Goal: Control of acute pain  Description: Control of acute pain  4/13/2022 0229 by Shauna Reina RN  Outcome: Ongoing     Problem: Pain:  Goal: Control of chronic pain  Description: Control of chronic pain  4/13/2022 0229 by Shauna Reina RN  Outcome: Ongoing

## 2022-04-13 NOTE — CARE COORDINATION
Critical access hospital    DC order noted, all docs needed have been faxed to [facility] for home care services.     Home care to see patient within 24-48 hrs    Becky Culver RN, BSN CTN  Critical access hospital [phone]

## 2022-04-13 NOTE — PROGRESS NOTES
Huddle performed this morning including Nurse navigator Qiana Cortez, Physical therapist Paddy Santos, and Occupational therapist cat. Discussed plan of care, discharge plan, and dme needs if applicable for orthopedic total joint patient.   Electronically signed by Rosita Aden RN on 4/13/2022 at 10:45 AM

## 2022-04-13 NOTE — PROGRESS NOTES
Dressing removed without complication. Prineo remains clean, dry, and intact. Pt's pain controlled slightly better this morning with percocet and elevation. Ice applied. Will continue to monitor.     Electronically signed by Nitin Castellano RN on 4/13/2022 at 6:13 AM

## 2022-04-13 NOTE — PROGRESS NOTES
Data- discharge order received, patient verbalized agreement to discharge, disposition to previous residence, needs noted for [facility] and informed Otilia Soto NP. Action- discharge instructions prepared and given to patient and significant other Sergio Leyva, patient verbalized understanding. Medication information packet given r/t NEW and/or CHANGED prescriptions emphasizing name/purpose/side effects, pt verbalized understanding. Discharge instruction summary: Diet- general, Activity- wbat, Primary Care Physician as follows: Marcelino Turk, APRN - [phone]. f/u appointment with orthopedic office noted below, immunizations reviewed and discussed with patient, prescription medications to be filled by retail pharmacy and then delivered. Inpatient surgical procedure precautions reviewed: anterior hip precautions. Neurovascular check performed and patient is WNLs, denies numbness/tingling in extremties. Incision site  prineo glue dressing assessed and is  clean,dry, and intact, no signs of redness, drainage, or odor noted. patient's bedside RN jhonny notified of patient completing discharge instructions and iv removal. Nurse Navigator and Orthopedic Office contact information on discharge instructions and provided to patient. Response- IV removed. Medications to be delivered to patient via meds to bed program. Disposition is home with [facility] Gall[facility] , to be transported with family.      Future Appointments   Date Time Provider Harjit Ferrari   4/25/2022 10:30 AM Blease Blizzard, MD  [facility]   5/9/2022 10:00 AM [facility]   10/13/2022  9:00 AM Jolene Mcconnell MD [facility]           Electronically signed by Woo Juan RN on 4/13/2022 at 10:14 AM

## 2022-04-13 NOTE — PROGRESS NOTES
Pt discharged to home. Transportation here with wheelchair. Accompanied by spouse. Transported in personal vehicle. Discharge instructions, Rx, and personal belongings given to pt. Explanation of discharge medications and instructions understood by verbal statement. No questions, comments or concerns at this time.    Electronically signed by Jhoana Mckinley RN on 4/13/2022 at 10:31 AM

## 2022-04-13 NOTE — PROGRESS NOTES
Physical Therapy    Facility/Department: [facility] ORTHOPEDICS    Treatment note    NAME: Monty Slater  : 1975  MRN: [id number]    Date of Service: 2022     Left THR    Assessment / Discharge Recommendations:  -progressing well and ready for home today with assist of her   -instructed in initial HEP and general activity to do until Home PT takes charge of care  -reviewed car transfers and platform steps as needed to enter home - her  to assist and is familiar from prior THR   -has rolling walker for home use      Body structures, Functions, Activity limitations: Decreased functional mobility ; Decreased ROM; Decreased strength;Decreased endurance;Decreased ADL status; Decreased balance; Increased pain  Activity Tolerance  Activity Tolerance: Patient Tolerated treatment well;Patient limited by pain       Patient Diagnosis(es): Diagnoses of Arthritis of left hip and Primary osteoarthritis of both hips were pertinent to this visit. has a past medical history of Arthritis, Back pain, Bipolar disorder ([facility] [address] Depression, Gastritis, Hypertension, Insomnia, Migraine, Neuropathy, PLMD (periodic limb movement disorder), Preoperative clearance, Prolonged emergence from general anesthesia, Sleep apnea, and TMJ (temporomandibular joint syndrome). has a past surgical history that includes Tonsillectomy; Foot surgery; Colposcopy; Endometrial ablation; Total hip arthroplasty (Right, 2019); Shoulder arthroscopy (Right, 2019); Pain management procedure (N/A, 2021); Pain management procedure (Bilateral, 2021); Nerve Block (N/A, 10/26/2021); Rotator cuff repair; Pain management procedure (Left, 2022); Pain management procedure (Right, 2022); joint replacement (Right, 2019); and Total hip arthroplasty (Left, 2022).     Restrictions  Restrictions/Precautions  Restrictions/Precautions: Weight Bearing,Fall Risk,ROM Restrictions  Lower Extremity Weight Bearing Restrictions  Left Lower Extremity Weight Bearing: Weight Bearing As Tolerated  Position Activity Restriction  Hip Precautions: No hip extension,No hip external rotation  Vision/Hearing  Hearing: Within functional limits     Subjective  General  Chart Reviewed: Yes  Patient assessed for rehabilitation services?: Yes  Additional Pertinent Hx: Pt is a 49 yo. female who presents to hospital for elective L HUA on 4/12/22. Additional PMHx as noted, including R HUA in 2019. Response To Previous Treatment: Patient with no complaints from previous session.   Family / Caregiver Present: Yes ()  Follows Commands: Within Functional Limits  Subjective  Subjective: to room following OT session -patient sitting up in recliner -  present to observe and will be assisting her with car transfers and into home today -she states pain is moderate to severe  - has had pain medications  Pain Screening  Patient Currently in Pain: Yes  Social/Functional History  Social/Functional History  Lives With: Significant other  Type of Home: House  Home Layout: One level,Able to Live on Main level with bedroom/bathroom  Home Access: Stairs to enter without rails  Entrance Stairs - Number of Steps: 2 steps to enter without rail  Bathroom Shower/Tub: Tub/Shower unit,Curtain  Bathroom Toilet: Standard (sink close)  Bathroom Equipment: Shower chair  Bathroom Accessibility: Accessible  Home Equipment: Standard walker,Rolling walker  ADL Assistance: Independent  Homemaking Assistance: Independent  Ambulation Assistance: Independent  Transfer Assistance: Independent  Additional Comments: Here for elective L THR: R THR 3 years ago  Objective  Bed mobility  Supine to Sit: Stand by assistance  Sit to Supine: Unable to assess  Transfers  Sit to Stand: Stand by assistance;Supervision  Stand to sit: Supervision;Stand by assistance  Ambulation  Ambulation?:  (OT and nursing indicate at supervision level wbat with rolling walker)  Balance  Comments: steady with transfers and ambulation  Exercises  Quad Sets: 10 per hour  Gluteal Sets: 10 per hour  Ankle Pumps: 30 per hour in easy pace  Comments: encouraged practice with the spirometer at home for a few more days   Plan   Plan  Times per week: 1-3 days  Current Treatment Recommendations: Strengthening,Functional Mobility Training,Transfer Training,Gait Training,Stair training,Endurance Training,Balance Training,Positioning,Modalities,Patient/Caregiver Education & Training,ADL/Self-care Training  Safety Devices  Type of devices: Gait belt,Patient at risk for falls,Left in chair,Nurse notified,Call light within [employer] alarm in place    Goals  Short term goals  Time Frame for Short term goals: 1-3 days  Short term goal 1: Pt will be independent with all bed mobility  Short term goal 2: Pt will ambulate 76' with CGA and RW to demonstrate improved functional mobility. Short term goal 3: Pt will demonstrate verbal understanding of anterior hip precautions  Short term goal 4: Pt will perform 2 stairs without handrail and with Min A  Patient Goals   Patient goals :  To return home and recover       Therapy Time   Individual Concurrent Group Co-treatment   Time In 0855         Time Out 0920         Minutes Reggie Wooten, PT

## 2022-04-13 NOTE — PROGRESS NOTES
Occupational Therapy  Facility/Department: [address] ORTHOPEDICS  Daily Treatment Note  NAME: Renato Briceño  : 1975  MRN: [id number]    Date of Service: 2022    Discharge Recommendations:  2-3 sessions per week,Patient would benefit from continued therapy after discharge,Home with Home health OT,24 hour supervision or assist      Renato Briceño scored a 18/24 on the AM-PAC ADL Inpatient form. Current research shows that an AM-PAC score of 18 or greater is typically associated with a discharge to the patient's home setting. Based on the patient's AM-PAC score, and their current ADL deficits, it is recommended that the patient have 2-3 sessions per week of Occupational Therapy at d/c to increase the patient's independence. At this time, this patient demonstrates the endurance and safety to discharge home with [address] [place] [address] (home vs OP services) and a follow up treatment frequency of 2-3x/wk. Please see assessment section for further patient specific details. If patient discharges prior to next session this note will serve as a discharge summary. Please see below for the latest assessment towards goals. Assessment   Discussed am pac score of 18 with OTR which indicates that pt. is able to return home upon d/c with 24 hr assistance and HHOT to increase independence, decrease assistance levels, and decrease caregiver burden. Pt. completed bed mobility SBA with HOB elevated. Pt. completed sit<>stand SBA from EOB>RW>recliner>RW>recliner with mild vc's for hand placement. Pt. completed fxl mobility from EOB>sink>recliner>curtain>recliner SBA with mild vc's to keep walker close. Pt. completed grooming at sink SBA. Pt. completed UB dressing with Setup. Pt. completed LB dressing with Min A, was able to don/doff socks, underwear, pants, shoes but needs full assistance with jeannie hose on both LE. Pt. educated on importance of jeannie hose, icing, and getting up to walk every hour upon d/c home.   Pt. left in bed at end of session. Continue POC. Patient Diagnosis(es): Diagnoses of Arthritis of left hip and Primary osteoarthritis of both hips were pertinent to this visit. has a past medical history of Arthritis, Back pain, Bipolar disorder ([address] Depression, Gastritis, Hypertension, Insomnia, Migraine, Neuropathy, PLMD (periodic limb movement disorder), Preoperative clearance, Prolonged emergence from general anesthesia, Sleep apnea, and TMJ (temporomandibular joint syndrome). has a past surgical history that includes Tonsillectomy; Foot surgery; Colposcopy; Endometrial ablation; Total hip arthroplasty (Right, 2/19/2019); Shoulder arthroscopy (Right, 11/25/2019); Pain management procedure (N/A, 7/6/2021); Pain management procedure (Bilateral, 8/31/2021); Nerve Block (N/A, 10/26/2021); Rotator cuff repair; Pain management procedure (Left, 1/11/2022); Pain management procedure (Right, 1/25/2022); joint replacement (Right, 02/19/2019); and Total hip arthroplasty (Left, 4/12/2022). Restrictions  Restrictions/Precautions  Restrictions/Precautions: Weight Bearing,Fall Risk,ROM Restrictions  Lower Extremity Weight Bearing Restrictions  Left Lower Extremity Weight Bearing: Weight Bearing As Tolerated  Position Activity Restriction  Hip Precautions: No hip extension,No hip external rotation  Subjective   General  Chart Reviewed: Yes  Patient assessed for rehabilitation services?: Yes  Additional Pertinent Hx: 49 yo female admitted 4/12 for elective L HUA- anterior percautions and WBAT. PMH: HTN, R HUA 2019, Bipolar, various back and neck chronic pain management  Family / Caregiver Present: No  Referring Practitioner: Alicia Rubio MD  Diagnosis: L HUA  Subjective  Subjective: Pt resting in bed upon arrival and agreeable to OT. Pt. reports L hip pain 6/10 while resting.   General Comment  Comments: RN ok to see      Orientation  Orientation  Overall Orientation Status: Within Functional Limits  Objective ADL  Grooming: Stand by assistance  UE Dressing: Setup  LE Dressing: Minimal assistance (don/doff socks, shoes, pants, underwear. Pt. needs full assistance with jeannie hose both LE.)        Balance  Sitting Balance: Stand by assistance  Standing Balance: Stand by assistance  Standing Balance  Time: ~3 minutes  Activity: before/after ambulatin, ADLs at sink, LB dressing  Functional Mobility  Functional - Mobility Device: Rolling Walker  Activity: To/from bathroom; Other (from EOB>sink>recliner>curtain>recliner)  Assist Level: Stand by assistance  Functional Mobility Comments: No overt LOB noted. mild vc's to keep walker close, gait slow and steady. Bed mobility  Supine to Sit: Stand by assistance  Sit to Supine: Unable to assess  Scooting: Stand by assistance  Comment: HOB elevated  Transfers  Sit to stand: Stand by assistance  Stand to sit: Stand by assistance  Transfer Comments: RW, mild vc's for hand placement. Cognition  Overall Cognitive Status: [facility]        Plan   Plan  Times per week: 2-3 sessions  Current Treatment Recommendations: Strengthening,Endurance Training,Patient/Caregiver Education & Training,ROM,Self-Care / ADL,Balance Training,Pain Management,Functional Mobility Training,Safety Education & Training,Positioning    AM-PAC Score        AM-[facility] Inpatient Daily Activity Raw Score: 18 (04/13/22 0904)  AM-PAC Inpatient ADL T-Scale Score : 38.66 (04/13/22 0904)  ADL Inpatient CMS 0-100% Score: 46.65 (04/13/22 0904)  ADL Inpatient CMS G-Code Modifier : CK (04/13/22 0904)    Goals  Short term goals  Time Frame for Short term goals: prior to d/c; goals ongoing.   Short term goal 1: toileting SUP  Short term goal 2: LB dressing with AE SUP  Short term goal 3: tolerate 5 min fxl standing task SUP  Short term goal 4: UB dressing seated set up - MET 4/13  Short term goal 5: fxl tx and mobility with RW household distances SUP  Patient Goals   Patient goals : return home       Therapy Time   Individual Concurrent Group Co-treatment   Time In 0815         Time Out 0853         Minutes 38                  Marsha Recio S/YVES  I attest that I was present for and made a skilled and mindful clinical judgement during the treatment of this patient 4/13/2022   Portillo LICEA/PAL,515

## 2022-04-13 NOTE — CARE COORDINATION
DISCHARGE SUMMARY     DATE OF DISCHARGE: 4/13/22    DISCHARGE DESTINATION: home with [facility]    HOME CARE: Yes    Agency Name: Armaan Flood  Discharging to Facility/ Agency   · Name:  [facility]    · Address: [address] [place] [address], [address] [place] [address]., [place] [address]  · Phone: [phone]  · Fax: [phone]     Notified: RN, Family and Facility/Agency    TRANSPORTATION: Private Car    NEW DME ORDERED: no    Electronically signed by Shanell Armenta on 4/13/2022 at 8:40 AM  #[phone]

## 2022-04-13 NOTE — PROGRESS NOTES
Pt is alert and oriented x4, resting quietly in bed. Nightly medications and intake tolerated well. No complaints of nausea or vomiting. Pt complaining of hip pain. PRN pain medications given as prescribed - elevation has helped dramatically. Dressing remains clean, dry, and intact. Ice applied. Pt able to ambulate with walker x1. Pt using IS when awake. No other needs made known at this time. Fall precautions in place. Call light within reach. Will continue to monitor.     Electronically signed by Vanessa Berkowitz RN on 4/13/2022 at 2:29 AM

## 2022-04-13 NOTE — PROGRESS NOTES
[facility] Orthopedic Surgery   Progress Note      S/P :  SUBJECTIVE  Up with therapies, Doing well. Alert and oriented. Plans to go home today. Pain is   described in left hip and with the intensity of moderate. Pain is described as aching. OBJECTIVE              Physical                      VITALS:  /77   Pulse 67   Temp 98.1 °F (36.7 °C) (Oral)   Resp 16   Ht 5' 6\" (1.676 m)   Wt 272 lb 14.9 oz (123.8 kg)   SpO2 95%   BMI 44.05 kg/m²                     MUSCULOSKELETAL:  left foot NVI. Wiggles toes to command. Able to plantarflex and dorsiflex ankle Pedal pulses are palpable. NEUROLOGIC:                                  Sensory:  Touch:  Left Lower Extremity:  normal                                                 Surgical wound appears clean and dry with Prineo dressing left anterior hip. Interdry placed in pannus by me at this time. LO hose on.      Data       CBC:   Lab Results   Component Value Date    WBC 8.8 04/04/2022    RBC 5.13 04/04/2022    HGB 15.2 04/04/2022    HCT 45.9 04/04/2022    MCV 89.6 04/04/2022    MCH 29.6 04/04/2022    MCHC 33.0 04/04/2022    RDW 13.6 04/04/2022     04/04/2022    MPV 7.7 04/04/2022        WBC:    Lab Results   Component Value Date    WBC 8.8 04/04/2022        Hemoglobin/Hematocrit:    Lab Results   Component Value Date    HGB 15.2 04/04/2022    HCT 45.9 04/04/2022        PT/INR:    Lab Results   Component Value Date    PROTIME 10.3 04/04/2022    INR 0.91 04/04/2022              Current Inpatient Medications             Current Facility-Administered Medications: amLODIPine (NORVASC) tablet 10 mg, 10 mg, Oral, Daily  FLUoxetine (PROZAC) capsule 40 mg, 40 mg, Oral, Daily  lamoTRIgine (LAMICTAL) tablet 100 mg, 100 mg, Oral, Daily  lithium capsule 300 mg, 300 mg, Oral, QAM  lithium capsule 600 mg, 600 mg, Oral, QPM  losartan (COZAAR) tablet 25 mg, 25 mg, Oral, Daily  0.45 % sodium chloride infusion, , IntraVENous, Continuous  sodium chloride flush 0.9 % injection 10 mL, 10 mL, IntraVENous, 2 times per day  sodium chloride flush 0.9 % injection 10 mL, 10 mL, IntraVENous, PRN  0.9 % sodium chloride infusion, 25 mL, IntraVENous, PRN  acetaminophen (TYLENOL) tablet 650 mg, 650 mg, Oral, 4 times per day  oxyCODONE (ROXICODONE) immediate release tablet 5 mg, 5 mg, Oral, Q4H PRN **OR** oxyCODONE HCl (OXY-IR) immediate release tablet 10 mg, 10 mg, Oral, Q4H PRN  morphine (PF) injection 2 mg, 2 mg, IntraVENous, Q3H PRN **OR** morphine (PF) injection 4 mg, 4 mg, IntraVENous, Q3H PRN  ondansetron (ZOFRAN-ODT) disintegrating tablet 4 mg, 4 mg, Oral, Q8H PRN **OR** ondansetron (ZOFRAN) injection 4 mg, 4 mg, IntraVENous, Q6H PRN  apixaban (ELIQUIS) tablet 2.5 mg, 2.5 mg, Oral, BID  meloxicam (MOBIC) tablet 7.5 mg, 7.5 mg, Oral, Daily  pregabalin (LYRICA) capsule 75 mg, 75 mg, Oral, BID  diphenhydrAMINE (BENADRYL) tablet 25 mg, 25 mg, Oral, Q4H PRN    ASSESSMENT AND PLAN      Post left HUA, stable exam  DVT prophylaxis ordered, Eliquis bid for 30 total days after discharge from hospital for DVT prophylaxis. Pt is \"high\" risk with BMI>40  PT OT for ADL's and ambulation as tolerated, anterior hip precautions  SS for DC planning, home with home care today  IV or PO pain med as ordered, Lyrica added for postop pain control in an effort to reduce narcotic use per Dr Ángela De Oliveira protocol.      Josue Shukla, ROSSANA - CNP  4/13/2022  8:59 AM

## 2022-04-13 NOTE — PROGRESS NOTES
Patient is sitting up in the chair and is alert and oriented x4. Patient complaining of pain in her left hip. PRN pain medication given by night shift RN (see eMAR). Prineo is clean, dry, and intact. Neuro checks are WDL. Pedal pulses palpable. Moderate pushes and pulls. LO hose and SED's remain in place. Ice therapy placed on left hip. Patient denies numbness and tingling. Patient tolerating all PO intake. IV fluids stopped. Patient ambulates as a walker x1. Patient on RA. Morning medication given and head to toe assessment is complete. All needs are met and safety precautions remain in place. Will continue to monitor and assess.   Electronically signed by Timothy Valladares RN on 4/13/2022 at 9:21 AM

## 2022-04-17 ENCOUNTER — PATIENT MESSAGE (OUTPATIENT)
Dept: ORTHOPEDIC SURGERY | Age: 47
End: 2022-04-17

## 2022-04-17 DIAGNOSIS — M16.0 PRIMARY OSTEOARTHRITIS OF BOTH HIPS: ICD-10-CM

## 2022-04-17 DIAGNOSIS — M16.12 ARTHRITIS OF LEFT HIP: ICD-10-CM

## 2022-04-18 ENCOUNTER — TELEPHONE (OUTPATIENT)
Dept: ORTHOPEDIC SURGERY | Age: 47
End: 2022-04-18

## 2022-04-18 ENCOUNTER — TELEPHONE (OUTPATIENT)
Dept: ADMINISTRATIVE | Age: 47
End: 2022-04-18

## 2022-04-18 ENCOUNTER — TELEPHONE (OUTPATIENT)
Dept: ORTHOPEDICS UNIT | Age: 47
End: 2022-04-18

## 2022-04-18 RX ORDER — OXYCODONE HYDROCHLORIDE 5 MG/1
5-10 TABLET ORAL EVERY 6 HOURS PRN
Qty: 40 TABLET | Refills: 0 | Status: SHIPPED | OUTPATIENT
Start: 2022-04-18 | End: 2022-04-25 | Stop reason: SDUPTHER

## 2022-04-18 RX ORDER — LAMOTRIGINE 100 MG/1
TABLET ORAL
Qty: 30 TABLET | Refills: 0 | Status: SHIPPED | OUTPATIENT
Start: 2022-04-18 | End: 2022-05-18

## 2022-04-18 NOTE — TELEPHONE ENCOUNTER
[facility] needs to change dosage and quantity and instructions due to ins purposes    [phone]  [facility]

## 2022-04-18 NOTE — TELEPHONE ENCOUNTER
From: Monty Click  To: Dr. Tameka Kruger: 4/17/2022 7:54 AM EDT  Subject: Pain meds    I only have 4 left.  Hank Cabral

## 2022-04-18 NOTE — TELEPHONE ENCOUNTER
Submitted PA for Desvenlafaxine Succinate ER 25MG er tablets  Via CM Key: [id number] Deleted. Patient not taking on 04/12/2022 in chart.

## 2022-04-19 RX ORDER — AMLODIPINE BESYLATE 10 MG/1
TABLET ORAL
Qty: 90 TABLET | Refills: 0 | Status: SHIPPED | OUTPATIENT
Start: 2022-04-19 | End: 2022-07-06 | Stop reason: SDUPTHER

## 2022-04-20 ENCOUNTER — TELEPHONE (OUTPATIENT)
Dept: ORTHOPEDIC SURGERY | Age: 47
End: 2022-04-20

## 2022-04-20 NOTE — TELEPHONE ENCOUNTER
General Question     Subject: PATIENT STATES THE TAPE IS COMING OFF. SHE STATES HER  PUT TAPE OVER THE TAPE, AND IT IS STILL COMING OFF. SHE WOULD LIKE TO TAKE A SHOWER BUT DOES NOT KNOW WHAT TO DO.  SHE STATES THAT UNDERNEATH THE SURGICAL TAPE IT LOOKS AS THOUGH THE INCISION IS PULLING APART. PLEASE ADVISE.     Patient:  Reese Lopez \"Cynthia\"  Contact Number: [phone]

## 2022-04-21 ENCOUNTER — OFFICE VISIT (OUTPATIENT)
Dept: ORTHOPEDIC SURGERY | Age: 47
End: 2022-04-21
Payer: COMMERCIAL

## 2022-04-21 DIAGNOSIS — Z96.642 H/O TOTAL HIP ARTHROPLASTY, LEFT: Primary | ICD-10-CM

## 2022-04-21 PROCEDURE — 99024 POSTOP FOLLOW-UP VISIT: CPT | Performed by: ORTHOPAEDIC SURGERY

## 2022-04-21 NOTE — PROGRESS NOTES
5/5 in all major motor groups. Left hip: Incision is healing as expected with no erythema, fluctuance, drainage. There is no dehiscence. The distal portion of the Prineo is coming off. Examination demonstrates negative logroll and negative Stinchfield. There is brisk capillary refill. Imaging:  3 views of the left hip were performed and reviewed today. Significant for total hip arthroplasty prosthesis in place with no signs of osteolysis, loosening, fracture, or dislocation. Assessment:  S/p left total hip arthroplasty    Plan:  She is doing well postoperatively. She will continue work with home physical therapy and possibly transition outpatient physical therapy. She will wean off of oxycodone as tolerated. The distal portion of her incision was reinforced with Steri-Strips today. She will follow-up in 4 weeks or sooner if needed. This dictation was done with Dragon dictation and may contain mechanical errors related to translation.

## 2022-04-21 NOTE — DISCHARGE SUMMARY
Physician Discharge Summary     Patient ID:  Carlos Enrique Gottlieb  [id number]  82 y.o.  1975    Admit date: 4/12/2022    Discharge date and time: 4/13/2022 10:14 AM     Admitting Physician: Chau Cordova MD     Discharge Physician: Eze Saldivar    Admission Diagnoses: Arthritis of left hip [M16.12]  Primary osteoarthritis of left hip [M16.12]    Discharge Diagnoses: left hip OA    Admission Condition: good    Discharged Condition: good    Indication for Admission: Failed conservative treatment as outpatient for joint pain including PT and pain meds. This patient was then electively scheduled for total joint replacement surgery    Surgical procedure: left HUA    Consults: PT OT SS    This patient had no postoperative complications. They has PT and OT for ADL's . IV and PO pain med for pain control and was eventually DC in stable condition    Treatments: analgesia,  therapies: PT OT,  and surgery      Disposition: home    Patient Instructions:   [unfilled]  Activity: activity as tolerated  Diet: regular diet  Wound Care: keep wound clean and dry    Follow-up with SRIDHAR Lou in 2 weeks.     Signed:  ROSSANA Martinez CNP  4/21/2022  3:05 PM

## 2022-04-22 ENCOUNTER — TELEPHONE (OUTPATIENT)
Dept: ORTHOPEDIC SURGERY | Age: 47
End: 2022-04-22

## 2022-04-25 ENCOUNTER — PATIENT MESSAGE (OUTPATIENT)
Dept: ORTHOPEDIC SURGERY | Age: 47
End: 2022-04-25

## 2022-04-25 DIAGNOSIS — M16.12 ARTHRITIS OF LEFT HIP: ICD-10-CM

## 2022-04-25 DIAGNOSIS — M16.0 PRIMARY OSTEOARTHRITIS OF BOTH HIPS: ICD-10-CM

## 2022-04-25 RX ORDER — OXYCODONE HYDROCHLORIDE 5 MG/1
5-10 TABLET ORAL EVERY 6 HOURS PRN
Qty: 40 TABLET | Refills: 0 | Status: SHIPPED | OUTPATIENT
Start: 2022-04-25 | End: 2022-05-04 | Stop reason: SDUPTHER

## 2022-04-25 NOTE — TELEPHONE ENCOUNTER
From: Joanne Gibson  To: Dr. Josias Posada: 4/25/2022 2:01 PM EDT  Subject: Pain medication    Running low on pain medication

## 2022-04-27 ENCOUNTER — PATIENT MESSAGE (OUTPATIENT)
Dept: FAMILY MEDICINE CLINIC | Age: 47
End: 2022-04-27

## 2022-04-27 DIAGNOSIS — F90.2 ATTENTION DEFICIT HYPERACTIVITY DISORDER (ADHD), COMBINED TYPE: ICD-10-CM

## 2022-04-27 NOTE — TELEPHONE ENCOUNTER
From: Zohreh Lin  To: Luly Murders  Sent: 4/27/2022 9:50 AM EDT  Subject: Russella Courts    I was not able to refill the vyvance due to federal law whatever that means. Can dr call them in please.

## 2022-05-04 ENCOUNTER — PATIENT MESSAGE (OUTPATIENT)
Dept: ORTHOPEDIC SURGERY | Age: 47
End: 2022-05-04

## 2022-05-04 DIAGNOSIS — M16.12 ARTHRITIS OF LEFT HIP: ICD-10-CM

## 2022-05-04 DIAGNOSIS — M16.0 PRIMARY OSTEOARTHRITIS OF BOTH HIPS: ICD-10-CM

## 2022-05-04 RX ORDER — OXYCODONE HYDROCHLORIDE 5 MG/1
5 TABLET ORAL EVERY 4 HOURS PRN
Qty: 40 TABLET | Refills: 0 | Status: SHIPPED | OUTPATIENT
Start: 2022-05-04 | End: 2022-05-12 | Stop reason: SDUPTHER

## 2022-05-04 NOTE — TELEPHONE ENCOUNTER
From: Shilpa Pack  To: Dr. Hoang Sin: 5/4/2022 11:41 AM EDT  Subject: Pain medicine    I am out of medicine

## 2022-05-09 ENCOUNTER — TELEPHONE (OUTPATIENT)
Dept: FAMILY MEDICINE CLINIC | Age: 47
End: 2022-05-09

## 2022-05-09 ENCOUNTER — NURSE ONLY (OUTPATIENT)
Dept: FAMILY MEDICINE CLINIC | Age: 47
End: 2022-05-09
Payer: COMMERCIAL

## 2022-05-09 ENCOUNTER — HOSPITAL ENCOUNTER (OUTPATIENT)
Dept: MAMMOGRAPHY | Age: 47
Discharge: HOME | End: 2022-05-14
Payer: COMMERCIAL

## 2022-05-09 VITALS — HEIGHT: 66 IN | BODY MASS INDEX: 43.39 KG/M2 | WEIGHT: 270 LBS

## 2022-05-09 DIAGNOSIS — Z79.899 LONG-TERM USE OF HIGH-RISK MEDICATION: Primary | ICD-10-CM

## 2022-05-09 DIAGNOSIS — Z12.31 VISIT FOR SCREENING MAMMOGRAM: ICD-10-CM

## 2022-05-09 PROCEDURE — 99999 PR OFFICE/OUTPT VISIT,PROCEDURE ONLY: CPT | Performed by: NURSE PRACTITIONER

## 2022-05-09 PROCEDURE — 80305 DRUG TEST PRSMV DIR OPT OBS: CPT | Performed by: NURSE PRACTITIONER

## 2022-05-09 PROCEDURE — 77067 SCR MAMMO BI INCL CAD: CPT

## 2022-05-09 NOTE — TELEPHONE ENCOUNTER
----- Message from Miko Rodriguez sent at 5/9/2022  1:11 PM EDT -----  Subject: Message to Provider    QUESTIONS  Information for Provider? Patient would like to cancel her lab visit for 2   pm as she already had the urinalysis done this morning   ---------------------------------------------------------------------------  --------------  CALL BACK INFO  What is the best way for the office to contact you? OK to leave message on   voicemail  Preferred Call Back Phone Number? [phone]  ---------------------------------------------------------------------------  --------------  SCRIPT ANSWERS  Relationship to Patient?  Self

## 2022-05-12 RX ORDER — OXYCODONE HYDROCHLORIDE 5 MG/1
5 TABLET ORAL EVERY 4 HOURS PRN
Qty: 40 TABLET | Refills: 0 | Status: SHIPPED | OUTPATIENT
Start: 2022-05-12 | End: 2022-05-19 | Stop reason: SDUPTHER

## 2022-05-18 RX ORDER — LAMOTRIGINE 100 MG/1
TABLET ORAL
Qty: 30 TABLET | Refills: 0 | Status: SHIPPED | OUTPATIENT
Start: 2022-05-18 | End: 2022-06-20

## 2022-05-19 ENCOUNTER — OFFICE VISIT (OUTPATIENT)
Dept: ORTHOPEDIC SURGERY | Age: 47
End: 2022-05-19

## 2022-05-19 VITALS — BODY MASS INDEX: 43.39 KG/M2 | RESPIRATION RATE: 16 BRPM | HEIGHT: 66 IN | WEIGHT: 270 LBS

## 2022-05-19 DIAGNOSIS — M16.12 ARTHRITIS OF LEFT HIP: ICD-10-CM

## 2022-05-19 DIAGNOSIS — M16.0 PRIMARY OSTEOARTHRITIS OF BOTH HIPS: ICD-10-CM

## 2022-05-19 PROCEDURE — 99024 POSTOP FOLLOW-UP VISIT: CPT | Performed by: PHYSICIAN ASSISTANT

## 2022-05-19 RX ORDER — OXYCODONE HYDROCHLORIDE 5 MG/1
5 TABLET ORAL EVERY 4 HOURS PRN
Qty: 40 TABLET | Refills: 0 | Status: SHIPPED | OUTPATIENT
Start: 2022-05-19 | End: 2022-05-26

## 2022-05-24 NOTE — PROGRESS NOTES
This dictation was done with [employer] dictation and may contain mechanical errors related to translation. Resp. rate 16, height 5' 6\" (1.676 m), weight 270 lb (122.5 kg), not currently breastfeeding. This is a pleasant 40-year-old female who is almost 6 weeks out from a left total hip replacement all in all she is doing well she has some swelling but incisions healing nicely and she is making good progress. Previous x-rays show good hip replacement her incisions healing nicely no signs of infection as of now. Skin color is good she is got decent hip flexion and abduction strength she is walking without assistance. She has good distal pulses good dorsiflexion plantarflexion strength. My impression is stable healing left total hip replacement.     We will have her follow-up with us in 5 to 6 weeks for repeat examination and x-rays

## 2022-05-26 RX ORDER — OXYCODONE HYDROCHLORIDE 5 MG/1
5 TABLET ORAL EVERY 4 HOURS PRN
Qty: 40 TABLET | Refills: 0 | Status: SHIPPED | OUTPATIENT
Start: 2022-05-26 | End: 2022-06-06 | Stop reason: SDUPTHER

## 2022-06-06 RX ORDER — OXYCODONE HYDROCHLORIDE 5 MG/1
5 TABLET ORAL EVERY 6 HOURS PRN
Qty: 28 TABLET | Refills: 0 | Status: SHIPPED | OUTPATIENT
Start: 2022-06-06 | End: 2022-06-15 | Stop reason: SDUPTHER

## 2022-06-09 ENCOUNTER — PATIENT MESSAGE (OUTPATIENT)
Dept: FAMILY MEDICINE CLINIC | Age: 47
End: 2022-06-09

## 2022-06-09 RX ORDER — DESVENLAFAXINE 25 MG/1
25 TABLET, EXTENDED RELEASE ORAL DAILY
Qty: 60 TABLET | Refills: 1 | Status: SHIPPED | OUTPATIENT
Start: 2022-06-09 | End: 2022-06-09 | Stop reason: SDUPTHER

## 2022-06-09 RX ORDER — DESVENLAFAXINE 25 MG/1
25 TABLET, EXTENDED RELEASE ORAL DAILY
Qty: 60 TABLET | Refills: 1 | Status: SHIPPED | OUTPATIENT
Start: 2022-06-09 | End: 2022-07-06 | Stop reason: SDUPTHER

## 2022-06-09 NOTE — TELEPHONE ENCOUNTER
From: Rosamaria Rojas  To: Pepito Currie  Sent: 6/9/2022 11:19 AM EDT  Subject: Refill    My prestiqu needs refilled.  My bottle has no refills   I have 3 left

## 2022-06-10 ENCOUNTER — TELEPHONE (OUTPATIENT)
Dept: ADMINISTRATIVE | Age: 47
End: 2022-06-10

## 2022-06-15 RX ORDER — OXYCODONE HYDROCHLORIDE 5 MG/1
5 TABLET ORAL EVERY 8 HOURS PRN
Qty: 21 TABLET | Refills: 0 | Status: SHIPPED | OUTPATIENT
Start: 2022-06-15 | End: 2022-06-23 | Stop reason: SDUPTHER

## 2022-06-20 RX ORDER — LOSARTAN POTASSIUM 25 MG/1
TABLET ORAL
Qty: 90 TABLET | Refills: 0 | Status: SHIPPED | OUTPATIENT
Start: 2022-06-20 | End: 2022-09-26

## 2022-06-20 RX ORDER — LAMOTRIGINE 100 MG/1
TABLET ORAL
Qty: 30 TABLET | Refills: 0 | Status: SHIPPED | OUTPATIENT
Start: 2022-06-20 | End: 2022-07-06 | Stop reason: SDUPTHER

## 2022-06-23 ENCOUNTER — OFFICE VISIT (OUTPATIENT)
Dept: ORTHOPEDIC SURGERY | Age: 47
End: 2022-06-23

## 2022-06-23 ENCOUNTER — TELEPHONE (OUTPATIENT)
Dept: ADMINISTRATIVE | Age: 47
End: 2022-06-23

## 2022-06-23 VITALS — WEIGHT: 270 LBS | RESPIRATION RATE: 16 BRPM | BODY MASS INDEX: 43.39 KG/M2 | HEIGHT: 66 IN

## 2022-06-23 DIAGNOSIS — Z96.642 H/O TOTAL HIP ARTHROPLASTY, LEFT: Primary | ICD-10-CM

## 2022-06-23 DIAGNOSIS — M16.12 ARTHRITIS OF LEFT HIP: ICD-10-CM

## 2022-06-23 DIAGNOSIS — M16.0 PRIMARY OSTEOARTHRITIS OF BOTH HIPS: ICD-10-CM

## 2022-06-23 PROCEDURE — 99024 POSTOP FOLLOW-UP VISIT: CPT | Performed by: PHYSICIAN ASSISTANT

## 2022-06-23 RX ORDER — OXYCODONE HYDROCHLORIDE 5 MG/1
5 TABLET ORAL EVERY 8 HOURS PRN
Qty: 21 TABLET | Refills: 0 | Status: SHIPPED | OUTPATIENT
Start: 2022-06-23 | End: 2022-06-30

## 2022-06-23 NOTE — TELEPHONE ENCOUNTER
Submitted PA for Zolpidem Tartrate  Via CM Key: [id number] STATUS: APPROVED. LETTER ATTACHED. If this requires a response please respond to the pool. [address]). Please advise patient thank you.

## 2022-06-24 ENCOUNTER — TELEPHONE (OUTPATIENT)
Dept: FAMILY MEDICINE CLINIC | Age: 47
End: 2022-06-24

## 2022-06-24 DIAGNOSIS — F90.2 ATTENTION DEFICIT HYPERACTIVITY DISORDER (ADHD), COMBINED TYPE: ICD-10-CM

## 2022-06-24 NOTE — TELEPHONE ENCOUNTER
Refill is not due until 6/28, and she is due for an appointment. See if she will schedule.  UDS is up to date so can be virtual.

## 2022-06-24 NOTE — TELEPHONE ENCOUNTER
SPOKE WITH PT AND SCHEDULED APPT FOR 7/6/22  WITH CC AND INFORMED HER MEDS TOO EARLY TO SEND IN SCRIPT

## 2022-06-24 NOTE — TELEPHONE ENCOUNTER
----- Message from Sangeetha Bermeo sent at 6/24/2022  2:41 PM EDT -----  Subject: Refill Request    QUESTIONS  Name of Medication? lisdexamfetamine (VYVANSE) 50 MG capsule  Patient-reported dosage and instructions? one a day   How many days do you have left? 3  Preferred Pharmacy? [address] [facility] [address]  Pharmacy phone number (if available)? [phone]  ---------------------------------------------------------------------------  --------------  Jim BRUNO  What is the best way for the office to contact you? OK to leave message on   voicemail  Preferred Call Back Phone Number? [phone]  ---------------------------------------------------------------------------  --------------  SCRIPT ANSWERS  Relationship to Patient?  Self

## 2022-06-24 NOTE — TELEPHONE ENCOUNTER
----- Message from Diane Elias sent at 6/24/2022  2:41 PM EDT -----  Subject: Refill Request    QUESTIONS  Name of Medication? lisdexamfetamine (VYVANSE) 50 MG capsule  Patient-reported dosage and instructions? one a day   How many days do you have left? 3  Preferred Pharmacy? [address] [facility] [address]  Pharmacy phone number (if available)? [phone]  ---------------------------------------------------------------------------  --------------  Erendira Meo INFO  What is the best way for the office to contact you? OK to leave message on   voicemail  Preferred Call Back Phone Number? [phone]  ---------------------------------------------------------------------------  --------------  SCRIPT ANSWERS  Relationship to Patient?  Self

## 2022-06-27 NOTE — PROGRESS NOTES
This dictation was done with Flowboard dictation and may contain mechanical errors related to translation. Resp. rate 16, height 5' 6\" (1.676 m), weight 270 lb (122.5 kg), not currently breastfeeding. This is a pleasant 55-year-old female had a left total hip replacement on 4/12/2022. Incisions healing nicely we talked about continued care and finishing out physical therapy. She is on her feet a lot she was sent for x-rays including AP pelvis and a 2 view left hip. She states the pain score can be a 4 out of 10 with certain activities. Three views of the left total Hip arthroplasty were done today including an AP pelvis and a 2 view hip. This reveals satisfactory alignment of the prosthesis with good sizing and fit. There is good version of the cup and no subsiding of the stem. . No signs of significant polyethylene wear or failure. No progressive radiolucencies,fractures, tumors or dislocations. On examination she still has some weakness with hip abduction and hip flexion on the left side but overall motion feels satisfactory without any radicular pain rating into the groin area. Well impression stable healing left total hip replacement.     At this point we will have her continue with therapy per the protocol she will refill her pain medicine and we will see her in follow-up in 4 weeks

## 2022-06-28 DIAGNOSIS — F90.2 ATTENTION DEFICIT HYPERACTIVITY DISORDER (ADHD), COMBINED TYPE: ICD-10-CM

## 2022-07-06 ENCOUNTER — TELEPHONE (OUTPATIENT)
Dept: ADMINISTRATIVE | Age: 47
End: 2022-07-06

## 2022-07-06 ENCOUNTER — TELEMEDICINE (OUTPATIENT)
Dept: FAMILY MEDICINE CLINIC | Age: 47
End: 2022-07-06
Payer: COMMERCIAL

## 2022-07-06 DIAGNOSIS — F90.2 ATTENTION DEFICIT HYPERACTIVITY DISORDER (ADHD), COMBINED TYPE: Primary | ICD-10-CM

## 2022-07-06 DIAGNOSIS — I10 ESSENTIAL HYPERTENSION, BENIGN: ICD-10-CM

## 2022-07-06 DIAGNOSIS — F51.04 PSYCHOPHYSIOLOGICAL INSOMNIA: ICD-10-CM

## 2022-07-06 DIAGNOSIS — F31.61 BIPOLAR DISORDER, CURRENT EPISODE MIXED, MILD (HCC): ICD-10-CM

## 2022-07-06 PROCEDURE — G8427 DOCREV CUR MEDS BY ELIG CLIN: HCPCS | Performed by: NURSE PRACTITIONER

## 2022-07-06 PROCEDURE — 99214 OFFICE O/P EST MOD 30 MIN: CPT | Performed by: NURSE PRACTITIONER

## 2022-07-06 RX ORDER — LAMOTRIGINE 100 MG/1
TABLET ORAL
Qty: 90 TABLET | Refills: 1 | Status: SHIPPED | OUTPATIENT
Start: 2022-07-06

## 2022-07-06 RX ORDER — DESVENLAFAXINE 25 MG/1
25 TABLET, EXTENDED RELEASE ORAL DAILY
Qty: 90 TABLET | Refills: 1 | Status: SHIPPED | OUTPATIENT
Start: 2022-07-06 | End: 2022-10-27

## 2022-07-06 RX ORDER — AMLODIPINE BESYLATE 10 MG/1
TABLET ORAL
Qty: 90 TABLET | Refills: 1 | Status: SHIPPED | OUTPATIENT
Start: 2022-07-06

## 2022-07-06 NOTE — PROGRESS NOTES
Reina Plummer (:  1975) is a Established patient, here for evaluation of the following:    Assessment & Plan   Below is the assessment and plan developed based on review of pertinent history, physical exam, labs, studies, and medications. 1. Attention deficit hyperactivity disorder (ADHD), combined type  -     lisdexamfetamine (VYVANSE) 50 MG capsule; Take 1 capsule by mouth every morning for 30 days. FILL , Disp-30 capsule, R-0Normal  -     lisdexamfetamine (VYVANSE) 50 MG capsule; Take 1 capsule by mouth every morning for 30 days. FILL , Disp-30 capsule, R-0Normal  -     lisdexamfetamine (VYVANSE) 50 MG capsule; Take 1 capsule by mouth every morning for 30 days. FILL , Disp-30 capsule, R-0Normal  2. Positive depression screening  3. Bipolar disorder, [facility] episode [facility], [facility]    No follow-ups on file. Subjective   HPI     Mood  Marylene Shaker states she  feels much better since starting  the Pristiq  She also takes her Lamictal- lithium as prescribed  Her overall mood has improved  She denies SI/HI - feels good about her life    ADHD  The vyvanse works well for her  She is able to stay on track and focused  Getting things done instead of starting stuff and never finishing things  She feels like it has also helped her mood  She has not had nay issues with the medication    Hypertension: Patient here for follow-up of elevated blood pressure. She is not exercising and is adherent to low salt diet. She does not check her blood pressure at home. Cardiac symptoms none. Patient denies chest pain, chest pressure/discomfort, claudication, dyspnea, exertional chest pressure/discomfort, fatigue, irregular heart beat, lower extremity edema, near-syncope, orthopnea, palpitations, paroxysmal nocturnal dyspnea, syncope and tachypnea. Cardiovascular risk factors: hypertension, obesity (BMI >= 30 kg/m2) and sedentary lifestyle. Use of agents associated with hypertension: amphetamines.  History of target organ damage: none. Review of Systems        Objective   Patient-Reported Vitals  No data recorded     Physical Exam  [INSTRUCTIONS:  \"[x]\" Indicates a positive item  \"[]\" Indicates a negative item  -- DELETE ALL ITEMS NOT EXAMINED]    Constitutional: [x] Appears well-developed and well-nourished [x] No apparent distress      [] Abnormal -     Mental status: [x] Alert and awake  [x] Oriented to person/place/time [x] Able to follow commands    [] Abnormal -     Eyes:   EOM    [x]  Normal    [] Abnormal -   Sclera  [x]  Normal    [] Abnormal -          Discharge [x]  None visible   [] Abnormal -     HENT: [x] Normocephalic, atraumatic  [] Abnormal -   [x] Mouth/Throat: Mucous membranes are moist    External Ears [x] Normal  [] Abnormal -    Neck: [x] No visualized mass [] Abnormal -     Pulmonary/Chest: [x] Respiratory effort normal   [x] No visualized signs of difficulty breathing or respiratory distress        [] Abnormal -      Musculoskeletal:   [x] Normal gait with no signs of ataxia         [x] Normal range of motion of neck        [] Abnormal -     Neurological:        [x] No Facial Asymmetry (Cranial nerve 7 motor function) (limited exam due to video visit)          [x] No gaze palsy        [] Abnormal -          Skin:        [x] No significant exanthematous lesions or discoloration noted on facial skin         [] Abnormal -            Psychiatric:       [x] Normal Affect [] Abnormal -        [x] No Hallucinations    Other pertinent observable physical exam findings:-    Neha Santiago was seen today for adhd.     Diagnoses and all orders for this visit:    Attention deficit hyperactivity disorder (ADHD), combined type  Stable on current medication  Controlled substances monitoring: possible medication side effects, risk of tolerance and/or dependence, and alternative treatments discussed, no signs of potential drug abuse or diversion identified and OARRS report reviewed today- activity consistent with treatment plan. -     lisdexamfetamine (VYVANSE) 50 MG capsule; Take 1 capsule by mouth every morning for 30 days. FILL 7/28  -     lisdexamfetamine (VYVANSE) 50 MG capsule; Take 1 capsule by mouth every morning for 30 days. FILL 8/27  -     lisdexamfetamine (VYVANSE) 50 MG capsule; Take 1 capsule by mouth every morning for 30 days. FILL 9/26  Follow up in three months    Essential hypertension, benign  -     amLODIPine (NORVASC) 10 MG tablet; Take 1 tablet by mouth once daily  Continue Cozaar as prescribed  Encouraged to monitor b/p weekly goal 140/90 or less  Lifestyle Recommendations for High Blood Pressure:  Reduce sodium intake to less than 1500 mg daily  Include 5-7 servings of fruits and vegetables daily  Reduce weight to ideal if overweight  30 minutes of physical activity daily  Bipolar disorder, current episode mixed, mild (HCC)  Stable on current medications  -     desvenlafaxine succinate (PRISTIQ) 25 MG TB24 extended release tablet; Take 1 tablet by mouth daily  -     lamoTRIgine (LAMICTAL) 100 MG tablet; Take 1 tablet by mouth once daily  Continue Lithium as prescribed  Psychophysiological insomnia  Continue Ambien as prescribed  No refills needed at this time             Deancullenal Thanh, was evaluated through a synchronous (real-time) audio-video encounter. The patient (or guardian if applicable) is aware that this is a billable service, which includes applicable co-pays. This Virtual Visit was conducted with patient's (and/or legal guardian's) consent. The visit was conducted pursuant to the emergency declaration under the [address] [place] [address], [address] [place] [address] authority and the [employer] General Act. Patient identification was verified, and a caregiver was present when appropriate. The patient was located at Home: [address] [address].    Provider was located at [facility] ([address] [place] [address]): [facility] [address] [facility],  [address]         --Nany Garcia, APRN - CNP

## 2022-07-06 NOTE — TELEPHONE ENCOUNTER
Submitted PA for Desvenlafaxine Succinate  Via [facility]  Key: [id number] STATUS: DENIED. LETTER ATTACHED. If this requires a response please respond to the pool. [address]). Please advise patient thank you.

## 2022-07-07 NOTE — TELEPHONE ENCOUNTER
NOT SURE WHAT OTHER MEDS SHE IS SUPPOSED TO TRY - IT LOOKS LIKE THE ONES LISTED ARE NOT COVERED -PLEASE CLARIFY

## 2022-07-08 NOTE — TELEPHONE ENCOUNTER
CALLED [facility]. EVERYTHING THEY WANT HER TO TRY AND FAIL IS AN INTERACTION ON HER GENE SIGHT. SHE HAS TRIED AND FAILED MORE THEN JUST FLUOXETINE. RESUBMITTED VIA A PAPER FORM.  AND GAVE MORE TRIED AND FAILED MEDS AND ATTACHED [employer] RESULTS. EUNICE

## 2022-07-21 DIAGNOSIS — F51.04 PSYCHOPHYSIOLOGICAL INSOMNIA: ICD-10-CM

## 2022-07-21 RX ORDER — ZOLPIDEM TARTRATE 12.5 MG/1
TABLET, FILM COATED, EXTENDED RELEASE ORAL
Qty: 90 TABLET | Refills: 0 | Status: SHIPPED | OUTPATIENT
Start: 2022-07-21 | End: 2022-09-26 | Stop reason: SDUPTHER

## 2022-08-02 ENCOUNTER — TELEPHONE (OUTPATIENT)
Dept: FAMILY MEDICINE CLINIC | Age: 47
End: 2022-08-02

## 2022-08-02 DIAGNOSIS — U07.1 COVID: Primary | ICD-10-CM

## 2022-08-02 NOTE — TELEPHONE ENCOUNTER
Patient just tested positive today for covid. First symptoms were on Sunday. What can we do for her? Symptoms - coughing - no color, headaches, running nose, drainage, no sore throat, fever - 100, and she feels fatigue. [address]. Phone no. [phone]    Please give her a call back.

## 2022-09-24 DIAGNOSIS — M16.0 PRIMARY OSTEOARTHRITIS OF BOTH HIPS: ICD-10-CM

## 2022-09-26 ENCOUNTER — PATIENT MESSAGE (OUTPATIENT)
Dept: FAMILY MEDICINE CLINIC | Age: 47
End: 2022-09-26

## 2022-09-26 DIAGNOSIS — F51.04 PSYCHOPHYSIOLOGICAL INSOMNIA: ICD-10-CM

## 2022-09-26 RX ORDER — LOSARTAN POTASSIUM 25 MG/1
TABLET ORAL
Qty: 90 TABLET | Refills: 0 | Status: SHIPPED | OUTPATIENT
Start: 2022-09-26 | End: 2022-10-27 | Stop reason: ALTCHOICE

## 2022-09-26 RX ORDER — LOSARTAN POTASSIUM 25 MG/1
TABLET ORAL
Qty: 90 TABLET | Refills: 0 | Status: SHIPPED | OUTPATIENT
Start: 2022-09-26

## 2022-09-26 RX ORDER — ZOLPIDEM TARTRATE 12.5 MG/1
TABLET, FILM COATED, EXTENDED RELEASE ORAL
Qty: 90 TABLET | Refills: 0 | Status: SHIPPED | OUTPATIENT
Start: 2022-10-20 | End: 2023-01-20

## 2022-09-26 RX ORDER — IBUPROFEN 800 MG/1
TABLET ORAL
Qty: 270 TABLET | Refills: 0 | Status: SHIPPED | OUTPATIENT
Start: 2022-09-26

## 2022-09-26 NOTE — TELEPHONE ENCOUNTER
lisdexamfetamine (VYVANSE) 50 MG capsule 30 capsule 0 9/26/2022 10/26/2022    Sig - Route: Take 1 capsule by mouth every morning for 30 days. FILL 9/26 - Oral    Sent to pharmacy as: Lisdexamfetamine Dimesylate 50 MG Oral Capsule (Vyvanse)    Earliest Fill Date: 9/26/2022    E-Prescribing Status: Receipt confirmed by pharmacy (7/6/2022 10:57 AM EDT)      [address], I WILL LET THE PT KNOW. SHE IS DUE FOR ZOLPIDEM AND LOSARTAN. I PENDED THE REFILLS. WHEN IS SHE DUE TO SEE YOU NEXT? ?? LAST VISIT 07/06/2022.   [address] [place] [address]

## 2022-09-26 NOTE — TELEPHONE ENCOUNTER
From: Edison Sotelo  To: Amalia Kelly  Sent: 9/26/2022 8:29 AM EDT  Subject: Medicine refills     I am using my last prescription of Vyvanse and others also need refills. I am going back to work and would like to know if the next appointment will be virtual or in office.  I would like to have someone call and Schedule the appointment so I can let my employer know

## 2022-10-07 DIAGNOSIS — Z13.31 POSITIVE DEPRESSION SCREENING: ICD-10-CM

## 2022-10-07 DIAGNOSIS — F31.61 BIPOLAR DISORDER, CURRENT EPISODE MIXED, MILD (HCC): ICD-10-CM

## 2022-10-07 RX ORDER — LITHIUM CARBONATE 300 MG/1
CAPSULE ORAL
Qty: 270 CAPSULE | Refills: 0 | Status: SHIPPED | OUTPATIENT
Start: 2022-10-07

## 2022-10-27 ENCOUNTER — OFFICE VISIT (OUTPATIENT)
Dept: FAMILY MEDICINE CLINIC | Age: 47
End: 2022-10-27
Payer: COMMERCIAL

## 2022-10-27 VITALS
SYSTOLIC BLOOD PRESSURE: 136 MMHG | BODY MASS INDEX: 40.66 KG/M2 | WEIGHT: 253 LBS | HEART RATE: 72 BPM | HEIGHT: 66 IN | TEMPERATURE: 98.6 F | RESPIRATION RATE: 20 BRPM | DIASTOLIC BLOOD PRESSURE: 88 MMHG

## 2022-10-27 DIAGNOSIS — I10 ESSENTIAL HYPERTENSION, BENIGN: ICD-10-CM

## 2022-10-27 DIAGNOSIS — Z72.0 NICOTINE ABUSE: ICD-10-CM

## 2022-10-27 DIAGNOSIS — F90.2 ATTENTION DEFICIT HYPERACTIVITY DISORDER (ADHD), COMBINED TYPE: Primary | ICD-10-CM

## 2022-10-27 DIAGNOSIS — F51.04 PSYCHOPHYSIOLOGICAL INSOMNIA: ICD-10-CM

## 2022-10-27 PROCEDURE — 3078F DIAST BP <80 MM HG: CPT | Performed by: NURSE PRACTITIONER

## 2022-10-27 PROCEDURE — 1036F TOBACCO NON-USER: CPT | Performed by: NURSE PRACTITIONER

## 2022-10-27 PROCEDURE — G8484 FLU IMMUNIZE NO ADMIN: HCPCS | Performed by: NURSE PRACTITIONER

## 2022-10-27 PROCEDURE — G8417 CALC BMI ABV UP PARAM F/U: HCPCS | Performed by: NURSE PRACTITIONER

## 2022-10-27 PROCEDURE — 99214 OFFICE O/P EST MOD 30 MIN: CPT | Performed by: NURSE PRACTITIONER

## 2022-10-27 PROCEDURE — 3074F SYST BP LT 130 MM HG: CPT | Performed by: NURSE PRACTITIONER

## 2022-10-27 PROCEDURE — G8427 DOCREV CUR MEDS BY ELIG CLIN: HCPCS | Performed by: NURSE PRACTITIONER

## 2022-10-27 RX ORDER — ZOLPIDEM TARTRATE 12.5 MG/1
12.5 TABLET, FILM COATED, EXTENDED RELEASE ORAL NIGHTLY PRN
Qty: 30 TABLET | Refills: 5 | Status: SHIPPED | OUTPATIENT
Start: 2022-10-27 | End: 2022-11-26

## 2022-10-27 SDOH — ECONOMIC STABILITY: FOOD INSECURITY: WITHIN THE PAST 12 MONTHS, YOU WORRIED THAT YOUR FOOD WOULD RUN OUT BEFORE YOU GOT MONEY TO BUY MORE.: NEVER TRUE

## 2022-10-27 SDOH — ECONOMIC STABILITY: FOOD INSECURITY: WITHIN THE PAST 12 MONTHS, THE FOOD YOU BOUGHT JUST DIDN'T LAST AND YOU DIDN'T HAVE MONEY TO GET MORE.: NEVER TRUE

## 2022-10-27 ASSESSMENT — SOCIAL DETERMINANTS OF HEALTH (SDOH): HOW HARD IS IT FOR YOU TO PAY FOR THE VERY BASICS LIKE FOOD, HOUSING, MEDICAL CARE, AND HEATING?: NOT HARD AT ALL

## 2022-10-27 NOTE — PROGRESS NOTES
Ana Carlson (:  1975) is a 50 y.o. female,Established patient, here for evaluation of the following chief complaint(s): Other (Pap done within  last year  not sure where.  ) and ADHD (Routine follow up)      SUBJECTIVE/OBJECTIVE:  HPI    ADHD  SHE IS TAKING THE VYVANSE AS PRESCRIBED  IT DOES HELP HER STAY ON TRACK FOCUSED   SHE BABYSITS HER GRANDKIDS -   SHE ALSO FEELS LIKE IT HELPS HER STAY AWAKE - SHE WILL FALL ASLEEP EASILY IF SHE DOES NOT TAKE IT    INSOMNIA  THE AMBIEN WORKS OK IT HELPS HER GET TO SLEEP  BUT SHE TENDS TO WAKE UP FOUR HOURS LATER AND HAS A HARD TIME GETTING BACK TO SLEEP   SOMETIMES- IT DOES WORK BETTER THAN THE REGULAR AMBIEN    Hypertension: Patient here for follow-up of elevated blood pressure. She is not exercising and is adherent to low salt diet. Blood pressure is well controlled at home. Cardiac symptoms none. Patient denies chest pain, chest pressure/discomfort, claudication, dyspnea, exertional chest pressure/discomfort, fatigue, irregular heart beat, lower extremity edema, near-syncope, orthopnea, palpitations, paroxysmal nocturnal dyspnea, syncope, and tachypnea. Cardiovascular risk factors: hypertension, obesity (BMI >= 30 kg/m2), sedentary lifestyle, and smoking/ tobacco exposure. Use of agents associated with hypertension: amphetamines History of target organ damage: none. Review of Systems   Cardiovascular: Negative. Psychiatric/Behavioral:  Positive for decreased concentration and sleep disturbance. Physical Exam  Vitals reviewed. Constitutional:       General: She is awake. She is not in acute distress. Appearance: Normal appearance. She is well-developed and well-groomed. She is morbidly obese. She is not ill-appearing, toxic-appearing or diaphoretic. Cardiovascular:      Rate and Rhythm: Normal rate and regular rhythm. Heart sounds: Normal heart sounds, S1 normal and S2 normal. Heart sounds not distant. No murmur heard.   No systolic murmur is present. No diastolic murmur is present. No friction rub. No gallop. No S3 or S4 sounds. Pulmonary:      Effort: Pulmonary effort is normal.      Breath sounds: Normal breath sounds and air entry. Musculoskeletal:      Right lower leg: No edema. Left lower leg: No edema. Neurological:      Mental Status: She is alert and oriented to person, place, and time. Psychiatric:         Behavior: Behavior is cooperative. Christiano Landeros was seen today for other and adhd. Diagnoses and all orders for this visit:    Attention deficit hyperactivity disorder (ADHD), combined type  STABLE ON CURRENT MEDICATION  CONTINUE VYVANSE AS PRESCRIBED  Controlled substances monitoring: possible medication side effects, risk of tolerance and/or dependence, and alternative treatments discussed, no signs of potential drug abuse or diversion identified, and OARRS report reviewed today- activity consistent with treatment plan. -     lisdexamfetamine (VYVANSE) 50 MG capsule; Take 1 capsule by mouth every morning for 30 days. FILL 10/27  -     lisdexamfetamine (VYVANSE) 50 MG capsule; Take 1 capsule by mouth every morning for 30 days. Fill 11/26  -     lisdexamfetamine (VYVANSE) 50 MG capsule; Take 1 capsule by mouth every morning for 30 days. Fill 12/26  FOLLOW UP IN THREE MONTHS    Psychophysiological insomnia  STABLE ON CURRENT MEDICATION  CONTINUE zolpidem (AMBIEN CR) 12.5 MG extended release tablet; Take 1 tablet by mouth nightly as needed for Sleep for up to 30 days. Fill 10/27    Essential hypertension, benign  STABLE ON CURRENT MEDICATION  CONTINUE NORVASC AND TOPROL AS PRESCRIBED  ENCOURAGED TO MONITOR B/P 1-2 X WEEKLY GOAL 140/90 OR LESS  Lifestyle Recommendations for High Blood Pressure:  Reduce sodium intake to less than 1500 mg daily  Include 5-7 servings of fruits and vegetables daily  Reduce weight to ideal if overweight  Quit smoking . ..  30 minutes of physical activity daily      NICOTINE ABUSE  ENCOURAGED TO STOP SMOKING     An electronic signature was used to authenticate this note.     --Dejon Lugo, APRN - CNP

## 2022-11-09 ENCOUNTER — OFFICE VISIT (OUTPATIENT)
Dept: FAMILY MEDICINE CLINIC | Age: 47
End: 2022-11-09
Payer: COMMERCIAL

## 2022-11-09 VITALS
RESPIRATION RATE: 20 BRPM | WEIGHT: 252 LBS | SYSTOLIC BLOOD PRESSURE: 158 MMHG | HEART RATE: 80 BPM | BODY MASS INDEX: 40.5 KG/M2 | HEIGHT: 66 IN | DIASTOLIC BLOOD PRESSURE: 80 MMHG

## 2022-11-09 DIAGNOSIS — L02.91 ABSCESS: Primary | ICD-10-CM

## 2022-11-09 PROCEDURE — 99214 OFFICE O/P EST MOD 30 MIN: CPT | Performed by: NURSE PRACTITIONER

## 2022-11-09 PROCEDURE — G8484 FLU IMMUNIZE NO ADMIN: HCPCS | Performed by: NURSE PRACTITIONER

## 2022-11-09 PROCEDURE — 3078F DIAST BP <80 MM HG: CPT | Performed by: NURSE PRACTITIONER

## 2022-11-09 PROCEDURE — G8427 DOCREV CUR MEDS BY ELIG CLIN: HCPCS | Performed by: NURSE PRACTITIONER

## 2022-11-09 PROCEDURE — G8417 CALC BMI ABV UP PARAM F/U: HCPCS | Performed by: NURSE PRACTITIONER

## 2022-11-09 PROCEDURE — 1036F TOBACCO NON-USER: CPT | Performed by: NURSE PRACTITIONER

## 2022-11-09 PROCEDURE — 3074F SYST BP LT 130 MM HG: CPT | Performed by: NURSE PRACTITIONER

## 2022-11-09 RX ORDER — SULFAMETHOXAZOLE AND TRIMETHOPRIM 800; 160 MG/1; MG/1
1 TABLET ORAL 2 TIMES DAILY
Qty: 14 TABLET | Refills: 0 | Status: SHIPPED | OUTPATIENT
Start: 2022-11-09 | End: 2022-11-16

## 2022-11-09 RX ORDER — ACETAMINOPHEN AND CODEINE PHOSPHATE 300; 30 MG/1; MG/1
1 TABLET ORAL EVERY 8 HOURS PRN
Qty: 9 TABLET | Refills: 0 | Status: SHIPPED | OUTPATIENT
Start: 2022-11-09 | End: 2022-11-12

## 2022-11-09 RX ORDER — CEPHALEXIN 500 MG/1
500 CAPSULE ORAL 2 TIMES DAILY
Qty: 14 CAPSULE | Refills: 0 | Status: SHIPPED | OUTPATIENT
Start: 2022-11-09 | End: 2022-11-16

## 2022-11-09 NOTE — PROGRESS NOTES
Elizabeth Lane (:  1975) is a 50 y.o. female,Established patient, here for evaluation of the following chief complaint(s): Mass (Under arm on right arm. Tripled size in past seven days. /)        Subjective   SUBJECTIVE/OBJECTIVE:  Mass    Patient presents with    Mass     Under arm on right arm. Tripled size in past seven days. States she has had a cyst under her right arm for 7 years. She states that last Wednesday she noticed discomfort and  it grew in size. She states that it feels like a hard pimple and it is like a blister that is being rubbed over and over, a stabbing pain. 8/10. She states her bra rubbing gives her more pain. She has taken Ibuprofen and it helps but not completely. Review of Systems   Constitutional: Negative. Skin:         Lump under right arm        Objective   Physical Exam  Vitals reviewed. Constitutional:       General: She is awake. Appearance: Normal appearance. HENT:      Head: Normocephalic. Cardiovascular:      Rate and Rhythm: Normal rate and regular rhythm. Pulmonary:      Effort: Pulmonary effort is normal.      Breath sounds: Normal breath sounds and air entry. Lymphadenopathy:      Upper Body:      Right upper body: No axillary adenopathy. Left upper body: No axillary adenopathy. Comments: Red, warm, swollen firm mass Area was tender to touch. About 3 inches in length/1.5 in width. No drainage noted   Neurological:      General: No focal deficit present. Mental Status: She is alert and oriented to person, place, and time. Mental status is at baseline. Psychiatric:         Attention and Perception: Attention and perception normal.         Mood and Affect: Mood and affect normal.         Speech: Speech normal.         Behavior: Behavior normal. Behavior is cooperative. Thought Content:  Thought content normal.         Cognition and Memory: Cognition and memory normal.         Diagnoses and all orders for this visit:      Abscess  Procedure dw pt- risks/benefits pt agrees to proceed  Site cleansed with betadine and alcohol  # 11 blade used to gin   Moderate amount of thick/gray and malodorous drainage  expectorated  Pt tolerated without difficulty  Covered with abx ointment and dry sterile gauze  Instructed pt to cleanse with soap and water- watching for increased redness- streaking - fevers- if any noted to f/u in office   -     Culture, Aerobic and Anaerobic  -     cephALEXin (KEFLEX) 500 MG capsule; Take 1 capsule by mouth 2 times daily for 7 days  -     sulfamethoxazole-trimethoprim (BACTRIM DS;SEPTRA DS) 800-160 MG per tablet; Take 1 tablet by mouth 2 times daily for 7 days  -     acetaminophen-codeine (TYLENOL/CODEINE #3) 300-30 MG per tablet; Take 1 tablet by mouth every 8 hours as needed for Pain for up to 3 days. Intended supply: 3 days. Take lowest dose possible to manage pain se dw pt  Instructed to use warm compresses as well        An electronic signature was used to authenticate this note.     --December N Zach

## 2022-11-11 LAB
GRAM STAIN RESULT: ABNORMAL
WOUND/ABSCESS: ABNORMAL

## 2022-12-05 ENCOUNTER — OFFICE VISIT (OUTPATIENT)
Dept: SLEEP MEDICINE | Age: 47
End: 2022-12-05
Payer: COMMERCIAL

## 2022-12-05 VITALS
DIASTOLIC BLOOD PRESSURE: 80 MMHG | BODY MASS INDEX: 40.95 KG/M2 | RESPIRATION RATE: 18 BRPM | HEIGHT: 66 IN | HEART RATE: 80 BPM | SYSTOLIC BLOOD PRESSURE: 125 MMHG | OXYGEN SATURATION: 98 % | TEMPERATURE: 98 F | WEIGHT: 254.8 LBS

## 2022-12-05 DIAGNOSIS — I10 ESSENTIAL HYPERTENSION, BENIGN: ICD-10-CM

## 2022-12-05 DIAGNOSIS — E66.01 OBESITY, CLASS III, BMI 40-49.9 (MORBID OBESITY) (HCC): ICD-10-CM

## 2022-12-05 DIAGNOSIS — Z99.89 DEPENDENCE ON OTHER ENABLING MACHINES AND DEVICES: ICD-10-CM

## 2022-12-05 DIAGNOSIS — Z99.89 OSA ON CPAP: Primary | ICD-10-CM

## 2022-12-05 DIAGNOSIS — G47.33 OSA ON CPAP: Primary | ICD-10-CM

## 2022-12-05 PROCEDURE — G8484 FLU IMMUNIZE NO ADMIN: HCPCS | Performed by: PSYCHIATRY & NEUROLOGY

## 2022-12-05 PROCEDURE — 99214 OFFICE O/P EST MOD 30 MIN: CPT | Performed by: PSYCHIATRY & NEUROLOGY

## 2022-12-05 PROCEDURE — G8427 DOCREV CUR MEDS BY ELIG CLIN: HCPCS | Performed by: PSYCHIATRY & NEUROLOGY

## 2022-12-05 PROCEDURE — 1036F TOBACCO NON-USER: CPT | Performed by: PSYCHIATRY & NEUROLOGY

## 2022-12-05 PROCEDURE — 3078F DIAST BP <80 MM HG: CPT | Performed by: PSYCHIATRY & NEUROLOGY

## 2022-12-05 PROCEDURE — 3074F SYST BP LT 130 MM HG: CPT | Performed by: PSYCHIATRY & NEUROLOGY

## 2022-12-05 PROCEDURE — G8417 CALC BMI ABV UP PARAM F/U: HCPCS | Performed by: PSYCHIATRY & NEUROLOGY

## 2022-12-05 ASSESSMENT — SLEEP AND FATIGUE QUESTIONNAIRES
HOW LIKELY ARE YOU TO NOD OFF OR FALL ASLEEP WHILE SITTING AND TALKING TO SOMEONE: 0
HOW LIKELY ARE YOU TO NOD OFF OR FALL ASLEEP WHILE SITTING INACTIVE IN A PUBLIC PLACE: 0
HOW LIKELY ARE YOU TO NOD OFF OR FALL ASLEEP WHILE WATCHING TV: 2
HOW LIKELY ARE YOU TO NOD OFF OR FALL ASLEEP WHILE SITTING QUIETLY AFTER LUNCH WITHOUT ALCOHOL: 0
HOW LIKELY ARE YOU TO NOD OFF OR FALL ASLEEP WHILE LYING DOWN TO REST IN THE AFTERNOON WHEN CIRCUMSTANCES PERMIT: 1
HOW LIKELY ARE YOU TO NOD OFF OR FALL ASLEEP WHEN YOU ARE A PASSENGER IN A CAR FOR AN HOUR WITHOUT A BREAK: 0
HOW LIKELY ARE YOU TO NOD OFF OR FALL ASLEEP IN A CAR, WHILE STOPPED FOR A FEW MINUTES IN TRAFFIC: 0
HOW LIKELY ARE YOU TO NOD OFF OR FALL ASLEEP WHILE SITTING AND READING: 0
ESS TOTAL SCORE: 3

## 2022-12-05 NOTE — PROGRESS NOTES
MD ASHKAN Sauceda Board Certified in Sleep Medicine  Certified in [address] [place] [address] Certified in Neurology [address] [facility]  Carlton Mera  [address] [facility],  Fausto Jacobs 65  [phone]   [address] [place] [address] [address]                      [address]  [address] [facility] [address] [phone]    Subjective:     Patient ID: Tete Osorio is a 50 y.o. female. Chief Complaint   Patient presents with    Follow-up    Sleep Apnea       HPI:        Tete Osorio is a 50 y.o. female was seen today as a follow for mild obstructive sleep apnea. (weight was 254 pounds 09/10/2019). Patient is using the PAP machine about 99% of the time, more than 4 hours a nightabout  99 %, in total average of 8:14 hours a night in last 90 days. Currently on PAP at 9 cm (), the AHI is only 1.1 events per hour at this pressure. Patient improved regarding daytime sleepiness and fatigue, wakes up refreshed in the morning. The Patient scored [place] Sleepiness Score: 3 on [place] Sleepiness Scale ( more than 10 is indicative of daytime sleepiness)   Patient has no problem with PAP pressure or mask. Wakes up in the morning with dry mouth, the setting of the heated humidifier at # 4. BP is stable. Has not gained weight pounds since last visit.    DOT/CDL - N/A        Previous Report(s)Reviewed: historical medical records         Social History     Socioeconomic History    Marital status:      Spouse name: Not on file    Number of children: Not on file    Years of education: Not on file    Highest education level: Not on file   Occupational History    Occupation: bank collections   Tobacco Use    Smoking status: Former     Packs/day: 0.50     Types: Cigarettes     Passive exposure: Past    Smokeless tobacco: Never    Tobacco comments:     R   Vaping Use    Vaping Use: Never used   Substance and Sexual Activity    Alcohol use: Not Currently     Alcohol/week: 5.0 standard drinks     Types: 6 Standard drinks or equivalent per week     Comment: rarely    Drug use: Not Currently     Types: Marijuana Aloma Pasha)     Comment: LAST [id number]    Sexual activity: Yes   Other Topics Concern    Not on file   Social History Narrative    Not on file     Social Determinants of Health     Financial Resource Strain: Low Risk     Difficulty of Paying Living Expenses: Not hard at all   Food Insecurity: No Food Insecurity    Worried About Running Out of Food in the Last Year: Never true    Ran Out of Food in the Last Year: Never true   Transportation Needs: No Transportation Needs    Lack of Transportation (Medical): No    Lack of Transportation (Non-Medical): No   Physical Activity: Not on file   Stress: Not on file   Social Connections: Not on file   Intimate Partner Violence: Not on file   Housing Stability: Not on file       Prior to Admission medications    Medication Sig Start Date End Date Taking? Authorizing Provider   lisdexamfetamine (VYVANSE) 50 MG capsule Take 1 capsule by mouth every morning for 30 days. Fill 11/26 11/26/22 12/26/22 Yes Mishel Rosario APRN - CNP   lithium 300 MG capsule TAKE 1 CAPSULE BY MOUTH IN THE MORNING AND 2 IN THE EVENING 10/7/22  Yes Cuong Badillo APRN - CNP   losartan (COZAAR) 25 MG tablet Take 1 tablet by mouth once daily 9/26/22  Yes Antony Ag APRN - CNP   ibuprofen (ADVIL;MOTRIN) 800 MG tablet TAKE 1 TABLET BY MOUTH THREE TIMES DAILY WITH MEALS 9/26/22  Yes Iris Trevino APRN - CNP   zolpidem (AMBIEN CR) 12.5 MG extended release tablet Fill 10/20 TAKE 1 TABLET BY MOUTH NIGHTLY AS NEEDED FOR SLEEP 10/20/22 1/20/23 Yes Mishel Rosario APRN - CNP   lisdexamfetamine (VYVANSE) 50 MG capsule Take 1 capsule by mouth every morning for 30 days.  FILL 9/26 9/26/22 6/14/24 Yes ROSSANA Mills CNP   lamoTRIgine (LAMICTAL) 100 MG tablet Take 1 tablet by mouth once daily 7/6/22  Yes ROSSANA Tavarez CNP   amLODIPine (NORVASC) 10 MG tablet Take 1 tablet by mouth once daily 7/6/22  Yes ROSSANA Tavarez CNP   ammonium lactate (LAC-HYDRIN) 12 % lotion Apply topically daily. Patient taking differently: Apply topically as needed Apply topically daily PRN. 2/8/21  Yes ROSSANA Cortez CNP   lisdexamfetamine (VYVANSE) 50 MG capsule Take 1 capsule by mouth every morning for 30 days.  FILL 10/27 10/27/22 11/26/22  ROSSANA Cortez CNP   desvenlafaxine succinate (PRISTIQ) 25 MG TB24 extended release tablet Take 1 tablet by mouth daily  Patient not taking: Reported on 12/5/2022 7/6/22 11/9/22  ROSSANA Mills CNP       Allergies as of 12/05/2022 - Fully Reviewed 12/05/2022   Allergen Reaction Noted    Imitrex [sumatriptan] Nausea Only 02/04/2013       Patient Active Problem List   Diagnosis    Migraine    Amenorrhea, secondary    Depression    Gastritis    TMJ (temporomandibular joint syndrome)    Lipoma    Grief reaction    Anxiety    Tobacco abuse    Insomnia    Bipolar affective disorder (HCC)    VICTORIANO (obstructive sleep apnea)    Periodic limb movement disorder (PLMD)    Primary osteoarthritis of both hips    Left hip pain    Arthritis of right hip    Obesity, Class III, BMI 40-49.9 (morbid obesity) ([address]    Essential hypertension, benign    Bipolar disorder with depression ([address]    Morbid obesity ([address]    Tear of right rotator cuff    Hypersomnia    Traumatic complete tear of right rotator cuff    History of total hip arthroplasty, right    Cervical spondylosis    Cervical radicular pain    DDD (degenerative disc disease), cervical    Claustrophobia    Cervical stenosis of spine    Primary osteoarthritis of left hip       Past Medical History:   Diagnosis Date    Arthritis     Back pain     Bipolar disorder ([address] Depression     Gastritis     Hypertension     Insomnia     Migraine     Neuropathy     PLMD (periodic limb movement disorder)     PATIENT UNAWARE    Preoperative clearance     Prolonged emergence from general anesthesia     Sleep apnea     uses C-PAP    TMJ (temporomandibular joint syndrome)        Past Surgical History:   Procedure Laterality Date    COLPOSCOPY      ENDOMETRIAL ABLATION      FOOT SURGERY      JOINT REPLACEMENT Right 02/19/2019    RIGHT LATERAL TOTAL HIP REPLACEMENT    NERVE BLOCK N/A 10/26/2021    BILATERAL C5-C7 MEDIAL BRANCH BLOCK WITH FLUOROSCOPY performed by Samantha Cleary MD at [address] [place] [address] N/A 7/6/2021    C5C6  EPIDURAL STERIOD INJECTION WITH FLUOROSCOPY performed by Samantha Cleary MD at [address] [place] [address] Bilateral 8/31/2021    BILATERAL C5, C6, C7 MEDIAL BRANCH BLOCK WITH FLUOROSCOPY (88435, 48475)-NO STEROIDS performed by Samantha Cleary MD at [address] [place] [address] Left 1/11/2022    LEFT C4, C5, C6 RADIOFREQUENCY ABLATION WITH FLUOROSCOPY performed by Samantha Cleary MD at [address] [place] [address] Right 1/25/2022    RIGHT C-4-C-6 NERVE RADIOFREQUENCY ABLATION WITH FLUOROSCOPY performed by Samantha Cleary MD at [place] [address] ARTHROSCOPY Right 11/25/2019    RIGHT SHOULDER ARTHROSCOPY, SUBACROMIAL DECOMPRESSION,   ROTATOR CUFF REPAIR performed by Rad Allen MD at [address] Right 2/19/2019    RIGHT LATERAL TOTAL HIP REPLACEMENT performed by Lou Wagner MD at [facility] Left 4/12/2022    LEFT ANTERIOR TOTAL HIP REPLACEMENT performed by Laura Polanco MD at [address] [place] [address] History   Problem Relation Age of Onset    COPD Mother     Alcohol Abuse Mother     Mult Sclerosis Father     Alcohol Abuse Sister     High Cholesterol Brother     Diabetes Brother     Obesity Brother     Arthritis Brother     Hypertension Brother Elevated Lipids Brother     COPD Brother     Heart Failure Brother     Other Maternal Grandmother         hips replacement    Obesity Maternal Grandmother     Diabetes Maternal Grandmother     Breast Cancer Maternal Grandmother     No Known Problems Maternal Grandfather     No Known Problems Paternal Grandmother     Diabetes Paternal Grandfather     Arthritis Sister     Depression Daughter        Review of Systems    Objective:     Vitals:  Weight BMI Neck circumference    Wt Readings from Last 3 Encounters:   12/05/22 254 lb 12.8 oz (115.6 kg)   11/09/22 252 lb (114.3 kg)   10/27/22 253 lb (114.8 kg)    Body mass index is 41.13 kg/m². BP HR SaO2   BP Readings from Last 3 Encounters:   12/05/22 125/80   11/09/22 (!) 158/80   10/27/22 136/88    Pulse Readings from Last 3 Encounters:   12/05/22 80   11/09/22 80   10/27/22 72    SpO2 Readings from Last 3 Encounters:   12/05/22 98%   04/13/22 96%   04/12/22 100%        Themandibular molar Class :   [x]1 []2 []3      Mallampati I Airway Classification:   []1 []2 []3 [x]4      Physical Exam  Vitals and nursing note reviewed. Constitutional:       Appearance: Normal appearance. Cardiovascular:      Rate and Rhythm: Normal rate and regular rhythm. Pulmonary:      Effort: Pulmonary effort is normal.      Breath sounds: Normal breath sounds. Musculoskeletal:      Right lower leg: No edema. Left lower leg: No edema.       :   Mild Obstructive Sleep Apnea/Hypopnea Syndrome under good control on PAP at 9 cmwp. Diagnosis Orders   1. VICTORIANO on CPAP        2. Dependence on other enabling machines and devices        3. Obesity, Class III, BMI 40-49.9 (morbid obesity) ([facility]  Ambulatory Referral to Bariatric Surgery      4. Essential hypertension, benign          Plan: Will continue the PAP at 9 cmwp. I educated the Patient about the PAP machine including how to change the heated humidifier. I will order PAP supplies, mask, filters. ...   Most likely treating the VICTORIANO will have positive impact on HTN control. Weight loss: We discussed the proportionality between weight and AHI. With 10% weight change, the AHI has a 27% proportionate change. With 20% weight change, the AHI has a 45-50% proportionate change. Orders Placed This Encounter   Procedures    Ambulatory Referral to Bariatric Surgery         Return in about 1 year (around 12/5/2023) for Reveiwing CPAP usage and compliance report and tro.     Rcok Hodges MD  Medical Director - [facility]

## 2022-12-05 NOTE — PROGRESS NOTES
Luther David         : 1975        PHONE: [phone] (home)   [x] [address]      [] [address]      [] [address]     [] [address]    [] [address] [facility]  [] [facility]   [] [address] [facility]  [] [facility] [] Other:     Diagnosis: [x] VICTORIANO (G47.33) [] CSA (G47.31) [] Apnea (G47.30)   Length of Need: [] 12 Months [x] 99 Months [] Other:    Machine (TRENT!): [] Respironics Dream Station      Auto [] ResMed AirSense     Auto [] Other:     []  CPAP () [] Bilevel ()   Mode: [] Auto [] Spontaneous    Mode: [] Auto [] Spontaneous                            Comfort Settings:   - Ramp Pressure: 5 cmH2O                                        - Ramp time: 15 min                                     -  Flex/EPR - 3 full time                                    - For ResMed Bilevel (TiMax-4 sec   TiMin- 0.2 sec)     Humidifier: [] Heated ()        [x] Water chamber replacement ()/ 1 per 6 months        Mask:   [] Nasal () /1 per 3 months [x] Full Face () /1 per 3 months   [] Patient choice -Size and fit mask [x] Patient Choice - Size and fit mask   [] Dispense:  [] Dispense:    [] Headgear () / 1 per 3 months [x] Headgear () / 1 per 3 months   [] Replacement Nasal Cushion ()/2 per month [x] Interface Replacement ()/1 per month   [] Replacement Nasal Pillows ()/2 per month         Tubing: [x] Heated ()/1 per 3 months    [] Standard ()/1 per 3 months [] Other:           Filters: [x] Non-disposable ()/1 per 6 months     [x] Ultra-Fine, Disposable ()/2 per month        Miscellaneous: [x] Chin Strap ()/ 1 per 6 months [] O2 bleed-in:       LPM   [] Oximetry on CPAP/Bilevel []  Other:          Start Order Date: 22    MEDICAL JUSTIFICATION:  I, the undersigned, certify that the above prescribed supplies are medically necessary for this patients wellbeing.   In my opinion, the supplies are both reasonable and necessary in reference to accepted standards of medicalpractice in treatment of this patients condition.     Marrian Castleman, MD      NPI: [id number]       Order Signed Date: 12/05/22    Electronically signed by Marrian Castleman, MD on 12/5/2022 at 9:09 AM

## 2022-12-08 ENCOUNTER — PATIENT MESSAGE (OUTPATIENT)
Dept: FAMILY MEDICINE CLINIC | Age: 47
End: 2022-12-08

## 2022-12-08 NOTE — TELEPHONE ENCOUNTER
From: Lowell Ball  To: Renetta Church  Sent: 12/8/2022 12:14 PM EST  Subject: Flu    I need cough medicine. Can she prescribe it without seeing me?

## 2022-12-13 ENCOUNTER — TELEMEDICINE (OUTPATIENT)
Dept: FAMILY MEDICINE CLINIC | Age: 47
End: 2022-12-13
Payer: COMMERCIAL

## 2022-12-13 DIAGNOSIS — R50.9 FEBRILE ILLNESS: ICD-10-CM

## 2022-12-13 DIAGNOSIS — Z12.11 SCREENING FOR MALIGNANT NEOPLASM OF COLON: ICD-10-CM

## 2022-12-13 DIAGNOSIS — J01.40 ACUTE NON-RECURRENT PANSINUSITIS: Primary | ICD-10-CM

## 2022-12-13 PROCEDURE — G8417 CALC BMI ABV UP PARAM F/U: HCPCS | Performed by: NURSE PRACTITIONER

## 2022-12-13 PROCEDURE — 99213 OFFICE O/P EST LOW 20 MIN: CPT | Performed by: NURSE PRACTITIONER

## 2022-12-13 PROCEDURE — G8484 FLU IMMUNIZE NO ADMIN: HCPCS | Performed by: NURSE PRACTITIONER

## 2022-12-13 PROCEDURE — 1036F TOBACCO NON-USER: CPT | Performed by: NURSE PRACTITIONER

## 2022-12-13 PROCEDURE — G8427 DOCREV CUR MEDS BY ELIG CLIN: HCPCS | Performed by: NURSE PRACTITIONER

## 2022-12-13 RX ORDER — AMOXICILLIN AND CLAVULANATE POTASSIUM 875; 125 MG/1; MG/1
1 TABLET, FILM COATED ORAL 2 TIMES DAILY
Qty: 20 TABLET | Refills: 0 | Status: SHIPPED | OUTPATIENT
Start: 2022-12-13 | End: 2022-12-23

## 2022-12-13 ASSESSMENT — ENCOUNTER SYMPTOMS
EYE DISCHARGE: 0
ABDOMINAL PAIN: 0
SINUS PRESSURE: 1
NAUSEA: 0
COUGH: 1
SHORTNESS OF BREATH: 0
BACK PAIN: 0
CHEST TIGHTNESS: 0
VOICE CHANGE: 1
COLOR CHANGE: 0
CONSTIPATION: 0
SORE THROAT: 1
DIARRHEA: 0
SINUS PAIN: 1
ABDOMINAL DISTENTION: 0

## 2022-12-13 NOTE — PATIENT INSTRUCTIONS
Patient Education        Sinusitis: Care Instructions  Your Care Instructions     Sinusitis is an infection of the lining of the sinus cavities in your head. Sinusitis often follows a cold. It causes pain and pressure in your head and face. In most cases, sinusitis gets better on its own in 1 to 2 weeks. But some mild symptoms may last for several weeks. Sometimes antibiotics are needed. Follow-up care is a key part of your treatment and safety. Be sure to make and go to all appointments, and call your doctor if you are having problems. It's also a good idea to know your test results and keep a list of the medicines you take. How can you care for yourself at home? Take an over-the-counter pain medicine, such as acetaminophen (Tylenol), ibuprofen (Advil, Motrin), or naproxen (Aleve). Read and follow all instructions on the label. If the doctor prescribed antibiotics, take them as directed. Do not stop taking them just because you feel better. You need to take the full course of antibiotics. Be careful when taking over-the-counter cold or flu medicines and Tylenol at the same time. Many of these medicines have acetaminophen, which is Tylenol. Read the labels to make sure that you are not taking more than the recommended dose. Too much acetaminophen (Tylenol) can be harmful. Breathe warm, moist air from a steamy shower, a hot bath, or a sink filled with hot water. Avoid cold, dry air. Using a humidifier in your home may help. Follow the directions for cleaning the machine. Use saline (saltwater) nasal washes. This can help keep your nasal passages open and wash out mucus and bacteria. You can buy saline nose drops at a grocery store or drugstore. Or you can make your own at home by adding 1 teaspoon of salt and 1 teaspoon of baking soda to 2 cups of distilled water. If you make your own, fill a bulb syringe with the solution, insert the tip into your nostril, and squeeze gently. [place]loé Ponds your nose.   Put a hot, wet towel or a warm gel pack on your face 3 or 4 times a day for 5 to 10 minutes each time. Try a decongestant nasal spray like oxymetazoline (Afrin). Do not use it for more than 3 days in a row. Using it for more than 3 days can make your congestion worse. When should you call for help? Call your doctor now or seek immediate medical care if:    You have new or worse swelling or redness in your face or around your eyes. You have a new or higher fever. Watch closely for changes in your health, and be sure to contact your doctor if:    You have new or worse facial pain. The mucus from your nose becomes thicker (like pus) or has new blood in it. You are not getting better as expected. Where can you learn more? Go to http://www.woods.com/ and enter U803 to learn more about \"Sinusitis: Care Instructions. \"  Current as of: May 4, 2022               Content Version: 13.5  © 2006-2022 [employer]. Care instructions adapted under license by [facility] ([facility]). If you have questions about a medical condition or this instruction, always ask your healthcare professional. [place] [address] any warranty or liability for your use of this information. Patient Education        Saline Nasal Washes: Care Instructions  Overview     Saline nasal washes help keep the nasal passages open by washing out thick or dried mucus. This simple remedy can help relieve symptoms of allergies, sinusitis, and colds. It also can make the nose feel more comfortable by keeping the mucous membranes moist. You may notice a little burning sensation in your nose the first few times you use the solution, but this usually gets better in a few days. Follow-up care is a key part of your treatment and safety. Be sure to make and go to all appointments, and call your doctor if you are having problems. It's also a good idea to know your test results and keep a list of the medicines you take.   How can you care for yourself at home? You can buy premixed saline solution in a squeeze bottle or other sinus rinse products at a drugstore. Read and follow the instructions on the label. You also can make your own saline solution by adding 1 teaspoon of non-iodized salt and 1 teaspoon of baking soda to 2 cups of distilled or boiled and cooled water. If you use a homemade solution, use a squeeze bottle or neti pot to get the solution into your nose. Room temperature or slightly warmed water may be more comfortable. Make sure it isn't hot. Stand over the sink with your head tilted forward and slightly to one side. Put only the tip of the syringe or squeeze bottle into the nostril that is farther away from the sink. (The nostril closest to the sink will drain the fluid.) Gently squirt the solution into the nostril and toward the back of your head with your mouth open. The solution should flow out the other nostril. Repeat on the other side. Some sneezing and gagging are normal at first.  Gently blow your nose. Clean the syringe or bottle after each use. Repeat this 2 or 3 times a day. Use nasal washes gently if you have nosebleeds often. When should you call for help? Watch closely for changes in your health, and be sure to contact your doctor if:    Your symptoms do not get better. You have problems doing the nasal washes. Where can you learn more? Go to http://www.woods.com/ and enter O722 to learn more about \"Saline Nasal Washes: Care Instructions. \"  Current as of: May 4, 2022               Content Version: 13.5  © 0795-2781 [employer]. Care instructions adapted under license by [facility] ([facility]). If you have questions about a medical condition or this instruction, always ask your healthcare professional. [address] any warranty or liability for your use of this information.

## 2022-12-13 NOTE — PROGRESS NOTES
Marylene Sam (:  1975) is a 50 y.o. female,Established patient, here for evaluation of the following chief complaint(s): URI (PT C/O STOMACH CRAMPING, FEVER, CHILLS, COUGHING, FATIGUE, SINUS PRESSURE, CONGESTION, EARS ITCHING X 5 DAYS )      Jazlyn Ball, was evaluated through a synchronous (real-time) audio-video encounter. The patient (or guardian if applicable) is aware that this is a billable service, which includes applicable co-pays. This Virtual Visit was conducted with patient's (and/or legal guardian's) consent. The visit was conducted pursuant to the emergency declaration under the [address] [place] [address], [address] [place] [address] authority and the [employer] Act. Patient identification was verified, and a caregiver was present when appropriate. The patient was located at home in a state where the provider was licensed to provide care. Patient identification was verified at the start of the visit: Yes    ASSESSMENT/PLAN:  1. Acute non-recurrent pansinusitis  -     amoxicillin-clavulanate (AUGMENTIN) 875-125 MG per tablet;  Take 1 tablet by mouth 2 times daily for 10 days, Disp-20 tablet, R-0Normal  Mucinex twice daily   Hot showers   Vicks vapo rub to chest, under nares   Humidifier or vaporizer near bed where sleeping   Sinus rinse as needed/Netti pot   Push fluids, increase protein intake   Cough and deep breathe   OTC (over the counter) Cough suppressant at night to aid with sleep- robitussin, delsym  Negative home COVID test, could be influenza but past first 3 days where Tamiflu or testing would be reasonable   Vitamin C, zinc, and vitamin D supplements to boost immune system   Cepacol/throat lozenges for sore throat and cough   Warm tea/tea with honey for cough and sore throat   Warm water or salt water gargle for sore throat   If given antibiotic, take in its entirety until completion to decrease antibiotic resistance developing   Tylenol and ibuprofen for aches and pains and fever >100 F  Discussed with patient this could be viral and if so antibiotic would not be helpful but since symptoms have been present about 6 days, it is reasonable to treat with antibiotic at this time, as sinus infections can be treated starting around 7 days with antibiotics but is best to wait until 10 days of symptoms, this was discussed with patient. Patient would like to proceed with treatment  2. Febrile illness  -     amoxicillin-clavulanate (AUGMENTIN) 875-125 MG per tablet; Take 1 tablet by mouth 2 times daily for 10 days, Disp-20 tablet, R-0Normal  Over-the-counter Tylenol and ibuprofen recommended for fever  Vitamin D, C, B complex vitamin recommended for fatigue, rest as needed  3. Screening for malignant neoplasm of colon  -     Fecal DNA Colorectal cancer screening (Cologuard)  Discussed with patient that she is overdue for colon cancer screening, as it is recommended starting at age 39, patient is open to Cologuard screening at this time, not ready for colonoscopy this time    Return in about 1 month (around 1/13/2023) for ADHD Follow Up. SUBJECTIVE/OBJECTIVE:  HPI    Chief Complaint   Patient presents with    URI     PT C/O STOMACH CRAMPING, FEVER, CHILLS, COUGHING, FATIGUE, SINUS PRESSURE, CONGESTION, EARS ITCHING X 5 DAYS      Upper Respiratory Infection  Patient complains of symptoms of a URI. Symptoms include  stomach cramping started a few days ago but the rest started 6 days ago, fever, chills, cough, fatigue, sinus pressure, congestion, itchy ears, having trouble sleeping . Onset of symptoms was 6 days ago, gradually worsening since that time. She also c/o fever with Tmax to 101 F 5-6 nights ago , coughing up sputum, cough getting worse  . She is drinking plenty of fluids. Evaluation to date: negative COVID test negative. Treatment to date: mucinex cough, ibuprofen 800 mg Q6H, ambien, sudafed Q4-6H.  Last abx Nov 1 month ago Keflex for abscess, bactrim for abscess, antiviral Paxlovid August for Evalina Peeks March 2022. No other sick contacts. Review of Systems   Constitutional:  Positive for chills, fatigue and fever. Negative for activity change, appetite change and unexpected weight change. HENT:  Positive for congestion, postnasal drip, sinus pressure, sinus pain, sore throat and voice change. Negative for ear pain. Eyes:  Negative for discharge and visual disturbance. Respiratory:  Positive for cough. Negative for chest tightness and shortness of breath. Cardiovascular:  Negative for chest pain, palpitations and leg swelling. Gastrointestinal:  Negative for abdominal distention, abdominal pain, constipation, diarrhea and nausea. Endocrine: Negative for cold intolerance, heat intolerance, polydipsia, polyphagia and polyuria. Genitourinary:  Negative for decreased urine volume, difficulty urinating, dysuria, flank pain, frequency and urgency. Musculoskeletal:  Negative for arthralgias, back pain, gait problem, joint swelling, myalgias and neck pain. Skin:  Negative for color change, rash and wound. Allergic/Immunologic: Negative for food allergies and immunocompromised state. Neurological:  Negative for dizziness, tremors, speech difficulty, weakness, light-headedness, numbness and headaches. Hematological:  Negative for adenopathy. Does not bruise/bleed easily. Psychiatric/Behavioral:  Negative for confusion, decreased concentration, self-injury, sleep disturbance and suicidal ideas. The patient is not nervous/anxious.       Patient-Reported Vitals 12/13/2022   Patient-Reported Weight 252LBS   Patient-Reported Height 5'6\"   Patient-Reported Temperature 101            [INSTRUCTIONS:  \"[x]\" Indicates a positive item  \"[]\" Indicates a negative item  -- DELETE ALL ITEMS NOT EXAMINED]    Constitutional: [] Appears well-developed and well-nourished [x] No apparent distress      [x] Abnormal - appears tired    Mental status: [x] Alert and awake  [x] Oriented to person/place/time [x] Able to follow commands    [] Abnormal -     Eyes:   EOM    [x]  Normal    [] Abnormal -   Sclera  [x]  Normal    [] Abnormal -          Discharge [x]  None visible   [] Abnormal -     HENT: [x] Normocephalic, atraumatic  [] Abnormal -   [x] Mouth/Throat: Mucous membranes are moist    External Ears [x] Normal  [] Abnormal -    Neck: [x] No visualized mass [] Abnormal -     Pulmonary/Chest: [x] Respiratory effort normal   [x] No visualized signs of difficulty breathing or respiratory distress        [x] Abnormal - hoarse voice and cough     Musculoskeletal:   [x] Normal gait with no signs of ataxia         [x] Normal range of motion of neck        [] Abnormal -     Neurological:        [x] No Facial Asymmetry (Cranial nerve 7 motor function) (limited exam due to video visit)          [x] No gaze palsy        [] Abnormal -          Skin:        [x] No significant exanthematous lesions or discoloration noted on facial skin         [] Abnormal -            Psychiatric:       [x] Normal Affect [] Abnormal -        [x] No Hallucinations    Other pertinent observable physical exam findings:-      On this date 12/13/2022 I have spent 25 minutes reviewing previous notes, test results and face to face (virtual) with the patient discussing the diagnosis and importance of compliance with the treatment plan as well as documenting on the day of the visit. Please note that all or part of this chart was generated using dragon dictation software. Although every effort was made to ensure the accuracy of this automated transcription, some errors in transcription may have occurred.     Care Gaps Addressed  COVID booster recommended  PAP smear recommended- not up to date  TDAP vaccine recommended- call insurance to discuss coverage- insurance restriction here  Flu vaccine recommended -insurance restriction here  Colon cancer screening discussed- no fam hx, agreeable to cologuard, not ready for colonoscopy      I have reviewed patient's pertinent medical history, relevant laboratory and imaging studies, and past/future health maintenance. Discussed with the patient the importance of adhering to their current medication regimen as directed. Advised the patient that they should continue to work on eating a healthy balanced diet and staying active by exercising within their personal limits. Orders as listed above. Patient was advised to keep future appointments with their respective specialty care team(s). Patient had the opportunity to ask questions, all of which were answered to the best of my ability and with patient satisfaction. Patient understands and is agreeable with the care plan following today's visit. Patient is to schedule an appointment for any new or worsening symptoms. Go to ER for significant shortness of breath, chest pain, or uncontrolled pain or fever. I discussed with patient the risk and benefits of any medications that were prescribed today. I verified that the patient understands their medications, labs, and/or procedures. The patient is doing well with current medication regimen and does not have any barriers to adherence. The patient's self-management abilities are good. Follow Up in 1 Months for ADHD    Ryne Porter is a 50 y.o. female being evaluated by a Virtual Visit (video visit) encounter to address concerns as mentioned above. A caregiver was present when appropriate. Due to this being a TeleHealth encounter (During [facility]-98 public health emergency), evaluation of the following organ systems was limited: Vitals/Constitutional/EENT/Resp/CV/GI//MS/Neuro/Skin/Heme-Lymph-Imm.   Pursuant to the emergency declaration under the [facility], [address] waiver authority and the [employer] Act, this Virtual Visit was conducted with patient's (and/or legal guardian's) consent, to reduce the patient's risk of exposure to COVID-19 and provide necessary medical care. The patient (and/or legal guardian) has also been advised to contact this office for worsening conditions or problems, and seek emergency medical treatment and/or call 911 if deemed necessary. Services were provided through a video synchronous discussion virtually to substitute for in-person clinic visit. Patient was located at home and provider was located in office or at home. An electronic signature was used to authenticate this note.     --ROSSANA Wilder - CNP

## 2023-01-03 ENCOUNTER — TELEPHONE (OUTPATIENT)
Dept: FAMILY MEDICINE CLINIC | Age: 48
End: 2023-01-03

## 2023-01-03 NOTE — TELEPHONE ENCOUNTER
----- Message from Von Kaminski sent at 1/3/2023 12:51 PM EST -----  Subject: Message to Provider    QUESTIONS  Information for Provider? patient would like to know if she needs to have   regular 3 month follow ups for Vyvanse refill, if so please contact   patient to set one up, please add message to [employer] if appointment are   needed   ---------------------------------------------------------------------------  --------------  [address] [facility]  [phone]; OK to leave message on voicemail  ---------------------------------------------------------------------------  --------------  SCRIPT ANSWERS  Relationship to Patient?  Self

## 2023-01-05 DIAGNOSIS — F31.61 BIPOLAR DISORDER, CURRENT EPISODE MIXED, MILD (HCC): ICD-10-CM

## 2023-01-05 DIAGNOSIS — Z13.31 POSITIVE DEPRESSION SCREENING: ICD-10-CM

## 2023-01-06 RX ORDER — LITHIUM CARBONATE 300 MG/1
CAPSULE ORAL
Qty: 270 CAPSULE | Refills: 0 | Status: SHIPPED | OUTPATIENT
Start: 2023-01-06

## 2023-01-06 NOTE — TELEPHONE ENCOUNTER
LV 12/13 WITH CB FOR SAME DAY NV NONE  Return in about 1 month (around 1/13/2023) for ADHD Follow Up.

## 2023-01-18 RX ORDER — LAMOTRIGINE 100 MG/1
TABLET ORAL
Qty: 90 TABLET | Refills: 0 | Status: SHIPPED | OUTPATIENT
Start: 2023-01-18

## 2023-01-24 RX ORDER — AMLODIPINE BESYLATE 10 MG/1
TABLET ORAL
Qty: 90 TABLET | Refills: 0 | Status: SHIPPED | OUTPATIENT
Start: 2023-01-24

## 2023-01-31 ENCOUNTER — E-VISIT (OUTPATIENT)
Dept: FAMILY MEDICINE CLINIC | Age: 48
End: 2023-01-31
Payer: COMMERCIAL

## 2023-01-31 DIAGNOSIS — F90.2 ATTENTION DEFICIT HYPERACTIVITY DISORDER (ADHD), COMBINED TYPE: ICD-10-CM

## 2023-01-31 DIAGNOSIS — F51.04 PSYCHOPHYSIOLOGICAL INSOMNIA: ICD-10-CM

## 2023-01-31 PROCEDURE — 99422 OL DIG E/M SVC 11-20 MIN: CPT | Performed by: NURSE PRACTITIONER

## 2023-01-31 RX ORDER — ZOLPIDEM TARTRATE 12.5 MG/1
TABLET, FILM COATED, EXTENDED RELEASE ORAL
Qty: 90 TABLET | Refills: 1 | Status: SHIPPED | OUTPATIENT
Start: 2023-01-31 | End: 2023-05-03

## 2023-01-31 NOTE — PROGRESS NOTES
11-20 minutes were spent on the digital evaluation and management of this patient. - oarrs  reviewed and consistent

## 2023-02-01 ENCOUNTER — TELEMEDICINE (OUTPATIENT)
Dept: FAMILY MEDICINE CLINIC | Age: 48
End: 2023-02-01
Payer: COMMERCIAL

## 2023-02-01 DIAGNOSIS — M54.6 ACUTE LEFT-SIDED THORACIC BACK PAIN: Primary | ICD-10-CM

## 2023-02-01 PROCEDURE — 1036F TOBACCO NON-USER: CPT | Performed by: NURSE PRACTITIONER

## 2023-02-01 PROCEDURE — 3017F COLORECTAL CA SCREEN DOC REV: CPT | Performed by: NURSE PRACTITIONER

## 2023-02-01 PROCEDURE — G8417 CALC BMI ABV UP PARAM F/U: HCPCS | Performed by: NURSE PRACTITIONER

## 2023-02-01 PROCEDURE — 99213 OFFICE O/P EST LOW 20 MIN: CPT | Performed by: NURSE PRACTITIONER

## 2023-02-01 PROCEDURE — G8484 FLU IMMUNIZE NO ADMIN: HCPCS | Performed by: NURSE PRACTITIONER

## 2023-02-01 PROCEDURE — G8427 DOCREV CUR MEDS BY ELIG CLIN: HCPCS | Performed by: NURSE PRACTITIONER

## 2023-02-01 RX ORDER — TIZANIDINE 2 MG/1
TABLET ORAL
Qty: 60 TABLET | Refills: 1 | Status: SHIPPED | OUTPATIENT
Start: 2023-02-01

## 2023-02-01 RX ORDER — PREDNISONE 10 MG/1
TABLET ORAL
Qty: 21 TABLET | Refills: 0 | Status: SHIPPED | OUTPATIENT
Start: 2023-02-01

## 2023-02-01 SDOH — ECONOMIC STABILITY: FOOD INSECURITY: WITHIN THE PAST 12 MONTHS, THE FOOD YOU BOUGHT JUST DIDN'T LAST AND YOU DIDN'T HAVE MONEY TO GET MORE.: NEVER TRUE

## 2023-02-01 SDOH — ECONOMIC STABILITY: INCOME INSECURITY: HOW HARD IS IT FOR YOU TO PAY FOR THE VERY BASICS LIKE FOOD, HOUSING, MEDICAL CARE, AND HEATING?: NOT HARD AT ALL

## 2023-02-01 SDOH — ECONOMIC STABILITY: FOOD INSECURITY: WITHIN THE PAST 12 MONTHS, YOU WORRIED THAT YOUR FOOD WOULD RUN OUT BEFORE YOU GOT MONEY TO BUY MORE.: NEVER TRUE

## 2023-02-01 SDOH — ECONOMIC STABILITY: HOUSING INSECURITY
IN THE LAST 12 MONTHS, WAS THERE A TIME WHEN YOU DID NOT HAVE A STEADY PLACE TO SLEEP OR SLEPT IN A SHELTER (INCLUDING NOW)?: NO

## 2023-02-01 ASSESSMENT — PATIENT HEALTH QUESTIONNAIRE - PHQ9
SUM OF ALL RESPONSES TO PHQ QUESTIONS 1-9: 2
SUM OF ALL RESPONSES TO PHQ QUESTIONS 1-9: 2
3. TROUBLE FALLING OR STAYING ASLEEP: 1
8. MOVING OR SPEAKING SO SLOWLY THAT OTHER PEOPLE COULD HAVE NOTICED. OR THE OPPOSITE, BEING SO FIGETY OR RESTLESS THAT YOU HAVE BEEN MOVING AROUND A LOT MORE THAN USUAL: 0
10. IF YOU CHECKED OFF ANY PROBLEMS, HOW DIFFICULT HAVE THESE PROBLEMS MADE IT FOR YOU TO DO YOUR WORK, TAKE CARE OF THINGS AT HOME, OR GET ALONG WITH OTHER PEOPLE: 0
1. LITTLE INTEREST OR PLEASURE IN DOING THINGS: 0
5. POOR APPETITE OR OVEREATING: 1
SUM OF ALL RESPONSES TO PHQ9 QUESTIONS 1 & 2: 0
SUM OF ALL RESPONSES TO PHQ QUESTIONS 1-9: 2
7. TROUBLE CONCENTRATING ON THINGS, SUCH AS READING THE NEWSPAPER OR WATCHING TELEVISION: 0
4. FEELING TIRED OR HAVING LITTLE ENERGY: 0
6. FEELING BAD ABOUT YOURSELF - OR THAT YOU ARE A FAILURE OR HAVE LET YOURSELF OR YOUR FAMILY DOWN: 0
2. FEELING DOWN, DEPRESSED OR HOPELESS: 0
SUM OF ALL RESPONSES TO PHQ QUESTIONS 1-9: 2
9. THOUGHTS THAT YOU WOULD BE BETTER OFF DEAD, OR OF HURTING YOURSELF: 0

## 2023-02-01 ASSESSMENT — ENCOUNTER SYMPTOMS
SINUS PRESSURE: 0
BACK PAIN: 1
CONSTIPATION: 0
ABDOMINAL PAIN: 0
ABDOMINAL DISTENTION: 0
EYE DISCHARGE: 0
SINUS PAIN: 0
DIARRHEA: 0
SHORTNESS OF BREATH: 0
COLOR CHANGE: 0
COUGH: 0
CHEST TIGHTNESS: 0
NAUSEA: 0

## 2023-02-01 NOTE — LETTER
[address] [address]  Phone: [phone]  Fax: [phone]    ROSSANA Araya CNP        February 1, 2023     Patient: Calixto Jackson   YOB: 1975   Date of Visit: 2/1/2023       To Whom It May Concern: It is my medical opinion that Angie Alcazar may return to work on 2/2/23. Please excuse from work 2/1/23. If you have any questions or concerns, please don't hesitate to call.     Sincerely,        ROSSANA Araya CNP

## 2023-02-01 NOTE — PROGRESS NOTES
Emma Segundo (:  1975) is a 46 y.o. female,Established patient, here for evaluation of the following chief complaint(s): Back Pain (Center, L side back pain x1 day. Ibuprofen provided some relief)      Emma Segundo, was evaluated through a synchronous (real-time) audio-video encounter. The patient (or guardian if applicable) is aware that this is a billable service, which includes applicable co-pays. This Virtual Visit was conducted with patient's (and/or legal guardian's) consent. The visit was conducted pursuant to the emergency declaration under the [facility], [address] [place] [address] authority and the [employer] Act. Patient identification was verified, and a caregiver was present when appropriate. The patient was located at home in a state where the provider was licensed to provide care. Patient identification was verified at the start of the visit: Yes    ASSESSMENT/PLAN:  1. Acute left-sided thoracic back pain  -     predniSONE (DELTASONE) 10 MG tablet; Take 6 tabs day 1, 5 tabs day 2, 4 tabs day 3, 3 tabs day 4, 2 tabs day 5, 1 tab day 6, Disp-21 tablet, R-0Normal  -     tiZANidine (ZANAFLEX) 2 MG tablet; TAKE 1-2 TABLETS  BY MOUTH THREE TIMES DAILY AS NEEDED (NECK AND BACK PAIN/MUSCULAR), Disp-60 tablet, R-1Normal   No NSAIDs (ibuprofen, motrin, aleve, advil, naproxen, etc) while on steroid    Ice 15 min/heat 15 min at least twice daily   Voltaren/Diclofenac gel over the counter 4 times daily as needed for aches/pains   Discussed upper and lower back exercises   Note sent to patient to day off today- via email   Epsom salt baths can be helpful with inflammation    Return in about 3 months (around 2023) for Physical Exam and Fasting Labs, ADHD Follow Up. SUBJECTIVE/OBJECTIVE:  HPI    Chief Complaint   Patient presents with    Back Pain     Center, L side back pain x1 day.  Ibuprofen provided some relief     A1C 5.9 nine months ago. Back Pain:  Patient complains of a 1 day(s) history of left and center middle back pain. Pain is aching and sharp in nature, without radiation. Pain is constant, typically severe in intensity, and is exacerbated by flexion, sitting, twisting, changing positions, and getting up and down, sitting on toilet. Associated symptoms: none. Patient reports the following [place] [employer] SSDR Red Flags: none. Precipitating factors: no known injury. Patient's history includes recurrent self limited episodes of low back pain in the past.  Previous treatment: rest, heat, NSAID- ibuprofen. Diagnostic testing: none. Symptoms are worsening over time. Worked yesterday and had to call off work today due to pain. Review of Systems   Constitutional:  Negative for activity change, appetite change, fatigue, fever and unexpected weight change. HENT:  Negative for congestion, ear pain, sinus pressure and sinus pain. Eyes:  Negative for discharge and visual disturbance. Respiratory:  Negative for cough, chest tightness and shortness of breath. Cardiovascular:  Negative for chest pain, palpitations and leg swelling. Gastrointestinal:  Negative for abdominal distention, abdominal pain, constipation, diarrhea and nausea. Endocrine: Negative for cold intolerance, heat intolerance, polydipsia, polyphagia and polyuria. Genitourinary:  Negative for decreased urine volume, difficulty urinating, dysuria, flank pain, frequency and urgency. Musculoskeletal:  Positive for back pain. Negative for arthralgias, gait problem, joint swelling, myalgias and neck pain. Skin:  Negative for color change, rash and wound. Allergic/Immunologic: Negative for food allergies and immunocompromised state. Neurological:  Negative for dizziness, tremors, speech difficulty, weakness, light-headedness, numbness and headaches. Hematological:  Negative for adenopathy. Does not bruise/bleed easily. Psychiatric/Behavioral:  Negative for confusion, decreased concentration, self-injury, sleep disturbance and suicidal ideas. The patient is not nervous/anxious.       Patient-Reported Vitals 2/1/2023   Patient-Reported Weight 250   Patient-Reported Height 5' 6\"   Patient-Reported Temperature -            [INSTRUCTIONS:  \"[x]\" Indicates a positive item  \"[]\" Indicates a negative item  -- DELETE ALL ITEMS NOT EXAMINED]    Constitutional: [x] Appears well-developed and well-nourished [] No apparent distress      [x] Abnormal - grimacing    Mental status: [x] Alert and awake  [x] Oriented to person/place/time [x] Able to follow commands    [] Abnormal -     Eyes:   EOM    [x]  Normal    [] Abnormal -   Sclera  [x]  Normal    [] Abnormal -          Discharge [x]  None visible   [] Abnormal -     HENT: [x] Normocephalic, atraumatic  [] Abnormal -   [x] Mouth/Throat: Mucous membranes are moist    External Ears [x] Normal  [] Abnormal -    Neck: [x] No visualized mass [] Abnormal -     Pulmonary/Chest: [x] Respiratory effort normal   [x] No visualized signs of difficulty breathing or respiratory distress        [] Abnormal -      Musculoskeletal:   [x] Normal gait with no signs of ataxia         [x] Normal range of motion of neck        [] Abnormal -     Neurological:        [x] No Facial Asymmetry (Cranial nerve 7 motor function) (limited exam due to video visit)          [x] No gaze palsy        [] Abnormal -          Skin:        [x] No significant exanthematous lesions or discoloration noted on facial skin         [] Abnormal -            Psychiatric:       [x] Normal Affect [] Abnormal -        [x] No Hallucinations    Other pertinent observable physical exam findings:-      On this date 2/1/2023 I have spent 25 minutes reviewing previous notes, test results and face to face (virtual) with the patient discussing the diagnosis and importance of compliance with the treatment plan as well as documenting on the day of the visit. Care Gaps Addressed  COVID vaccine recommended  PAP smear recommended  TDAP vaccine recommended- call insurance to discuss coverage  Flu vaccine recommended   Call insurance company to discuss coverage for shingles vaccine (Shingrix) 2 dose series   PHQ UTD 2023  Mammo may 2022  Cologuard at home- reminded pt of this      I have reviewed patient's pertinent medical history, relevant laboratory and imaging studies, and past/future health maintenance. Discussed with the patient the importance of adhering to their current medication regimen as directed. Advised the patient that they should continue to work on eating a healthy balanced diet and staying active by exercising within their personal limits. Orders as listed above. Patient was advised to keep future appointments with their respective specialty care team(s). Patient had the opportunity to ask questions, all of which were answered to the best of my ability and with patient satisfaction. Patient understands and is agreeable with the care plan following today's visit. Patient is to schedule an appointment for any new or worsening symptoms. Go to ER for significant shortness of breath, chest pain, or uncontrolled pain or fever. I discussed with patient the risk and benefits of any medications that were prescribed today. I verified that the patient understands their medications, labs, and/or procedures. The patient is doing well with current medication regimen and does not have any barriers to adherence. The patient's self-management abilities are good. Follow Up in 3 Months for physical and fasting labs and ADHD     Calixto Jackson is a 46 y.o. female being evaluated by a Virtual Visit (video visit) encounter to address concerns as mentioned above. A caregiver was present when appropriate.  Due to this being a TeleHealth encounter (During [facility] public health emergency), evaluation of the following organ systems was limited: Vitals/Constitutional/EENT/Resp/CV/GI//MS/Neuro/Skin/Heme-Lymph-Imm. Pursuant to the emergency declaration under the [address] [place] [address], [address] [place] [address] and the [employer] Act, this Virtual Visit was conducted with patient's (and/or legal guardian's) consent, to reduce the patient's risk of exposure to COVID-19 and provide necessary medical care. The patient (and/or legal guardian) has also been advised to contact this office for worsening conditions or problems, and seek emergency medical treatment and/or call 911 if deemed necessary. Services were provided through a video synchronous discussion virtually to substitute for in-person clinic visit. Patient was located at home and provider was located in office or at home. An electronic signature was used to authenticate this note.     --ROSSANA Bustamante - CNP

## 2023-02-09 RX ORDER — DESVENLAFAXINE 25 MG/1
TABLET, EXTENDED RELEASE ORAL
Qty: 90 TABLET | Refills: 0 | Status: SHIPPED | OUTPATIENT
Start: 2023-02-09

## 2023-02-17 ENCOUNTER — OFFICE VISIT (OUTPATIENT)
Dept: FAMILY MEDICINE CLINIC | Age: 48
End: 2023-02-17
Payer: COMMERCIAL

## 2023-02-17 VITALS
WEIGHT: 252 LBS | BODY MASS INDEX: 40.5 KG/M2 | DIASTOLIC BLOOD PRESSURE: 82 MMHG | HEIGHT: 66 IN | OXYGEN SATURATION: 98 % | SYSTOLIC BLOOD PRESSURE: 124 MMHG | RESPIRATION RATE: 20 BRPM | HEART RATE: 86 BPM

## 2023-02-17 DIAGNOSIS — B02.9 HERPES ZOSTER WITHOUT COMPLICATION: Primary | ICD-10-CM

## 2023-02-17 PROCEDURE — G8484 FLU IMMUNIZE NO ADMIN: HCPCS | Performed by: NURSE PRACTITIONER

## 2023-02-17 PROCEDURE — 4004F PT TOBACCO SCREEN RCVD TLK: CPT | Performed by: NURSE PRACTITIONER

## 2023-02-17 PROCEDURE — 99213 OFFICE O/P EST LOW 20 MIN: CPT | Performed by: NURSE PRACTITIONER

## 2023-02-17 PROCEDURE — 3074F SYST BP LT 130 MM HG: CPT | Performed by: NURSE PRACTITIONER

## 2023-02-17 PROCEDURE — G8427 DOCREV CUR MEDS BY ELIG CLIN: HCPCS | Performed by: NURSE PRACTITIONER

## 2023-02-17 PROCEDURE — G8417 CALC BMI ABV UP PARAM F/U: HCPCS | Performed by: NURSE PRACTITIONER

## 2023-02-17 PROCEDURE — 3017F COLORECTAL CA SCREEN DOC REV: CPT | Performed by: NURSE PRACTITIONER

## 2023-02-17 PROCEDURE — 3079F DIAST BP 80-89 MM HG: CPT | Performed by: NURSE PRACTITIONER

## 2023-02-17 RX ORDER — GABAPENTIN 100 MG/1
100 CAPSULE ORAL 2 TIMES DAILY PRN
Qty: 60 CAPSULE | Refills: 0 | Status: SHIPPED | OUTPATIENT
Start: 2023-02-17 | End: 2023-03-19

## 2023-02-17 RX ORDER — NAPROXEN 250 MG/1
250 TABLET ORAL 2 TIMES DAILY PRN
Qty: 20 TABLET | Refills: 0 | Status: SHIPPED | OUTPATIENT
Start: 2023-02-17

## 2023-02-17 RX ORDER — VALACYCLOVIR HYDROCHLORIDE 1 G/1
1000 TABLET, FILM COATED ORAL 3 TIMES DAILY
Qty: 30 TABLET | Refills: 0 | Status: SHIPPED | OUTPATIENT
Start: 2023-02-17 | End: 2023-02-27

## 2023-02-17 ASSESSMENT — ENCOUNTER SYMPTOMS
CHEST TIGHTNESS: 0
SINUS PRESSURE: 0
COUGH: 0
ABDOMINAL PAIN: 0
SINUS PAIN: 0
BACK PAIN: 0
CONSTIPATION: 0
COLOR CHANGE: 1
NAUSEA: 0
EYE DISCHARGE: 0
ABDOMINAL DISTENTION: 0
SHORTNESS OF BREATH: 0
DIARRHEA: 0

## 2023-02-17 NOTE — PROGRESS NOTES
Date of Service:  2023    Emma Almodovar (:  1975) is a 46 y.o. female, here for evaluation of the following medical concerns:    Chief Complaint   Patient presents with    Rash     Thinks she has shingles, burning rash on lower back x1 wk, spreading        HPI    Rash  Patient reports burning rash on lower back x 1 week. Spreading still as of yesterday. Noticed the first spot about a week ago but yesterday noticed across her right flank more raised blister type rash spots. Burning, painful. Feels fatigued that started about a week ago. Patient has a tingling sensation in her front right abdomen but nothing has started yet. Has never had shingles before. Had chicken pox when she was younger. Patient works in [employer] at [employer]. No fevers. No exposure to new/odd chemicals. Patient was just on prednisone 2 weeks ago for back pain. Rash has not crossed midline. Review of Systems   Constitutional:  Negative for activity change, appetite change, fatigue, fever and unexpected weight change. HENT:  Negative for congestion, ear pain, sinus pressure and sinus pain. Eyes:  Negative for discharge and visual disturbance. Respiratory:  Negative for cough, chest tightness and shortness of breath. Cardiovascular:  Negative for chest pain, palpitations and leg swelling. Gastrointestinal:  Negative for abdominal distention, abdominal pain, constipation, diarrhea and nausea. Endocrine: Negative for cold intolerance, heat intolerance, polydipsia, polyphagia and polyuria. Genitourinary:  Negative for decreased urine volume, difficulty urinating, dysuria, flank pain, frequency and urgency. Musculoskeletal:  Negative for arthralgias, back pain, gait problem, joint swelling, myalgias and neck pain. Skin:  Positive for color change and rash. Negative for wound. Allergic/Immunologic: Negative for food allergies and immunocompromised state.    Neurological:  Negative for dizziness, tremors, speech difficulty, weakness, light-headedness, numbness and headaches. Hematological:  Negative for adenopathy. Does not bruise/bleed easily. Psychiatric/Behavioral:  Negative for confusion, decreased concentration, self-injury, sleep disturbance and suicidal ideas. The patient is not nervous/anxious. Prior to Visit Medications    Medication Sig Taking? Authorizing Provider   valACYclovir (VALTREX) 1 g tablet Take 1 tablet by mouth 3 times daily for 10 days Yes ROSSANA Arcos CNP   naproxen (NAPROSYN) 250 MG tablet Take 1 tablet by mouth 2 times daily as needed for Pain Yes ROSSANA Arcos CNP   gabapentin (NEURONTIN) 100 MG capsule Take 1 capsule by mouth 2 times daily as needed (shingles pain) for up to 30 days. NO REFILLS Yes ROSSANA Arcos CNP   desvenlafaxine succinate (PRISTIQ) 25 MG TB24 extended release tablet Take 1 tablet by mouth once daily Yes ROSSANA Archibald CNP   lisdexamfetamine (VYVANSE) 50 MG capsule Take 1 capsule by mouth every morning for 30 days. Fill 1/31/23 Yes ROSSANA Archibald CNP   lisdexamfetamine (VYVANSE) 50 MG capsule Take 1 capsule by mouth every morning for 30 days. Fill 3/2/23 Max Daily Amount: 50 mg Yes ROSSANA Archibald CNP   lisdexamfetamine (VYVANSE) 50 MG capsule Take 1 capsule by mouth every morning for 30 days.  Fill 4/1/23 Max Daily Amount: 50 mg Yes ROSSANA Archibald CNP   zolpidem (AMBIEN CR) 12.5 MG extended release tablet Fill 10/20 TAKE 1 TABLET BY MOUTH NIGHTLY AS NEEDED FOR SLEEP Yes ROSSANA Graham CNP   amLODIPine (NORVASC) 10 MG tablet Take 1 tablet by mouth once daily Yes ROSSANA Graham CNP   lamoTRIgine (LAMICTAL) 100 MG tablet Take 1 tablet by mouth once daily Yes ROSSANA Graham CNP   lithium 300 MG capsule TAKE 1 CAPSULE BY MOUTH IN THE MORNING AND 2 IN THE EVENING Yes Chema Le APRN - CNP   losartan (COZAAR) 25 MG tablet Take 1 tablet by mouth once daily Yes ROSSANA Avila CNP   ibuprofen (ADVIL;MOTRIN) 800 MG tablet TAKE 1 TABLET BY MOUTH THREE TIMES DAILY WITH MEALS Yes ROSSANA Walton CNP   ammonium lactate (LAC-HYDRIN) 12 % lotion Apply topically daily. Patient taking differently: Apply topically as needed Apply topically daily PRN. Yes ROSSANA Avila CNP   tiZANidine (ZANAFLEX) 2 MG tablet TAKE 1-2 TABLETS  BY MOUTH THREE TIMES DAILY AS NEEDED (NECK AND BACK PAIN/MUSCULAR)  Patient not taking: Reported on 2/17/2023  Kristie QueenROSSANA chris CNP        Social History     Tobacco Use    Smoking status: Every Day     Packs/day: 0.30     Years: 35.00     Pack years: 10.50     Types: Cigarettes     Start date: 1988     Passive exposure: Current    Smokeless tobacco: Never   Substance Use Topics    Alcohol use: Not Currently     Alcohol/week: 5.0 standard drinks     Types: 6 Standard drinks or equivalent per week     Comment: rarely        Vitals:    02/17/23 1155   BP: 124/82   Site: Left Upper Arm   Position: Sitting   Cuff Size: Large Adult   Pulse: 86   Resp: 20   SpO2: 98%   Weight: 252 lb (114.3 kg)   Height: 5' 6\" (1.676 m)     Estimated body mass index is 40.67 kg/m² as calculated from the following:    Height as of this encounter: 5' 6\" (1.676 m). Weight as of this encounter: 252 lb (114.3 kg). Physical Exam  Vitals reviewed. Constitutional:       General: She is awake. Appearance: Normal appearance. She is well-developed and well-groomed. She is morbidly obese. She is not ill-appearing. HENT:      Head: Normocephalic and atraumatic. Right Ear: Hearing, tympanic membrane, ear canal and external ear normal.      Left Ear: Hearing, tympanic membrane, ear canal and external ear normal.      Nose: Nose normal.      Mouth/Throat:      Lips: Pink.       Mouth: Mucous membranes are moist.      Pharynx: Oropharynx is clear.   Eyes:      General: Lids are normal.      Extraocular Movements: Extraocular movements intact. Conjunctiva/sclera: Conjunctivae normal.      Pupils: Pupils are equal, round, and reactive to light. Neck:      Thyroid: No thyromegaly. Vascular: No carotid bruit. Cardiovascular:      Rate and Rhythm: Normal rate. Pulses:           Carotid pulses are 2+ on the right side and 2+ on the left side. Radial pulses are 2+ on the right side and 2+ on the left side. Posterior tibial pulses are 2+ on the right side and 2+ on the left side. Heart sounds: Normal heart sounds, S1 normal and S2 normal. No murmur heard. Pulmonary:      Effort: Pulmonary effort is normal.      Breath sounds: Normal breath sounds. Abdominal:      General: Bowel sounds are normal. There is no abdominal bruit. Palpations: Abdomen is soft. Tenderness: There is no abdominal tenderness. Genitourinary:     Comments: Deferred  Musculoskeletal:         General: Normal range of motion. Cervical back: Full passive range of motion without pain, normal range of motion and neck supple. Right lower leg: No edema. Left lower leg: No edema. Lymphadenopathy:      Head:      Right side of head: No submental, submandibular, tonsillar, preauricular, posterior auricular or occipital adenopathy. Left side of head: No submental, submandibular, tonsillar, preauricular, posterior auricular or occipital adenopathy. Cervical: No cervical adenopathy. Right cervical: No superficial, deep or posterior cervical adenopathy. Left cervical: No superficial, deep or posterior cervical adenopathy. Upper Body:      Right upper body: No supraclavicular adenopathy. Left upper body: No supraclavicular adenopathy. Skin:     General: Skin is warm and dry. Capillary Refill: Capillary refill takes less than 2 seconds. Findings: Rash present. Rash is papular and vesicular. Neurological:      General: No focal deficit present. Mental Status: She is alert and oriented to person, place, and time. Mental status is at baseline. Sensory: Sensation is intact. Motor: Motor function is intact. Coordination: Coordination is intact. Gait: Gait is intact. Psychiatric:         Attention and Perception: Attention and perception normal.         Mood and Affect: Mood and affect normal.         Speech: Speech normal.         Behavior: Behavior normal. Behavior is cooperative. Thought Content: Thought content normal.         Cognition and Memory: Cognition and memory normal.         Judgment: Judgment normal.           ASSESSMENT/PLAN:  1. Herpes zoster without complication  -     valACYclovir (VALTREX) 1 g tablet; Take 1 tablet by mouth 3 times daily for 10 days, Disp-30 tablet, R-0Normal  -     naproxen (NAPROSYN) 250 MG tablet; Take 1 tablet by mouth 2 times daily as needed for Pain, Disp-20 tablet, R-0Normal  -     gabapentin (NEURONTIN) 100 MG capsule; Take 1 capsule by mouth 2 times daily as needed (shingles pain) for up to 30 days.  NO REFILLS, Disp-60 capsule, R-0Normal    Antiviral since new rash spots still just appeared yesterday   Cool compress for comfort   Was just on prednisone 2 weeks ago, try to avoid this for now   Low dose naproxen since on lithium   Gabapentin PRN for pain x 1 month only   Information printed and reviewed   Discussed medications with pt   Discussed contagious    Care Gaps Addressed  COVID BOOSTER RECOMMENDED  PAP smear recommended  TDAP vaccine recommended- call insurance to discuss coverage  Flu vaccine recommended   Call insurance company to discuss coverage for shingles vaccine (Shingrix) 2 dose series   Mammo due may 2023  PHQ UTD 2023  Colon cancer screening discussed- has cologuard box at home      I have reviewed patient's pertinent medical history, relevant laboratory and imaging studies, and past/future health maintenance. Discussed with the patient the importance of adhering to their current medication regimen as directed. Advised the patient that they should continue to work on eating a healthy balanced diet and staying active by exercising within their personal limits. Orders as listed above. Patient was advised to keep future appointments with their respective specialty care team(s). Patient had the opportunity to ask questions, all of which were answered to the best of my ability and with patient satisfaction. Patient understands and is agreeable with the care plan following today's visit. Patient is to schedule an appointment for any new or worsening symptoms. Go to ER for significant shortness of breath, chest pain, or uncontrolled pain or fever. I discussed with patient the risk and benefits of any medications that were prescribed today. I verified that the patient understands their medications, labs, and/or procedures. The patient is doing well with current medication regimen and does not have any barriers to adherence. The patient's self-management abilities are good. Return in about 2 months (around 4/30/2023) for Physical Exam and Fasting Labs, ADHD Follow Up- in person, UDS and med contract, . An electronic signature was used to authenticate this note.     --Vladislav Bradshaw, ROSSANA - CNP on 2/17/2023 at 12:29 PM

## 2023-03-04 ENCOUNTER — PATIENT MESSAGE (OUTPATIENT)
Dept: FAMILY MEDICINE CLINIC | Age: 48
End: 2023-03-04

## 2023-03-06 NOTE — TELEPHONE ENCOUNTER
From: vEonne Blakely  To: Donavan Leblanc  Sent: 3/4/2023 10:33 AM EST  Subject: Vyvanse     I am out of medicine and pharmacy won't fill it till talks with the office

## 2023-03-06 NOTE — TELEPHONE ENCOUNTER
Tried calling the pharmacy twice. First time rang for awhile then went to voicemail, second time rang for awhile then went to fax tone. Looks like Rx is already placed, not sure what would be needed. Will try to call again later.

## 2023-04-04 ENCOUNTER — PATIENT MESSAGE (OUTPATIENT)
Dept: FAMILY MEDICINE CLINIC | Age: 48
End: 2023-04-04

## 2023-04-04 DIAGNOSIS — F90.2 ATTENTION DEFICIT HYPERACTIVITY DISORDER (ADHD), COMBINED TYPE: ICD-10-CM

## 2023-04-05 NOTE — TELEPHONE ENCOUNTER
From: Medina Mccaluey  To: Tari Ball  Sent: 4/4/2023 1:09 PM EDT  Subject: Donna Ballesteros    You have a script for April at the pharmacy    gayla

## 2023-04-22 DIAGNOSIS — F31.61 BIPOLAR DISORDER, CURRENT EPISODE MIXED, MILD (HCC): ICD-10-CM

## 2023-04-22 PROCEDURE — 36415 COLL VENOUS BLD VENIPUNCTURE: CPT | Performed by: NURSE PRACTITIONER

## 2023-04-23 LAB
LITHIUM DOSE: 300 MG
LITHIUM SERPL-MCNC: 0.9 MMOL/L (ref 0.6–1.2)

## 2023-04-24 LAB — LAMOTRIGINE SERPL-MCNC: 3.4 UG/ML (ref 3–15)

## 2023-04-24 NOTE — PATIENT INSTRUCTIONS
You may receive a survey regarding the care you received during your visit. Your input is valuable to us. We encourage you to complete and return your survey. We hope you will choose us in the future for your healthcare needs. GENERAL OFFICE POLICIES      Telephone Calls: Messages will be answered within 1-2 business days, unless the provider is out of the office. If it is urgent a covering provider will answer. (this does not include Medication refills). MyChart: We recommend all patients sign up for [facility]. Through this portal you can see your lab results, request refills, schedule appointments, pay your bill and send messages to the office. [facility] messages will be answered within 1-2 business days unless the provider is out of the office. For urgent matters, please call the office. Appointments:  All appointments must be scheduled. We ask all patients to schedule their next follow up appointment before they leave the office to make sure you will be able to be seen before you run out of medications. 24 hours notice is required to cancel or reschedule an appointment to avoid being marked as a no show. You may be dismissed from the practice after 3 no shows. LATE for Appointment: If you are 15 or more minutes late for your appointment, you may be asked to reschedule. MA/LAB APPTS: Must be scheduled, cannot accept walk in lab visits. We only draw labs for patients established in our office. We only do injections for medications ordered by our office. Acute Sick Visits:  Nothing other than acute complaint will be addressed at this visit. TRADITIONAL MEDICARE  DOES NOT COVER PHYSICALS  MEDICARE WELLNESS VISITS: These are NOT physicals but the free annual visit offered by Medicare to discuss wellness issues. Medication refills, checkups, etc. will not be addressed during this visit.   Medication Refills: Refills are handled electronically so please contact your pharmacy for medication refills

## 2023-04-25 ENCOUNTER — OFFICE VISIT (OUTPATIENT)
Dept: FAMILY MEDICINE CLINIC | Age: 48
End: 2023-04-25
Payer: COMMERCIAL

## 2023-04-25 VITALS
HEIGHT: 66 IN | DIASTOLIC BLOOD PRESSURE: 78 MMHG | SYSTOLIC BLOOD PRESSURE: 130 MMHG | HEART RATE: 94 BPM | BODY MASS INDEX: 40.66 KG/M2 | WEIGHT: 253 LBS | OXYGEN SATURATION: 99 %

## 2023-04-25 DIAGNOSIS — Z23 NEED FOR SHINGLES VACCINE: ICD-10-CM

## 2023-04-25 DIAGNOSIS — I10 BENIGN ESSENTIAL HTN: ICD-10-CM

## 2023-04-25 DIAGNOSIS — Z00.00 ANNUAL PHYSICAL EXAM: Primary | ICD-10-CM

## 2023-04-25 DIAGNOSIS — F90.2 ATTENTION DEFICIT HYPERACTIVITY DISORDER (ADHD), COMBINED TYPE: ICD-10-CM

## 2023-04-25 PROCEDURE — 3017F COLORECTAL CA SCREEN DOC REV: CPT | Performed by: NURSE PRACTITIONER

## 2023-04-25 PROCEDURE — 3078F DIAST BP <80 MM HG: CPT | Performed by: NURSE PRACTITIONER

## 2023-04-25 PROCEDURE — G8417 CALC BMI ABV UP PARAM F/U: HCPCS | Performed by: NURSE PRACTITIONER

## 2023-04-25 PROCEDURE — 3075F SYST BP GE 130 - 139MM HG: CPT | Performed by: NURSE PRACTITIONER

## 2023-04-25 PROCEDURE — 99213 OFFICE O/P EST LOW 20 MIN: CPT | Performed by: NURSE PRACTITIONER

## 2023-04-25 PROCEDURE — 99396 PREV VISIT EST AGE 40-64: CPT | Performed by: NURSE PRACTITIONER

## 2023-04-25 PROCEDURE — G8427 DOCREV CUR MEDS BY ELIG CLIN: HCPCS | Performed by: NURSE PRACTITIONER

## 2023-04-25 PROCEDURE — 4004F PT TOBACCO SCREEN RCVD TLK: CPT | Performed by: NURSE PRACTITIONER

## 2023-04-25 RX ORDER — ZOSTER VACCINE RECOMBINANT, ADJUVANTED 50 MCG/0.5
0.5 KIT INTRAMUSCULAR SEE ADMIN INSTRUCTIONS
Qty: 0.5 ML | Refills: 0 | Status: SHIPPED | OUTPATIENT
Start: 2023-04-25 | End: 2023-10-22

## 2023-04-25 NOTE — PROGRESS NOTES
History and Physical      Desiree Shankar  YOB: 1975    Date of Service:  4/25/2023    Chief Complaint:   Desiree Shankar is a 46 y.o. female who presents for complete physical examination.     HPI:     Labs completed at last visit  She would like to increase the dose of the Vyvanse  The 50 mg dose is not working as well as it used to she takes it about 0700 and it only last until 5371-7845 she is easily distracted - difficulty completing tasks     She is not checking her b/p at home  Just restarted her Norvasc as she ran out of it  Denies CP- SOB -   She is exercising once weekly      Wt Readings from Last 3 Encounters:   04/11/23 252 lb (114.3 kg)   02/17/23 252 lb (114.3 kg)   12/05/22 254 lb 12.8 oz (115.6 kg)     BP Readings from Last 3 Encounters:   04/11/23 (!) 168/88   02/17/23 124/82   12/05/22 125/80       Patient Active Problem List   Diagnosis    Migraine    Amenorrhea, secondary    Depression    Gastritis    TMJ (temporomandibular joint syndrome)    Lipoma    Grief reaction    Anxiety    Tobacco abuse    Insomnia    Bipolar affective disorder (HCC)    VICTORIANO (obstructive sleep apnea)    Periodic limb movement disorder (PLMD)    Primary osteoarthritis of both hips    Left hip pain    Arthritis of right hip    Obesity, Class III, BMI 40-49.9 (morbid obesity) ([address]    Essential hypertension, benign    Bipolar disorder with depression ([address]    Morbid obesity ([address]    Tear of right rotator cuff    Hypersomnia    Traumatic complete tear of right rotator cuff    History of total hip arthroplasty, right    Cervical spondylosis    Cervical radicular pain    DDD (degenerative disc disease), cervical    Claustrophobia    Cervical stenosis of spine    Primary osteoarthritis of left hip       Preventive Care:  Health Maintenance   Topic Date Due    Pneumococcal 0-64 years Vaccine (1 - PCV) Never done    COVID-19 Vaccine (3 - Booster for Pfizer series) 06/25/2021    Cervical cancer screen

## 2023-04-26 RX ORDER — AMLODIPINE BESYLATE 10 MG/1
TABLET ORAL
Qty: 90 TABLET | Refills: 0 | Status: SHIPPED | OUTPATIENT
Start: 2023-04-26

## 2023-05-11 RX ORDER — LOSARTAN POTASSIUM 50 MG/1
TABLET ORAL
Qty: 30 TABLET | Refills: 2 | Status: SHIPPED | OUTPATIENT
Start: 2023-05-11

## 2023-06-03 DIAGNOSIS — F90.2 ATTENTION DEFICIT HYPERACTIVITY DISORDER (ADHD), COMBINED TYPE: ICD-10-CM

## 2023-06-26 ENCOUNTER — OFFICE VISIT (OUTPATIENT)
Dept: ORTHOPEDIC SURGERY | Age: 48
End: 2023-06-26

## 2023-06-26 VITALS — RESPIRATION RATE: 16 BRPM | BODY MASS INDEX: 40.66 KG/M2 | WEIGHT: 253 LBS | HEIGHT: 66 IN

## 2023-06-26 DIAGNOSIS — Z96.642 H/O TOTAL HIP ARTHROPLASTY, LEFT: Primary | ICD-10-CM

## 2023-07-09 DIAGNOSIS — F90.2 ATTENTION DEFICIT HYPERACTIVITY DISORDER (ADHD), COMBINED TYPE: ICD-10-CM

## 2023-07-10 RX ORDER — GABAPENTIN 100 MG/1
100 CAPSULE ORAL NIGHTLY
Qty: 180 CAPSULE | Refills: 0 | Status: SHIPPED | OUTPATIENT
Start: 2023-07-10 | End: 2023-10-08

## 2023-07-11 RX ORDER — ATORVASTATIN CALCIUM 10 MG/1
TABLET, FILM COATED ORAL
Qty: 90 TABLET | Refills: 0 | Status: SHIPPED | OUTPATIENT
Start: 2023-07-11

## 2023-07-12 DIAGNOSIS — F31.61 BIPOLAR DISORDER, CURRENT EPISODE MIXED, MILD (HCC): ICD-10-CM

## 2023-07-12 RX ORDER — LITHIUM CARBONATE 300 MG/1
CAPSULE ORAL
Qty: 270 CAPSULE | Refills: 0 | Status: SHIPPED | OUTPATIENT
Start: 2023-07-12

## 2023-07-12 RX ORDER — DESVENLAFAXINE 25 MG/1
TABLET, EXTENDED RELEASE ORAL
Qty: 90 TABLET | Refills: 0 | Status: SHIPPED | OUTPATIENT
Start: 2023-07-12

## 2023-07-26 RX ORDER — LAMOTRIGINE 100 MG/1
TABLET ORAL
Qty: 90 TABLET | Refills: 0 | Status: SHIPPED | OUTPATIENT
Start: 2023-07-26

## 2023-08-03 RX ORDER — HYDROXYZINE PAMOATE 100 MG/1
CAPSULE ORAL
Qty: 30 CAPSULE | Refills: 0 | Status: SHIPPED | OUTPATIENT
Start: 2023-08-03 | End: 2023-09-07

## 2023-08-07 RX ORDER — LOSARTAN POTASSIUM 50 MG/1
TABLET ORAL
Qty: 30 TABLET | Refills: 2 | Status: SHIPPED | OUTPATIENT
Start: 2023-08-07

## 2023-08-10 DIAGNOSIS — F90.2 ATTENTION DEFICIT HYPERACTIVITY DISORDER (ADHD), COMBINED TYPE: ICD-10-CM

## 2023-08-11 ENCOUNTER — TELEPHONE (OUTPATIENT)
Dept: FAMILY MEDICINE CLINIC | Age: 48
End: 2023-08-11

## 2023-08-11 DIAGNOSIS — F90.2 ATTENTION DEFICIT HYPERACTIVITY DISORDER (ADHD), COMBINED TYPE: ICD-10-CM

## 2023-08-11 NOTE — TELEPHONE ENCOUNTER
PT CALLING TO HAVE HER VYVANSE SENT TO       [facility] [address] [facility], [address] [place] [address] [phone] Qian Hardy [phone] [address] YXREFF[facility]Y IS OUT    PT @  [phone]

## 2023-08-24 DIAGNOSIS — F90.2 ATTENTION DEFICIT HYPERACTIVITY DISORDER (ADHD), COMBINED TYPE: ICD-10-CM

## 2023-08-24 RX ORDER — LISDEXAMFETAMINE DIMESYLATE CAPSULES 50 MG/1
50 CAPSULE ORAL DAILY
Qty: 30 CAPSULE | Refills: 0 | Status: SHIPPED | OUTPATIENT
Start: 2023-10-23 | End: 2023-09-13

## 2023-08-24 RX ORDER — LOSARTAN POTASSIUM 50 MG/1
50 TABLET ORAL DAILY
Qty: 30 TABLET | Refills: 2 | OUTPATIENT
Start: 2023-08-24

## 2023-08-24 RX ORDER — LISDEXAMFETAMINE DIMESYLATE CAPSULES 50 MG/1
50 CAPSULE ORAL DAILY
Qty: 30 CAPSULE | Refills: 0 | Status: SHIPPED | OUTPATIENT
Start: 2023-08-24 | End: 2023-09-13

## 2023-08-24 RX ORDER — LISDEXAMFETAMINE DIMESYLATE CAPSULES 50 MG/1
50 CAPSULE ORAL DAILY
Qty: 30 CAPSULE | Refills: 0 | Status: SHIPPED | OUTPATIENT
Start: 2023-09-23 | End: 2023-09-13

## 2023-08-24 NOTE — TELEPHONE ENCOUNTER
LV 4/25/23 WITH CC FOR PHYSICAL NV NONE  losartan (COZAAR) 50 MG tablet 30 tablet 2 8/7/2023     Sig: Take 1 tablet by mouth once daily    Sent to pharmacy as: Losartan Potassium 50 MG Oral Tablet (COZAAR)    Cosign for Ordering: Accepted by ROSSANA Escobar CNP on 8/7/2023  2:59 PM    E-Prescribing Status: Receipt confirmed by pharmacy (8/7/2023  7:96 PM EDT)    DUPLICATE REQUEST

## 2023-08-28 RX ORDER — ZOLPIDEM TARTRATE 12.5 MG/1
TABLET, FILM COATED, EXTENDED RELEASE ORAL
Qty: 30 TABLET | Refills: 0 | OUTPATIENT
Start: 2023-08-28

## 2023-08-28 NOTE — TELEPHONE ENCOUNTER
LV 4/25/23 WITH CC FOR PHYSICAL NV NONE  Return in about 3 months (around 7/25/2023), or adhd.   MY CHART MESSAGE SENT

## 2023-09-07 RX ORDER — HYDROXYZINE PAMOATE 100 MG/1
CAPSULE ORAL
Qty: 30 CAPSULE | Refills: 0 | Status: SHIPPED | OUTPATIENT
Start: 2023-09-07

## 2023-09-07 NOTE — TELEPHONE ENCOUNTER
LV 4/25/23 WITH CC FOR PHYSICAL NV NONE  Return in about 3 months (around 7/25/2023), or adhd.   CALL PT TO SCHEDULE APPT  30 DAY SUPPLY SENT

## 2023-09-10 DIAGNOSIS — F51.04 PSYCHOPHYSIOLOGICAL INSOMNIA: Primary | ICD-10-CM

## 2023-09-11 RX ORDER — ZOLPIDEM TARTRATE 12.5 MG/1
TABLET, FILM COATED, EXTENDED RELEASE ORAL
Qty: 30 TABLET | Refills: 0 | OUTPATIENT
Start: 2023-09-11

## 2023-09-11 NOTE — TELEPHONE ENCOUNTER
LV 4/25/23 WITH CC FOR PHYSICAL NV NONE  PLEASE CALL PT TO SCHEDULE APPT FOR INSOMNIA AND MED REFILLS

## 2023-09-12 NOTE — TELEPHONE ENCOUNTER
CALLED TO SCHED SHE STATED SHE WAS HERE ON 8/16 THE ENCOUNTER WAS ERRONEOUS ENCOUNTER? AND SHE STATED CONCERTA WAS NOT SENT?  I LET HER KNOW I WILL GET A MESSAGE TO [email or web address]

## 2023-09-14 NOTE — TELEPHONE ENCOUNTER
Concerta was sent 8/45 she was supposed to let me know if this worked for her since she could not get the vyvanse-

## 2023-09-17 RX ORDER — ZOLPIDEM TARTRATE 12.5 MG/1
TABLET, FILM COATED, EXTENDED RELEASE ORAL
Qty: 90 TABLET | Refills: 0 | Status: SHIPPED | OUTPATIENT
Start: 2023-09-17 | End: 2023-12-15

## 2023-09-19 ENCOUNTER — TELEPHONE (OUTPATIENT)
Dept: ADMINISTRATIVE | Age: 48
End: 2023-09-19

## 2023-09-19 DIAGNOSIS — F90.2 ATTENTION DEFICIT HYPERACTIVITY DISORDER (ADHD), COMBINED TYPE: ICD-10-CM

## 2023-09-19 NOTE — TELEPHONE ENCOUNTER
Submitted PA for Zolpidem Tartrate ER 12.5MG er tablets  Via CM (Key: [id number] STATUS: PENDING. Follow up done daily; if no response in three days we will refax for status check. If another three days goes by with no response we will call the insurance for status.

## 2023-09-22 RX ORDER — LISDEXAMFETAMINE DIMESYLATE CAPSULES 70 MG/1
70 CAPSULE ORAL EVERY MORNING
Qty: 30 CAPSULE | Refills: 0 | Status: CANCELLED | OUTPATIENT
Start: 2023-09-22 | End: 2023-10-22

## 2023-09-22 NOTE — TELEPHONE ENCOUNTER
CAMILA DOES NOT FEEL LIKE WE SHOULD FILL THIS. YOU SENT IN CONCERTA AND HER APPT IN [facility] WAS CANCELED? EUNICE

## 2023-09-22 NOTE — TELEPHONE ENCOUNTER
\"THIS MEDICATION IS ON YOUR PLAN'S LIST OF COVERED DRUGS. PRIOR AUTHORIZATION IS NOT REQUIRED AT THIS TIME. \"    If this requires a response please respond to the pool. [facility]). Please advise patient thank you.

## 2023-09-23 ENCOUNTER — PATIENT MESSAGE (OUTPATIENT)
Dept: FAMILY MEDICINE CLINIC | Age: 48
End: 2023-09-23

## 2023-09-23 DIAGNOSIS — F90.2 ATTENTION DEFICIT HYPERACTIVITY DISORDER (ADHD), COMBINED TYPE: ICD-10-CM

## 2023-09-25 DIAGNOSIS — F90.2 ATTENTION DEFICIT HYPERACTIVITY DISORDER (ADHD), COMBINED TYPE: ICD-10-CM

## 2023-09-25 RX ORDER — LISDEXAMFETAMINE DIMESYLATE CAPSULES 50 MG/1
50 CAPSULE ORAL DAILY
Qty: 30 CAPSULE | Refills: 0 | Status: SHIPPED | OUTPATIENT
Start: 2023-10-25 | End: 2023-11-09 | Stop reason: ALTCHOICE

## 2023-09-25 RX ORDER — LISDEXAMFETAMINE DIMESYLATE CAPSULES 50 MG/1
50 CAPSULE ORAL DAILY
Qty: 30 CAPSULE | Refills: 0 | Status: SHIPPED | OUTPATIENT
Start: 2023-09-25 | End: 2023-11-09

## 2023-09-25 RX ORDER — LISDEXAMFETAMINE DIMESYLATE CAPSULES 70 MG/1
70 CAPSULE ORAL EVERY MORNING
Qty: 30 CAPSULE | Refills: 0 | Status: CANCELLED | OUTPATIENT
Start: 2023-09-25 | End: 2023-10-25

## 2023-09-25 NOTE — TELEPHONE ENCOUNTER
From: Oneil Guardado  To: Jesus Duarte  Sent: 9/23/2023 12:03 PM EDT  Subject: Vyvanse     Please call my prescription in to [address].  50 mg

## 2023-09-28 ENCOUNTER — PATIENT MESSAGE (OUTPATIENT)
Dept: FAMILY MEDICINE CLINIC | Age: 48
End: 2023-09-28

## 2023-09-28 NOTE — TELEPHONE ENCOUNTER
From: Jim Shelleyite  To: Jonathon Geronimo  Sent: 9/28/2023 7:17 AM EDT  Subject: Another appointment? I was told by one of the people that called me last week I needed an appointment with you? Do I? If so I will schedule it. I just was curious why.

## 2023-10-02 RX ORDER — HYDROXYZINE PAMOATE 100 MG/1
CAPSULE ORAL
Qty: 30 CAPSULE | Refills: 2 | Status: SHIPPED | OUTPATIENT
Start: 2023-10-02

## 2023-10-22 DIAGNOSIS — F90.2 ATTENTION DEFICIT HYPERACTIVITY DISORDER (ADHD), COMBINED TYPE: ICD-10-CM

## 2023-10-22 DIAGNOSIS — F31.61 BIPOLAR DISORDER, CURRENT EPISODE MIXED, MILD (HCC): ICD-10-CM

## 2023-10-23 RX ORDER — ATORVASTATIN CALCIUM 10 MG/1
TABLET, FILM COATED ORAL
Qty: 90 TABLET | Refills: 0 | Status: SHIPPED | OUTPATIENT
Start: 2023-10-23

## 2023-10-23 RX ORDER — LITHIUM CARBONATE 300 MG/1
CAPSULE ORAL
Qty: 270 CAPSULE | Refills: 0 | Status: SHIPPED | OUTPATIENT
Start: 2023-10-23

## 2023-10-23 RX ORDER — LISDEXAMFETAMINE DIMESYLATE CAPSULES 70 MG/1
70 CAPSULE ORAL EVERY MORNING
Qty: 30 CAPSULE | Refills: 0 | OUTPATIENT
Start: 2023-10-23 | End: 2023-11-22

## 2023-10-23 RX ORDER — LAMOTRIGINE 100 MG/1
TABLET ORAL
Qty: 90 TABLET | Refills: 0 | Status: SHIPPED | OUTPATIENT
Start: 2023-10-23

## 2023-10-31 RX ORDER — AMLODIPINE BESYLATE 10 MG/1
TABLET ORAL
Qty: 90 TABLET | Refills: 0 | Status: SHIPPED | OUTPATIENT
Start: 2023-10-31

## 2023-11-01 ENCOUNTER — PATIENT MESSAGE (OUTPATIENT)
Dept: FAMILY MEDICINE CLINIC | Age: 48
End: 2023-11-01

## 2023-11-01 ENCOUNTER — TELEPHONE (OUTPATIENT)
Dept: FAMILY MEDICINE CLINIC | Age: 48
End: 2023-11-01

## 2023-11-06 RX ORDER — LOSARTAN POTASSIUM 50 MG/1
TABLET ORAL
Qty: 30 TABLET | Refills: 0 | Status: SHIPPED | OUTPATIENT
Start: 2023-11-06

## 2023-11-06 NOTE — TELEPHONE ENCOUNTER
LV 4/25/23 WITH CC FOR PHYSICAL NV 11/9/23 Quality 130: Documentation Of Current Medications In The Medical Record: Current Medications Documented Detail Level: Detailed Quality 110: Preventive Care And Screening: Influenza Immunization: Influenza immunization was not ordered or administered, reason not given

## 2023-11-09 ENCOUNTER — TELEMEDICINE (OUTPATIENT)
Dept: FAMILY MEDICINE CLINIC | Age: 48
End: 2023-11-09
Payer: COMMERCIAL

## 2023-11-09 DIAGNOSIS — F90.2 ATTENTION DEFICIT HYPERACTIVITY DISORDER (ADHD), COMBINED TYPE: ICD-10-CM

## 2023-11-09 DIAGNOSIS — F33.41 RECURRENT MAJOR DEPRESSIVE DISORDER, IN PARTIAL REMISSION (HCC): Primary | ICD-10-CM

## 2023-11-09 PROCEDURE — G8484 FLU IMMUNIZE NO ADMIN: HCPCS

## 2023-11-09 PROCEDURE — 4004F PT TOBACCO SCREEN RCVD TLK: CPT

## 2023-11-09 PROCEDURE — 3017F COLORECTAL CA SCREEN DOC REV: CPT

## 2023-11-09 PROCEDURE — G8427 DOCREV CUR MEDS BY ELIG CLIN: HCPCS

## 2023-11-09 PROCEDURE — 99213 OFFICE O/P EST LOW 20 MIN: CPT

## 2023-11-09 PROCEDURE — G8417 CALC BMI ABV UP PARAM F/U: HCPCS

## 2023-11-09 RX ORDER — DESVENLAFAXINE 25 MG/1
25 TABLET, EXTENDED RELEASE ORAL DAILY
Qty: 90 TABLET | Refills: 3 | Status: SHIPPED | OUTPATIENT
Start: 2023-11-09

## 2023-11-09 RX ORDER — LISDEXAMFETAMINE DIMESYLATE CAPSULES 70 MG/1
70 CAPSULE ORAL DAILY
Qty: 30 CAPSULE | Refills: 0 | Status: SHIPPED | OUTPATIENT
Start: 2023-12-09 | End: 2024-01-08

## 2023-11-09 RX ORDER — DESVENLAFAXINE 25 MG/1
TABLET, EXTENDED RELEASE ORAL
Qty: 90 TABLET | Refills: 0 | OUTPATIENT
Start: 2023-11-09

## 2023-11-09 RX ORDER — LISDEXAMFETAMINE DIMESYLATE CAPSULES 70 MG/1
70 CAPSULE ORAL DAILY
Qty: 30 CAPSULE | Refills: 0 | Status: SHIPPED | OUTPATIENT
Start: 2023-11-09 | End: 2023-12-09

## 2023-11-09 RX ORDER — LISDEXAMFETAMINE DIMESYLATE CAPSULES 70 MG/1
70 CAPSULE ORAL EVERY MORNING
Qty: 30 CAPSULE | Refills: 0 | Status: SHIPPED | OUTPATIENT
Start: 2024-01-08 | End: 2024-02-07

## 2023-11-09 NOTE — PROGRESS NOTES
Soledad Woods (: 1975) is a 46 y.o. female is here for evaluation of the following chief complaint(s): ADHD (ADHD f/u)    Assessment/Plan:   1. Recurrent major depressive disorder, in partial remission ([address])  -Well-controlled on Pristiq daily.  -Refill sent to the pharmacy.  -Follow-up annually. -     desvenlafaxine succinate (PRISTIQ) 25 MG TB24 extended release tablet; Take 1 tablet by mouth daily, Disp-90 tablet, R-3Normal  2. Attention deficit hyperactivity disorder (ADHD), combined type  -Well-controlled on Vyvanse 70 mg daily. Sometimes has to take 50 mg daily if pharmacy is out of 70 mg capsules. -Med contract and UDS updated 2023. -PDMP/OARRS reviewed. -Refill sent to pharmacy.  -Follow-up in 3 months.  -     lisdexamfetamine (VYVANSE) 70 MG capsule; Take 1 capsule by mouth every morning for 30 days. Fill on or after 2024 Max Daily Amount: 70 mg, Disp-30 capsule, R-0Normal  -     lisdexamfetamine (VYVANSE) 70 MG capsule; Take 1 capsule by mouth daily for 30 days. Fill on or after 2023 Max Daily Amount: 70 mg, Disp-30 capsule, R-0Normal  -     lisdexamfetamine (VYVANSE) 70 MG capsule; Take 1 capsule by mouth daily for 30 days. Fill on or after 2023 Max Daily Amount: 70 mg, Disp-30 capsule, R-0Normal    Return in 3 months (on 2024). Subjective/Objective:   Since last visit: no change. Medication compliance: all of the time. Side effects from medication include: none.  has been reviewed. A comprehensive review of systems was negative. Exam:  General: alert, appears stated age, and cooperative  Mental Status exam: normal mood, behavior, and thought processes, able to follow commands, well dressed, good eye contact, fluent speech  Patient-Reported Vitals  Patient-Reported Weight: 245  Patient-Reported Height: [address], was evaluated through a synchronous (real-time) audio-video encounter.  The patient (or guardian if applicable) is

## 2023-11-15 ENCOUNTER — PATIENT MESSAGE (OUTPATIENT)
Dept: FAMILY MEDICINE CLINIC | Age: 48
End: 2023-11-15

## 2023-11-16 NOTE — TELEPHONE ENCOUNTER
From: Hodan Iniguez  To: Keyshawn Gallo  Sent: 11/15/2023 7:39 PM EST  Subject: Mammogram results     I had a mammogram Friday and I was wondering about results.  Girls at work got there already

## 2023-11-30 ENCOUNTER — PATIENT MESSAGE (OUTPATIENT)
Dept: FAMILY MEDICINE CLINIC | Age: 48
End: 2023-11-30

## 2023-12-01 NOTE — TELEPHONE ENCOUNTER
From: Jax Ruiz  To: Nidia Romo  Sent: 11/30/2023 5:58 PM EST  Subject: Physical form from work     I get a bonus for getting a yearly physical. Is there an email I can send the form or should I drop it off?  It is due December 15

## 2023-12-11 RX ORDER — LOSARTAN POTASSIUM 50 MG/1
TABLET ORAL
Qty: 30 TABLET | Refills: 1 | Status: SHIPPED | OUTPATIENT
Start: 2023-12-11

## 2023-12-13 ENCOUNTER — PATIENT MESSAGE (OUTPATIENT)
Dept: FAMILY MEDICINE CLINIC | Age: 48
End: 2023-12-13

## 2023-12-14 NOTE — TELEPHONE ENCOUNTER
From: Governor  Sellards  To: Jose Brooks  Sent: 12/13/2023 7:53 PM EST  Subject: Refill    My Vyvanse is out. Can you please send this to my pharmacy? Also my insurance is not going to cover my vyvance after the new Year. Will I need to see Estuardo Valera to go over options?

## 2023-12-29 DIAGNOSIS — F51.04 PSYCHOPHYSIOLOGICAL INSOMNIA: ICD-10-CM

## 2023-12-29 RX ORDER — ZOLPIDEM TARTRATE 12.5 MG/1
TABLET, FILM COATED, EXTENDED RELEASE ORAL
Qty: 90 TABLET | Refills: 0 | Status: SHIPPED | OUTPATIENT
Start: 2023-12-29 | End: 2024-03-28

## 2023-12-29 RX ORDER — HYDROXYZINE PAMOATE 100 MG
CAPSULE ORAL
Qty: 30 CAPSULE | Refills: 1 | Status: SHIPPED | OUTPATIENT
Start: 2023-12-29

## 2024-01-08 ENCOUNTER — OFFICE VISIT (OUTPATIENT)
Dept: SLEEP MEDICINE | Age: 49
End: 2024-01-08
Payer: COMMERCIAL

## 2024-01-08 VITALS
BODY MASS INDEX: 41.14 KG/M2 | SYSTOLIC BLOOD PRESSURE: 118 MMHG | RESPIRATION RATE: 18 BRPM | HEIGHT: 66 IN | TEMPERATURE: 97.6 F | DIASTOLIC BLOOD PRESSURE: 84 MMHG | HEART RATE: 84 BPM | WEIGHT: 256 LBS | OXYGEN SATURATION: 98 %

## 2024-01-08 DIAGNOSIS — I10 ESSENTIAL HYPERTENSION, BENIGN: ICD-10-CM

## 2024-01-08 DIAGNOSIS — E66.01 OBESITY, CLASS III, BMI 40-49.9 (MORBID OBESITY) (HCC): ICD-10-CM

## 2024-01-08 DIAGNOSIS — Z99.89 DEPENDENCE ON OTHER ENABLING MACHINES AND DEVICES: ICD-10-CM

## 2024-01-08 DIAGNOSIS — G47.33 OSA ON CPAP: Primary | ICD-10-CM

## 2024-01-08 PROCEDURE — 3074F SYST BP LT 130 MM HG: CPT | Performed by: PSYCHIATRY & NEUROLOGY

## 2024-01-08 PROCEDURE — G8417 CALC BMI ABV UP PARAM F/U: HCPCS | Performed by: PSYCHIATRY & NEUROLOGY

## 2024-01-08 PROCEDURE — 4004F PT TOBACCO SCREEN RCVD TLK: CPT | Performed by: PSYCHIATRY & NEUROLOGY

## 2024-01-08 PROCEDURE — 3017F COLORECTAL CA SCREEN DOC REV: CPT | Performed by: PSYCHIATRY & NEUROLOGY

## 2024-01-08 PROCEDURE — 3079F DIAST BP 80-89 MM HG: CPT | Performed by: PSYCHIATRY & NEUROLOGY

## 2024-01-08 PROCEDURE — G8484 FLU IMMUNIZE NO ADMIN: HCPCS | Performed by: PSYCHIATRY & NEUROLOGY

## 2024-01-08 PROCEDURE — 99214 OFFICE O/P EST MOD 30 MIN: CPT | Performed by: PSYCHIATRY & NEUROLOGY

## 2024-01-08 PROCEDURE — G8427 DOCREV CUR MEDS BY ELIG CLIN: HCPCS | Performed by: PSYCHIATRY & NEUROLOGY

## 2024-01-08 ASSESSMENT — SLEEP AND FATIGUE QUESTIONNAIRES
HOW LIKELY ARE YOU TO NOD OFF OR FALL ASLEEP IN A CAR, WHILE STOPPED FOR A FEW MINUTES IN TRAFFIC: 0
HOW LIKELY ARE YOU TO NOD OFF OR FALL ASLEEP WHILE WATCHING TV: 1
HOW LIKELY ARE YOU TO NOD OFF OR FALL ASLEEP WHILE SITTING INACTIVE IN A PUBLIC PLACE: 0
HOW LIKELY ARE YOU TO NOD OFF OR FALL ASLEEP WHILE SITTING AND READING: 0
HOW LIKELY ARE YOU TO NOD OFF OR FALL ASLEEP WHILE SITTING QUIETLY AFTER LUNCH WITHOUT ALCOHOL: 0
HOW LIKELY ARE YOU TO NOD OFF OR FALL ASLEEP WHILE LYING DOWN TO REST IN THE AFTERNOON WHEN CIRCUMSTANCES PERMIT: 2
ESS TOTAL SCORE: 3
HOW LIKELY ARE YOU TO NOD OFF OR FALL ASLEEP WHILE SITTING AND TALKING TO SOMEONE: 0
HOW LIKELY ARE YOU TO NOD OFF OR FALL ASLEEP WHEN YOU ARE A PASSENGER IN A CAR FOR AN HOUR WITHOUT A BREAK: 0

## 2024-01-08 NOTE — PROGRESS NOTES
MD ASHKAN Almendarez Board Certified in Sleep Medicine  Certified in Behavioral Sleep Medicine  Board Certified in Neurology [facility]  [address]   [address]  [place]  [address]  [phone]   [facility]  [address]  [address]  [place], [place] [address]                      [facility] [place] [facility]  [address] [id number] [phone]    Subjective:     Patient ID: Tammi Ball is a 48 y.o. female.    Chief Complaint   Patient presents with    CPAP/BiPAP     1 yr         HPI:        Tammi Ball is a 48 y.o. female was seen today as a follow for REM related obstructive sleep apnea with an overall apnea hypopnea index of 3.9/h and REM AHI of 13.8/h with lowest O2 saturation of 87%, patient spent about 0.4 minutes below 90%. Weight was 254 pounds.  Patient is using the PAP machine about 100% of the time, more than 4 hours a nightabout  100 %, in total average of 9:07 hours a night in last 90 days.  Currently on PAP at 9 cm (), the AHI is only 0.9 events per hour at this pressure.  Patient improved regarding daytime sleepiness and fatigue, wakes up refreshed in the morning.  The Patient scored Moul[place] Sleepiness Score: 3 on Moul[place] Sleepiness Scale ( more than 10 is indicative of daytime sleepiness)   Patient has no problem with PAP pressure or mask, uses FFM.    BP is stable. Has not gained weight pounds since last visit. 254  DOT/CDL - N/A      Previous Report(s)Reviewed: historical medical records         Social History     Socioeconomic History    Marital status:      Spouse name: Not on file    Number of children: Not on file    Years of education: Not on file    Highest education level: Not on file   Occupational History    Occupation: bank collections   Tobacco Use    Smoking status: Every Day     Current packs/day: 1.00     Average

## 2024-01-08 NOTE — PROGRESS NOTES
Tammi Ball         : 1975  [x] [facility]     [] [facility]      [] [facility]     [][facility]    [] [facility]  [] [facility]  [] [facility]   [] [facility] [] Other:  Diagnosis: [x] VICTORIANO (G47.33) [] CSA (G47.31) [] Apnea (G47.30)   Length of Need: [] 12 Months [x] 99 Months [] Other:    Machine (TRENT!):  [x] ResMed AirSense     Auto [] Other:         Humidifier: [x] Heated ()        [x] Water chamber replacement ()/ 1 per 6 months        Mask:  Please always start with the mask the patient used during the titraion    [x] Full Face () /1 per 3 months    [x] Patient Choice - Size and fit mask    [] Dispense: F30 airfit    [x] Headgear () / 1 per 3 months    [x] Interface Replacement ()/1 per month            Tubing: [x] Heated ()/1 per 3 months    [] Standard ()/1 per 3 months [] Other:           Filters: [x] Non-disposable ()/1 per 6 months     [x] Ultra-Fine, Disposable ()/2 per month        Miscellaneous: [x] Chin Strap ()/ 1 per 6 months [] O2 bleed-in:       LPM   [] Oximetry on CPAP/Bilevel []  Other:          Start Order Date: 24    MEDICAL JUSTIFICATION:  I, the undersigned, certify that the above prescribed supplies are medically necessary for this patient’s wellbeing.  In my opinion, the supplies are both reasonable and necessary in reference to accepted standards of medicalpractice in treatment of this patient’s condition.    Mary Almendarez MD      NPI: [id number]       Order Signed Date: 24    Electronically signed by Mary Almendarez MD on 2024 at 9:15 AM

## 2024-01-12 RX ORDER — HYDROXYZINE PAMOATE 100 MG
CAPSULE ORAL
Qty: 30 CAPSULE | Refills: 0 | OUTPATIENT
Start: 2024-01-12

## 2024-01-21 DIAGNOSIS — F31.61 BIPOLAR DISORDER, CURRENT EPISODE MIXED, MILD (HCC): ICD-10-CM

## 2024-01-22 ENCOUNTER — OFFICE VISIT (OUTPATIENT)
Dept: FAMILY MEDICINE CLINIC | Age: 49
End: 2024-01-22
Payer: COMMERCIAL

## 2024-01-22 VITALS
OXYGEN SATURATION: 100 % | SYSTOLIC BLOOD PRESSURE: 138 MMHG | DIASTOLIC BLOOD PRESSURE: 70 MMHG | HEIGHT: 66 IN | WEIGHT: 255 LBS | BODY MASS INDEX: 40.98 KG/M2 | HEART RATE: 88 BPM

## 2024-01-22 DIAGNOSIS — Z23 NEED FOR INFLUENZA VACCINATION: ICD-10-CM

## 2024-01-22 DIAGNOSIS — S16.1XXA STRAIN OF STERNOCLEIDOMASTOID MUSCLE, INITIAL ENCOUNTER: ICD-10-CM

## 2024-01-22 DIAGNOSIS — F90.2 ATTENTION DEFICIT HYPERACTIVITY DISORDER (ADHD), COMBINED TYPE: Primary | ICD-10-CM

## 2024-01-22 DIAGNOSIS — Z13.1 DIABETES MELLITUS SCREENING: ICD-10-CM

## 2024-01-22 DIAGNOSIS — F33.1 MODERATE EPISODE OF RECURRENT MAJOR DEPRESSIVE DISORDER (HCC): ICD-10-CM

## 2024-01-22 DIAGNOSIS — R53.83 FATIGUE, UNSPECIFIED TYPE: ICD-10-CM

## 2024-01-22 DIAGNOSIS — Z13.220 LIPID SCREENING: ICD-10-CM

## 2024-01-22 DIAGNOSIS — G44.209 TENSION HEADACHE: ICD-10-CM

## 2024-01-22 DIAGNOSIS — F31.9 BIPOLAR DISORDER WITH DEPRESSION (HCC): ICD-10-CM

## 2024-01-22 LAB
ALBUMIN SERPL-MCNC: 4.5 G/DL (ref 3.4–5)
ALBUMIN/GLOB SERPL: 2 {RATIO} (ref 1.1–2.2)
ALP SERPL-CCNC: 81 U/L (ref 40–129)
ALT SERPL-CCNC: 21 U/L (ref 10–40)
ANION GAP SERPL CALCULATED.3IONS-SCNC: 12 MMOL/L (ref 3–16)
AST SERPL-CCNC: 16 U/L (ref 15–37)
BILIRUB SERPL-MCNC: <0.2 MG/DL (ref 0–1)
BUN SERPL-MCNC: 19 MG/DL (ref 7–20)
CALCIUM SERPL-MCNC: 9.7 MG/DL (ref 8.3–10.6)
CHLORIDE SERPL-SCNC: 103 MMOL/L (ref 99–110)
CHOLEST SERPL-MCNC: 187 MG/DL (ref 0–199)
CO2 SERPL-SCNC: 24 MMOL/L (ref 21–32)
CREAT SERPL-MCNC: 0.9 MG/DL (ref 0.6–1.1)
DEPRECATED RDW RBC AUTO: 12.5 % (ref 12.4–15.4)
GFR SERPLBLD CREATININE-BSD FMLA CKD-EPI: >60 ML/MIN/{1.73_M2}
GLUCOSE SERPL-MCNC: 120 MG/DL (ref 70–99)
HCT VFR BLD AUTO: 41.6 % (ref 36–48)
HDLC SERPL-MCNC: 55 MG/DL (ref 40–60)
HGB BLD-MCNC: 13.8 G/DL (ref 12–16)
LDLC SERPL CALC-MCNC: 100 MG/DL
LITHIUM DOSE: NORMAL MG
LITHIUM SERPL-MCNC: 1 MMOL/L (ref 0.6–1.2)
MCH RBC QN AUTO: 30.2 PG (ref 26–34)
MCHC RBC AUTO-ENTMCNC: 33.3 G/DL (ref 31–36)
MCV RBC AUTO: 90.8 FL (ref 80–100)
PLATELET # BLD AUTO: 251 K/UL (ref 135–450)
PMV BLD AUTO: 8.6 FL (ref 5–10.5)
POTASSIUM SERPL-SCNC: 5.2 MMOL/L (ref 3.5–5.1)
PROT SERPL-MCNC: 6.8 G/DL (ref 6.4–8.2)
RBC # BLD AUTO: 4.58 M/UL (ref 4–5.2)
SODIUM SERPL-SCNC: 139 MMOL/L (ref 136–145)
TRIGL SERPL-MCNC: 161 MG/DL (ref 0–150)
TSH SERPL DL<=0.005 MIU/L-ACNC: 2.27 UIU/ML (ref 0.27–4.2)
VLDLC SERPL CALC-MCNC: 32 MG/DL
WBC # BLD AUTO: 7.5 K/UL (ref 4–11)

## 2024-01-22 PROCEDURE — G8417 CALC BMI ABV UP PARAM F/U: HCPCS | Performed by: NURSE PRACTITIONER

## 2024-01-22 PROCEDURE — G9899 SCRN MAM PERF RSLTS DOC: HCPCS | Performed by: NURSE PRACTITIONER

## 2024-01-22 PROCEDURE — G8427 DOCREV CUR MEDS BY ELIG CLIN: HCPCS | Performed by: NURSE PRACTITIONER

## 2024-01-22 PROCEDURE — 90471 IMMUNIZATION ADMIN: CPT | Performed by: NURSE PRACTITIONER

## 2024-01-22 PROCEDURE — 3017F COLORECTAL CA SCREEN DOC REV: CPT | Performed by: NURSE PRACTITIONER

## 2024-01-22 PROCEDURE — 90674 CCIIV4 VAC NO PRSV 0.5 ML IM: CPT | Performed by: NURSE PRACTITIONER

## 2024-01-22 PROCEDURE — 99214 OFFICE O/P EST MOD 30 MIN: CPT | Performed by: NURSE PRACTITIONER

## 2024-01-22 PROCEDURE — 4004F PT TOBACCO SCREEN RCVD TLK: CPT | Performed by: NURSE PRACTITIONER

## 2024-01-22 PROCEDURE — 3078F DIAST BP <80 MM HG: CPT | Performed by: NURSE PRACTITIONER

## 2024-01-22 PROCEDURE — G8482 FLU IMMUNIZE ORDER/ADMIN: HCPCS | Performed by: NURSE PRACTITIONER

## 2024-01-22 PROCEDURE — 36415 COLL VENOUS BLD VENIPUNCTURE: CPT | Performed by: NURSE PRACTITIONER

## 2024-01-22 PROCEDURE — 3075F SYST BP GE 130 - 139MM HG: CPT | Performed by: NURSE PRACTITIONER

## 2024-01-22 RX ORDER — DESVENLAFAXINE SUCCINATE 50 MG/1
50 TABLET, EXTENDED RELEASE ORAL DAILY
Qty: 30 TABLET | Refills: 3 | Status: SHIPPED | OUTPATIENT
Start: 2024-01-22

## 2024-01-22 RX ORDER — LAMOTRIGINE 100 MG/1
TABLET ORAL
Qty: 90 TABLET | Refills: 0 | Status: SHIPPED | OUTPATIENT
Start: 2024-01-22

## 2024-01-22 RX ORDER — PREDNISONE 10 MG/1
TABLET ORAL
Qty: 20 TABLET | Refills: 0 | Status: SHIPPED | OUTPATIENT
Start: 2024-01-22

## 2024-01-22 RX ORDER — LISDEXAMFETAMINE DIMESYLATE CAPSULES 70 MG/1
70 CAPSULE ORAL DAILY
Qty: 30 CAPSULE | Refills: 0 | Status: SHIPPED | OUTPATIENT
Start: 2024-04-12 | End: 2024-05-12

## 2024-01-22 RX ORDER — LITHIUM CARBONATE 300 MG/1
CAPSULE ORAL
Qty: 270 CAPSULE | Refills: 0 | Status: SHIPPED | OUTPATIENT
Start: 2024-01-22

## 2024-01-22 RX ORDER — LISDEXAMFETAMINE DIMESYLATE CAPSULES 70 MG/1
70 CAPSULE ORAL DAILY
Qty: 30 CAPSULE | Refills: 0 | Status: SHIPPED | OUTPATIENT
Start: 2024-03-13 | End: 2024-04-12

## 2024-01-22 RX ORDER — TIZANIDINE 2 MG/1
2 TABLET ORAL NIGHTLY PRN
Qty: 30 TABLET | Refills: 0 | Status: SHIPPED | OUTPATIENT
Start: 2024-01-22 | End: 2024-02-21

## 2024-01-22 RX ORDER — LISDEXAMFETAMINE DIMESYLATE CAPSULES 70 MG/1
70 CAPSULE ORAL DAILY
Qty: 30 CAPSULE | Refills: 0 | Status: SHIPPED | OUTPATIENT
Start: 2024-02-12 | End: 2024-03-13

## 2024-01-22 RX ORDER — ATORVASTATIN CALCIUM 10 MG/1
TABLET, FILM COATED ORAL
Qty: 90 TABLET | Refills: 0 | Status: SHIPPED | OUTPATIENT
Start: 2024-01-22

## 2024-01-22 ASSESSMENT — PATIENT HEALTH QUESTIONNAIRE - PHQ9
4. FEELING TIRED OR HAVING LITTLE ENERGY: 3
6. FEELING BAD ABOUT YOURSELF - OR THAT YOU ARE A FAILURE OR HAVE LET YOURSELF OR YOUR FAMILY DOWN: 1
SUM OF ALL RESPONSES TO PHQ QUESTIONS 1-9: 10
SUM OF ALL RESPONSES TO PHQ QUESTIONS 1-9: 10
9. THOUGHTS THAT YOU WOULD BE BETTER OFF DEAD, OR OF HURTING YOURSELF: 0
1. LITTLE INTEREST OR PLEASURE IN DOING THINGS: 2
10. IF YOU CHECKED OFF ANY PROBLEMS, HOW DIFFICULT HAVE THESE PROBLEMS MADE IT FOR YOU TO DO YOUR WORK, TAKE CARE OF THINGS AT HOME, OR GET ALONG WITH OTHER PEOPLE: 2
2. FEELING DOWN, DEPRESSED OR HOPELESS: 1
5. POOR APPETITE OR OVEREATING: 0
8. MOVING OR SPEAKING SO SLOWLY THAT OTHER PEOPLE COULD HAVE NOTICED. OR THE OPPOSITE, BEING SO FIGETY OR RESTLESS THAT YOU HAVE BEEN MOVING AROUND A LOT MORE THAN USUAL: 0
SUM OF ALL RESPONSES TO PHQ QUESTIONS 1-9: 10
SUM OF ALL RESPONSES TO PHQ9 QUESTIONS 1 & 2: 3
SUM OF ALL RESPONSES TO PHQ QUESTIONS 1-9: 10
3. TROUBLE FALLING OR STAYING ASLEEP: 2
7. TROUBLE CONCENTRATING ON THINGS, SUCH AS READING THE NEWSPAPER OR WATCHING TELEVISION: 1

## 2024-01-22 ASSESSMENT — COLUMBIA-SUICIDE SEVERITY RATING SCALE - C-SSRS
2. HAVE YOU ACTUALLY HAD ANY THOUGHTS OF KILLING YOURSELF?: NO
1. WITHIN THE PAST MONTH, HAVE YOU WISHED YOU WERE DEAD OR WISHED YOU COULD GO TO SLEEP AND NOT WAKE UP?: NO
6. HAVE YOU EVER DONE ANYTHING, STARTED TO DO ANYTHING, OR PREPARED TO DO ANYTHING TO END YOUR LIFE?: NO

## 2024-01-22 NOTE — PATIENT INSTRUCTIONS
You may receive a survey regarding the care you received during your visit.  Your input is valuable to us.  We encourage you to complete and return your survey.  We hope you will choose us in the future for your healthcare needs. GENERAL OFFICE POLICIES      Telephone Calls: Messages will be answered within 1-2 business days, unless the provider is out of the office.  If it is urgent a covering provider will answer. (this does not include Medication refills).    MyChart:  We recommend all patients sign up for [email or web address].  Through this portal you can see your lab results, request refills, schedule appointments, pay your bill and send messages to the office.   [email or web address] messages will be answered within 1-2 business days unless the provider is out of the office.  For urgent matters, please call the office.  Appointments:  All appointments must be scheduled.  We ask all patients to schedule their next follow up appointment before they leave the office to make sure you will be able to be seen before you run out of medications.  24 hours notice is required to cancel or reschedule an appointment to avoid being marked as a no show.  You may be dismissed from the practice after 3 no shows.    LATE for Appointment: If you are 15 or more minutes late for your appointment, you may be asked to reschedule.  MA/LAB APPTS: Must be scheduled, cannot accept walk in lab visits.  We only draw labs for patients established in our office.  We only do injections for medications ordered by our office.  Acute Sick Visits:  Nothing other than acute complaint will be addressed at this visit.  TRADITIONAL MEDICARE  DOES NOT COVER PHYSICALS  MEDICARE WELLNESS VISITS: These are NOT physicals but the free annual visit offered by Medicare to discuss wellness issues. Medication refills, checkups, etc. will not be addressed during this visit.  Medication Refills: Refills are handled electronically so please contact your pharmacy for medication refills

## 2024-01-22 NOTE — PROGRESS NOTES
Tammi Ball (:  1975) is a 49 y.o. female,Established patient, here for evaluation of the following chief complaint(s):    Headache (Headaches and body aches for three weeks, no fever, negative for covid //PAP due Declined TDAP)      SUBJECTIVE/OBJECTIVE:  HPI  Tammi states she has been sick for the last three weeks- muscle pain generalized worse in her neck nothing has made this worse-not sure if the cold weather has made it worse daily headaches in the back left side  throbbing pain that does not radiate - has not noticed anything that make it worse- neck is not tender to touch - denies cough - ear pain - sore throat or fevers noted has taken ibuprofen  has helped- easily fatigued as well she take  ambien and  atarax to help her sleep  ADHD  The vyvanse is working well for her  She takes it as prescribed helps her stay on track and focused  Works full time at [employer]    Review of Systems   Constitutional:  Positive for fatigue.   Neurological:  Positive for headaches.   Psychiatric/Behavioral:  Positive for decreased concentration and dysphoric mood.        Physical Exam  Vitals reviewed.   Constitutional:       General: She is awake. She is not in acute distress.     Appearance: Normal appearance. She is well-developed and well-groomed. She is not ill-appearing, toxic-appearing or diaphoretic.   Neck:        Comments: LEFT SIDED CERVICAL PAIN - SCM PAIN   Cardiovascular:      Rate and Rhythm: Normal rate and regular rhythm.      Heart sounds: Normal heart sounds, S1 normal and S2 normal.   Pulmonary:      Effort: Pulmonary effort is normal.      Breath sounds: Normal breath sounds and air entry.   Musculoskeletal:      Cervical back: Muscular tenderness present.   Neurological:      Mental Status: She is alert and oriented to person, place, and time.   Psychiatric:         Attention and Perception: Attention and perception normal.         Mood and Affect: Mood and affect normal.

## 2024-01-25 ENCOUNTER — PATIENT MESSAGE (OUTPATIENT)
Dept: FAMILY MEDICINE CLINIC | Age: 49
End: 2024-01-25

## 2024-01-25 DIAGNOSIS — R73.9 HYPERGLYCEMIA: Primary | ICD-10-CM

## 2024-01-25 NOTE — TELEPHONE ENCOUNTER
Component  Ref Range & Units 1/22/24 0809 4/11/23 1147 4/13/22 0630 4/4/22 1150 4/4/22 1150 3/17/22 1112 2/8/21 1609   Sodium  136 - 145 mmol/L 139 141 135 Low   139 140 135 Low    Potassium  3.5 - 5.1 mmol/L 5.2 High  4.5 4.4  4.5 4.4 4.7   Chloride  99 - 110 mmol/L 103 103 102  102 106 100   CO2  21 - 32 mmol/L 24 26 22  23 21 24   Anion Gap  3 - 16 12 12 11  14 13 11   Glucose  70 - 99 mg/dL 120 High  101 High  122 High   121 High  85 96

## 2024-01-25 NOTE — TELEPHONE ENCOUNTER
From: Tammi Ball  To: Mishel Ag  Sent: 1/25/2024 9:48 AM EST  Subject: Sugar levels    Curious if you are going to prescribe anything for my glucose level

## 2024-01-29 RX ORDER — AMLODIPINE BESYLATE 10 MG/1
TABLET ORAL
Qty: 90 TABLET | Refills: 0 | Status: SHIPPED | OUTPATIENT
Start: 2024-01-29

## 2024-01-31 DIAGNOSIS — M54.2 NECK PAIN: Primary | ICD-10-CM

## 2024-01-31 RX ORDER — HYDROXYZINE PAMOATE 100 MG
CAPSULE ORAL
Qty: 30 CAPSULE | Refills: 0 | Status: SHIPPED | OUTPATIENT
Start: 2024-01-31

## 2024-02-01 ENCOUNTER — NURSE ONLY (OUTPATIENT)
Dept: FAMILY MEDICINE CLINIC | Age: 49
End: 2024-02-01
Payer: COMMERCIAL

## 2024-02-01 DIAGNOSIS — R73.9 ELEVATED BLOOD SUGAR: Primary | ICD-10-CM

## 2024-02-01 LAB — HBA1C MFR BLD: 6.1 %

## 2024-02-01 PROCEDURE — 83036 HEMOGLOBIN GLYCOSYLATED A1C: CPT | Performed by: NURSE PRACTITIONER

## 2024-02-05 RX ORDER — LOSARTAN POTASSIUM 50 MG/1
TABLET ORAL
Qty: 30 TABLET | Refills: 1 | Status: SHIPPED | OUTPATIENT
Start: 2024-02-05

## 2024-02-13 DIAGNOSIS — F90.2 ATTENTION DEFICIT HYPERACTIVITY DISORDER (ADHD), COMBINED TYPE: ICD-10-CM

## 2024-02-14 RX ORDER — LISDEXAMFETAMINE DIMESYLATE CAPSULES 70 MG/1
70 CAPSULE ORAL DAILY
Qty: 30 CAPSULE | Refills: 0 | OUTPATIENT
Start: 2024-02-14 | End: 2024-03-15

## 2024-02-16 DIAGNOSIS — F90.2 ATTENTION DEFICIT HYPERACTIVITY DISORDER (ADHD), COMBINED TYPE: ICD-10-CM

## 2024-02-16 RX ORDER — LISDEXAMFETAMINE DIMESYLATE CAPSULES 70 MG/1
70 CAPSULE ORAL DAILY
Qty: 30 CAPSULE | Refills: 0 | Status: SHIPPED | OUTPATIENT
Start: 2024-03-13 | End: 2024-04-12

## 2024-02-19 ENCOUNTER — PATIENT MESSAGE (OUTPATIENT)
Dept: FAMILY MEDICINE CLINIC | Age: 49
End: 2024-02-19

## 2024-02-19 NOTE — TELEPHONE ENCOUNTER
From: Tammi Ball  To: Mishel Ag  Sent: 2/19/2024 1:01 PM EST  Subject: Refill     Still have not heard from [facility] about my vyvance. I am out

## 2024-02-26 RX ORDER — HYDROXYZINE PAMOATE 100 MG
CAPSULE ORAL
Qty: 30 CAPSULE | Refills: 0 | Status: SHIPPED | OUTPATIENT
Start: 2024-02-26

## 2024-03-22 DIAGNOSIS — F90.2 ATTENTION DEFICIT HYPERACTIVITY DISORDER (ADHD), COMBINED TYPE: ICD-10-CM

## 2024-03-22 RX ORDER — LISDEXAMFETAMINE DIMESYLATE CAPSULES 70 MG/1
70 CAPSULE ORAL DAILY
Qty: 30 CAPSULE | Refills: 0 | Status: SHIPPED | OUTPATIENT
Start: 2024-03-22 | End: 2024-04-21

## 2024-03-22 RX ORDER — HYDROXYZINE PAMOATE 100 MG
CAPSULE ORAL
Qty: 30 CAPSULE | Refills: 0 | Status: SHIPPED | OUTPATIENT
Start: 2024-03-22

## 2024-04-05 NOTE — PATIENT INSTRUCTIONS

## 2024-04-07 SDOH — ECONOMIC STABILITY: FOOD INSECURITY: WITHIN THE PAST 12 MONTHS, YOU WORRIED THAT YOUR FOOD WOULD RUN OUT BEFORE YOU GOT MONEY TO BUY MORE.: NEVER TRUE

## 2024-04-07 SDOH — ECONOMIC STABILITY: INCOME INSECURITY: HOW HARD IS IT FOR YOU TO PAY FOR THE VERY BASICS LIKE FOOD, HOUSING, MEDICAL CARE, AND HEATING?: NOT HARD AT ALL

## 2024-04-07 SDOH — ECONOMIC STABILITY: FOOD INSECURITY: WITHIN THE PAST 12 MONTHS, THE FOOD YOU BOUGHT JUST DIDN'T LAST AND YOU DIDN'T HAVE MONEY TO GET MORE.: NEVER TRUE

## 2024-04-07 NOTE — PROGRESS NOTES
Tammi Ball (:  1975) is a 49 y.o. female,Established patient, here for evaluation of the following chief complaint(s):    ADHD (ROUTINE ADHD FOLLOW UP) and Other (Due for pap)      SUBJECTIVE/OBJECTIVE:  HPI  ROUTINE ADHD FOLLOW UP  SHE TAKES VYVANSE WORKS WELL FOR HER  SHE TAKES THE MEDICATION AS PRESCRIBED  HAS NOT HAD ANY ISSUES   SHE IS BETTER ABLE TO STAY ON TRACK AND FOCUSED WHEN SHE TAKES THE MEDICATION  SHE IS WORKING AT [employer]  HAD A CYST REMOVED TWO WEEKS AGO NEEDS THE SUTURES REMOVED  Review of Systems   Psychiatric/Behavioral:  Positive for decreased concentration.        Physical Exam  Vitals reviewed.   Constitutional:       General: She is awake. She is not in acute distress.     Appearance: Normal appearance. She is well-developed and well-groomed. She is morbidly obese. She is not ill-appearing, toxic-appearing or diaphoretic.   Cardiovascular:      Rate and Rhythm: Normal rate and regular rhythm.      Heart sounds: Normal heart sounds, S1 normal and S2 normal.   Pulmonary:      Effort: Pulmonary effort is normal.      Breath sounds: Normal breath sounds and air entry.   Neurological:      Mental Status: She is alert and oriented to person, place, and time.   Psychiatric:         Attention and Perception: Attention and perception normal.         Mood and Affect: Mood and affect normal.         Speech: Speech normal.         Behavior: Behavior normal. Behavior is cooperative.         Thought Content: Thought content normal.         Cognition and Memory: Cognition and memory normal.         Judgment: Judgment normal.       Tammi was seen today for adhd, other and suture / staple removal.    Diagnoses and all orders for this visit:    Attention deficit hyperactivity disorder (ADHD), combined type  -     POCT Rapid Drug Screen- CONSISTENT  Controlled substances monitoring: possible medication side effects, risk of tolerance and/or dependence, and alternative treatments discussed,

## 2024-04-08 ENCOUNTER — OFFICE VISIT (OUTPATIENT)
Dept: FAMILY MEDICINE CLINIC | Age: 49
End: 2024-04-08
Payer: COMMERCIAL

## 2024-04-08 VITALS
SYSTOLIC BLOOD PRESSURE: 134 MMHG | RESPIRATION RATE: 16 BRPM | WEIGHT: 256 LBS | HEART RATE: 86 BPM | OXYGEN SATURATION: 100 % | HEIGHT: 66 IN | DIASTOLIC BLOOD PRESSURE: 84 MMHG | BODY MASS INDEX: 41.14 KG/M2

## 2024-04-08 DIAGNOSIS — Z79.899 LONG TERM USE OF DRUG: ICD-10-CM

## 2024-04-08 DIAGNOSIS — Z12.11 COLON CANCER SCREENING: ICD-10-CM

## 2024-04-08 DIAGNOSIS — F90.2 ATTENTION DEFICIT HYPERACTIVITY DISORDER (ADHD), COMBINED TYPE: Primary | ICD-10-CM

## 2024-04-08 PROCEDURE — 99213 OFFICE O/P EST LOW 20 MIN: CPT | Performed by: NURSE PRACTITIONER

## 2024-04-08 PROCEDURE — G8427 DOCREV CUR MEDS BY ELIG CLIN: HCPCS | Performed by: NURSE PRACTITIONER

## 2024-04-08 PROCEDURE — 4004F PT TOBACCO SCREEN RCVD TLK: CPT | Performed by: NURSE PRACTITIONER

## 2024-04-08 PROCEDURE — 3017F COLORECTAL CA SCREEN DOC REV: CPT | Performed by: NURSE PRACTITIONER

## 2024-04-08 PROCEDURE — 80305 DRUG TEST PRSMV DIR OPT OBS: CPT | Performed by: NURSE PRACTITIONER

## 2024-04-08 PROCEDURE — 3079F DIAST BP 80-89 MM HG: CPT | Performed by: NURSE PRACTITIONER

## 2024-04-08 PROCEDURE — 3075F SYST BP GE 130 - 139MM HG: CPT | Performed by: NURSE PRACTITIONER

## 2024-04-08 PROCEDURE — G9899 SCRN MAM PERF RSLTS DOC: HCPCS | Performed by: NURSE PRACTITIONER

## 2024-04-08 PROCEDURE — G8417 CALC BMI ABV UP PARAM F/U: HCPCS | Performed by: NURSE PRACTITIONER

## 2024-04-08 RX ORDER — LISDEXAMFETAMINE DIMESYLATE CAPSULES 70 MG/1
70 CAPSULE ORAL DAILY
Qty: 30 CAPSULE | Refills: 0 | Status: SHIPPED | OUTPATIENT
Start: 2024-06-23 | End: 2024-07-23

## 2024-04-08 RX ORDER — LISDEXAMFETAMINE DIMESYLATE CAPSULES 70 MG/1
70 CAPSULE ORAL EVERY MORNING
Qty: 30 CAPSULE | Refills: 0 | Status: SHIPPED | OUTPATIENT
Start: 2024-05-24 | End: 2024-06-23

## 2024-04-08 RX ORDER — LISDEXAMFETAMINE DIMESYLATE CAPSULES 70 MG/1
70 CAPSULE ORAL DAILY
Qty: 30 CAPSULE | Refills: 0 | Status: SHIPPED | OUTPATIENT
Start: 2024-04-24 | End: 2024-05-24

## 2024-04-11 LAB
6MAM UR QL: NOT DETECTED
7AMINOCLONAZEPAM UR QL: NOT DETECTED
A-OH ALPRAZ UR QL: NOT DETECTED
ALPHA-OH-MIDAZOLAM, URINE: NOT DETECTED
ALPRAZ UR QL: NOT DETECTED
AMPHET UR QL SCN: PRESENT
ANNOTATION COMMENT IMP: NORMAL
ANNOTATION COMMENT IMP: NORMAL
BARBITURATES UR QL: NEGATIVE
BUPRENORPHINE UR QL: NOT DETECTED
BZE UR QL: NEGATIVE
CARBOXYTHC UR QL: NEGATIVE
CARISOPRODOL UR QL: NEGATIVE
CLONAZEPAM UR QL: NOT DETECTED
CODEINE UR QL: NOT DETECTED
CREAT UR-MCNC: 23.6 MG/DL (ref 20–400)
DIAZEPAM UR QL: NOT DETECTED
ETHYL GLUCURONIDE UR QL: NEGATIVE
FENTANYL UR QL: NOT DETECTED
GABAPENTIN: NOT DETECTED
HYDROCODONE UR QL: NOT DETECTED
HYDROMORPHONE UR QL: NOT DETECTED
LORAZEPAM UR QL: NOT DETECTED
MDA UR QL: NOT DETECTED
MDEA UR QL: NOT DETECTED
MDMA UR QL: NOT DETECTED
MEPERIDINE UR QL: NOT DETECTED
METHADONE UR QL: NEGATIVE
METHAMPHET UR QL: NOT DETECTED
MIDAZOLAM UR QL SCN: NOT DETECTED
MORPHINE UR QL: NOT DETECTED
NALOXONE: NOT DETECTED
NORBUPRENORPHINE UR QL CFM: NOT DETECTED
NORDIAZEPAM UR QL: NOT DETECTED
NORFENTANYL UR QL: NOT DETECTED
NORHYDROCODONE UR QL CFM: NOT DETECTED
NOROXYCODONE UR QL CFM: NOT DETECTED
NOROXYMORPHONE, URINE: NOT DETECTED
OXAZEPAM UR QL: NOT DETECTED
OXYCODONE UR QL: NOT DETECTED
OXYMORPHONE UR QL: NOT DETECTED
PATHOLOGY STUDY: NORMAL
PCP UR QL: NEGATIVE
PHENTERMINE UR QL: NOT DETECTED
PPAA UR QL: NOT DETECTED
PREGABALIN: NOT DETECTED
SERVICE CMNT-IMP: NORMAL
TAPENTADOL UR QL SCN: NOT DETECTED
TAPENTADOL-O-SULFATE, URINE: NOT DETECTED
TEMAZEPAM UR QL: NOT DETECTED
TRAMADOL UR QL: NEGATIVE
ZOLPIDEM UR QL: NOT DETECTED

## 2024-04-15 NOTE — PROGRESS NOTES
[facility] ENDOSCOPY COLONOSCOPY PRE-OPERATIVE INSTRUCTIONS    Procedure date_04/19/2024________Arrival time__0730__________        Surgery time__0830__________       Clear liquids the day before the procedure. Do not eat or drink anything within 5 hours of your procedure.    This includes water chewing gum, mints and ice chips.   You may brush your teeth and gargle the morning of your surgery, but do not swallow the water    You may be asked to stop blood thinners such as Coumadin, Plavix, Fragmin, Lovenox, etc., or any anti-inflammatories such as:  Aspirin, Ibuprofen, Advil, Naproxen prior to your procedure.   We also ask that you stop any OTC medications such as fish oil, vitamin E, glucosamine, garlic, Multivitamins, COQ 10, etc.    You must make arrangements for a responsible adult to arrive with you and stay in our waiting area during your procedure.  They will also need to take you home after your procedure.    For your safety you will not be allowed to leave alone or drive yourself home.    Also for your safety, it is strongly suggested that someone stay with you the first 24 hours after your procedure.    For your comfort, please wear simple loose fitting clothing to the center.  Please do not bring valuables.      If you have a living will and a durable power of  for healthcare, please bring in a copy.     You will need to bring a photo ID and insurance card    Our goal is to provide you with excellent care so if you have any questions, please contact us at the [facility] at [phone]         Please note these are generalized instructions for all colonoscopy cases, you may be provided with more specific instructions if necessary

## 2024-04-18 ENCOUNTER — ANESTHESIA EVENT (OUTPATIENT)
Dept: ENDOSCOPY | Age: 49
End: 2024-04-18
Payer: COMMERCIAL

## 2024-04-18 DIAGNOSIS — F31.61 BIPOLAR DISORDER, CURRENT EPISODE MIXED, MILD (HCC): ICD-10-CM

## 2024-04-18 RX ORDER — LITHIUM CARBONATE 300 MG/1
CAPSULE ORAL
Qty: 270 CAPSULE | Refills: 0 | Status: SHIPPED | OUTPATIENT
Start: 2024-04-18

## 2024-04-19 ENCOUNTER — ANESTHESIA (OUTPATIENT)
Dept: ENDOSCOPY | Age: 49
End: 2024-04-19
Payer: COMMERCIAL

## 2024-04-19 ENCOUNTER — HOSPITAL ENCOUNTER (OUTPATIENT)
Age: 49
Setting detail: OUTPATIENT SURGERY
Discharge: HOME | End: 2024-04-19
Attending: INTERNAL MEDICINE | Admitting: INTERNAL MEDICINE
Payer: COMMERCIAL

## 2024-04-19 VITALS
RESPIRATION RATE: 18 BRPM | HEART RATE: 73 BPM | DIASTOLIC BLOOD PRESSURE: 63 MMHG | SYSTOLIC BLOOD PRESSURE: 122 MMHG | HEIGHT: 66 IN | WEIGHT: 250 LBS | BODY MASS INDEX: 40.18 KG/M2 | TEMPERATURE: 96.8 F | OXYGEN SATURATION: 96 %

## 2024-04-19 DIAGNOSIS — Z12.11 COLON CANCER SCREENING: ICD-10-CM

## 2024-04-19 PROCEDURE — 3700000001 HC ADD 15 MINUTES (ANESTHESIA): Performed by: INTERNAL MEDICINE

## 2024-04-19 PROCEDURE — 3700000000 HC ANESTHESIA ATTENDED CARE: Performed by: INTERNAL MEDICINE

## 2024-04-19 PROCEDURE — 2709999900 HC NON-CHARGEABLE SUPPLY: Performed by: INTERNAL MEDICINE

## 2024-04-19 PROCEDURE — 2580000003 HC RX 258: Performed by: ANESTHESIOLOGY

## 2024-04-19 PROCEDURE — 7100000010 HC PHASE II RECOVERY - FIRST 15 MIN: Performed by: INTERNAL MEDICINE

## 2024-04-19 PROCEDURE — 3609010600 HC COLONOSCOPY POLYPECTOMY SNARE/COLD BIOPSY: Performed by: INTERNAL MEDICINE

## 2024-04-19 PROCEDURE — 2580000003 HC RX 258: Performed by: NURSE ANESTHETIST, CERTIFIED REGISTERED

## 2024-04-19 PROCEDURE — 7100000011 HC PHASE II RECOVERY - ADDTL 15 MIN: Performed by: INTERNAL MEDICINE

## 2024-04-19 PROCEDURE — 6360000002 HC RX W HCPCS: Performed by: NURSE ANESTHETIST, CERTIFIED REGISTERED

## 2024-04-19 PROCEDURE — 88305 TISSUE EXAM BY PATHOLOGIST: CPT

## 2024-04-19 PROCEDURE — 2500000003 HC RX 250 WO HCPCS: Performed by: NURSE ANESTHETIST, CERTIFIED REGISTERED

## 2024-04-19 RX ORDER — LABETALOL HYDROCHLORIDE 5 MG/ML
5 INJECTION, SOLUTION INTRAVENOUS
Status: CANCELLED | OUTPATIENT
Start: 2024-04-19

## 2024-04-19 RX ORDER — ONDANSETRON 2 MG/ML
4 INJECTION INTRAMUSCULAR; INTRAVENOUS
Status: CANCELLED | OUTPATIENT
Start: 2024-04-19 | End: 2024-04-20

## 2024-04-19 RX ORDER — FENTANYL CITRATE 50 UG/ML
25 INJECTION, SOLUTION INTRAMUSCULAR; INTRAVENOUS EVERY 5 MIN PRN
Status: CANCELLED | OUTPATIENT
Start: 2024-04-19

## 2024-04-19 RX ORDER — LIDOCAINE HYDROCHLORIDE 20 MG/ML
INJECTION, SOLUTION EPIDURAL; INFILTRATION; INTRACAUDAL; PERINEURAL PRN
Status: DISCONTINUED | OUTPATIENT
Start: 2024-04-19 | End: 2024-04-19 | Stop reason: SDUPTHER

## 2024-04-19 RX ORDER — SODIUM CHLORIDE 9 MG/ML
INJECTION, SOLUTION INTRAVENOUS PRN
Status: CANCELLED | OUTPATIENT
Start: 2024-04-19

## 2024-04-19 RX ORDER — DIPHENHYDRAMINE HYDROCHLORIDE 50 MG/ML
12.5 INJECTION INTRAMUSCULAR; INTRAVENOUS
Status: CANCELLED | OUTPATIENT
Start: 2024-04-19 | End: 2024-04-20

## 2024-04-19 RX ORDER — MEPERIDINE HYDROCHLORIDE 25 MG/ML
12.5 INJECTION INTRAMUSCULAR; INTRAVENOUS; SUBCUTANEOUS EVERY 5 MIN PRN
Status: CANCELLED | OUTPATIENT
Start: 2024-04-19

## 2024-04-19 RX ORDER — PROPOFOL 10 MG/ML
INJECTION, EMULSION INTRAVENOUS PRN
Status: DISCONTINUED | OUTPATIENT
Start: 2024-04-19 | End: 2024-04-19 | Stop reason: SDUPTHER

## 2024-04-19 RX ORDER — SODIUM CHLORIDE 9 MG/ML
INJECTION, SOLUTION INTRAVENOUS CONTINUOUS PRN
Status: DISCONTINUED | OUTPATIENT
Start: 2024-04-19 | End: 2024-04-19 | Stop reason: SDUPTHER

## 2024-04-19 RX ORDER — SODIUM CHLORIDE 0.9 % (FLUSH) 0.9 %
5-40 SYRINGE (ML) INJECTION EVERY 12 HOURS SCHEDULED
Status: CANCELLED | OUTPATIENT
Start: 2024-04-19

## 2024-04-19 RX ORDER — NALOXONE HYDROCHLORIDE 0.4 MG/ML
INJECTION, SOLUTION INTRAMUSCULAR; INTRAVENOUS; SUBCUTANEOUS PRN
Status: CANCELLED | OUTPATIENT
Start: 2024-04-19

## 2024-04-19 RX ORDER — SODIUM CHLORIDE 0.9 % (FLUSH) 0.9 %
5-40 SYRINGE (ML) INJECTION PRN
Status: CANCELLED | OUTPATIENT
Start: 2024-04-19

## 2024-04-19 RX ORDER — SODIUM CHLORIDE 0.9 % (FLUSH) 0.9 %
5-40 SYRINGE (ML) INJECTION EVERY 12 HOURS SCHEDULED
Status: DISCONTINUED | OUTPATIENT
Start: 2024-04-19 | End: 2024-04-19 | Stop reason: HOSPADM

## 2024-04-19 RX ORDER — SODIUM CHLORIDE 9 MG/ML
INJECTION, SOLUTION INTRAVENOUS PRN
Status: DISCONTINUED | OUTPATIENT
Start: 2024-04-19 | End: 2024-04-19 | Stop reason: HOSPADM

## 2024-04-19 RX ORDER — SODIUM CHLORIDE 0.9 % (FLUSH) 0.9 %
5-40 SYRINGE (ML) INJECTION PRN
Status: DISCONTINUED | OUTPATIENT
Start: 2024-04-19 | End: 2024-04-19 | Stop reason: HOSPADM

## 2024-04-19 RX ADMIN — PROPOFOL 50 MG: 10 INJECTION, EMULSION INTRAVENOUS at 08:45

## 2024-04-19 RX ADMIN — LIDOCAINE HYDROCHLORIDE 60 MG: 20 INJECTION, SOLUTION EPIDURAL; INFILTRATION; INTRACAUDAL; PERINEURAL at 08:40

## 2024-04-19 RX ADMIN — SODIUM CHLORIDE: 9 INJECTION, SOLUTION INTRAVENOUS at 08:36

## 2024-04-19 RX ADMIN — PROPOFOL 50 MG: 10 INJECTION, EMULSION INTRAVENOUS at 08:41

## 2024-04-19 RX ADMIN — SODIUM CHLORIDE: 9 INJECTION, SOLUTION INTRAVENOUS at 08:46

## 2024-04-19 RX ADMIN — PROPOFOL 50 MG: 10 INJECTION, EMULSION INTRAVENOUS at 08:43

## 2024-04-19 RX ADMIN — SODIUM CHLORIDE: 9 INJECTION, SOLUTION INTRAVENOUS at 08:16

## 2024-04-19 RX ADMIN — PROPOFOL 100 MG: 10 INJECTION, EMULSION INTRAVENOUS at 08:40

## 2024-04-19 ASSESSMENT — PAIN - FUNCTIONAL ASSESSMENT
PAIN_FUNCTIONAL_ASSESSMENT: NONE - DENIES PAIN

## 2024-04-19 ASSESSMENT — LIFESTYLE VARIABLES: SMOKING_STATUS: 1

## 2024-04-19 ASSESSMENT — ENCOUNTER SYMPTOMS: SHORTNESS OF BREATH: 0

## 2024-04-19 NOTE — H&P
Gastroenterology Outpatient History and Physical     Patient: Tammi Ball MRN: [id number] Sex: female   YOB: 1975 Age: 49 y.o. Location: [facility]    Date:4/19/2024  Primary Care Physician: Mishel Ag, APRN - CNP         Patient: Tammi Ball    Physician: Herbert Mattson MD    History of Present Illness: Colon cancer screening  Review of Systems:  Weight Loss: No  Dysphagia: No  Dyspepsia: No  History:  Past Medical History:   Diagnosis Date    ADHD (attention deficit hyperactivity disorder)     Arthritis     Back pain     Bipolar disorder (HCC)     Depression     Gastritis     Hypertension     Insomnia     Migraine     Neuropathy     Obesity     PLMD (periodic limb movement disorder)     PATIENT UNAWARE    Preoperative clearance     Prolonged emergence from general anesthesia     Sleep apnea     uses C-PAP    TMJ (temporomandibular joint syndrome)       Past Surgical History:   Procedure Laterality Date    COLPOSCOPY      ENDOMETRIAL ABLATION      FOOT SURGERY      JOINT REPLACEMENT Right 02/19/2019    RIGHT LATERAL TOTAL HIP REPLACEMENT    NERVE BLOCK N/A 10/26/2021    BILATERAL C5-C7 MEDIAL BRANCH BLOCK WITH FLUOROSCOPY performed by Sridevi Noguera MD at [facility]    PAIN MANAGEMENT PROCEDURE N/A 07/06/2021    C5C6  EPIDURAL STERIOD INJECTION WITH FLUOROSCOPY performed by Sridevi Noguera MD at [facility]    PAIN MANAGEMENT PROCEDURE Bilateral 08/31/2021    BILATERAL C5, C6, C7 MEDIAL BRANCH BLOCK WITH FLUOROSCOPY (53502, 19468)-NO STEROIDS performed by Sridevi Noguera MD at [facility]    PAIN MANAGEMENT PROCEDURE Left 01/11/2022    LEFT C4, C5, C6 RADIOFREQUENCY ABLATION WITH FLUOROSCOPY performed by Sridevi Noguera MD at [facility]    PAIN MANAGEMENT PROCEDURE Right 01/25/2022    RIGHT C-4-C-6 NERVE RADIOFREQUENCY ABLATION WITH FLUOROSCOPY performed by Sridevi Noguera MD at [facility]    ROTATOR CUFF REPAIR      SHOULDER ARTHROSCOPY Right 11/25/2019    RIGHT SHOULDER  113.4 kg (250 lb)   LMP 01/11/2010   SpO2 96%   BMI 40.35 kg/m²   Physical Exam:   Heart: regular   Lungs: non-labored breathing  Mental status:  Alert and oriented    ASA score:  ASA 2 - Patient with mild systemic disease with no functional limitations{  Mallimpati score:  2     Planned Procedure: colonoscopy    Planned Sedation:  Monitored anesthesia.    Signed By: Herbert Mattson MD   April 19, 2024

## 2024-04-19 NOTE — ANESTHESIA PRE PROCEDURE
Department of Anesthesiology  Preprocedure Note       Name:  Tammi Ball   Age:  49 y.o.  :  1975                                          MRN:  [id number]         Date:  2024      Surgeon: Surgeon(s):  Herbert Mattson MD    Procedure: Procedure(s):  COLORECTAL CANCER SCREENING, NOT HIGH RISK    Medications prior to admission:   Prior to Admission medications    Medication Sig Start Date End Date Taking? Authorizing Provider   lithium 300 MG capsule TAKE 1 CAPSULE BY MOUTH IN THE MORNING AND 2 IN THE EVENING 24   Mishel Ag APRN - CNP   hydrOXYzine pamoate (VISTARIL) 100 MG capsule TAKE 1 CAPSULE BY MOUTH NIGHTLY AS NEEDED FOR INSOMNIA 3/22/24   Glischinski, Luke A, APRN - CNP   losartan (COZAAR) 50 MG tablet Take 1 tablet by mouth once daily 24   Frantz Muñoz APRN - CNP   amLODIPine (NORVASC) 10 MG tablet Take 1 tablet by mouth once daily 24   Glischinski, Luke A, APRN - CNP   atorvastatin (LIPITOR) 10 MG tablet Take 1 tablet by mouth once daily 24   Glischinski, Luke A, APRN - CNP   lamoTRIgine (LAMICTAL) 100 MG tablet Take 1 tablet by mouth once daily 24   Mishel Ag APRN - CNP   lisdexamfetamine (VYVANSE) 70 MG capsule Take 1 capsule by mouth daily for 30 days. Fill  Max Daily Amount: 70 mg 24  Mishel Ag APRN - CNP   desvenlafaxine succinate (PRISTIQ) 50 MG TB24 extended release tablet Take 1 tablet by mouth daily 24   Mishel Ag APRN - CNP   ibuprofen (ADVIL;MOTRIN) 800 MG tablet TAKE 1 TABLET BY MOUTH THREE TIMES DAILY WITH MEALS 23   Mishel Ag APRN - CNP       Current medications:    Current Facility-Administered Medications   Medication Dose Route Frequency Provider Last Rate Last Admin   • sodium chloride flush 0.9 % injection 5-40 mL  5-40 mL IntraVENous 2 times per day Geraldine Lou MD       • sodium chloride flush 0.9 % injection 5-40 mL  5-40 mL IntraVENous

## 2024-04-19 NOTE — OP NOTE
COLONOSCOPY     Patient: Tammi Ball MRN: [id number]   YOB: 1975 Age: 49 y.o. Sex: female       Admitting Physician: CARRILLO FERNÁNDEZ     Primary Care Physician: Mishel Ag APRN - JADEN      DATE OF PROCEDURE: 4/19/2024  PROCEDURE: Colonoscopy    PREOPERATIVE DIAGNOSIS: Colon cancer screening [Z12.11]  HPI: This is a 49 y.o. year old female who presents today for colon cancer screening and screening colonoscopy.    ENDOSCOPIST: Carrillo Fernández MD    POSTOPERATIVE DIAGNOSIS:    1.  Colonoscopy completed to the cecum but incomplete colon secondary to poor prep  2.  Small ascending colon polyp, cold snared and removed    PLAN:   1.  Follow-up biopsies; the patient to contact me in 1 week for results  2.  Repeat colonoscopy with a better colon preparation      INFORMED CONSENT:  Informed consent for colonoscopy was obtained.  The benefits and risks including adverse medicine reaction and perforation have been explained.  The patient's questions were answered and the patient agreed to proceed.    ASA: ASA 2 - Patient with mild systemic disease with no functional limitations     SEDATION: MAC    The patient's vital signs, cardiac status, pulmonary status, abdominal status and mental status were stable for the procedure. The patient's vital signs and respiratory function as monitored by oxygen saturation remained stable.    COLON PREPARATION:  The patient was given a split colon preparation and the preparation was inadequate.  Large amount of retained stool in the colon both liquid and solid.  This could not be suctioned from the colon.  The prep was not adequate for colon cancer screening and polyp screening.    Procedure Details:    An anal exam was performed and this was unremarkable. A digital rectal exam was performed and no masses palpated. The Olympus videocolonoscope  was inserted in the rectum and carefully advanced to the cecum.  Retrograde examination was performed to a

## 2024-04-19 NOTE — ANESTHESIA POSTPROCEDURE EVALUATION
Department of Anesthesiology  Postprocedure Note    Patient: Tammi Ball  MRN: [id number]  YOB: 1975  Date of evaluation: 4/19/2024    Procedure Summary       Date: 04/19/24 Room / Location: [place] [address] / [facility]    Anesthesia Start: 0836 Anesthesia Stop: 0855    Procedure: COLONOSCOPY POLYPECTOMY SNARE BIOPSY Diagnosis:       Colon cancer screening      (Colon cancer screening [Z12.11])    Surgeons: Herbert Mattson MD Responsible Provider: Kriss Bell MD    Anesthesia Type: MAC ASA Status: 3            Anesthesia Type: MAC    Gilbert Phase I: Gilbert Score: 10    Gilbert Phase II: Gilbert Score: 9    Anesthesia Post Evaluation    Patient location during evaluation: PACU  Patient participation: complete - patient participated  Level of consciousness: awake  Pain score: 0  Airway patency: patent  Nausea & Vomiting: no nausea  Cardiovascular status: hemodynamically stable  Respiratory status: acceptable  Hydration status: euvolemic  Multimodal analgesia pain management approach    No notable events documented.

## 2024-04-19 NOTE — DISCHARGE INSTRUCTIONS
Discharge Instructions for Colonoscopy     Colonoscopy is a visual exam of the lining of the large intestine, also called the bowel or colon, with a colonoscope. A colonoscope is a flexible tube with a light and a viewing device. It allows the doctor to view the inside of the colon through a tiny video camera.     Colonoscopy is performed for many reasons: unexplained anemia , pain, diarrhea , bloody stools, cancer screening, among many other reasons.     Complications from a colonoscopy are rare. Some possible serious complications include perforated bowel (which might require surgery) and bleeding (which could require blood transfusion ). Minor complications include bloating, gas, and cramping that can last for 1-2 days after the procedure.     Because air is put into your colon during the procedure, it is normal to pass large amounts of air from your rectum. You may not have a bowel movement for 1-3 days after the procedure.     What You Will Need:  Someone to drive you home after the procedure     Steps to Take:  Home Care -  Rest when you get home.   Because the sedative will make you drowsy, don't drive, operate machinery, or make important decisions the day of the procedure.  Feelings of bloating, gas, or cramping may persist for 24 hours.   Diet -  Try sips of water first. If tolerated, resume bland food (scrambled eggs, toast, soup) first.  If tolerated, resume regular diet or the diet recommended by your physician.   Do not drink alcohol for 24 hours.   Physical Activity -  Ask your doctor when you will be able to return to work.   Do not drive, operate heavy machinery, or do activities that require coordination or balance for 24 hours.   Otherwise, return to your normal routine as soon as you are comfortable to do so, which is usually the next day after the procedure.   Medications - When taking medications, it's important to:   Take your medication as directed, not more, not less, not at a different  time.   Do not stop taking them without consulting your healthcare provider.   Don't share them with anyone else.   Know what effects and side effects to expect, and report them to your healthcare provider.   If you are taking more than one drug, even if it is an over-the-counter medication, herb, or dietary supplement, be sure to check with a physician or pharmacist about drug interactions.   Plan ahead for refills so you don't run out.   Lifestyle Changes - The results of your colonoscopy will determine if any lifestyle changes are necessary.     Follow-up:  The doctor will usually give you a preliminary report after the medication wears off and you are more alert. The results from a biopsy can take as long as 1-2 weeks to be completed.   Schedule a follow-up appointment as directed by your doctor.   You should schedule a follow-up colonoscopy as recommended by your doctor.     Call Your Doctor If Any of the Following Occurs:  Bleeding from your rectum; notify your doctor if you pass a teaspoonful or more of blood   Black, tarry stools   Severe abdominal pain   Hard, swollen abdomen   Signs of infection, including fever or chills   Inability to pass gas or stool   Coughing, shortness of breath, chest pain, severe nausea or vomiting     In case of an emergency, call 911 immediately.     Await pathology.  Repeat colonoscopy with better colon preparation    Herbert Mattson MD    With questions or concerns, call  @EXT@ at .

## 2024-04-22 RX ORDER — ATORVASTATIN CALCIUM 10 MG/1
TABLET, FILM COATED ORAL
Qty: 90 TABLET | Refills: 0 | Status: SHIPPED | OUTPATIENT
Start: 2024-04-22

## 2024-04-22 RX ORDER — LOSARTAN POTASSIUM 50 MG/1
TABLET ORAL
Qty: 30 TABLET | Refills: 0 | Status: SHIPPED | OUTPATIENT
Start: 2024-04-22

## 2024-04-23 NOTE — RESULT ENCOUNTER NOTE
Incomplete colonoscopy secondary to poor prep showed a small ascending colon adenoma which was removed.  I have recommended a repeat colonoscopy with a better colon preparation.

## 2024-04-24 RX ORDER — AMLODIPINE BESYLATE 10 MG/1
TABLET ORAL
Qty: 90 TABLET | Refills: 0 | Status: SHIPPED | OUTPATIENT
Start: 2024-04-24

## 2024-04-24 RX ORDER — HYDROXYZINE PAMOATE 100 MG
CAPSULE ORAL
Qty: 30 CAPSULE | Refills: 0 | Status: SHIPPED | OUTPATIENT
Start: 2024-04-24

## 2024-04-24 RX ORDER — LOSARTAN POTASSIUM 50 MG/1
TABLET ORAL
Qty: 30 TABLET | Refills: 0 | OUTPATIENT
Start: 2024-04-24

## 2024-04-25 ENCOUNTER — TELEPHONE (OUTPATIENT)
Dept: FAMILY MEDICINE CLINIC | Age: 49
End: 2024-04-25

## 2024-04-25 DIAGNOSIS — F31.61 BIPOLAR DISORDER, CURRENT EPISODE MIXED, MILD (HCC): ICD-10-CM

## 2024-04-25 RX ORDER — LAMOTRIGINE 100 MG/1
100 TABLET ORAL DAILY
Qty: 90 TABLET | Refills: 0 | Status: SHIPPED | OUTPATIENT
Start: 2024-04-25

## 2024-05-28 RX ORDER — HYDROXYZINE PAMOATE 100 MG
CAPSULE ORAL
Qty: 30 CAPSULE | Refills: 0 | Status: SHIPPED | OUTPATIENT
Start: 2024-05-28

## 2024-06-03 DIAGNOSIS — F31.9 BIPOLAR DISORDER WITH DEPRESSION (HCC): ICD-10-CM

## 2024-06-03 DIAGNOSIS — F33.1 MODERATE EPISODE OF RECURRENT MAJOR DEPRESSIVE DISORDER (HCC): ICD-10-CM

## 2024-06-03 RX ORDER — LOSARTAN POTASSIUM 50 MG/1
TABLET ORAL
Qty: 30 TABLET | Refills: 0 | Status: SHIPPED | OUTPATIENT
Start: 2024-06-03

## 2024-06-03 RX ORDER — DESVENLAFAXINE SUCCINATE 50 MG/1
TABLET, EXTENDED RELEASE ORAL DAILY
Qty: 30 TABLET | Refills: 2 | Status: SHIPPED | OUTPATIENT
Start: 2024-06-03

## 2024-06-24 RX ORDER — HYDROXYZINE PAMOATE 100 MG
CAPSULE ORAL
Qty: 30 CAPSULE | Refills: 0 | Status: SHIPPED | OUTPATIENT
Start: 2024-06-24

## 2024-07-01 ENCOUNTER — PATIENT MESSAGE (OUTPATIENT)
Dept: FAMILY MEDICINE CLINIC | Age: 49
End: 2024-07-01

## 2024-07-01 DIAGNOSIS — F51.04 PSYCHOPHYSIOLOGICAL INSOMNIA: ICD-10-CM

## 2024-07-01 RX ORDER — ZOLPIDEM TARTRATE 12.5 MG/1
12.5 TABLET, FILM COATED, EXTENDED RELEASE ORAL NIGHTLY PRN
Qty: 30 TABLET | Refills: 0 | Status: SHIPPED | OUTPATIENT
Start: 2024-07-01 | End: 2024-07-31

## 2024-07-01 NOTE — TELEPHONE ENCOUNTER
From: Tammi Ball  To: Mishel Ag  Sent: 7/1/2024 7:01 AM EDT  Subject: Ambien    Please call me in my Ambien. I have not slept in days but a couple hours a night

## 2024-07-10 NOTE — PROGRESS NOTES
[facility] ENDOSCOPY COLONOSCOPY PRE-OPERATIVE INSTRUCTIONS    Procedure date_07/12/2024________Arrival time__0700__________        Surgery time___0800_________       Clear liquids the day before the procedure. Do not eat or drink anything within 5 hours of your procedure.    This includes water chewing gum, mints and ice chips.   You may brush your teeth and gargle the morning of your surgery, but do not swallow the water    You may be asked to stop blood thinners such as Coumadin, Plavix, Fragmin, Lovenox, etc., or any anti-inflammatories such as:  Aspirin, Ibuprofen, Advil, Naproxen prior to your procedure.   We also ask that you stop any OTC medications such as fish oil, vitamin E, glucosamine, garlic, Multivitamins, COQ 10, etc.    You must make arrangements for a responsible adult to arrive with you and stay in our waiting area during your procedure.  They will also need to take you home after your procedure.    For your safety you will not be allowed to leave alone or drive yourself home.    Also for your safety, it is strongly suggested that someone stay with you the first 24 hours after your procedure.    For your comfort, please wear simple loose fitting clothing to the center.  Please do not bring valuables.      If you have a living will and a durable power of  for healthcare, please bring in a copy.     You will need to bring a photo ID and insurance card    Our goal is to provide you with excellent care so if you have any questions, please contact us at the [facility] at [phone]         Please note these are generalized instructions for all colonoscopy cases, you may be provided with more specific instructions if necessary

## 2024-07-11 ENCOUNTER — ANESTHESIA EVENT (OUTPATIENT)
Dept: ENDOSCOPY | Age: 49
End: 2024-07-11
Payer: COMMERCIAL

## 2024-07-12 ENCOUNTER — HOSPITAL ENCOUNTER (OUTPATIENT)
Age: 49
Setting detail: OUTPATIENT SURGERY
Discharge: HOME | End: 2024-07-12
Attending: INTERNAL MEDICINE | Admitting: INTERNAL MEDICINE
Payer: COMMERCIAL

## 2024-07-12 ENCOUNTER — ANESTHESIA (OUTPATIENT)
Dept: ENDOSCOPY | Age: 49
End: 2024-07-12
Payer: COMMERCIAL

## 2024-07-12 VITALS
HEIGHT: 66 IN | WEIGHT: 254 LBS | TEMPERATURE: 97.4 F | DIASTOLIC BLOOD PRESSURE: 79 MMHG | SYSTOLIC BLOOD PRESSURE: 123 MMHG | OXYGEN SATURATION: 99 % | RESPIRATION RATE: 16 BRPM | HEART RATE: 64 BPM | BODY MASS INDEX: 40.82 KG/M2

## 2024-07-12 DIAGNOSIS — Z12.11 COLON CANCER SCREENING: ICD-10-CM

## 2024-07-12 PROCEDURE — 7100000010 HC PHASE II RECOVERY - FIRST 15 MIN: Performed by: INTERNAL MEDICINE

## 2024-07-12 PROCEDURE — 3700000000 HC ANESTHESIA ATTENDED CARE: Performed by: INTERNAL MEDICINE

## 2024-07-12 PROCEDURE — 7100000011 HC PHASE II RECOVERY - ADDTL 15 MIN: Performed by: INTERNAL MEDICINE

## 2024-07-12 PROCEDURE — 3700000001 HC ADD 15 MINUTES (ANESTHESIA): Performed by: INTERNAL MEDICINE

## 2024-07-12 PROCEDURE — 6360000002 HC RX W HCPCS

## 2024-07-12 PROCEDURE — 3609010600 HC COLONOSCOPY POLYPECTOMY SNARE/COLD BIOPSY: Performed by: INTERNAL MEDICINE

## 2024-07-12 PROCEDURE — 88305 TISSUE EXAM BY PATHOLOGIST: CPT

## 2024-07-12 PROCEDURE — 2500000003 HC RX 250 WO HCPCS

## 2024-07-12 PROCEDURE — 2709999900 HC NON-CHARGEABLE SUPPLY: Performed by: INTERNAL MEDICINE

## 2024-07-12 PROCEDURE — 2580000003 HC RX 258: Performed by: ANESTHESIOLOGY

## 2024-07-12 RX ORDER — MEPERIDINE HYDROCHLORIDE 25 MG/ML
12.5 INJECTION INTRAMUSCULAR; INTRAVENOUS; SUBCUTANEOUS EVERY 5 MIN PRN
Status: DISCONTINUED | OUTPATIENT
Start: 2024-07-12 | End: 2024-07-12 | Stop reason: HOSPADM

## 2024-07-12 RX ORDER — SODIUM CHLORIDE 0.9 % (FLUSH) 0.9 %
5-40 SYRINGE (ML) INJECTION PRN
Status: DISCONTINUED | OUTPATIENT
Start: 2024-07-12 | End: 2024-07-12 | Stop reason: HOSPADM

## 2024-07-12 RX ORDER — SODIUM CHLORIDE 9 MG/ML
INJECTION, SOLUTION INTRAVENOUS PRN
Status: DISCONTINUED | OUTPATIENT
Start: 2024-07-12 | End: 2024-07-12 | Stop reason: HOSPADM

## 2024-07-12 RX ORDER — PROPOFOL 10 MG/ML
INJECTION, EMULSION INTRAVENOUS PRN
Status: DISCONTINUED | OUTPATIENT
Start: 2024-07-12 | End: 2024-07-12 | Stop reason: SDUPTHER

## 2024-07-12 RX ORDER — DIPHENHYDRAMINE HYDROCHLORIDE 50 MG/ML
12.5 INJECTION INTRAMUSCULAR; INTRAVENOUS
Status: DISCONTINUED | OUTPATIENT
Start: 2024-07-12 | End: 2024-07-12 | Stop reason: HOSPADM

## 2024-07-12 RX ORDER — SODIUM CHLORIDE 0.9 % (FLUSH) 0.9 %
5-40 SYRINGE (ML) INJECTION EVERY 12 HOURS SCHEDULED
Status: DISCONTINUED | OUTPATIENT
Start: 2024-07-12 | End: 2024-07-12 | Stop reason: HOSPADM

## 2024-07-12 RX ORDER — LIDOCAINE HYDROCHLORIDE 20 MG/ML
INJECTION, SOLUTION EPIDURAL; INFILTRATION; INTRACAUDAL; PERINEURAL PRN
Status: DISCONTINUED | OUTPATIENT
Start: 2024-07-12 | End: 2024-07-12 | Stop reason: SDUPTHER

## 2024-07-12 RX ORDER — FENTANYL CITRATE 50 UG/ML
25 INJECTION, SOLUTION INTRAMUSCULAR; INTRAVENOUS EVERY 5 MIN PRN
Status: DISCONTINUED | OUTPATIENT
Start: 2024-07-12 | End: 2024-07-12 | Stop reason: HOSPADM

## 2024-07-12 RX ORDER — LABETALOL HYDROCHLORIDE 5 MG/ML
5 INJECTION, SOLUTION INTRAVENOUS
Status: DISCONTINUED | OUTPATIENT
Start: 2024-07-12 | End: 2024-07-12 | Stop reason: HOSPADM

## 2024-07-12 RX ORDER — NALOXONE HYDROCHLORIDE 0.4 MG/ML
INJECTION, SOLUTION INTRAMUSCULAR; INTRAVENOUS; SUBCUTANEOUS PRN
Status: DISCONTINUED | OUTPATIENT
Start: 2024-07-12 | End: 2024-07-12 | Stop reason: HOSPADM

## 2024-07-12 RX ORDER — ONDANSETRON 2 MG/ML
4 INJECTION INTRAMUSCULAR; INTRAVENOUS
Status: DISCONTINUED | OUTPATIENT
Start: 2024-07-12 | End: 2024-07-12 | Stop reason: HOSPADM

## 2024-07-12 RX ADMIN — PROPOFOL 100 MG: 10 INJECTION, EMULSION INTRAVENOUS at 08:23

## 2024-07-12 RX ADMIN — LIDOCAINE HYDROCHLORIDE 60 MG: 20 INJECTION, SOLUTION EPIDURAL; INFILTRATION; INTRACAUDAL; PERINEURAL at 08:23

## 2024-07-12 RX ADMIN — PROPOFOL 180 MCG/KG/MIN: 10 INJECTION, EMULSION INTRAVENOUS at 08:24

## 2024-07-12 RX ADMIN — SODIUM CHLORIDE: 9 INJECTION, SOLUTION INTRAVENOUS at 07:38

## 2024-07-12 ASSESSMENT — LIFESTYLE VARIABLES: SMOKING_STATUS: 0

## 2024-07-12 ASSESSMENT — PAIN - FUNCTIONAL ASSESSMENT
PAIN_FUNCTIONAL_ASSESSMENT: NONE - DENIES PAIN

## 2024-07-12 ASSESSMENT — ENCOUNTER SYMPTOMS: SHORTNESS OF BREATH: 0

## 2024-07-12 NOTE — ANESTHESIA PRE PROCEDURE
Department of Anesthesiology  Preprocedure Note       Name:  Tammi Ball   Age:  49 y.o.  :  1975                                          MRN:  [id number]         Date:  2024      Surgeon: Surgeon(s):  Herbert Mattson MD    Procedure: Procedure(s):  COLORECTAL CANCER SCREENING, NOT HIGH RISK    Medications prior to admission:   Prior to Admission medications    Medication Sig Start Date End Date Taking? Authorizing Provider   zolpidem (AMBIEN CR) 12.5 MG extended release tablet Take 1 tablet by mouth nightly as needed for Sleep for up to 30 days. 24  Mishel Ag APRN - CNP   hydrOXYzine pamoate (VISTARIL) 100 MG capsule TAKE 1 CAPSULE BY MOUTH NIGHTLY AS NEEDED FOR INSOMNIA 24   Mishel Ag APRN - CNP   losartan (COZAAR) 50 MG tablet Take 1 tablet by mouth once daily 6/3/24   Mishel Ag APRN - CNP   desvenlafaxine succinate (PRISTIQ) 50 MG TB24 extended release tablet Take 1 tablet by mouth once daily 6/3/24   Mishel Ag APRN - CNP   lamoTRIgine (LAMICTAL) 100 MG tablet Take 1 tablet by mouth daily 24   Glischinski, Luke A, APRN - CNP   amLODIPine (NORVASC) 10 MG tablet Take 1 tablet by mouth once daily 24   Mishel Ag APRN - CNP   atorvastatin (LIPITOR) 10 MG tablet Take 1 tablet by mouth once daily 24   Glischinski, Luke A, APRN - CNP   lithium 300 MG capsule TAKE 1 CAPSULE BY MOUTH IN THE MORNING AND 2 IN THE EVENING 24   Mishel Ag APRN - CNP   lisdexamfetamine (VYVANSE) 70 MG capsule Take 1 capsule by mouth daily for 30 days. Fill  Max Daily Amount: 70 mg 24  Mishel Ag APRN - CNP   ibuprofen (ADVIL;MOTRIN) 800 MG tablet TAKE 1 TABLET BY MOUTH THREE TIMES DAILY WITH MEALS 23   Kimberli, Mishel Valeria, APRN - CNP       Current medications:    Current Facility-Administered Medications   Medication Dose Route Frequency Provider Last Rate Last

## 2024-07-12 NOTE — OP NOTE
COLONOSCOPY     Patient: Tammi Ball MRN: [id number]   YOB: 1975 Age: 49 y.o. Sex: female       Admitting Physician: CARRILLO FERNÁNDEZ     Primary Care Physician: Mishel Ag APRN - JADEN      DATE OF PROCEDURE: 7/12/2024  PROCEDURE: Colonoscopy    PREOPERATIVE DIAGNOSIS: Colon cancer screening [Z12.11]  HPI: This is a 49 y.o. year old female who presents today for colon cancer screening and screening colonoscopy.    ENDOSCOPIST: Carrillo Fernández MD    POSTOPERATIVE DIAGNOSIS:    1.  Small sigmoid colon polyp, cold snared and removed  2.  Cecal diverticulum    PLAN:   1.  Follow-up biopsies; the patient to contact me in 1 week for results  2.  Tentative surveillance colonoscopy in 7 years    INFORMED CONSENT:  Informed consent for colonoscopy was obtained.  The benefits and risks including adverse medicine reaction and perforation have been explained.  The patient's questions were answered and the patient agreed to proceed.    ASA: ASA 2 - Patient with mild systemic disease with no functional limitations     SEDATION: MAC    The patient's vital signs, cardiac status, pulmonary status, abdominal status and mental status were stable for the procedure. The patient's vital signs and respiratory function as monitored by oxygen saturation remained stable.    COLON PREPARATION:  The patient was given a split colon preparation and the preparation was adequate.    Procedure Details:    An anal exam was performed and this was unremarkable. A digital rectal exam was performed and no masses palpated. The Olympus videocolonoscope  was inserted in the rectum and carefully advanced to the cecum as identified by IC valve, crow's foot appearance and appendix. The cecum was photodocumented.  The colonoscope was slowly withdrawn and retrograde examination of the colon was carefully performed with inspection around and between folds. The ascending colon and cecum were intubated twice with repeat

## 2024-07-12 NOTE — ANESTHESIA POSTPROCEDURE EVALUATION
Department of Anesthesiology  Postprocedure Note    Patient: Tammi Ball  MRN: [id number]  YOB: 1975  Date of evaluation: 7/12/2024    Procedure Summary       Date: 07/12/24 Room / Location: [address]    Anesthesia Start: 0818 Anesthesia Stop: 0842    Procedure: COLONOSCOPY POLYPECTOMY SNARE/BIOPSY Diagnosis:       Colon cancer screening      (Colon cancer screening [Z12.11])    Surgeons: Herbert Mattson MD Responsible Provider: Kriss Bell MD    Anesthesia Type: MAC ASA Status: 3            Anesthesia Type: No value filed.    Gilbert Phase I: Gilbert Score: 10    Gilbert Phase II: Gilbert Score: 10    Anesthesia Post Evaluation    Patient location during evaluation: PACU  Patient participation: complete - patient participated  Level of consciousness: awake  Pain score: 0  Airway patency: patent  Nausea & Vomiting: no nausea  Cardiovascular status: hemodynamically stable  Respiratory status: acceptable  Hydration status: euvolemic  Multimodal analgesia pain management approach    No notable events documented.

## 2024-07-12 NOTE — H&P
Gastroenterology Outpatient History and Physical     Patient: Tammi Ball MRN: [id number] Sex: female   YOB: 1975 Age: 49 y.o. Location: [facility]    Date:7/12/2024  Primary Care Physician: Mishel Ag, APRN - CNP         Patient: Tammi Ball    Physician: Herbert Mattson MD    History of Present Illness: Colon cancer screening  Review of Systems:  Weight Loss: No  Dysphagia: No  Dyspepsia: No  History:  Past Medical History:   Diagnosis Date    ADHD (attention deficit hyperactivity disorder)     Arthritis     Back pain     Bipolar disorder (HCC)     Depression     Gastritis     Hypertension     Insomnia     Migraine     Neuropathy     Obesity     PLMD (periodic limb movement disorder)     PATIENT UNAWARE    Preoperative clearance     Prolonged emergence from general anesthesia     Sleep apnea     uses C-PAP    TMJ (temporomandibular joint syndrome)       Past Surgical History:   Procedure Laterality Date    COLONOSCOPY N/A 4/19/2024    COLONOSCOPY POLYPECTOMY SNARE BIOPSY performed by Herbert Mattson MD at [facility] ENDOSCOPY    COLPOSCOPY      ENDOMETRIAL ABLATION      FOOT SURGERY      JOINT REPLACEMENT Right 02/19/2019    RIGHT LATERAL TOTAL HIP REPLACEMENT    NERVE BLOCK N/A 10/26/2021    BILATERAL C5-C7 MEDIAL BRANCH BLOCK WITH FLUOROSCOPY performed by Sridevi Noguera MD at [facility]    PAIN MANAGEMENT PROCEDURE N/A 07/06/2021    C5C6  EPIDURAL STERIOD INJECTION WITH FLUOROSCOPY performed by Sridevi Noguera MD at [facility]    PAIN MANAGEMENT PROCEDURE Bilateral 08/31/2021    BILATERAL C5, C6, C7 MEDIAL BRANCH BLOCK WITH FLUOROSCOPY (83428, 77300)-NO STEROIDS performed by Sridevi Noguera MD at [facility]    PAIN MANAGEMENT PROCEDURE Left 01/11/2022    LEFT C4, C5, C6 RADIOFREQUENCY ABLATION WITH FLUOROSCOPY performed by Sridevi Noguera MD at [facility]    PAIN MANAGEMENT PROCEDURE Right 01/25/2022    RIGHT C-4-C-6 NERVE RADIOFREQUENCY ABLATION WITH FLUOROSCOPY  Sclerosis Father     Alcohol Abuse Sister     High Cholesterol Brother     Diabetes Brother     Obesity Brother     Arthritis Brother     Hypertension Brother     Elevated Lipids Brother     COPD Brother     Heart Failure Brother     Other Maternal Grandmother         hips replacement    Obesity Maternal Grandmother     Diabetes Maternal Grandmother     Breast Cancer Maternal Grandmother     No Known Problems Maternal Grandfather     No Known Problems Paternal Grandmother     Diabetes Paternal Grandfather     Arthritis Sister     Depression Daughter      Allergies:   Allergies   Allergen Reactions    Imitrex [Sumatriptan] Nausea Only     nausea     Medications:   Prior to Admission medications    Medication Sig Start Date End Date Taking? Authorizing Provider   zolpidem (AMBIEN CR) 12.5 MG extended release tablet Take 1 tablet by mouth nightly as needed for Sleep for up to 30 days. 7/1/24 7/31/24  Mishel Ag APRN - CNP   hydrOXYzine pamoate (VISTARIL) 100 MG capsule TAKE 1 CAPSULE BY MOUTH NIGHTLY AS NEEDED FOR INSOMNIA 6/24/24   Mishel Ag APRN - CNP   losartan (COZAAR) 50 MG tablet Take 1 tablet by mouth once daily 6/3/24   Mishel Ag APRN - CNP   desvenlafaxine succinate (PRISTIQ) 50 MG TB24 extended release tablet Take 1 tablet by mouth once daily 6/3/24   Mishel Ag APRN - CNP   lamoTRIgine (LAMICTAL) 100 MG tablet Take 1 tablet by mouth daily 4/25/24   Glischinski, Luke A, APRN - CNP   amLODIPine (NORVASC) 10 MG tablet Take 1 tablet by mouth once daily 4/24/24   Mishel Ag APRN - CNP   atorvastatin (LIPITOR) 10 MG tablet Take 1 tablet by mouth once daily 4/22/24   Glischinski, Luke A, APRN - CNP   lithium 300 MG capsule TAKE 1 CAPSULE BY MOUTH IN THE MORNING AND 2 IN THE EVENING 4/18/24   Mishel Ag APRN - CNP   lisdexamfetamine (VYVANSE) 70 MG capsule Take 1 capsule by mouth daily for 30 days. Fill 4/12 Max Daily Amount: 70 mg

## 2024-07-16 NOTE — PATIENT INSTRUCTIONS

## 2024-07-17 ENCOUNTER — OFFICE VISIT (OUTPATIENT)
Dept: FAMILY MEDICINE CLINIC | Age: 49
End: 2024-07-17
Payer: COMMERCIAL

## 2024-07-17 VITALS
HEIGHT: 66 IN | SYSTOLIC BLOOD PRESSURE: 122 MMHG | HEART RATE: 80 BPM | DIASTOLIC BLOOD PRESSURE: 80 MMHG | BODY MASS INDEX: 41.78 KG/M2 | OXYGEN SATURATION: 97 % | WEIGHT: 260 LBS

## 2024-07-17 DIAGNOSIS — F90.0 ATTENTION DEFICIT HYPERACTIVITY DISORDER (ADHD), PREDOMINANTLY INATTENTIVE TYPE: ICD-10-CM

## 2024-07-17 DIAGNOSIS — F51.04 PSYCHOPHYSIOLOGICAL INSOMNIA: ICD-10-CM

## 2024-07-17 DIAGNOSIS — F31.9 BIPOLAR DISORDER WITH DEPRESSION (HCC): Primary | ICD-10-CM

## 2024-07-17 DIAGNOSIS — Z87.891 PERSONAL HISTORY OF TOBACCO USE: ICD-10-CM

## 2024-07-17 PROCEDURE — G8427 DOCREV CUR MEDS BY ELIG CLIN: HCPCS | Performed by: NURSE PRACTITIONER

## 2024-07-17 PROCEDURE — 3074F SYST BP LT 130 MM HG: CPT | Performed by: NURSE PRACTITIONER

## 2024-07-17 PROCEDURE — 4004F PT TOBACCO SCREEN RCVD TLK: CPT | Performed by: NURSE PRACTITIONER

## 2024-07-17 PROCEDURE — 3079F DIAST BP 80-89 MM HG: CPT | Performed by: NURSE PRACTITIONER

## 2024-07-17 PROCEDURE — G9899 SCRN MAM PERF RSLTS DOC: HCPCS | Performed by: NURSE PRACTITIONER

## 2024-07-17 PROCEDURE — G8417 CALC BMI ABV UP PARAM F/U: HCPCS | Performed by: NURSE PRACTITIONER

## 2024-07-17 PROCEDURE — 99214 OFFICE O/P EST MOD 30 MIN: CPT | Performed by: NURSE PRACTITIONER

## 2024-07-17 PROCEDURE — G0296 VISIT TO DETERM LDCT ELIG: HCPCS | Performed by: NURSE PRACTITIONER

## 2024-07-17 PROCEDURE — 3017F COLORECTAL CA SCREEN DOC REV: CPT | Performed by: NURSE PRACTITIONER

## 2024-07-17 RX ORDER — LISDEXAMFETAMINE DIMESYLATE 70 MG/1
70 CAPSULE ORAL DAILY
Qty: 30 CAPSULE | Refills: 0 | Status: SHIPPED | OUTPATIENT
Start: 2024-08-31 | End: 2024-09-30

## 2024-07-17 RX ORDER — LAMOTRIGINE 100 MG/1
100 TABLET ORAL DAILY
Qty: 90 TABLET | Refills: 0 | Status: SHIPPED | OUTPATIENT
Start: 2024-07-17

## 2024-07-17 RX ORDER — LISDEXAMFETAMINE DIMESYLATE 70 MG/1
70 CAPSULE ORAL DAILY
Qty: 30 CAPSULE | Refills: 0 | Status: SHIPPED | OUTPATIENT
Start: 2024-09-30 | End: 2024-10-30

## 2024-07-17 RX ORDER — LISDEXAMFETAMINE DIMESYLATE 70 MG/1
70 CAPSULE ORAL DAILY
Qty: 30 CAPSULE | Refills: 0 | Status: SHIPPED | OUTPATIENT
Start: 2024-08-01 | End: 2024-08-31

## 2024-07-17 RX ORDER — LITHIUM CARBONATE 300 MG/1
600 CAPSULE ORAL 2 TIMES DAILY WITH MEALS
Qty: 360 CAPSULE | Refills: 1 | Status: SHIPPED | OUTPATIENT
Start: 2024-07-17 | End: 2024-10-15

## 2024-07-17 RX ORDER — ZOLPIDEM TARTRATE 12.5 MG/1
12.5 TABLET, FILM COATED, EXTENDED RELEASE ORAL NIGHTLY PRN
Qty: 90 TABLET | Refills: 1 | Status: SHIPPED | OUTPATIENT
Start: 2024-08-01 | End: 2024-10-30

## 2024-07-17 NOTE — PROGRESS NOTES
depression and other.    Diagnoses and all orders for this visit:    Bipolar disorder with depression (HCC)  NOT WELL CONTROLLED AT THIS TIME  WILL INCREASE LITHIUM DOSE  -     lithium 300 MG capsule; Take 2 capsules by mouth 2 times daily (with meals)  RECHECK  LITHIUM LEVEL AT NEXT VISIT  -     lamoTRIgine (LAMICTAL) 100 MG tablet; Take 1 tablet by mouth daily  Psychophysiological insomnia  STABLE ON CURRENT MEDICATION  CONTINUE  zolpidem (AMBIEN CR) 12.5 MG extended release tablet; Take 1 tablet by mouth nightly as needed for Sleep for up to 90 days. Max Daily Amount: 12.5 mg  Controlled substances monitoring: possible medication side effects, risk of tolerance and/or dependence, and alternative treatments discussed, no signs of potential drug abuse or diversion identified, and [place] report reviewed today- activity consistent with treatment plan.     Attention deficit hyperactivity disorder (ADHD), predominantly inattentive type  STABLE ON CURRENT MEDICATION   CONTINUE VYVANSE AS PRESCRIBED  Controlled substances monitoring: possible medication side effects, risk of tolerance and/or dependence, and alternative treatments discussed, no signs of potential drug abuse or diversion identified, and [place] report reviewed today- activity consistent with treatment plan.   -     lisdexamfetamine (VYVANSE) 70 MG capsule; Take 1 capsule by mouth daily for 30 days. Max Daily Amount: 70 mg  -     lisdexamfetamine (VYVANSE) 70 MG capsule; Take 1 capsule by mouth daily for 30 days. Max Daily Amount: 70 mg  -     lisdexamfetamine (VYVANSE) 70 MG capsule; Take 1 capsule by mouth daily for 30 days. Max Daily Amount: 70 mg  FOLLOW UP IN THREE MONTHS  Personal history of tobacco use  ENCOURAGED TO STOP SMOKING- NOT READY AT THIS TIME  -     IN VISIT TO DISCUSS LUNG CA SCREEN W LDCT  -     CT Lung Screen (Initial/Annual/Baseline); Future                   An electronic signature was used to authenticate this note.    --Mishel Shaw

## 2024-07-25 RX ORDER — HYDROXYZINE PAMOATE 100 MG
CAPSULE ORAL
Qty: 30 CAPSULE | Refills: 0 | Status: SHIPPED | OUTPATIENT
Start: 2024-07-25

## 2024-08-04 RX ORDER — AMLODIPINE BESYLATE 10 MG/1
TABLET ORAL
Qty: 90 TABLET | Refills: 0 | Status: SHIPPED | OUTPATIENT
Start: 2024-08-04

## 2024-08-07 ENCOUNTER — TELEPHONE (OUTPATIENT)
Dept: FAMILY MEDICINE CLINIC | Age: 49
End: 2024-08-07

## 2024-08-07 DIAGNOSIS — F33.1 MODERATE EPISODE OF RECURRENT MAJOR DEPRESSIVE DISORDER (HCC): ICD-10-CM

## 2024-08-07 DIAGNOSIS — F31.9 BIPOLAR DISORDER WITH DEPRESSION (HCC): ICD-10-CM

## 2024-08-07 RX ORDER — DESVENLAFAXINE SUCCINATE 50 MG/1
TABLET, EXTENDED RELEASE ORAL
Qty: 30 TABLET | Refills: 2 | Status: SHIPPED | OUTPATIENT
Start: 2024-08-07

## 2024-08-08 ENCOUNTER — E-VISIT (OUTPATIENT)
Dept: FAMILY MEDICINE CLINIC | Age: 49
End: 2024-08-08
Payer: COMMERCIAL

## 2024-08-08 DIAGNOSIS — J32.9 VIRAL SINUSITIS: Primary | ICD-10-CM

## 2024-08-08 DIAGNOSIS — B97.89 VIRAL SINUSITIS: Primary | ICD-10-CM

## 2024-08-08 PROCEDURE — 99422 OL DIG E/M SVC 11-20 MIN: CPT

## 2024-08-08 RX ORDER — METHYLPREDNISOLONE 4 MG/1
TABLET ORAL
Qty: 1 KIT | Refills: 0 | Status: SHIPPED | OUTPATIENT
Start: 2024-08-08 | End: 2024-08-14

## 2024-08-08 ASSESSMENT — LIFESTYLE VARIABLES
SMOKING_YEARS: 35
SMOKING_STATUS: YES

## 2024-08-08 NOTE — PROGRESS NOTES
1. Viral sinusitis  -Presentation consistent with viral sinusitis.  Less suspicious for bacterial sinusitis at this time.  -Treatment options discussed.  Medrol Dosepak as being sent to the pharmacy.   The medication uses and side effects were discussed with the patient.  Patient verbalized understanding and agrees to the plan.  -Can use over-the-counter's that are BP safe for symptomatic management.   -Can consider antibiotic or inhalers if no improvement after 7 days.    -Follow-up as needed.  - methylPREDNISolone (MEDROL DOSEPACK) 4 MG tablet; Take by mouth.  Dispense: 1 kit; Refill: 0      --Luke A Glischinski, APRN - CNP      Please note that this chart was generated using dragon dictation software.  Although every effort was made to ensure the accuracy of this automated transcription, some errors in transcription may have occurred.    A total of 11-20 minutes were spent on the entirety of this E-visit.

## 2024-08-13 ENCOUNTER — OFFICE VISIT (OUTPATIENT)
Age: 49
End: 2024-08-13

## 2024-08-13 VITALS
SYSTOLIC BLOOD PRESSURE: 150 MMHG | TEMPERATURE: 98.3 F | DIASTOLIC BLOOD PRESSURE: 74 MMHG | HEART RATE: 62 BPM | OXYGEN SATURATION: 96 % | RESPIRATION RATE: 18 BRPM

## 2024-08-13 DIAGNOSIS — I10 ELEVATED BLOOD PRESSURE READING WITH DIAGNOSIS OF HYPERTENSION: ICD-10-CM

## 2024-08-13 DIAGNOSIS — J01.90 ACUTE NON-RECURRENT SINUSITIS, UNSPECIFIED LOCATION: Primary | ICD-10-CM

## 2024-08-13 DIAGNOSIS — J22 LOWER RESPIRATORY TRACT INFECTION: ICD-10-CM

## 2024-08-13 RX ORDER — DOXYCYCLINE HYCLATE 100 MG
100 TABLET ORAL 2 TIMES DAILY
Qty: 14 TABLET | Refills: 0 | Status: SHIPPED | OUTPATIENT
Start: 2024-08-13 | End: 2024-08-20

## 2024-08-13 ASSESSMENT — ENCOUNTER SYMPTOMS
NAUSEA: 0
BACK PAIN: 0
DIARRHEA: 0
VOMITING: 0
COUGH: 1
RHINORRHEA: 1
SHORTNESS OF BREATH: 1
ABDOMINAL PAIN: 0
SINUS PRESSURE: 1
SINUS PAIN: 1
WHEEZING: 0
SORE THROAT: 0

## 2024-08-13 NOTE — PATIENT INSTRUCTIONS
New Prescriptions    DOXYCYCLINE HYCLATE (VIBRA-TABS) 100 MG TABLET    Take 1 tablet by mouth 2 times daily for 7 days      Take the antibiotic as prescribed.  Continue to take the steroid pack as directed.  Encourage rest and increase fluid intake.  Follow-up with your PCP as needed.  Take tylenol and/or ibuprofen for pain/fever relief.  Return for severe/worsening symptoms.

## 2024-08-13 NOTE — PROGRESS NOTES
chills. She denies any known sick contacts. She has been mildly short of breath when coughing. She denies noting any wheezing.        Vitals:    08/13/24 0934   BP: (!) 150/74   Pulse: 62   Resp: 18   Temp: 98.3 °F (36.8 °C)   TempSrc: Oral   SpO2: 96%        Review of Systems   Constitutional:  Positive for fatigue. Negative for chills and fever.   HENT:  Positive for congestion, rhinorrhea, sinus pressure and sinus pain. Negative for ear pain, postnasal drip and sore throat.    Respiratory:  Positive for cough and shortness of breath. Negative for wheezing.    Cardiovascular:  Negative for chest pain.   Gastrointestinal:  Negative for abdominal pain, diarrhea, nausea and vomiting.   Genitourinary:  Negative for urgency.   Musculoskeletal:  Negative for back pain and neck pain.   Skin:  Negative for rash.   Neurological:  Positive for headaches. Negative for syncope.       Objective   Physical Exam  Constitutional:       Appearance: She is not ill-appearing or toxic-appearing.   HENT:      Right Ear: Tympanic membrane normal. Tympanic membrane is not injected or erythematous.      Left Ear: Tympanic membrane normal. Tympanic membrane is not injected or erythematous.      Nose:      Right Sinus: Maxillary sinus tenderness and frontal sinus tenderness present.      Left Sinus: Maxillary sinus tenderness and frontal sinus tenderness present.      Mouth/Throat:      Mouth: Mucous membranes are moist.      Pharynx: Oropharynx is clear. No pharyngeal swelling, oropharyngeal exudate or posterior oropharyngeal erythema.      Comments: No erythema, swelling, or exudate to pharynx  Eyes:      Pupils: Pupils are equal, round, and reactive to light.   Cardiovascular:      Rate and Rhythm: Normal rate and regular rhythm.      Pulses: Normal pulses.      Heart sounds: Normal heart sounds.   Pulmonary:      Effort: Pulmonary effort is normal.      Breath sounds: Normal breath sounds.      Comments: Lung sounds clear

## 2024-08-22 RX ORDER — HYDROXYZINE PAMOATE 100 MG
CAPSULE ORAL
Qty: 30 CAPSULE | Refills: 0 | Status: SHIPPED | OUTPATIENT
Start: 2024-08-22

## 2024-08-29 RX ORDER — HYDROXYZINE PAMOATE 100 MG
CAPSULE ORAL
Qty: 30 CAPSULE | Refills: 0 | OUTPATIENT
Start: 2024-08-29

## 2024-08-29 RX ORDER — LOSARTAN POTASSIUM 50 MG/1
TABLET ORAL
Qty: 30 TABLET | Refills: 0 | Status: SHIPPED | OUTPATIENT
Start: 2024-08-29

## 2024-09-09 ENCOUNTER — HOSPITAL ENCOUNTER (OUTPATIENT)
Age: 49
Discharge: HOME | End: 2024-09-09
Payer: COMMERCIAL

## 2024-09-09 ENCOUNTER — OFFICE VISIT (OUTPATIENT)
Dept: FAMILY MEDICINE CLINIC | Age: 49
End: 2024-09-09
Payer: COMMERCIAL

## 2024-09-09 ENCOUNTER — HOSPITAL ENCOUNTER (OUTPATIENT)
Dept: GENERAL RADIOLOGY | Age: 49
Discharge: HOME | End: 2024-09-09
Payer: COMMERCIAL

## 2024-09-09 VITALS
HEIGHT: 66 IN | WEIGHT: 261 LBS | BODY MASS INDEX: 41.95 KG/M2 | SYSTOLIC BLOOD PRESSURE: 138 MMHG | DIASTOLIC BLOOD PRESSURE: 78 MMHG

## 2024-09-09 DIAGNOSIS — J01.90 ACUTE BACTERIAL SINUSITIS: ICD-10-CM

## 2024-09-09 DIAGNOSIS — J18.9 PNEUMONIA OF RIGHT LOWER LOBE DUE TO INFECTIOUS ORGANISM: ICD-10-CM

## 2024-09-09 DIAGNOSIS — B96.89 ACUTE BACTERIAL SINUSITIS: ICD-10-CM

## 2024-09-09 DIAGNOSIS — Z72.0 TOBACCO ABUSE: ICD-10-CM

## 2024-09-09 DIAGNOSIS — J18.9 PNEUMONIA OF RIGHT LOWER LOBE DUE TO INFECTIOUS ORGANISM: Primary | ICD-10-CM

## 2024-09-09 PROCEDURE — 71046 X-RAY EXAM CHEST 2 VIEWS: CPT

## 2024-09-09 PROCEDURE — 3075F SYST BP GE 130 - 139MM HG: CPT

## 2024-09-09 PROCEDURE — 99406 BEHAV CHNG SMOKING 3-10 MIN: CPT

## 2024-09-09 PROCEDURE — 3078F DIAST BP <80 MM HG: CPT

## 2024-09-09 PROCEDURE — 99214 OFFICE O/P EST MOD 30 MIN: CPT

## 2024-09-09 PROCEDURE — 87428 SARSCOV & INF VIR A&B AG IA: CPT

## 2024-09-09 RX ORDER — AZITHROMYCIN 250 MG/1
TABLET, FILM COATED ORAL
Qty: 6 TABLET | Refills: 0 | Status: SHIPPED | OUTPATIENT
Start: 2024-09-09 | End: 2024-09-19

## 2024-09-09 RX ORDER — PREDNISONE 20 MG/1
40 TABLET ORAL DAILY
Qty: 10 TABLET | Refills: 0 | Status: SHIPPED | OUTPATIENT
Start: 2024-09-09 | End: 2024-09-14

## 2024-09-09 RX ORDER — TRIAMCINOLONE ACETONIDE 55 UG/1
2 SPRAY, METERED NASAL DAILY
Qty: 1 EACH | Refills: 0 | Status: SHIPPED | OUTPATIENT
Start: 2024-09-09

## 2024-09-09 ASSESSMENT — ENCOUNTER SYMPTOMS
NAUSEA: 0
ABDOMINAL PAIN: 0
TROUBLE SWALLOWING: 0
RHINORRHEA: 1
CHEST TIGHTNESS: 1
DIARRHEA: 0
WHEEZING: 1
SINUS PRESSURE: 1
CONSTIPATION: 0
COUGH: 1
SORE THROAT: 0

## 2024-09-10 LAB
ALBUMIN SERPL-MCNC: 4.7 G/DL (ref 3.4–5)
ALBUMIN/GLOB SERPL: 2.1 {RATIO} (ref 1.1–2.2)
ALP SERPL-CCNC: 83 U/L (ref 40–129)
ALT SERPL-CCNC: 30 U/L (ref 10–40)
ANION GAP SERPL CALCULATED.3IONS-SCNC: 11 MMOL/L (ref 3–16)
AST SERPL-CCNC: 20 U/L (ref 15–37)
BASOPHILS # BLD: 0.1 K/UL (ref 0–0.2)
BASOPHILS NFR BLD: 0.7 %
BILIRUB SERPL-MCNC: <0.2 MG/DL (ref 0–1)
BUN SERPL-MCNC: 13 MG/DL (ref 7–20)
CALCIUM SERPL-MCNC: 10.5 MG/DL (ref 8.3–10.6)
CHLORIDE SERPL-SCNC: 104 MMOL/L (ref 99–110)
CO2 SERPL-SCNC: 25 MMOL/L (ref 21–32)
CREAT SERPL-MCNC: 0.8 MG/DL (ref 0.6–1.1)
CRP SERPL-MCNC: <3 MG/L (ref 0–5.1)
DEPRECATED RDW RBC AUTO: 12.7 % (ref 12.4–15.4)
EOSINOPHIL # BLD: 0.1 K/UL (ref 0–0.6)
EOSINOPHIL NFR BLD: 1.6 %
GFR SERPLBLD CREATININE-BSD FMLA CKD-EPI: 89 ML/MIN/{1.73_M2}
GLUCOSE SERPL-MCNC: 107 MG/DL (ref 70–99)
HCT VFR BLD AUTO: 37 % (ref 36–48)
HGB BLD-MCNC: 12.6 G/DL (ref 12–16)
LYMPHOCYTES # BLD: 2.6 K/UL (ref 1–5.1)
LYMPHOCYTES NFR BLD: 29.4 %
MCH RBC QN AUTO: 31.2 PG (ref 26–34)
MCHC RBC AUTO-ENTMCNC: 34 G/DL (ref 31–36)
MCV RBC AUTO: 91.7 FL (ref 80–100)
MONOCYTES # BLD: 0.5 K/UL (ref 0–1.3)
MONOCYTES NFR BLD: 6.3 %
NEUTROPHILS # BLD: 5.4 K/UL (ref 1.7–7.7)
NEUTROPHILS NFR BLD: 62 %
PLATELET # BLD AUTO: 231 K/UL (ref 135–450)
PMV BLD AUTO: 8.3 FL (ref 5–10.5)
POTASSIUM SERPL-SCNC: 4.4 MMOL/L (ref 3.5–5.1)
PROT SERPL-MCNC: 6.9 G/DL (ref 6.4–8.2)
RBC # BLD AUTO: 4.04 M/UL (ref 4–5.2)
SODIUM SERPL-SCNC: 140 MMOL/L (ref 136–145)
WBC # BLD AUTO: 8.7 K/UL (ref 4–11)

## 2024-09-17 ENCOUNTER — PATIENT MESSAGE (OUTPATIENT)
Dept: FAMILY MEDICINE CLINIC | Age: 49
End: 2024-09-17

## 2024-09-17 DIAGNOSIS — J18.9 PNEUMONIA OF RIGHT LOWER LOBE DUE TO INFECTIOUS ORGANISM: Primary | ICD-10-CM

## 2024-09-17 RX ORDER — ATORVASTATIN CALCIUM 10 MG/1
TABLET, FILM COATED ORAL
Qty: 90 TABLET | Refills: 0 | Status: SHIPPED | OUTPATIENT
Start: 2024-09-17

## 2024-09-18 RX ORDER — BENZONATATE 100 MG/1
100 CAPSULE ORAL 3 TIMES DAILY PRN
Qty: 30 CAPSULE | Refills: 0 | Status: SHIPPED | OUTPATIENT
Start: 2024-09-18 | End: 2024-09-28

## 2024-09-27 RX ORDER — HYDROXYZINE PAMOATE 100 MG
CAPSULE ORAL
Qty: 30 CAPSULE | Refills: 0 | Status: SHIPPED | OUTPATIENT
Start: 2024-09-27

## 2024-10-08 DIAGNOSIS — F90.0 ATTENTION DEFICIT HYPERACTIVITY DISORDER (ADHD), PREDOMINANTLY INATTENTIVE TYPE: ICD-10-CM

## 2024-10-09 RX ORDER — LISDEXAMFETAMINE DIMESYLATE 70 MG/1
70 CAPSULE ORAL DAILY
Qty: 30 CAPSULE | Refills: 0 | OUTPATIENT
Start: 2024-10-09 | End: 2024-11-08

## 2024-10-09 NOTE — TELEPHONE ENCOUNTER
CAN DO AN EVISIT  
LV 7/17/24 WITH CC FOR DEPRESSION NV NONE  Return in about 3 months (around 10/17/2024), or adhd.   CAN THIS BE AN E-VISIT?  
98

## 2024-10-10 ENCOUNTER — PATIENT MESSAGE (OUTPATIENT)
Dept: FAMILY MEDICINE CLINIC | Age: 49
End: 2024-10-10

## 2024-10-15 NOTE — TELEPHONE ENCOUNTER
Can we schedule appt and fill before appt or does pt need to be seen? Pt having trouble submitting e-visit, can we do a VV?

## 2024-10-16 ENCOUNTER — OFFICE VISIT (OUTPATIENT)
Dept: FAMILY MEDICINE CLINIC | Age: 49
End: 2024-10-16

## 2024-10-16 VITALS
BODY MASS INDEX: 42.11 KG/M2 | WEIGHT: 262 LBS | HEIGHT: 66 IN | SYSTOLIC BLOOD PRESSURE: 138 MMHG | DIASTOLIC BLOOD PRESSURE: 80 MMHG | OXYGEN SATURATION: 98 % | HEART RATE: 73 BPM

## 2024-10-16 DIAGNOSIS — Z12.31 ENCOUNTER FOR SCREENING MAMMOGRAM FOR MALIGNANT NEOPLASM OF BREAST: ICD-10-CM

## 2024-10-16 DIAGNOSIS — F90.0 ATTENTION DEFICIT HYPERACTIVITY DISORDER (ADHD), PREDOMINANTLY INATTENTIVE TYPE: Primary | ICD-10-CM

## 2024-10-16 DIAGNOSIS — Z23 NEED FOR INFLUENZA VACCINATION: ICD-10-CM

## 2024-10-16 DIAGNOSIS — F31.9 BIPOLAR DISORDER WITH DEPRESSION (HCC): ICD-10-CM

## 2024-10-16 RX ORDER — DESVENLAFAXINE 100 MG/1
100 TABLET, EXTENDED RELEASE ORAL DAILY
Qty: 90 TABLET | Refills: 0 | Status: SHIPPED | OUTPATIENT
Start: 2024-10-16 | End: 2025-01-14

## 2024-10-16 RX ORDER — DESVENLAFAXINE 100 MG/1
100 TABLET, EXTENDED RELEASE ORAL DAILY
Qty: 90 TABLET | Refills: 0 | Status: SHIPPED | OUTPATIENT
Start: 2024-10-16 | End: 2024-10-16 | Stop reason: SDUPTHER

## 2024-10-16 RX ORDER — AMPHETAMINE 12.5 MG/1
TABLET, ORALLY DISINTEGRATING ORAL
Qty: 30 EACH | Refills: 0 | Status: SHIPPED | OUTPATIENT
Start: 2024-10-16 | End: 2024-11-15

## 2024-10-16 NOTE — PROGRESS NOTES
Tammi Ball (:  1975) is a 49 y.o. female,Established patient, here for evaluation of the following chief complaint(s):    ADHD (3 mon ADHD f/u-UDS UTD), Other (Due for pap), and Flu Vaccine (Would like flu shot/)      SUBJECTIVE/OBJECTIVE:  HPI needed Cynthia presents for routine ADHD follow-up  She is taking the Vyvanse as prescribed takes the first dose about 7:00 in the morning  It is not lasting her the whole workday her workday is about 5 and medication tends to wear off about noon  She would like to try different medication at this time  Bipolar disorder  Cynthia states she has been all over the place-easily irritated snapping at her   Having a hard time working with coworkers-feels like she is doing really good with the Pristiq was prescribed  Would like to increase to Pristiq at this time to see if it helps with the mood swings  She takes her other medications as prescribed lithium and lamictal  Review of Systems   Psychiatric/Behavioral:  Positive for decreased concentration. The patient is nervous/anxious.        Physical Exam  Vitals reviewed.   Constitutional:       General: She is awake. She is not in acute distress.     Appearance: Normal appearance.   Cardiovascular:      Rate and Rhythm: Normal rate and regular rhythm.      Heart sounds: Normal heart sounds, S1 normal and S2 normal.   Pulmonary:      Effort: Pulmonary effort is normal.      Breath sounds: Normal breath sounds and air entry.   Neurological:      Mental Status: She is alert and oriented to person, place, and time.   Psychiatric:         Attention and Perception: Attention and perception normal.         Mood and Affect: Mood and affect normal.         Speech: Speech normal.         Behavior: Behavior normal. Behavior is cooperative.         Thought Content: Thought content normal.         Cognition and Memory: Cognition and memory normal.         Judgment: Judgment normal.         ASSESSMENT/PLAN:  1. Attention

## 2024-10-16 NOTE — PROGRESS NOTES
Immunizations Administered       Name Date Dose Route    Influenza, FLUCELVAX, (age 6 mo+) IM, Trivalent PF, 0.5mL 10/16/2024 0.5 mL Intramuscular    Site: Deltoid- Right    Lot: [id number]    NDC: 43315-347-05

## 2024-10-21 ENCOUNTER — TELEPHONE (OUTPATIENT)
Dept: ADMINISTRATIVE | Age: 49
End: 2024-10-21

## 2024-10-21 NOTE — TELEPHONE ENCOUNTER
Submitted PA for Adzenys XR-ODT 12.5MG er dispersible tablets   Via Counts include 234 beds at the [facility] Key: [id number] STATUS: PENDING.    Follow up done daily; if no decision with in three days we will refax.  If another three days goes by with no decision will call the insurance for status.

## 2024-10-22 NOTE — TELEPHONE ENCOUNTER
The medication was DENIED; DENIAL letter is uploaded to MEDIA.    General Denial Reasoning:    ___  Patient must try   medication before the insurance will cover this drug.  Unless there is a contraindication that you can provide.    ___  Drug is not covered for off label usage.  This drug is FDA approved for  .    ___  Drug is not covered by the plan ( Plan Exclusion)    _X__  Other; please see Denial Letter.  NOTE: Coverage is provided when the member has a history of at least 30 days of therapy with the immediate release formulation if available (amphetamine IR ) and at least 30 days with at least TWO preferred extended-release drugs which include but are not limited to: Amphetamine/Dextroamphetamine ER , Dexmethylphenidate ER (generic of Focalin XR), Dextroamphetamine ER capsule, Methylphenidate ER Capsule (generic of Metadate CD, Ritalin LA), Methylphenidate ER Tablet (generic of Concerta, Methylin ER, Ritalin SR), and Quillichew ER AND Must provide documentation of medical necessity beyond convenience for why the patient cannot be changed to a preferred drug (i.e., allergies, drug-drug interactions, contraindications, or intolerances)................. The [facility] Policy for Medical Necessity as posted on the [facility] website and [place] Unified Preferred Drug List criteria were reviewed and per [place] Administrative Code Rule 5160-1-01 (C) and 5160-26-03 (B), a medically necessary service must include: generally accepted standards of medical practice, be clinically appropriate in administration, treatment and outcome and be the lowest cost alternative to effectively treat the condition. Please contact your provider to assist you with other treatment options that might be covered under your benefit package, or other services that might be available through the community.     DIABETIC MEDICATION DENIALS:    ___ Must have a A1C greater the 7.0.    ___ Hypoglycemic episodes or 3 or more injections of insulin daily

## 2024-10-23 DIAGNOSIS — F90.0 ATTENTION DEFICIT HYPERACTIVITY DISORDER (ADHD), PREDOMINANTLY INATTENTIVE TYPE: Primary | ICD-10-CM

## 2024-10-23 RX ORDER — METHYLPHENIDATE HYDROCHLORIDE 36 MG/1
72 TABLET ORAL DAILY
Qty: 60 TABLET | Refills: 0 | Status: SHIPPED | OUTPATIENT
Start: 2024-10-23 | End: 2024-11-22

## 2024-10-28 RX ORDER — LAMOTRIGINE 100 MG/1
100 TABLET ORAL DAILY
Qty: 90 TABLET | Refills: 0 | Status: SHIPPED | OUTPATIENT
Start: 2024-10-28

## 2024-10-29 RX ORDER — DESVENLAFAXINE 50 MG/1
100 TABLET, FILM COATED, EXTENDED RELEASE ORAL DAILY
Qty: 180 TABLET | Refills: 0 | Status: CANCELLED | OUTPATIENT
Start: 2024-10-29 | End: 2025-01-27

## 2024-10-30 RX ORDER — HYDROXYZINE PAMOATE 100 MG
CAPSULE ORAL
Qty: 30 CAPSULE | Refills: 0 | Status: SHIPPED | OUTPATIENT
Start: 2024-10-30

## 2024-11-01 RX ORDER — AMLODIPINE BESYLATE 10 MG/1
TABLET ORAL
Qty: 90 TABLET | Refills: 1 | Status: SHIPPED | OUTPATIENT
Start: 2024-11-01

## 2024-11-18 ENCOUNTER — OFFICE VISIT (OUTPATIENT)
Dept: ORTHOPEDIC SURGERY | Age: 49
End: 2024-11-18
Payer: COMMERCIAL

## 2024-11-18 VITALS — WEIGHT: 268 LBS | BODY MASS INDEX: 43.07 KG/M2 | HEIGHT: 66 IN

## 2024-11-18 DIAGNOSIS — Z96.643 STATUS POST BILATERAL TOTAL HIP REPLACEMENT: Primary | ICD-10-CM

## 2024-11-18 DIAGNOSIS — M25.552 BILATERAL HIP PAIN: ICD-10-CM

## 2024-11-18 DIAGNOSIS — M25.551 BILATERAL HIP PAIN: ICD-10-CM

## 2024-11-18 DIAGNOSIS — M51.362 DEGENERATION OF INTERVERTEBRAL DISC OF LUMBAR REGION WITH DISCOGENIC BACK PAIN AND LOWER EXTREMITY PAIN: ICD-10-CM

## 2024-11-18 PROCEDURE — G8484 FLU IMMUNIZE NO ADMIN: HCPCS | Performed by: ORTHOPAEDIC SURGERY

## 2024-11-18 PROCEDURE — G8417 CALC BMI ABV UP PARAM F/U: HCPCS | Performed by: ORTHOPAEDIC SURGERY

## 2024-11-18 PROCEDURE — 3017F COLORECTAL CA SCREEN DOC REV: CPT | Performed by: ORTHOPAEDIC SURGERY

## 2024-11-18 PROCEDURE — G8427 DOCREV CUR MEDS BY ELIG CLIN: HCPCS | Performed by: ORTHOPAEDIC SURGERY

## 2024-11-18 PROCEDURE — 4004F PT TOBACCO SCREEN RCVD TLK: CPT | Performed by: ORTHOPAEDIC SURGERY

## 2024-11-18 PROCEDURE — 99213 OFFICE O/P EST LOW 20 MIN: CPT | Performed by: ORTHOPAEDIC SURGERY

## 2024-11-18 PROCEDURE — G9899 SCRN MAM PERF RSLTS DOC: HCPCS | Performed by: ORTHOPAEDIC SURGERY

## 2024-11-18 RX ORDER — PREDNISONE 10 MG/1
10 TABLET ORAL SEE ADMIN INSTRUCTIONS
Qty: 35 TABLET | Refills: 0 | Status: SHIPPED | OUTPATIENT
Start: 2024-11-18 | End: 2024-11-28

## 2024-11-18 NOTE — PROGRESS NOTES
[facility]  Office Visit    Chief Complaint: Follow-up for bilateral total hip arthroplasty; low back pain    HPI:  Tammi Ball is a 49 y.o. who is here for evaluation of low back pain.  Her history is significant for right total hip arthroplasty in February 2019 with Dr. Grider and left total hip arthroplasty in April 2022 with myself.  She reports low back pain that developed over the last 1 week with no inciting events.  The pain is going down the back of her legs and she reports numbness and tingling in her right thigh as well.  She is concerned there is something wrong with her hip replacements.  She walks without assistive device.  She takes Advil with minimal relief of symptoms.      Past Medical History:   Diagnosis Date    ADHD (attention deficit hyperactivity disorder)     Arthritis     Back pain     Bipolar disorder (HCC)     Depression     Gastritis     Hypertension     Insomnia     Migraine     Neuropathy     Obesity     PLMD (periodic limb movement disorder)     PATIENT UNAWARE    Preoperative clearance     Prolonged emergence from general anesthesia     Sleep apnea     uses C-PAP    TMJ (temporomandibular joint syndrome)         ROS:  Constitutional: denies fever, chills, weight loss  MSK: denies pain in other joints, muscle aches  Neurological: denies numbness, tingling, weakness    Exam:  Appearance: sitting in exam room chair, appears to be in no acute distress, awake and alert  Resp: unlabored breathing on room air  Skin: warm, dry and intact with out erythema or significant increased temperature  Neuro: grossly intact both lower extremities. Intact sensation to light touch. Motor exam 4+ to 5/5 in all major motor groups.  BLE: Examination demonstrates negative logroll and negative Stinchfield.  There is brisk capillary refill.  Strength is 5/5 in hamstrings, quads, hip flexors.     Imaging:  AP pelvis, AP and frog-leg lateral views of bilateral hips were

## 2024-11-26 DIAGNOSIS — F90.0 ATTENTION DEFICIT HYPERACTIVITY DISORDER (ADHD), PREDOMINANTLY INATTENTIVE TYPE: ICD-10-CM

## 2024-11-26 RX ORDER — METHYLPHENIDATE HYDROCHLORIDE 36 MG/1
72 TABLET ORAL DAILY
Qty: 60 TABLET | Refills: 0 | Status: SHIPPED | OUTPATIENT
Start: 2024-12-26 | End: 2025-01-25

## 2024-11-26 RX ORDER — HYDROXYZINE PAMOATE 100 MG
CAPSULE ORAL
Qty: 30 CAPSULE | Refills: 0 | Status: SHIPPED | OUTPATIENT
Start: 2024-11-26

## 2024-11-26 RX ORDER — METHYLPHENIDATE HYDROCHLORIDE 36 MG/1
72 TABLET ORAL DAILY
Qty: 60 TABLET | Refills: 0 | Status: SHIPPED | OUTPATIENT
Start: 2024-11-26 | End: 2024-12-26

## 2024-11-26 RX ORDER — ATORVASTATIN CALCIUM 10 MG/1
TABLET, FILM COATED ORAL
Qty: 90 TABLET | Refills: 0 | Status: SHIPPED | OUTPATIENT
Start: 2024-11-26

## 2024-12-17 ENCOUNTER — OFFICE VISIT (OUTPATIENT)
Dept: FAMILY MEDICINE CLINIC | Age: 49
End: 2024-12-17
Payer: COMMERCIAL

## 2024-12-17 VITALS
DIASTOLIC BLOOD PRESSURE: 80 MMHG | RESPIRATION RATE: 15 BRPM | TEMPERATURE: 97.4 F | WEIGHT: 269 LBS | HEART RATE: 87 BPM | OXYGEN SATURATION: 98 % | SYSTOLIC BLOOD PRESSURE: 136 MMHG | BODY MASS INDEX: 43.42 KG/M2

## 2024-12-17 DIAGNOSIS — R39.15 URINARY URGENCY: ICD-10-CM

## 2024-12-17 DIAGNOSIS — R10.32 ACUTE LEFT LOWER QUADRANT PAIN: Primary | ICD-10-CM

## 2024-12-17 DIAGNOSIS — R10.9 RIGHT FLANK PAIN: ICD-10-CM

## 2024-12-17 PROBLEM — B96.89 ACUTE BACTERIAL SINUSITIS: Status: RESOLVED | Noted: 2024-09-09 | Resolved: 2024-12-17

## 2024-12-17 PROBLEM — J18.9 PNEUMONIA OF RIGHT LOWER LOBE DUE TO INFECTIOUS ORGANISM: Status: RESOLVED | Noted: 2024-09-09 | Resolved: 2024-12-17

## 2024-12-17 PROBLEM — J01.90 ACUTE BACTERIAL SINUSITIS: Status: RESOLVED | Noted: 2024-09-09 | Resolved: 2024-12-17

## 2024-12-17 LAB
BILIRUBIN, POC: NORMAL
BLOOD URINE, POC: NORMAL
CLARITY, POC: NORMAL
COLOR, POC: YELLOW
GLUCOSE URINE, POC: NORMAL MG/DL
KETONES, POC: NORMAL MG/DL
LEUKOCYTE EST, POC: NORMAL
NITRITE, POC: NORMAL
PH, POC: 6
PROTEIN, POC: 30 MG/DL
SPECIFIC GRAVITY, POC: 1.03
UROBILINOGEN, POC: 0.2 MG/DL

## 2024-12-17 PROCEDURE — 81002 URINALYSIS NONAUTO W/O SCOPE: CPT

## 2024-12-17 PROCEDURE — G8427 DOCREV CUR MEDS BY ELIG CLIN: HCPCS

## 2024-12-17 PROCEDURE — G8484 FLU IMMUNIZE NO ADMIN: HCPCS

## 2024-12-17 PROCEDURE — G8417 CALC BMI ABV UP PARAM F/U: HCPCS

## 2024-12-17 PROCEDURE — 3075F SYST BP GE 130 - 139MM HG: CPT

## 2024-12-17 PROCEDURE — G9899 SCRN MAM PERF RSLTS DOC: HCPCS

## 2024-12-17 PROCEDURE — 99214 OFFICE O/P EST MOD 30 MIN: CPT

## 2024-12-17 PROCEDURE — 4004F PT TOBACCO SCREEN RCVD TLK: CPT

## 2024-12-17 PROCEDURE — 3079F DIAST BP 80-89 MM HG: CPT

## 2024-12-17 PROCEDURE — 3017F COLORECTAL CA SCREEN DOC REV: CPT

## 2024-12-17 RX ORDER — ONDANSETRON 4 MG/1
4 TABLET, ORALLY DISINTEGRATING ORAL 3 TIMES DAILY PRN
Qty: 30 TABLET | Refills: 0 | Status: SHIPPED | OUTPATIENT
Start: 2024-12-17 | End: 2024-12-27

## 2024-12-17 RX ORDER — DICYCLOMINE HCL 20 MG
20 TABLET ORAL 4 TIMES DAILY PRN
Qty: 40 TABLET | Refills: 0 | Status: SHIPPED | OUTPATIENT
Start: 2024-12-17 | End: 2024-12-27

## 2024-12-17 RX ORDER — ZOLPIDEM TARTRATE 12.5 MG/1
12.5 TABLET, FILM COATED, EXTENDED RELEASE ORAL NIGHTLY PRN
COMMUNITY
Start: 2024-11-30

## 2024-12-17 NOTE — ASSESSMENT & PLAN NOTE
Acute condition, new, POCT urinalysis unremarkable. Treat with antibiotics based on urine culture result.

## 2024-12-17 NOTE — ASSESSMENT & PLAN NOTE
Acute condition, new, No current abdominal pain noted on physical exam. Suspect viral gastroenteritis but cannot rule out diverticulitis or complex UTI at this time. POCT urinalysis negative. Symptom management with PRN Zofran 4 mg TID and Bentyl 20 mg QID for now. Will send in antibiotics if urine culture is positive.

## 2024-12-17 NOTE — PROGRESS NOTES
cecal diverticulum that was identified during the colonoscopy in July 2024. Tried OTC ibuprofen with some alleviation of symptoms. Denied pregnancy; last LMP 2010. Does not believe patient has a fever but she reports being sweaty during the course of illness. Does report increased urinary urgency, unsure of dysuria. Denied hematuria. Does have the history of kidney stone in the past.      Review of Systems  Per HPI    Social History     Tobacco Use    Smoking status: Every Day     Current packs/day: 1.00     Average packs/day: 1 pack/day for 37.2 years (37.2 ttl pk-yrs)     Types: Cigarettes     Start date: 9/17/1987     Passive exposure: Current    Smokeless tobacco: Never   Vaping Use    Vaping status: Never Used   Substance Use Topics    Alcohol use: Yes     Comment: Socially    Drug use: Not Currently     Types: Marijuana (Weed)     Comment: LAST USED-2020       Patient Active Problem List   Diagnosis    Migraine    Amenorrhea, secondary    Depression    Gastritis    TMJ (temporomandibular joint syndrome)    Lipoma    Anxiety    Tobacco abuse    Insomnia    Bipolar affective disorder (HCC)    VICTORIANO (obstructive sleep apnea)    Periodic limb movement disorder (PLMD)    Primary osteoarthritis of both hips    Left hip pain    Arthritis of right hip    Obesity, Class III, BMI 40-49.9 (morbid obesity)    Essential hypertension, benign    Bipolar disorder with depression (HCC)    Tear of right rotator cuff    Hypersomnia    Traumatic complete tear of right rotator cuff    History of total hip arthroplasty, right    Cervical spondylosis    Cervical radicular pain    DDD (degenerative disc disease), cervical    Claustrophobia    Cervical stenosis of spine    Primary osteoarthritis of left hip    Acute left lower quadrant pain    Right flank pain    Urinary urgency        Objective   Vitals:  /80 (Site: Right Upper Arm, Position: Sitting, Cuff Size: Large Adult)   Pulse 87   Temp 97.4 °F (36.3 °C) (Oral)   Resp 15

## 2024-12-17 NOTE — ASSESSMENT & PLAN NOTE
Acute condition, new, Physical exam notable for right CVA tenderness. POCT urinalysis unremarkable. Will await for urine culture for possible antibiotic therapy consideration.

## 2024-12-18 LAB
BACTERIA UR CULT: ABNORMAL
ORGANISM: ABNORMAL

## 2024-12-23 DIAGNOSIS — R10.32 ACUTE LEFT LOWER QUADRANT PAIN: Primary | ICD-10-CM

## 2024-12-23 RX ORDER — ALUMINUM HYDROXIDE, MAGNESIUM HYDROXIDE, SIMETHICONE 400; 400; 40 MG/10ML; MG/10ML; MG/10ML
30 SUSPENSION ORAL EVERY 4 HOURS PRN
Qty: 769 ML | Refills: 0 | Status: SHIPPED | OUTPATIENT
Start: 2024-12-23

## 2024-12-28 DIAGNOSIS — F90.0 ATTENTION DEFICIT HYPERACTIVITY DISORDER (ADHD), PREDOMINANTLY INATTENTIVE TYPE: ICD-10-CM

## 2024-12-29 ENCOUNTER — PATIENT MESSAGE (OUTPATIENT)
Dept: ORTHOPEDIC SURGERY | Age: 49
End: 2024-12-29

## 2024-12-29 DIAGNOSIS — M51.362 DEGENERATION OF INTERVERTEBRAL DISC OF LUMBAR REGION WITH DISCOGENIC BACK PAIN AND LOWER EXTREMITY PAIN: Primary | ICD-10-CM

## 2024-12-30 RX ORDER — METHYLPHENIDATE HYDROCHLORIDE 36 MG/1
72 TABLET ORAL DAILY
Qty: 60 TABLET | Refills: 0 | OUTPATIENT
Start: 2024-12-30 | End: 2025-01-29

## 2025-01-03 RX ORDER — HYDROXYZINE PAMOATE 100 MG
CAPSULE ORAL
Qty: 30 CAPSULE | Refills: 0 | Status: SHIPPED | OUTPATIENT
Start: 2025-01-03

## 2025-01-07 NOTE — TELEPHONE ENCOUNTER
Dr Carmine Argueta would like you to make an appointment with Dr Maryellen Richard. Please call his office at [phone]. The referral was sent to his office.

## 2025-01-16 ENCOUNTER — OFFICE VISIT (OUTPATIENT)
Dept: ORTHOPEDIC SURGERY | Age: 50
End: 2025-01-16

## 2025-01-16 VITALS — HEIGHT: 66 IN | WEIGHT: 269 LBS | BODY MASS INDEX: 43.23 KG/M2

## 2025-01-16 DIAGNOSIS — M75.102 TEAR OF LEFT ROTATOR CUFF, UNSPECIFIED TEAR EXTENT, UNSPECIFIED WHETHER TRAUMATIC: ICD-10-CM

## 2025-01-16 DIAGNOSIS — M25.512 LEFT SHOULDER PAIN, UNSPECIFIED CHRONICITY: Primary | ICD-10-CM

## 2025-01-16 RX ORDER — METHYLPREDNISOLONE 4 MG/1
TABLET ORAL
Qty: 1 KIT | Refills: 0 | Status: SHIPPED | OUTPATIENT
Start: 2025-01-16

## 2025-01-16 NOTE — PROGRESS NOTES
This dictation was done with [employer] dictation and may contain mechanical errors related to translation.    I have today reviewed with Tammi Ball the clinically relevant, past medical history, medications, allergies, family history, social history, and Review Of Systems form the patient’s most recent history form & I have documented any details relevant to today's presenting complaints in my history below. Ms. Tammi Ball's self-reported past medical history, medications, allergies, family history, social history, and Review Of Systems form has been scanned into the chart under the \"Media\" tab.    Subjective:  Tammi Ball is a 50 y.o. who is here as a well-established patient for her knees with a new problem.  She fell at work at [employer] where she was going down the steps and jerked her arm and then landed on it.  She tried to catch herself with, like a wire support and that wrenched her arm in a superior and posterior motion.  She ended up having a lot of bruising and ecchymosis on the outside of her shoulder had some initial pain that continued to get worse and it has been 2 weeks and has not settled down she still has 8 out of 10 pain that is sharp when she is driving or in certain positions or tries to raise her arm.  She has tried ibuprofen and Tylenol but this is her first visit so I sent her for an AP transaxillary view and Y view x-ray of her left shoulder.  Her medical history includes a previous right shoulder rotator cuff repair and bilateral knee surgeries      Patient Active Problem List   Diagnosis    Migraine    Amenorrhea, secondary    Depression    Gastritis    TMJ (temporomandibular joint syndrome)    Lipoma    Anxiety    Tobacco abuse    Insomnia    Bipolar affective disorder (HCC)    VICTORIANO (obstructive sleep apnea)    Periodic limb movement disorder (PLMD)    Primary osteoarthritis of both hips    Left hip pain    Arthritis of right hip    Obesity, Class III, BMI 40-49.9

## 2025-01-21 ENCOUNTER — PATIENT MESSAGE (OUTPATIENT)
Dept: ORTHOPEDIC SURGERY | Age: 50
End: 2025-01-21

## 2025-01-21 DIAGNOSIS — M25.512 LEFT SHOULDER PAIN, UNSPECIFIED CHRONICITY: Primary | ICD-10-CM

## 2025-01-23 RX ORDER — CYCLOBENZAPRINE HCL 10 MG
10 TABLET ORAL 3 TIMES DAILY PRN
Qty: 60 TABLET | Refills: 0 | Status: SHIPPED | OUTPATIENT
Start: 2025-01-23 | End: 2025-02-12

## 2025-01-23 RX ORDER — ALPRAZOLAM 0.5 MG
TABLET ORAL
Qty: 1 TABLET | Refills: 0 | Status: SHIPPED | OUTPATIENT
Start: 2025-01-23 | End: 2025-01-24

## 2025-01-28 ENCOUNTER — TELEPHONE (OUTPATIENT)
Dept: ORTHOPEDIC SURGERY | Age: 50
End: 2025-01-28

## 2025-01-29 ENCOUNTER — HOSPITAL ENCOUNTER (OUTPATIENT)
Dept: MRI IMAGING | Age: 50
Discharge: HOME | End: 2025-01-29
Attending: ORTHOPAEDIC SURGERY

## 2025-01-29 DIAGNOSIS — M75.102 TEAR OF LEFT ROTATOR CUFF, UNSPECIFIED TEAR EXTENT, UNSPECIFIED WHETHER TRAUMATIC: ICD-10-CM

## 2025-01-29 DIAGNOSIS — M25.512 LEFT SHOULDER PAIN, UNSPECIFIED CHRONICITY: ICD-10-CM

## 2025-01-31 ENCOUNTER — HOSPITAL ENCOUNTER (OUTPATIENT)
Dept: MRI IMAGING | Age: 50
Discharge: HOME | End: 2025-01-31
Attending: ORTHOPAEDIC SURGERY
Payer: COMMERCIAL

## 2025-01-31 PROCEDURE — 73221 MRI JOINT UPR EXTREM W/O DYE: CPT

## 2025-02-03 RX ORDER — HYDROXYZINE PAMOATE 100 MG
CAPSULE ORAL
Qty: 30 CAPSULE | Refills: 0 | Status: SHIPPED | OUTPATIENT
Start: 2025-02-03

## 2025-02-03 RX ORDER — LAMOTRIGINE 100 MG/1
100 TABLET ORAL DAILY
Qty: 90 TABLET | Refills: 0 | Status: SHIPPED | OUTPATIENT
Start: 2025-02-03

## 2025-02-03 RX ORDER — DESVENLAFAXINE 100 MG/1
TABLET, EXTENDED RELEASE ORAL DAILY
Qty: 90 TABLET | Refills: 0 | Status: SHIPPED | OUTPATIENT
Start: 2025-02-03

## 2025-02-06 ENCOUNTER — OFFICE VISIT (OUTPATIENT)
Dept: ORTHOPEDIC SURGERY | Age: 50
End: 2025-02-06

## 2025-02-06 VITALS — BODY MASS INDEX: 43.23 KG/M2 | WEIGHT: 269 LBS | HEIGHT: 66 IN

## 2025-02-06 DIAGNOSIS — S46.012D TRAUMATIC COMPLETE TEAR OF LEFT ROTATOR CUFF, SUBSEQUENT ENCOUNTER: Primary | ICD-10-CM

## 2025-02-07 DIAGNOSIS — F51.04 PSYCHOPHYSIOLOGICAL INSOMNIA: Primary | ICD-10-CM

## 2025-02-07 NOTE — PROGRESS NOTES
This dictation was done with [employer] dictation and may contain mechanical errors related to translation.  Height 1.676 m (5' 6\"), weight 122 kg (269 lb), last menstrual period 01/11/2010, not currently breastfeeding.    Is a very pleasant 50-year-old female who had significant injury while working at [employer] and she slipped and jumped jolted her shoulder.  We tried some conservative measures but ultimately got an MRI she is here with a Worker's Comp. injury and a claim.  Stating she has 8 out of 10 pain and it is getting worse over time.  The MRI results are listed below.    IMPRESSION:  1. 1.8 cm anterior-posterior full-thickness tear of the supraspinatus tendon.  Retraction of the torn fibers to the mid humeral head.  Moderate  supraspinatus muscle volume loss.  2. Moderate subscapularis and infraspinatus tendinosis.  3. Moderate intra-articular long head biceps tendinosis.  4. Moderate degeneration of the AC joint.  5. Joint effusion and synovitis.     Discussing these findings and reviewing the films with her I feel that doing a C9 to get continue to work through the limited and have a consultation with with Dr. Manuel for possible surgical considerations.    This was discussed with the patient and with the  and we decided to proceed to a consultation appointment with Dr. Kai Manuel

## 2025-02-10 ENCOUNTER — OFFICE VISIT (OUTPATIENT)
Dept: ORTHOPEDIC SURGERY | Age: 50
End: 2025-02-10

## 2025-02-10 ENCOUNTER — TELEPHONE (OUTPATIENT)
Dept: ORTHOPEDIC SURGERY | Age: 50
End: 2025-02-10

## 2025-02-10 VITALS — WEIGHT: 278 LBS | HEIGHT: 66 IN | RESPIRATION RATE: 16 BRPM | BODY MASS INDEX: 44.68 KG/M2

## 2025-02-10 DIAGNOSIS — S46.012A TRAUMATIC COMPLETE TEAR OF LEFT ROTATOR CUFF, INITIAL ENCOUNTER: Primary | ICD-10-CM

## 2025-02-10 NOTE — TELEPHONE ENCOUNTER
Following the patient's visit on 2-, Dr. Manuel wishes to request the following for the patient:   left shoulder arthroscopy, subacromial decompression, rotator cuff repair.   Postop PT    Please advise clinical staff if further action is needed and/or when approval is received.  Thank you

## 2025-02-10 NOTE — PROGRESS NOTES
CHIEF COMPLAINT: Left shoulder pain    DATE OF INJURY: 12/30/24    History:    Tammi Ball is a 50 y.o. right handed female referred by Melvin Garrido MD for evaluation and treatment of left shoulder pain.  She is seen with her  today.  This is evaluated as a workers compensation injury.   The pain began 6 weeks ago.  Pain is rated as a 10/10.   There was an injury.  She fell down the steps at work.  She is not sure how she landed.  She states pain is located along her biceps and scapular.  She has difficulty with reaching overhead, reaching behind her back, and lifting.  She is adamant that she has significant difficulty even doing her job which is mostly desk work.  The patient has not had PT. The patient has not had an injection.   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  She does have a history of right shoulder rotator cuff repair by Dr. Graves in 2019.    Past Medical History:   Diagnosis Date    ADHD (attention deficit hyperactivity disorder)     Arthritis     Back pain     Bipolar disorder (HCC)     Depression     Gastritis     Hypertension     Insomnia     Migraine     Neuropathy     Obesity     PLMD (periodic limb movement disorder)     PATIENT UNAWARE    Preoperative clearance     Prolonged emergence from general anesthesia     Sleep apnea     uses C-PAP    TMJ (temporomandibular joint syndrome)        Past Surgical History:   Procedure Laterality Date    COLONOSCOPY N/A 4/19/2024    COLONOSCOPY POLYPECTOMY SNARE BIOPSY performed by Herbert Mattson MD at [facility] ENDOSCOPY    COLONOSCOPY N/A 7/12/2024    COLONOSCOPY POLYPECTOMY SNARE/BIOPSY performed by Herbert Mattson MD at [facility] ENDOSCOPY    COLPOSCOPY      ENDOMETRIAL ABLATION      FOOT SURGERY      JOINT REPLACEMENT Right 02/19/2019    RIGHT LATERAL TOTAL HIP REPLACEMENT    NERVE BLOCK N/A 10/26/2021    BILATERAL C5-C7 MEDIAL BRANCH BLOCK WITH FLUOROSCOPY performed by Sridevi Noguera MD at [facility] SIC

## 2025-02-12 RX ORDER — ZOLPIDEM TARTRATE 12.5 MG/1
12.5 TABLET, FILM COATED, EXTENDED RELEASE ORAL NIGHTLY PRN
Qty: 30 TABLET | Refills: 0 | Status: SHIPPED | OUTPATIENT
Start: 2025-02-12 | End: 2025-03-14

## 2025-02-14 ENCOUNTER — PREP FOR PROCEDURE (OUTPATIENT)
Dept: ORTHOPEDIC SURGERY | Age: 50
End: 2025-02-14

## 2025-02-14 PROBLEM — S46.012A TRAUMATIC TEAR OF LEFT ROTATOR CUFF: Status: ACTIVE | Noted: 2025-02-14

## 2025-02-14 RX ORDER — SODIUM CHLORIDE 9 MG/ML
INJECTION, SOLUTION INTRAVENOUS PRN
Status: CANCELLED | OUTPATIENT
Start: 2025-02-14

## 2025-02-14 RX ORDER — SODIUM CHLORIDE 0.9 % (FLUSH) 0.9 %
5-40 SYRINGE (ML) INJECTION EVERY 12 HOURS SCHEDULED
Status: CANCELLED | OUTPATIENT
Start: 2025-02-14

## 2025-02-14 RX ORDER — SODIUM CHLORIDE 0.9 % (FLUSH) 0.9 %
5-40 SYRINGE (ML) INJECTION PRN
Status: CANCELLED | OUTPATIENT
Start: 2025-02-14

## 2025-02-18 ENCOUNTER — TELEPHONE (OUTPATIENT)
Dept: ORTHOPEDIC SURGERY | Age: 50
End: 2025-02-18

## 2025-02-18 NOTE — PROGRESS NOTES
Called and spoke with patient today concerning cancellation of H&P today. She stated her pcp does not file for worker's comp. She will be going to [facility] in [place] the first thing in the morning 2/19, to get the physical completed. Requested that patient bring a hard copy of the H&P with her day of surgery. Did give her the fax number to [facility] PAT office as well. Patient verbalized understanding and agreement to bringing H&P.

## 2025-02-18 NOTE — TELEPHONE ENCOUNTER
Karie would like to know where pt has to go for her Pre Op because her office does not do WC appt. Please call her at  pt is waiting in the office

## 2025-02-19 ENCOUNTER — TELEPHONE (OUTPATIENT)
Dept: ORTHOPEDIC SURGERY | Age: 50
End: 2025-02-19

## 2025-02-19 ENCOUNTER — PATIENT MESSAGE (OUTPATIENT)
Dept: FAMILY MEDICINE CLINIC | Age: 50
End: 2025-02-19

## 2025-02-19 RX ORDER — LOSARTAN POTASSIUM 50 MG/1
TABLET ORAL
Qty: 30 TABLET | Refills: 0 | Status: SHIPPED | OUTPATIENT
Start: 2025-02-19

## 2025-02-19 NOTE — TELEPHONE ENCOUNTER
General Question     Subject: MEDICATION QUESTION/REQ A CALL BACK   Patient and /or Facility Request: Tammi Ball \"Cynthia\"   Contact Number: [phone]       PATIENT CALLED IN TO SEE IF SHE NEED TO TAKE AN BETA BLOCKER BEFORE HER SX. SHE JUST GOT IT FROM HER DOCTOR.     SX IS SCHEDULED FOR TOMORROW...     PLEASE ADVISE

## 2025-02-19 NOTE — DISCHARGE INSTRUCTIONS
DR. DEAL'S                  SHOULDER ARTHROSCOPY POSTOPERATIVE INSTRUCTIONS      ACTIVITIES:  Keep arm in sling after surgery. You may move your wrist and hand as much possible. Dr Deal will tell you when you can stop wearing the sling.         DRESSING: After surgery, a bulky dressing, and sometimes an ice cuff will be applied to your shoulder. It is normal to have a moderate amount of blood-tinted fluid drainage on the dressing. Keep the dressing clean and dry.          ICE/COOLING: Apply ice to your shoulder if you did not purchase an ice cuff. If you did purchase a cooling cuff, follow the instructions included with the cuff to keep it cold.  Wear the cuff continuously for 72 hours postoperatively.  You can ice intermittently (such as 30 minutes on, 30 minutes off).   Make sure the ice or cooling cuff is not directly in contact with your skin.  Prolonged contact can result in frostbite.   After 3 days, use after exercising, or to help with pain and swelling, for 30 minutes, 3 to 5 times a day.      DRIVING:  You may drive once you are out of your sling, have stopped your pain medication, and can use your shoulder normally. This may be a decision to be made between you and your physical therapist. You must be able to control the steering wheel comfortably. You are the one ultimately responsible when behind the wheel.    SHOWERING: You may begin to shower 3 days after your surgery. Keep the incisions covered.  Dr Deal will tell you if you need to wait longer.    MEDICATION: Although you have a prescription for a strong pain medication, many patients are able to handle the discomfort postoperatively with a milder medication such as Extra-strength Tylenol. You may or may not (based on your surgery - Dr. Deal will let you know) also have had a prescription for an anti-inflammatory such as Celebrex or Naprosyn, and an anti-nausea medication such as Phenergan.

## 2025-02-20 ENCOUNTER — HOSPITAL ENCOUNTER (OUTPATIENT)
Age: 50
Setting detail: OUTPATIENT SURGERY
Discharge: HOME | End: 2025-02-20
Attending: ORTHOPAEDIC SURGERY | Admitting: ORTHOPAEDIC SURGERY
Payer: COMMERCIAL

## 2025-02-20 ENCOUNTER — ANESTHESIA EVENT (OUTPATIENT)
Dept: OPERATING ROOM | Age: 50
End: 2025-02-20
Payer: COMMERCIAL

## 2025-02-20 ENCOUNTER — ANESTHESIA (OUTPATIENT)
Dept: OPERATING ROOM | Age: 50
End: 2025-02-20
Payer: COMMERCIAL

## 2025-02-20 VITALS
HEIGHT: 66 IN | WEIGHT: 270.5 LBS | TEMPERATURE: 97.4 F | DIASTOLIC BLOOD PRESSURE: 83 MMHG | RESPIRATION RATE: 24 BRPM | BODY MASS INDEX: 43.47 KG/M2 | OXYGEN SATURATION: 97 % | HEART RATE: 74 BPM | SYSTOLIC BLOOD PRESSURE: 129 MMHG

## 2025-02-20 DIAGNOSIS — S46.012A TRAUMATIC COMPLETE TEAR OF LEFT ROTATOR CUFF, INITIAL ENCOUNTER: Primary | ICD-10-CM

## 2025-02-20 PROCEDURE — 64415 NJX AA&/STRD BRCH PLXS IMG: CPT | Performed by: STUDENT IN AN ORGANIZED HEALTH CARE EDUCATION/TRAINING PROGRAM

## 2025-02-20 PROCEDURE — 6370000000 HC RX 637 (ALT 250 FOR IP): Performed by: ORTHOPAEDIC SURGERY

## 2025-02-20 PROCEDURE — 3600000004 HC SURGERY LEVEL 4 BASE: Performed by: ORTHOPAEDIC SURGERY

## 2025-02-20 PROCEDURE — 2580000003 HC RX 258: Performed by: ORTHOPAEDIC SURGERY

## 2025-02-20 PROCEDURE — 6360000002 HC RX W HCPCS: Performed by: ORTHOPAEDIC SURGERY

## 2025-02-20 PROCEDURE — 3600000014 HC SURGERY LEVEL 4 ADDTL 15MIN: Performed by: ORTHOPAEDIC SURGERY

## 2025-02-20 PROCEDURE — 7100000000 HC PACU RECOVERY - FIRST 15 MIN: Performed by: ORTHOPAEDIC SURGERY

## 2025-02-20 PROCEDURE — C1713 ANCHOR/SCREW BN/BN,TIS/BN: HCPCS | Performed by: ORTHOPAEDIC SURGERY

## 2025-02-20 PROCEDURE — 2720000010 HC SURG SUPPLY STERILE: Performed by: ORTHOPAEDIC SURGERY

## 2025-02-20 PROCEDURE — 7100000001 HC PACU RECOVERY - ADDTL 15 MIN: Performed by: ORTHOPAEDIC SURGERY

## 2025-02-20 PROCEDURE — 3700000000 HC ANESTHESIA ATTENDED CARE: Performed by: ORTHOPAEDIC SURGERY

## 2025-02-20 PROCEDURE — 3700000001 HC ADD 15 MINUTES (ANESTHESIA): Performed by: ORTHOPAEDIC SURGERY

## 2025-02-20 PROCEDURE — 7100000011 HC PHASE II RECOVERY - ADDTL 15 MIN: Performed by: ORTHOPAEDIC SURGERY

## 2025-02-20 PROCEDURE — L3660 SO 8 AB RSTR CAN/WEB PRE OTS: HCPCS | Performed by: ORTHOPAEDIC SURGERY

## 2025-02-20 PROCEDURE — 6360000002 HC RX W HCPCS: Performed by: NURSE ANESTHETIST, CERTIFIED REGISTERED

## 2025-02-20 PROCEDURE — 2500000003 HC RX 250 WO HCPCS: Performed by: NURSE ANESTHETIST, CERTIFIED REGISTERED

## 2025-02-20 PROCEDURE — 2709999900 HC NON-CHARGEABLE SUPPLY: Performed by: ORTHOPAEDIC SURGERY

## 2025-02-20 PROCEDURE — L3809 WHFO W/O JOINTS PRE OTS: HCPCS | Performed by: ORTHOPAEDIC SURGERY

## 2025-02-20 PROCEDURE — 7100000010 HC PHASE II RECOVERY - FIRST 15 MIN: Performed by: ORTHOPAEDIC SURGERY

## 2025-02-20 PROCEDURE — 6360000002 HC RX W HCPCS: Performed by: ANESTHESIOLOGY

## 2025-02-20 DEVICE — ANCHOR BONE SP FBRTAK RC TGRTPE WH/BLK: Type: IMPLANTABLE DEVICE | Site: SHOULDER | Status: FUNCTIONAL

## 2025-02-20 DEVICE — ANCHOR BONE SP FBRTAK RC TGRTPE BLU: Type: IMPLANTABLE DEVICE | Site: SHOULDER | Status: FUNCTIONAL

## 2025-02-20 DEVICE — ANCHOR SUT L24.5MM DIA4.75MM BIOCOMPOSITE SELF PUNCHING: Type: IMPLANTABLE DEVICE | Site: SHOULDER | Status: FUNCTIONAL

## 2025-02-20 RX ORDER — PROMETHAZINE HYDROCHLORIDE 25 MG/1
25 TABLET ORAL EVERY 6 HOURS PRN
Qty: 5 TABLET | Refills: 0 | Status: SHIPPED | OUTPATIENT
Start: 2025-02-20

## 2025-02-20 RX ORDER — OXYCODONE HYDROCHLORIDE 5 MG/1
10 TABLET ORAL PRN
Status: DISCONTINUED | OUTPATIENT
Start: 2025-02-20 | End: 2025-02-20 | Stop reason: HOSPADM

## 2025-02-20 RX ORDER — FENTANYL CITRATE 50 UG/ML
INJECTION, SOLUTION INTRAMUSCULAR; INTRAVENOUS
Status: DISCONTINUED | OUTPATIENT
Start: 2025-02-20 | End: 2025-02-20 | Stop reason: SDUPTHER

## 2025-02-20 RX ORDER — OXYCODONE AND ACETAMINOPHEN 5; 325 MG/1; MG/1
1 TABLET ORAL EVERY 4 HOURS PRN
Qty: 40 TABLET | Refills: 0 | Status: SHIPPED | OUTPATIENT
Start: 2025-02-20 | End: 2025-02-27

## 2025-02-20 RX ORDER — SODIUM CHLORIDE 0.9 % (FLUSH) 0.9 %
5-40 SYRINGE (ML) INJECTION PRN
Status: DISCONTINUED | OUTPATIENT
Start: 2025-02-20 | End: 2025-02-20 | Stop reason: HOSPADM

## 2025-02-20 RX ORDER — CEFAZOLIN SODIUM 1 G/3ML
INJECTION, POWDER, FOR SOLUTION INTRAMUSCULAR; INTRAVENOUS
Status: DISCONTINUED | OUTPATIENT
Start: 2025-02-20 | End: 2025-02-20 | Stop reason: SDUPTHER

## 2025-02-20 RX ORDER — TRANEXAMIC ACID 100 MG/ML
INJECTION, SOLUTION INTRAVENOUS
Status: DISCONTINUED | OUTPATIENT
Start: 2025-02-20 | End: 2025-02-20 | Stop reason: SDUPTHER

## 2025-02-20 RX ORDER — HYDROMORPHONE HYDROCHLORIDE 2 MG/ML
0.25 INJECTION, SOLUTION INTRAMUSCULAR; INTRAVENOUS; SUBCUTANEOUS EVERY 5 MIN PRN
Status: DISCONTINUED | OUTPATIENT
Start: 2025-02-20 | End: 2025-02-20 | Stop reason: HOSPADM

## 2025-02-20 RX ORDER — LIDOCAINE HYDROCHLORIDE 20 MG/ML
INJECTION, SOLUTION INFILTRATION; PERINEURAL
Status: DISCONTINUED | OUTPATIENT
Start: 2025-02-20 | End: 2025-02-20 | Stop reason: SDUPTHER

## 2025-02-20 RX ORDER — POVIDONE-IODINE 10 MG/G
OINTMENT TOPICAL
Status: COMPLETED | OUTPATIENT
Start: 2025-02-20 | End: 2025-02-20

## 2025-02-20 RX ORDER — ROCURONIUM BROMIDE 10 MG/ML
INJECTION, SOLUTION INTRAVENOUS
Status: DISCONTINUED | OUTPATIENT
Start: 2025-02-20 | End: 2025-02-20 | Stop reason: SDUPTHER

## 2025-02-20 RX ORDER — NALOXONE HYDROCHLORIDE 0.4 MG/ML
INJECTION, SOLUTION INTRAMUSCULAR; INTRAVENOUS; SUBCUTANEOUS PRN
Status: DISCONTINUED | OUTPATIENT
Start: 2025-02-20 | End: 2025-02-20 | Stop reason: HOSPADM

## 2025-02-20 RX ORDER — FENTANYL CITRATE 50 UG/ML
50 INJECTION, SOLUTION INTRAMUSCULAR; INTRAVENOUS EVERY 5 MIN PRN
Status: DISCONTINUED | OUTPATIENT
Start: 2025-02-20 | End: 2025-02-20 | Stop reason: HOSPADM

## 2025-02-20 RX ORDER — OXYCODONE HYDROCHLORIDE 5 MG/1
5 TABLET ORAL PRN
Status: DISCONTINUED | OUTPATIENT
Start: 2025-02-20 | End: 2025-02-20 | Stop reason: HOSPADM

## 2025-02-20 RX ORDER — FENTANYL CITRATE 50 UG/ML
50 INJECTION, SOLUTION INTRAMUSCULAR; INTRAVENOUS ONCE
Status: COMPLETED | OUTPATIENT
Start: 2025-02-20 | End: 2025-02-20

## 2025-02-20 RX ORDER — MIDAZOLAM HYDROCHLORIDE 2 MG/2ML
1 INJECTION, SOLUTION INTRAMUSCULAR; INTRAVENOUS ONCE
Status: COMPLETED | OUTPATIENT
Start: 2025-02-20 | End: 2025-02-20

## 2025-02-20 RX ORDER — SUCCINYLCHOLINE/SOD CL,ISO/PF 100 MG/5ML
SYRINGE (ML) INTRAVENOUS
Status: DISCONTINUED | OUTPATIENT
Start: 2025-02-20 | End: 2025-02-20 | Stop reason: SDUPTHER

## 2025-02-20 RX ORDER — SODIUM CHLORIDE 0.9 % (FLUSH) 0.9 %
5-40 SYRINGE (ML) INJECTION EVERY 12 HOURS SCHEDULED
Status: DISCONTINUED | OUTPATIENT
Start: 2025-02-20 | End: 2025-02-20 | Stop reason: HOSPADM

## 2025-02-20 RX ORDER — PROPOFOL 10 MG/ML
INJECTION, EMULSION INTRAVENOUS
Status: DISCONTINUED | OUTPATIENT
Start: 2025-02-20 | End: 2025-02-20 | Stop reason: SDUPTHER

## 2025-02-20 RX ORDER — SODIUM CHLORIDE 9 MG/ML
INJECTION, SOLUTION INTRAVENOUS PRN
Status: DISCONTINUED | OUTPATIENT
Start: 2025-02-20 | End: 2025-02-20 | Stop reason: HOSPADM

## 2025-02-20 RX ORDER — MIDAZOLAM HYDROCHLORIDE 1 MG/ML
INJECTION, SOLUTION INTRAMUSCULAR; INTRAVENOUS
Status: DISCONTINUED | OUTPATIENT
Start: 2025-02-20 | End: 2025-02-20 | Stop reason: SDUPTHER

## 2025-02-20 RX ADMIN — SUGAMMADEX 200 MG: 100 INJECTION, SOLUTION INTRAVENOUS at 13:30

## 2025-02-20 RX ADMIN — SODIUM CHLORIDE 3000 MG: 900 INJECTION INTRAVENOUS at 11:36

## 2025-02-20 RX ADMIN — ROCURONIUM BROMIDE 10 MG: 10 INJECTION, SOLUTION INTRAVENOUS at 12:25

## 2025-02-20 RX ADMIN — ROCURONIUM BROMIDE 20 MG: 10 INJECTION, SOLUTION INTRAVENOUS at 12:29

## 2025-02-20 RX ADMIN — LIDOCAINE HYDROCHLORIDE 100 MG: 20 INJECTION, SOLUTION INFILTRATION; PERINEURAL at 12:25

## 2025-02-20 RX ADMIN — FENTANYL CITRATE 100 MCG: 50 INJECTION, SOLUTION INTRAMUSCULAR; INTRAVENOUS at 12:25

## 2025-02-20 RX ADMIN — FENTANYL CITRATE 100 MCG: 50 INJECTION INTRAMUSCULAR; INTRAVENOUS at 11:46

## 2025-02-20 RX ADMIN — CEFAZOLIN 3 G: 1 INJECTION, POWDER, FOR SOLUTION INTRAVENOUS at 12:31

## 2025-02-20 RX ADMIN — MIDAZOLAM 2 MG: 1 INJECTION INTRAMUSCULAR; INTRAVENOUS at 12:15

## 2025-02-20 RX ADMIN — Medication 140 MG: at 12:25

## 2025-02-20 RX ADMIN — PROPOFOL 50 MG: 10 INJECTION, EMULSION INTRAVENOUS at 12:51

## 2025-02-20 RX ADMIN — MIDAZOLAM 2 MG: 1 INJECTION INTRAMUSCULAR; INTRAVENOUS at 11:46

## 2025-02-20 RX ADMIN — SODIUM CHLORIDE: 9 INJECTION, SOLUTION INTRAVENOUS at 11:27

## 2025-02-20 RX ADMIN — PROPOFOL 150 MG: 10 INJECTION, EMULSION INTRAVENOUS at 12:25

## 2025-02-20 RX ADMIN — TRANEXAMIC ACID 1000 MG: 1 INJECTION, SOLUTION INTRAVENOUS at 12:51

## 2025-02-20 ASSESSMENT — PAIN - FUNCTIONAL ASSESSMENT
PAIN_FUNCTIONAL_ASSESSMENT: PREVENTS OR INTERFERES WITH MANY ACTIVE NOT PASSIVE ACTIVITIES
PAIN_FUNCTIONAL_ASSESSMENT: 0-10

## 2025-02-20 ASSESSMENT — PAIN DESCRIPTION - DESCRIPTORS: DESCRIPTORS: BURNING;SHARP;SHOOTING

## 2025-02-20 NOTE — ANESTHESIA PROCEDURE NOTES
Peripheral Block    Patient location during procedure: pre-op  Reason for block: procedure for pain, post-op pain management and at surgeon's request  Start time: 2/20/2025 11:49 AM  End time: 2/20/2025 11:50 AM  Staffing  Performed: anesthesiologist   Anesthesiologist: Chacho Link DO  Performed by: Chacho Link DO  Authorized by: Chacho Link DO    Preanesthetic Checklist  Completed: patient identified, IV checked, site marked, risks and benefits discussed, surgical/procedural consents, equipment checked, pre-op evaluation, timeout performed, anesthesia consent given, oxygen available and monitors applied/VS acknowledged  Peripheral Block   Patient position: sitting  Prep: ChloraPrep  Patient monitoring: cardiac monitor, continuous pulse ox, frequent blood pressure checks, IV access, oxygen and responsive to questions  Block type: Brachial plexus  Interscalene  Laterality: left  Injection technique: single-shot  Guidance: ultrasound guided  Local infiltration: bupivacaine  Infiltration strength: 0.5 %  Local infiltration: bupivacaine  Dose: 15 mL    Needle   Needle type: insulated echogenic nerve stimulator needle   Needle gauge: 22 G  Needle localization: anatomical landmarks and ultrasound guidance  Needle length: 8 cm  Assessment   Injection assessment: negative aspiration for heme, no paresthesia on injection, local visualized surrounding nerve on ultrasound and no intravascular symptoms  Paresthesia pain: none  Slow fractionated injection: yes  Hemodynamics: stable  Outcomes: uncomplicated and patient tolerated procedure well

## 2025-02-20 NOTE — BRIEF OP NOTE
Brief Postoperative Note      Patient: Tammi Ball  YOB: 1975  MRN: [id number]    Date of Procedure: 2/20/2025    Pre-Op Diagnosis Codes:      * Traumatic tear of left rotator cuff [S46.012A]    Post-Op Diagnosis: Left shoulder rotator cuff tear, labral degeneration, long head biceps tendon fraying,  Grade 1 chondromalacia glenoid       Procedure(s):  LEFT SHOULDER ARTHROSCOPY SUBACROMIAL DECOMPRESSION, ROTATOR CUFF REPAIR    Surgeon(s):  Kai Manuel MD    Assistant:  Surgical Assistant: Sakina Cid    Anesthesia: General + block    Estimated Blood Loss (mL): Minimal    Complications: None    Specimens:   * No specimens in log *    Implants:  Implant Name Type Inv. Item Serial No.  Lot No. LRB No. Used Action   ANCHOR BONE SP FBRTAK RC TGRTPE MELISSA  - [id number]  ANCHOR BONE SP FBRTAK RC TGRTPE MELISSA   [employer] [id number] Left 1 Implanted   ANCHOR BONE SP FBRTAK RC TGRTPE WH/BLK  - [id number]  ANCHOR BONE SP FBRTAK RC TGRTPE WH/BLK   [employer] [id number] Left 1 Implanted   ANCHOR SUT L24.5MM DIA4.75MM BIOCOMPOSITE SELF PUNCHING - [id number]  ANCHOR SUT L24.5MM DIA4.75MM BIOCOMPOSITE SELF PUNCHING  [employer] [id number] Left 2 Implanted         Drains: * No LDAs found *    Findings:  Infection Present At Time Of Surgery (PATOS) (choose all levels that have infection present):  No infection present        Electronically signed by Kai Manuel MD on 2/20/2025 at 1:25 PM

## 2025-02-20 NOTE — OP NOTE
Tammi Ball (1975)    Date of Surgery- 2/20/2025      Preoperative Diagnosis:  Left shoulder rotator cuff tear                                           Postoperative Diagnosis:  Left shoulder rotator cuff tear, labral degeneration, long head biceps tendon from    Procedure:  Left shoulder Diagnostic arthroscopy, subacromial decompression, rotator cuff repair      Surgeon: Kai Manuel MD    Surgical Assistant: Kelley Cid    Anesthesia: General and interscalene nerve block    Estimated blood loss:  minimal    Complications: none    Disposition:  To PACU in good condition      Indications for Procedure  Quinn is a 50 y.o. female who was seen in the office for left shoulder pain.  She sustained a Workmen's Compensation injury when she fell down some steps at work.  She was found to have a full-thickness supraspinatus tendon tear on MRI.  We discussed operative and nonoperative treatment options and recommended surgical intervention.  We discussed the risks, benefits, complications, and alternatives of the  procedure. The patient chose to proceed with the procedure. The patient subsequently provided written informed consent for the procedure.  At no time were any guarantees implied or stated.    Site Marking and Surgical Prep  The patient was seen in the preoperative holding area and the appropriate extremity marked.  Interscalene block was administered by the anesthesia department. The patient was taken to the operating room, identified, and transferred onto the operating room table in the supine position.  The patient was then induced under general anesthesia.  Patient received prophylactic preoperative IV antibiotics and had DVT prophylaxis with sequential compression devices on the bilateral lower extremities    Diagnostic Arthroscopy  The operative extremity was then sterilely prepped and draped in the standard fashion.  The arm was placed in an arthroscopic arm rossi.  A timeout was

## 2025-02-20 NOTE — H&P
Preoperative H&P Update    The patient's History and Physical in the medical record from 2/19/25 was reviewed by me today.  I reviewed the HPI, medications, allergies, reason for surgery, diagnosis and treatment plan and there has been no interval change.    The patient was examined by me today. Physical exam findings for this update include:    BP (!) 157/92   Pulse 88   Temp 98.8 °F (37.1 °C) (Oral)   Resp 16   Ht 1.676 m (5' 6\")   Wt 122.7 kg (270 lb 8 oz)   LMP 01/11/2010 Comment: 2004  SpO2 97%   BMI 43.66 kg/m²   Airway is intact  Chest: breathing comfortably  Heart: regular rate  Findings on exam of the body region where surgery is to be performed include: see last office and/or consult note      A prescription monitoring report was reviewed for the patient.         Electronically signed by Kai Manuel MD on 2/20/2025 at 11:47 AM

## 2025-02-20 NOTE — ANESTHESIA POSTPROCEDURE EVALUATION
Department of Anesthesiology  Postprocedure Note    Patient: Tammi Ball  MRN: [id number]  YOB: 1975  Date of evaluation: 2/20/2025    Procedure Summary       Date: 02/20/25 Room / Location: [address]    Anesthesia Start: 1218 Anesthesia Stop: 1351    Procedure: LEFT SHOULDER ARTHROSCOPY SUBACROMIAL DECOMPRESSION, ROTATOR CUFF REPAIR-BLOCK-ARTHREX (Left: Shoulder) Diagnosis:       Traumatic tear of left rotator cuff      (Traumatic tear of left rotator cuff [S46.012A])    Surgeons: Kai Manuel MD Responsible Provider: Wesly Grimm DO    Anesthesia Type: general, regional ASA Status: 3            Anesthesia Type: No value filed.    Gilbert Phase I: Gilbert Score: 8    Gilbert Phase II:      Anesthesia Post Evaluation    Patient location during evaluation: PACU  Patient participation: complete - patient participated  Level of consciousness: awake  Airway patency: patent  Nausea & Vomiting: no vomiting and no nausea  Cardiovascular status: hemodynamically stable  Respiratory status: acceptable  Hydration status: stable  Multimodal analgesia pain management approach  Pain management: adequate    No notable events documented.

## 2025-02-20 NOTE — ANESTHESIA PRE PROCEDURE
Department of Anesthesiology  Preprocedure Note       Name:  Tammi Ball   Age:  50 y.o.  :  1975                                          MRN:  [id number]         Date:  2025      Surgeon: Surgeon(s):  Kai Manuel MD    Procedure: Procedure(s):  LEFT SHOULDER ARTHROSCOPY SUBACROMIAL DECOMPRESSION, ROTATOR CUFF REPAIR-BLOCK-ARTHREX    Medications prior to admission:   Prior to Admission medications    Medication Sig Start Date End Date Taking? Authorizing Provider   losartan (COZAAR) 50 MG tablet Take 1 tablet by mouth once daily 25   Frantz Muñoz APRN - CNP   zolpidem (AMBIEN CR) 12.5 MG extended release tablet Take 1 tablet by mouth nightly as needed for Sleep for up to 30 days. Max Daily Amount: 12.5 mg 2/12/25 3/14/25  Mishel Ag APRN - CNP   lamoTRIgine (LAMICTAL) 100 MG tablet Take 1 tablet by mouth once daily  Patient taking differently: Take 1 tablet by mouth nightly 2/3/25   Glischinski, Luke A, APRN - CNP   hydrOXYzine pamoate (VISTARIL) 100 MG capsule TAKE 1 CAPSULE BY MOUTH NIGHTLY AS NEEDED FOR INSOMNIA 2/3/25   Glischinski, Luke A, APRN - CNP   desvenlafaxine succinate (PRISTIQ) 100 MG TB24 extended release tablet Take 1 tablet by mouth once daily  Patient taking differently: Take 1 tablet by mouth nightly 2/3/25   Glischinski, Luke A, APRN - CNP   methylPREDNISolone (MEDROL DOSEPACK) 4 MG tablet Take by mouth.  Patient not taking: Reported on 2025   Nixon Mansfield PA   Alum & Mag Hydroxide-Simeth (ALUMINUM-MAGNESIUM-SIMETHICONE) 200-200-20 MG/5ML SUSP Take 30 mLs by mouth every 4 hours as needed (Abdominal pain)  Patient not taking: Reported on 24   Chinedu Fields MD   atorvastatin (LIPITOR) 10 MG tablet Take 1 tablet by mouth once daily  Patient taking differently: Take 1 tablet by mouth nightly 24   Mishel Ag APRN - CNP   methylphenidate (CONCERTA) 36 MG extended release tablet Take 2 tablets by

## 2025-02-21 ENCOUNTER — TELEPHONE (OUTPATIENT)
Dept: ORTHOPEDIC SURGERY | Age: 50
End: 2025-02-21

## 2025-02-21 NOTE — TELEPHONE ENCOUNTER
She was given prophylactic preop antibiotics. We do not routinely send patients home after surgery with antibiotics unless there is obvious infection, which she does not have.

## 2025-02-21 NOTE — TELEPHONE ENCOUNTER
Spoke with patient to check in and let her know I asked Dr. Manuel about that. Pt said she is at 8/10 w/ pain meds but as well as expected. Pt mentions she is having a hard time relaxing that shoulder, but is still keeping it safe in the sling. Told her to keep that in there and she should be okay.

## 2025-02-21 NOTE — TELEPHONE ENCOUNTER
Spoke with Dr. Manuel and she said that you were given a prophylactic preop antibiotics to help with preventing an infection of any kind. We do not routinely send patients home after surgery with antibiotics unless there is obvious infection, which you do not have.     Patient understood.

## 2025-02-21 NOTE — TELEPHONE ENCOUNTER
Other ANTIBIOTIC REQUEST        Contact: Tammi Ball \"Cynthia\"   Phone:  [phone]     IW STATES SHE WAS TOLD SHE'D BE GETTING AN ANTIBIOTIC TO TAKE WHILE HOME.      PLS ADVISE.

## 2025-02-24 ENCOUNTER — OFFICE VISIT (OUTPATIENT)
Dept: ORTHOPEDIC SURGERY | Age: 50
End: 2025-02-24

## 2025-02-24 VITALS — RESPIRATION RATE: 16 BRPM | WEIGHT: 275 LBS | BODY MASS INDEX: 44.2 KG/M2 | HEIGHT: 66 IN

## 2025-02-24 DIAGNOSIS — S46.012A TRAUMATIC COMPLETE TEAR OF LEFT ROTATOR CUFF, INITIAL ENCOUNTER: Primary | ICD-10-CM

## 2025-02-24 NOTE — PROGRESS NOTES
Shoulder Arthroscopy Follow-up  Tammi Ball is here for follow up after left shoulder arthroscopic surgery. Surgery date was 2/20/25. Findings at surgery: large rotator cuff tear, long biceps tendon fraying, Grade 1 chondromlacia glenoid. Pain is controlled with current analgesics.  Medication(s) being used: Percocet. The patient's pain is rated at 7-10/10. The patient denies fever, wound drainage, increasing redness, pus, increasing swelling. Post op problems reported: none. She has been in a sling and non-weightbearing as instructed.       Physical Examination:  Resp 16   Ht 1.676 m (5' 6\")   Wt 124.7 kg (275 lb)   LMP 01/11/2010 Comment: 2004  BMI 44.39 kg/m²    Patient is awake, alert, and in no acute distress.  The incisions are clean, dry, no drainage. There is no warmth, erythema, or purulent drainage over the incisions.    Sensation is intact to light touch in the axillary, median, radial, and ulnar nerve distribution bilaterally. The EPL, FPL, and interossei are grossly intact bilaterally. The Bilateral upper extremities are warm and well-perfused with brisk capillary refill.        Assessment:   4 days status post left shoulder arthroscopy, arthroscopy, subacromial decompression, rotator cuff repair  Bipolar disorder  ADHD  Class 3 obesity  HTN  Sleep apnea      Plan:   We reviewed intra-operative arthroscopy photos.    Start physical therapy at 4 weeks postop.    The patient may not advance their weight-bearing.    Sling for 6 weeks total after surgery. Elbow ROM when out of sling.    The patient should use the cold pad or apply ice to the shoulder continuously for 3 days after surgery. Then use cold pad or ice 20-30 minutes only 3-5 times per day as needed.    Refill pain medications as needed.    Discussed taking NSAID continuously with food for the next two weeks.  Afterwards, take prn pain.  The patient was advised that NSAID-type medications have several potential side effects that

## 2025-02-27 ENCOUNTER — TELEPHONE (OUTPATIENT)
Dept: ORTHOPEDIC SURGERY | Age: 50
End: 2025-02-27

## 2025-02-27 DIAGNOSIS — S46.012D TRAUMATIC COMPLETE TEAR OF LEFT ROTATOR CUFF, SUBSEQUENT ENCOUNTER: Primary | ICD-10-CM

## 2025-02-27 RX ORDER — OXYCODONE AND ACETAMINOPHEN 5; 325 MG/1; MG/1
1 TABLET ORAL EVERY 6 HOURS PRN
Qty: 28 TABLET | Refills: 0 | Status: SHIPPED | OUTPATIENT
Start: 2025-02-27 | End: 2025-03-06

## 2025-02-27 NOTE — TELEPHONE ENCOUNTER
Prescription Refill     Medication Name:  oxyCODONE-acetaminophen (PERCOCET) 5-325 MG per tablet     Pharmacy: [facility] [place] [address] #[phone]    Patient Contact Number:  [phone]

## 2025-02-28 ENCOUNTER — OFFICE VISIT (OUTPATIENT)
Dept: FAMILY MEDICINE CLINIC | Age: 50
End: 2025-02-28

## 2025-02-28 DIAGNOSIS — Z91.199 NO-SHOW FOR APPOINTMENT: Primary | ICD-10-CM

## 2025-03-03 ENCOUNTER — TELEMEDICINE (OUTPATIENT)
Dept: FAMILY MEDICINE CLINIC | Age: 50
End: 2025-03-03
Payer: COMMERCIAL

## 2025-03-03 DIAGNOSIS — F90.0 ATTENTION DEFICIT HYPERACTIVITY DISORDER (ADHD), PREDOMINANTLY INATTENTIVE TYPE: ICD-10-CM

## 2025-03-03 DIAGNOSIS — F51.04 PSYCHOPHYSIOLOGICAL INSOMNIA: ICD-10-CM

## 2025-03-03 PROCEDURE — 3017F COLORECTAL CA SCREEN DOC REV: CPT | Performed by: NURSE PRACTITIONER

## 2025-03-03 PROCEDURE — G8427 DOCREV CUR MEDS BY ELIG CLIN: HCPCS | Performed by: NURSE PRACTITIONER

## 2025-03-03 PROCEDURE — 99214 OFFICE O/P EST MOD 30 MIN: CPT | Performed by: NURSE PRACTITIONER

## 2025-03-03 PROCEDURE — G9899 SCRN MAM PERF RSLTS DOC: HCPCS | Performed by: NURSE PRACTITIONER

## 2025-03-03 RX ORDER — METHYLPHENIDATE HYDROCHLORIDE 36 MG/1
36 TABLET ORAL DAILY
Qty: 60 TABLET | Refills: 0 | Status: SHIPPED | OUTPATIENT
Start: 2025-05-02 | End: 2025-06-01

## 2025-03-03 RX ORDER — ZOLPIDEM TARTRATE 12.5 MG/1
12.5 TABLET, FILM COATED, EXTENDED RELEASE ORAL NIGHTLY PRN
Qty: 90 TABLET | Refills: 1 | Status: SHIPPED | OUTPATIENT
Start: 2025-03-14 | End: 2025-06-12

## 2025-03-03 RX ORDER — METHYLPHENIDATE HYDROCHLORIDE 36 MG/1
36 TABLET ORAL DAILY
Qty: 60 TABLET | Refills: 0 | Status: SHIPPED | OUTPATIENT
Start: 2025-03-03 | End: 2025-03-04 | Stop reason: SDUPTHER

## 2025-03-03 RX ORDER — METHYLPHENIDATE HYDROCHLORIDE 36 MG/1
36 TABLET ORAL DAILY
Qty: 60 TABLET | Refills: 0 | Status: SHIPPED | OUTPATIENT
Start: 2025-04-02 | End: 2025-05-02

## 2025-03-03 NOTE — ASSESSMENT & PLAN NOTE
Chronic, at goal (stable), continue current treatment plan  Controlled substances monitoring: possible medication side effects, risk of tolerance and/or dependence, and alternative treatments discussed, no signs of potential drug abuse or diversion identified, and OARRS report reviewed today- activity consistent with treatment plan.     zolpidem (AMBIEN CR) 12.5 MG extended release tablet; Take 1 tablet by mouth nightly as needed for Sleep for up to 90 days. Max Daily Amount: 12.5 mg  FOLLOW UP IN THREE MONTHS

## 2025-03-03 NOTE — PROGRESS NOTES
distress      [] Abnormal -     Mental status: [x] Alert and awake  [x] Oriented to person/place/time [x] Able to follow commands    [] Abnormal -     Eyes:   EOM    [x]  Normal    [] Abnormal -   Sclera  [x]  Normal    [] Abnormal -          Discharge [x]  None visible   [] Abnormal -     HENT: [x] Normocephalic, atraumatic  [] Abnormal -   [x] Mouth/Throat: Mucous membranes are moist    External Ears [x] Normal  [] Abnormal -    Neck: [x] No visualized mass [] Abnormal -     Pulmonary/Chest: [x] Respiratory effort normal   [x] No visualized signs of difficulty breathing or respiratory distress        [] Abnormal -      Musculoskeletal:   [x] Normal gait with no signs of ataxia         [x] Normal range of motion of neck        [] Abnormal -     Neurological:        [x] No Facial Asymmetry (Cranial nerve 7 motor function) (limited exam due to video visit)          [x] No gaze palsy        [] Abnormal -          Skin:        [x] No significant exanthematous lesions or discoloration noted on facial skin         [] Abnormal -            Psychiatric:       [x] Normal Affect [] Abnormal -        [x] No Hallucinations    Other pertinent observable physical exam findings:-           --Mishel Ag, APRBECKI - CNP

## 2025-03-04 RX ORDER — METHYLPHENIDATE HYDROCHLORIDE 36 MG/1
TABLET ORAL
Qty: 60 TABLET | Refills: 0 | Status: SHIPPED | OUTPATIENT
Start: 2025-03-04 | End: 2025-04-03

## 2025-03-04 RX ORDER — METHYLPHENIDATE HYDROCHLORIDE 36 MG/1
TABLET ORAL
Qty: 60 TABLET | Refills: 0 | Status: SHIPPED | OUTPATIENT
Start: 2025-04-03 | End: 2025-05-03

## 2025-03-04 RX ORDER — METHYLPHENIDATE HYDROCHLORIDE 36 MG/1
TABLET ORAL
Qty: 60 TABLET | Refills: 0 | Status: SHIPPED | OUTPATIENT
Start: 2025-05-03 | End: 2025-06-02

## 2025-03-06 ENCOUNTER — OFFICE VISIT (OUTPATIENT)
Dept: ORTHOPEDIC SURGERY | Age: 50
End: 2025-03-06

## 2025-03-06 VITALS — WEIGHT: 275 LBS | BODY MASS INDEX: 44.2 KG/M2 | HEIGHT: 66 IN

## 2025-03-06 DIAGNOSIS — S46.012D TRAUMATIC COMPLETE TEAR OF LEFT ROTATOR CUFF, SUBSEQUENT ENCOUNTER: Primary | ICD-10-CM

## 2025-03-06 PROCEDURE — 99024 POSTOP FOLLOW-UP VISIT: CPT | Performed by: ORTHOPAEDIC SURGERY

## 2025-03-06 RX ORDER — OXYCODONE AND ACETAMINOPHEN 5; 325 MG/1; MG/1
1 TABLET ORAL EVERY 6 HOURS PRN
Qty: 28 TABLET | Refills: 0 | Status: SHIPPED | OUTPATIENT
Start: 2025-03-06 | End: 2025-03-13

## 2025-03-06 NOTE — PROGRESS NOTES
Shoulder Arthroscopy Follow-up  Tammi Ball is here for follow up after left shoulder arthroscopic surgery. Surgery date was 2/20/25. Findings at surgery: large rotator cuff tear, long biceps tendon fraying, Grade 1 chondromlacia glenoid. Pain is controlled with current analgesics.  Medication(s) being used: Percocet. The patient's pain is rated at 8/10.  She has been in a sling and non-weightbearing as instructed. 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      Physical Examination:  Ht 1.676 m (5' 6\")   Wt 124.7 kg (275 lb)   LMP 01/11/2010 Comment: 2004  BMI 44.39 kg/m²    Patient is awake, alert, and in no acute distress.  The incisions are healing.      Assessment:   2 weeks status post left shoulder arthroscopy, arthroscopy, subacromial decompression, rotator cuff repair  Bipolar disorder  ADHD  Class 3 obesity  HTN  Sleep apnea      Plan:   Sutures were removed.  Steri-strips and mastisol were applied.    Patient may start taking baths or soaks at 4 weeks postop    Start physical therapy at 4 weeks postop. Referral and protocol provided.    The patient may not advance their weight-bearing.    Sling for 6 weeks total after surgery.    Refill pain medications as needed. A prescription monitoring report was reviewed for the patient.  Prescription for Percocet refill E scribed.    OTC NSAIDs as needed.    Ice as needed.    We will keep her out of work.    Return to office at the 6 week postop time period for evaluation of progress or prn if problems.          Kai Manuel MD  Orthopaedic Surgery and Sports Medicine     Disclaimer:  This note was generated with use of a verbal recognition program and an attempt was made to check for errors.  It is possible that there are still dictated errors within this office note.  If so, please bring any significant errors to my attention for an addendum.  All efforts were made to ensure that this office note is accurate.

## 2025-03-07 RX ORDER — HYDROXYZINE PAMOATE 100 MG
CAPSULE ORAL
Qty: 30 CAPSULE | Refills: 2 | Status: SHIPPED | OUTPATIENT
Start: 2025-03-07

## 2025-03-14 ENCOUNTER — TELEPHONE (OUTPATIENT)
Dept: ORTHOPEDIC SURGERY | Age: 50
End: 2025-03-14

## 2025-03-14 DIAGNOSIS — S46.012D TRAUMATIC COMPLETE TEAR OF LEFT ROTATOR CUFF, SUBSEQUENT ENCOUNTER: Primary | ICD-10-CM

## 2025-03-14 RX ORDER — HYDROCODONE BITARTRATE AND ACETAMINOPHEN 5; 325 MG/1; MG/1
1 TABLET ORAL EVERY 6 HOURS PRN
Qty: 28 TABLET | Refills: 0 | Status: SHIPPED | OUTPATIENT
Start: 2025-03-14 | End: 2025-03-21

## 2025-03-14 NOTE — TELEPHONE ENCOUNTER
Prescription Refill     Medication Name:  HYDROcodone 5mg  Pharmacy:   [id number] -   [facility]  - [address] [phone] - F [phone]     Patient Contact Number:  [phone]

## 2025-03-17 RX ORDER — LOSARTAN POTASSIUM 50 MG/1
50 TABLET ORAL DAILY
Qty: 30 TABLET | Refills: 0 | Status: SHIPPED | OUTPATIENT
Start: 2025-03-17

## 2025-03-25 ENCOUNTER — HOSPITAL ENCOUNTER (OUTPATIENT)
Dept: PHYSICAL THERAPY | Age: 50
Setting detail: THERAPIES SERIES
Discharge: HOME | End: 2025-03-25
Attending: ORTHOPAEDIC SURGERY
Payer: COMMERCIAL

## 2025-03-25 DIAGNOSIS — M25.612 IMPAIRED RANGE OF MOTION OF LEFT SHOULDER: ICD-10-CM

## 2025-03-25 DIAGNOSIS — R68.89 DECREASED FUNCTIONAL ACTIVITY TOLERANCE: Primary | ICD-10-CM

## 2025-03-25 DIAGNOSIS — R29.898 WEAKNESS OF LEFT SHOULDER: ICD-10-CM

## 2025-03-25 PROCEDURE — 97161 PT EVAL LOW COMPLEX 20 MIN: CPT

## 2025-03-25 PROCEDURE — 97110 THERAPEUTIC EXERCISES: CPT

## 2025-03-25 PROCEDURE — 97530 THERAPEUTIC ACTIVITIES: CPT

## 2025-03-25 NOTE — PLAN OF CARE
[facility] - Outpatient Rehabilitation and Therapy: [address], [place] [address] office: [phone] fax: [phone]     Physical Therapy Initial Evaluation Certification      Dear Kai Manuel MD ,    We had the pleasure of evaluating the following patient for physical therapy services at [facility].  A summary of our findings can be found in the initial assessment below.  This includes our plan of care.  If you have any questions or concerns regarding these findings, please do not hesitate to contact me at the office phone number listed above.  Thank you for the referral.     Physician Signature:_______________________________Date:__________________  By signing above (or electronic signature), therapist’s plan is approved by physician       Physical Therapy: TREATMENT/PROGRESS NOTE   Patient: Tammi Blal (50 y.o. female)   Examination Date: 2025   :  1975 MRN: [id number]   Visit #: 1   Insurance Allowable Auth Needed   Workers comp 2-3 times a week for 4-6 wks from 25-25 [x]Yes    []No    Insurance: Payor: GENERIC SELF-INSURED / Plan: GENERIC SELF-INSURED WC / Product Type: *No Product type* /   Insurance ID: [id number] - (Worker's Comp)  Secondary Insurance (if applicable):    Treatment Diagnosis:     ICD-10-CM    1. Decreased functional activity tolerance  R68.89       2. Impaired range of motion of left shoulder  M25.612       3. Weakness of left shoulder  R29.898          Medical Diagnosis:  Traumatic complete tear of left rotator cuff, subsequent encounter [S46.012D]   Referring Physician: Kai Manuel MD  PCP: Mishel Ag APRN - CNP     Plan of care signed (Y/N): sent today for cosign    Date of Patient follow up with Physician: 4/3/25     Plan of Care Report: EVAL today  POC update due: (10 visits /OR AUTH LIMITS, whichever is less)  2025                                             Medical

## 2025-03-27 ENCOUNTER — HOSPITAL ENCOUNTER (OUTPATIENT)
Dept: PHYSICAL THERAPY | Age: 50
Setting detail: THERAPIES SERIES
Discharge: HOME | End: 2025-03-27
Attending: ORTHOPAEDIC SURGERY
Payer: COMMERCIAL

## 2025-03-27 ENCOUNTER — TELEPHONE (OUTPATIENT)
Dept: ORTHOPEDIC SURGERY | Age: 50
End: 2025-03-27

## 2025-03-27 DIAGNOSIS — S46.012D TRAUMATIC COMPLETE TEAR OF LEFT ROTATOR CUFF, SUBSEQUENT ENCOUNTER: Primary | ICD-10-CM

## 2025-03-27 PROCEDURE — 97140 MANUAL THERAPY 1/> REGIONS: CPT

## 2025-03-27 PROCEDURE — 97110 THERAPEUTIC EXERCISES: CPT

## 2025-03-27 RX ORDER — HYDROCODONE BITARTRATE AND ACETAMINOPHEN 5; 325 MG/1; MG/1
1 TABLET ORAL EVERY 8 HOURS PRN
Qty: 21 TABLET | Refills: 0 | Status: SHIPPED | OUTPATIENT
Start: 2025-03-27 | End: 2025-04-03

## 2025-03-27 NOTE — FLOWSHEET NOTE
Code Tx Minutes 40 3       Total Treatment Minutes 40        Charge Justification:  (70134) THERAPEUTIC EXERCISE - Provided verbal/tactile cueing for HEP and/or activities related to strengthening, flexibility, endurance, ROM performed to prevent loss of range of motion, maintain or improve muscular strength or increase flexibility, following either an injury or surgery.   (36829) MANUAL THERAPY -  Manual therapy techniques, 1 or more regions, each 15 minutes (Mobilization/manipulation, manual lymphatic drainage, manual traction) for the purpose of modulating pain, promoting relaxation,  increasing ROM, reducing/eliminating soft tissue swelling/inflammation/restriction, improving soft tissue extensibility and allowing for proper ROM for normal function with self care, mobility, lifting and ambulation    GOALS     Patient stated goal: \"full recovery\"  [] Progressing: [] Met: [] Not Met: [] Adjusted    Therapist goals for Patient:   Short Term Goals: To be achieved in: 2 weeks  1Independent in HEP and progression per patient tolerance, in order to prevent re-injury.   [] Progressing: [] Met: [] Not Met: [] Adjusted  Patient will have a decrease in pain to <2/10 to facilitate improvement in movement, function, and ADLs as indicated by Functional Deficits.  [] Progressing: [] Met: [] Not Met: [] Adjusted    IF APPLICABLE:  [] Patient to demonstrate independence in wear and care for custom orthotic device. (Only if applicable for orthotic eval)     Long Term Goals: To be achieved in: 8 weeks  Disability index score of 10% or less for the Quick DASH to assist with reaching prior level of function with activities such as household chores.  [] Progressing: [] Met: [] Not Met: [] Adjusted  Patient will demonstrate increased AROM of L shoulder to 160 degrees flexion, functional ER with thumb to C7 and functional IR with thumb to T12 without pain to allow for proper joint functioning to enable patient to perform all self care

## 2025-03-31 ENCOUNTER — HOSPITAL ENCOUNTER (OUTPATIENT)
Dept: PHYSICAL THERAPY | Age: 50
Setting detail: THERAPIES SERIES
Discharge: HOME | End: 2025-03-31
Attending: ORTHOPAEDIC SURGERY
Payer: COMMERCIAL

## 2025-03-31 PROCEDURE — 97110 THERAPEUTIC EXERCISES: CPT

## 2025-03-31 PROCEDURE — 97140 MANUAL THERAPY 1/> REGIONS: CPT

## 2025-03-31 NOTE — FLOWSHEET NOTE
[facility] - Outpatient Rehabilitation and Therapy: [address], [place] [address] office: [phone] fax: [phone]       Physical Therapy: TREATMENT/PROGRESS NOTE   Patient: Tammi Ball (50 y.o. female)   Examination Date: 2025   :  1975 MRN: [id number]   Visit #: 3   Insurance Allowable Auth Needed   Workers comp 2-3 times a week for 4-6 wks from 25-25 [x]Yes    []No    Insurance: Payor: GENERIC SELF-INSURED / Plan: GENERIC SELF-INSURED WC / Product Type: *No Product type* /   Insurance ID: [id number] - (Worker's Comp)  Secondary Insurance (if applicable):    Treatment Diagnosis:     ICD-10-CM    1. Decreased functional activity tolerance  R68.89       2. Impaired range of motion of left shoulder  M25.612       3. Weakness of left shoulder  R29.898          Medical Diagnosis:  Traumatic complete tear of left rotator cuff, subsequent encounter [S46.012D]   Referring Physician: Kai Manuel MD  PCP: Mishel Ag APRN - CNP     Plan of care signed (Y/N): y    Date of Patient follow up with Physician: 4/3/25     Plan of Care Report: NO  POC update due: (10 visits /OR AUTH LIMITS, whichever is less)  2025                                             Medical History:  Comorbidities:  Hypertension  Osteoarthritis  Depression  Other Psychological Conditions: bipolar disorder  Other Neurological Conditions: neuropathy B feet  Relevant Medical History: R hip replacement, R foot surgery, epidural injections and ablation for back pain                                         Precautions/ Contra-indications:           Latex allergy:  NO  Pacemaker:    NO  Contraindications for Manipulation: None  Date of Surgery: 25  LEFT SHOULDER ARTHROSCOPY SUBACROMIAL DECOMPRESSION, ROTATOR CUFF REPAIR-BLOCK-ARTHREX       Rotator Cuff Repair   Post-Operative Therapy Protocol      Sling (except during PT and washing):  Large/Massive Tear: 6 weeks with

## 2025-04-02 ENCOUNTER — PATIENT MESSAGE (OUTPATIENT)
Dept: FAMILY MEDICINE CLINIC | Age: 50
End: 2025-04-02

## 2025-04-02 RX ORDER — ATORVASTATIN CALCIUM 10 MG/1
10 TABLET, FILM COATED ORAL DAILY
Qty: 90 TABLET | Refills: 0 | OUTPATIENT
Start: 2025-04-02

## 2025-04-03 ENCOUNTER — HOSPITAL ENCOUNTER (OUTPATIENT)
Dept: PHYSICAL THERAPY | Age: 50
Setting detail: THERAPIES SERIES
Discharge: HOME | End: 2025-04-03
Attending: ORTHOPAEDIC SURGERY
Payer: COMMERCIAL

## 2025-04-03 ENCOUNTER — OFFICE VISIT (OUTPATIENT)
Dept: ORTHOPEDIC SURGERY | Age: 50
End: 2025-04-03

## 2025-04-03 VITALS — BODY MASS INDEX: 44.2 KG/M2 | HEIGHT: 66 IN | WEIGHT: 275 LBS

## 2025-04-03 DIAGNOSIS — S46.012D TRAUMATIC COMPLETE TEAR OF LEFT ROTATOR CUFF, SUBSEQUENT ENCOUNTER: Primary | ICD-10-CM

## 2025-04-03 PROCEDURE — 97016 VASOPNEUMATIC DEVICE THERAPY: CPT

## 2025-04-03 PROCEDURE — 97140 MANUAL THERAPY 1/> REGIONS: CPT

## 2025-04-03 PROCEDURE — 99024 POSTOP FOLLOW-UP VISIT: CPT | Performed by: ORTHOPAEDIC SURGERY

## 2025-04-03 PROCEDURE — 97110 THERAPEUTIC EXERCISES: CPT

## 2025-04-03 RX ORDER — HYDROCODONE BITARTRATE AND ACETAMINOPHEN 5; 325 MG/1; MG/1
1 TABLET ORAL 2 TIMES DAILY PRN
Qty: 14 TABLET | Refills: 0 | Status: SHIPPED | OUTPATIENT
Start: 2025-04-03 | End: 2025-04-10

## 2025-04-03 RX ORDER — ATORVASTATIN CALCIUM 10 MG/1
10 TABLET, FILM COATED ORAL DAILY
Qty: 90 TABLET | Refills: 0 | Status: SHIPPED | OUTPATIENT
Start: 2025-04-03

## 2025-04-03 NOTE — FLOWSHEET NOTE
[facility] - Outpatient Rehabilitation and Therapy: [address], [place] [address] office: [phone] fax: [phone]       Physical Therapy: TREATMENT/PROGRESS NOTE   Patient: Tammi Ball (50 y.o. female)   Examination Date: 2025   :  1975 MRN: [id number]   Visit #: 4   Insurance Allowable Auth Needed   Workers comp 2-3 times a week for 4-6 wks from 25-25 [x]Yes    []No    Insurance: Payor: GENERIC SELF-INSURED / Plan: GENERIC SELF-INSURED WC / Product Type: *No Product type* /   Insurance ID: [id number] - (Worker's Comp)  Secondary Insurance (if applicable):    Treatment Diagnosis:     ICD-10-CM    1. Decreased functional activity tolerance  R68.89       2. Impaired range of motion of left shoulder  M25.612       3. Weakness of left shoulder  R29.898          Medical Diagnosis:  Traumatic complete tear of left rotator cuff, subsequent encounter [S46.012D]   Referring Physician: Kai Manuel MD  PCP: Mishel Ag APRN - CNP     Plan of care signed (Y/N): y    Date of Patient follow up with Physician: 4/3/25     Plan of Care Report: NO  POC update due: (10 visits /OR AUTH LIMITS, whichever is less)  2025                                             Medical History:  Comorbidities:  Hypertension  Osteoarthritis  Depression  Other Psychological Conditions: bipolar disorder  Other Neurological Conditions: neuropathy B feet  Relevant Medical History: R hip replacement, R foot surgery, epidural injections and ablation for back pain                                         Precautions/ Contra-indications:           Latex allergy:  NO  Pacemaker:    NO  Contraindications for Manipulation: None  Date of Surgery: 25  LEFT SHOULDER ARTHROSCOPY SUBACROMIAL DECOMPRESSION, ROTATOR CUFF REPAIR-BLOCK-ARTHREX       Rotator Cuff Repair   Post-Operative Therapy Protocol      Sling (except during PT and washing):  Large/Massive Tear: 6 weeks with

## 2025-04-04 NOTE — PROGRESS NOTES
In reviewing her other results, it looks like she had the right sided diagnostic mammo done 3 days after the screening was completed   MD ASHKAN Macias Board Certified in Sleep Medicine  Certified in [address] [place] [address] Certified in Neurology [address]  [place] [address] [address],  Fausto Jacobs [phone]   [facility], [address]                      [address]  [address] [place], [place] [id number]  [phone]    Subjective:     Patient ID: Pepe Martinez is a 53 y.o. female. Chief Complaint   Patient presents with    Follow-up     sleep study       HPI:        Pepe Martinez is a 53 y.o. female was seen today as a follow for obstructive sleep apnea. The patient underwent comprehensive polysomnogram on 09/10/2019, the overnight registration revealed REM related obstructive sleep apnea with an overall apnea hypopnea index of 3.9/h and REM AHI of 13.8/h with lowest O2 saturation of 87%, patient spent about 0.4 minutes below 90%. Had PSG before the weight loss on  09/13/2013 the AHI was 37.1/h with lowest O2 of 80%. DOT/CDL - N/A        Previous Report(s)Reviewed: historical medical records         Social History     Socioeconomic History    Marital status:      Spouse name: Not on file    Number of children: Not on file    Years of education: Not on file    Highest education level: Not on file   Occupational History    Occupation: bank collections   Social Needs    Financial resource strain: Not on file    Food insecurity:     Worry: Not on file     Inability: Not on file    Transportation needs:     Medical: Not on file     Non-medical: Not on file   Tobacco Use    Smoking status: Current Every Day Smoker     Packs/day: 0.50     Types: Cigarettes     Start date: 9/17/1987    Smokeless tobacco: Never Used    Tobacco comment: advised to quit   Substance and Sexual Activity    Alcohol use:  Yes     Alcohol/week: 5.0 standard drinks     Types: 6 Standard drinks or equivalent per week     Comment: socially    Drug use: Yes     Types: Marijuana     Comment: - last smoke months ago-1/2019    Sexual activity: Not on file   Lifestyle    Physical activity:     Days per week: Not on file     Minutes per session: Not on file    Stress: Not on file   Relationships    Social connections:     Talks on phone: Not on file     Gets together: Not on file     Attends Scientologist service: Not on file     Active member of club or organization: Not on file     Attends meetings of clubs or organizations: Not on file     Relationship status: Not on file    Intimate partner violence:     Fear of current or ex partner: Not on file     Emotionally abused: Not on file     Physically abused: Not on file     Forced sexual activity: Not on file   Other Topics Concern    Not on file   Social History Narrative    Not on file       Prior to Admission medications    Medication Sig Start Date End Date Taking? Authorizing Provider   HYDROcodone-acetaminophen (NORCO)  MG per tablet Take 1 tablet by mouth every 8 hours as needed for Pain for up to 30 days. 11/5 G60.9 11/5/19 12/5/19  ROSSANA Lamas CNP   diazepam (VALIUM) 10 MG tablet Take 1 tablet by mouth nightly as needed for Anxiety or Sleep for up to 30 days. FILL ON OR AFTER 9/19 9/19/19 10/19/19  ROSSANA Lamas CNP   HYDROcodone-acetaminophen (NORCO)  MG per tablet Take 1 tablet by mouth every 8 hours as needed for Pain for up to 30 days. G60.9 FILL 12/5Intended supply: 30 days 12/5/19 1/4/20  ROSSANA Lamas CNP   HYDROcodone-acetaminophen (NORCO)  MG per tablet Take 1 tablet by mouth every 8 hours as needed for Pain for up to 30 days.  G60.9 FILL 10/5 10/5/19 11/4/19  Charlene Bernett Rinne, APRN - CNP   Naloxone HCl (NALOXONE OPIATE OVERDOSE KIT) 1 each by Nasal route once for 1 dose 9/18/19 9/18/19  Charlene Bernett Rinne, APRN - CNP   gabapentin disorder)     Sleep apnea     TMJ (temporomandibular joint syndrome)        Past Surgical History:   Procedure Laterality Date    COLPOSCOPY      ENDOMETRIAL ABLATION      FOOT SURGERY      JOINT REPLACEMENT Right 02/19/2019    RIGHT LATERAL TOTAL HIP REPLACEMENT    TONSILLECTOMY      TOTAL HIP ARTHROPLASTY Right 2/19/2019    RIGHT LATERAL TOTAL HIP REPLACEMENT performed by Kishor Roy MD at [facility] History   Problem Relation Age of Onset    COPD Mother     Mult Sclerosis Father     No Known Problems Sister     High Cholesterol Brother     Diabetes Brother     Obesity Brother     Other Maternal Grandmother         hips replacement    Obesity Maternal Grandmother     No Known Problems Maternal Grandfather     No Known Problems Paternal Grandmother     Diabetes Paternal Grandfather     No Known Problems Sister        Review of Systems   Constitutional: Positive for diaphoresis. Respiratory: Positive for choking. Genitourinary: Positive for frequency. Objective:     Vitals:  Weight BMI Neck circumference    Wt Readings from Last 3 Encounters:   09/18/19 256 lb (116.1 kg)   09/18/19 258 lb (117 kg)   08/26/19 254 lb (115.2 kg)    Body mass index is 41.32 kg/m². BP HR SaO2   BP Readings from Last 3 Encounters:   09/18/19 (!) 150/86   09/18/19 (!) 160/90   08/26/19 (!) 154/93    Pulse Readings from Last 3 Encounters:   09/18/19 77   09/18/19 91   08/26/19 84    SpO2 Readings from Last 3 Encounters:   09/18/19 97%   09/18/19 98%   07/31/19 99%          Themandibular molar Class :   [x]1 []2 []3      Mallampati I Airway Classification:   []1 []2 []3 [x]4      Physical Exam   Constitutional: No distress. HENT:   Nose: Nose normal.   Eyes: EOM are normal.   Neck: Neck supple. Cardiovascular: Normal rate, regular rhythm and normal heart sounds. Pulmonary/Chest: Effort normal and breath sounds normal. No respiratory distress.    Musculoskeletal: Normal range of

## 2025-04-07 ENCOUNTER — APPOINTMENT (OUTPATIENT)
Dept: PHYSICAL THERAPY | Age: 50
End: 2025-04-07
Attending: ORTHOPAEDIC SURGERY
Payer: COMMERCIAL

## 2025-04-09 ENCOUNTER — HOSPITAL ENCOUNTER (OUTPATIENT)
Dept: PHYSICAL THERAPY | Age: 50
Setting detail: THERAPIES SERIES
Discharge: HOME | End: 2025-04-09
Attending: ORTHOPAEDIC SURGERY
Payer: COMMERCIAL

## 2025-04-09 PROCEDURE — 97110 THERAPEUTIC EXERCISES: CPT

## 2025-04-09 PROCEDURE — 97016 VASOPNEUMATIC DEVICE THERAPY: CPT

## 2025-04-09 PROCEDURE — 97140 MANUAL THERAPY 1/> REGIONS: CPT

## 2025-04-09 NOTE — FLOWSHEET NOTE
[facility] - Outpatient Rehabilitation and Therapy: [address], [place] [address] office: [phone] fax: [phone]       Physical Therapy: TREATMENT/PROGRESS NOTE   Patient: Tammi Ball (50 y.o. female)   Examination Date: 2025   :  1975 MRN: [id number]   Visit #: 5   Insurance Allowable Auth Needed   Workers comp 2-3 times a week for 4-6 wks from 25-25 [x]Yes    []No    Insurance: Payor: GENERIC SELF-INSURED / Plan: GENERIC SELF-INSURED WC / Product Type: *No Product type* /   Insurance ID: [id number] - (Worker's Comp)  Secondary Insurance (if applicable):    Treatment Diagnosis:     ICD-10-CM    1. Decreased functional activity tolerance  R68.89       2. Impaired range of motion of left shoulder  M25.612       3. Weakness of left shoulder  R29.898          Medical Diagnosis:  Traumatic complete tear of left rotator cuff, subsequent encounter [S46.012D]   Referring Physician: Kai Manuel MD  PCP: Mishel Ag APRN - CNP     Plan of care signed (Y/N): y    Date of Patient follow up with Physician: 4/3/25     Plan of Care Report: NO  POC update due: (10 visits /OR AUTH LIMITS, whichever is less)  2025                                             Medical History:  Comorbidities:  Hypertension  Osteoarthritis  Depression  Other Psychological Conditions: bipolar disorder  Other Neurological Conditions: neuropathy B feet  Relevant Medical History: R hip replacement, R foot surgery, epidural injections and ablation for back pain                                         Precautions/ Contra-indications:           Latex allergy:  NO  Pacemaker:    NO  Contraindications for Manipulation: None  Date of Surgery: 25  LEFT SHOULDER ARTHROSCOPY SUBACROMIAL DECOMPRESSION, ROTATOR CUFF REPAIR-BLOCK-ARTHREX       Rotator Cuff Repair   Post-Operative Therapy Protocol      Sling (except during PT and washing):  Large/Massive Tear: 6 weeks with

## 2025-04-14 ENCOUNTER — HOSPITAL ENCOUNTER (OUTPATIENT)
Dept: PHYSICAL THERAPY | Age: 50
Setting detail: THERAPIES SERIES
Discharge: HOME | End: 2025-04-14
Attending: ORTHOPAEDIC SURGERY
Payer: COMMERCIAL

## 2025-04-14 PROCEDURE — 97110 THERAPEUTIC EXERCISES: CPT

## 2025-04-14 PROCEDURE — 97140 MANUAL THERAPY 1/> REGIONS: CPT

## 2025-04-14 NOTE — FLOWSHEET NOTE
[facility] - Outpatient Rehabilitation and Therapy: [address], [place] [address] office: [phone] fax: [phone]       Physical Therapy: TREATMENT/PROGRESS NOTE   Patient: Tammi Ball (50 y.o. female)   Examination Date: 2025   :  1975 MRN: [id number]   Visit #: 6   Insurance Allowable Auth Needed   Workers comp 2-3 times a week for 4-6 wks from 25-25 [x]Yes    []No    Insurance: Payor: GENERIC SELF-INSURED / Plan: GENERIC SELF-INSURED WC / Product Type: *No Product type* /   Insurance ID: [id number] - (Worker's Comp)  Secondary Insurance (if applicable):    Treatment Diagnosis:     ICD-10-CM    1. Decreased functional activity tolerance  R68.89       2. Impaired range of motion of left shoulder  M25.612       3. Weakness of left shoulder  R29.898          Medical Diagnosis:  Traumatic complete tear of left rotator cuff, subsequent encounter [S46.012D]   Referring Physician: Kai Manuel MD  PCP: Mishel Ag APRN - CNP     Plan of care signed (Y/N): y    Date of Patient follow up with Physician: 4/3/25     Plan of Care Report: NO  POC update due: (10 visits /OR AUTH LIMITS, whichever is less)  2025                                             Medical History:  Comorbidities:  Hypertension  Osteoarthritis  Depression  Other Psychological Conditions: bipolar disorder  Other Neurological Conditions: neuropathy B feet  Relevant Medical History: R hip replacement, R foot surgery, epidural injections and ablation for back pain                                         Precautions/ Contra-indications:           Latex allergy:  NO  Pacemaker:    NO  Contraindications for Manipulation: None  Date of Surgery: 25  LEFT SHOULDER ARTHROSCOPY SUBACROMIAL DECOMPRESSION, ROTATOR CUFF REPAIR-BLOCK-ARTHREX       Rotator Cuff Repair   Post-Operative Therapy Protocol      Sling (except during PT and washing):  Large/Massive Tear: 6 weeks with

## 2025-04-16 ENCOUNTER — HOSPITAL ENCOUNTER (OUTPATIENT)
Dept: PHYSICAL THERAPY | Age: 50
Setting detail: THERAPIES SERIES
Discharge: HOME | End: 2025-04-16
Attending: ORTHOPAEDIC SURGERY
Payer: COMMERCIAL

## 2025-04-16 PROCEDURE — 97110 THERAPEUTIC EXERCISES: CPT

## 2025-04-16 PROCEDURE — 97140 MANUAL THERAPY 1/> REGIONS: CPT

## 2025-04-16 NOTE — PROGRESS NOTES
[facility] - Outpatient Rehabilitation and Therapy: [address], [place] [address] office: [phone] fax: [phone]       Physical Therapy: TREATMENT/PROGRESS NOTE   Patient: Tammi Ball (50 y.o. female)   Examination Date: 2025   :  1975 MRN: [id number]   Visit #: 7   Insurance Allowable Auth Needed   Workers comp 2-3 times a week for 4-6 wks from 25-25 [x]Yes    []No    Insurance: Payor: GENERIC SELF-INSURED / Plan: GENERIC SELF-INSURED WC / Product Type: *No Product type* /   Insurance ID: [id number] - (Worker's Comp)  Secondary Insurance (if applicable):    Treatment Diagnosis:     ICD-10-CM    1. Decreased functional activity tolerance  R68.89       2. Impaired range of motion of left shoulder  M25.612       3. Weakness of left shoulder  R29.898          Medical Diagnosis:  Traumatic complete tear of left rotator cuff, subsequent encounter [S46.012D]   Referring Physician: Kai Manuel MD  PCP: Mishel Ag APRN - CNP     Plan of care signed (Y/N): y    Date of Patient follow up with Physician: 4/3/25     Plan of Care Report: NO  POC update due: (10 visits /OR AUTH LIMITS, whichever is less)  2025                                             Medical History:  Comorbidities:  Hypertension  Osteoarthritis  Depression  Other Psychological Conditions: bipolar disorder  Other Neurological Conditions: neuropathy B feet  Relevant Medical History: R hip replacement, R foot surgery, epidural injections and ablation for back pain                                         Precautions/ Contra-indications:           Latex allergy:  NO  Pacemaker:    NO  Contraindications for Manipulation: None  Date of Surgery: 25  LEFT SHOULDER ARTHROSCOPY SUBACROMIAL DECOMPRESSION, ROTATOR CUFF REPAIR-BLOCK-ARTHREX       Rotator Cuff Repair   Post-Operative Therapy Protocol      Sling (except during PT and washing):  Large/Massive Tear: 6 weeks with

## 2025-04-21 ENCOUNTER — HOSPITAL ENCOUNTER (OUTPATIENT)
Dept: PHYSICAL THERAPY | Age: 50
Setting detail: THERAPIES SERIES
Discharge: HOME | End: 2025-04-21
Attending: ORTHOPAEDIC SURGERY
Payer: COMMERCIAL

## 2025-04-21 DIAGNOSIS — S46.012D TRAUMATIC COMPLETE TEAR OF LEFT ROTATOR CUFF, SUBSEQUENT ENCOUNTER: Primary | ICD-10-CM

## 2025-04-21 PROCEDURE — 97140 MANUAL THERAPY 1/> REGIONS: CPT

## 2025-04-21 PROCEDURE — 97110 THERAPEUTIC EXERCISES: CPT

## 2025-04-21 RX ORDER — HYDROCODONE BITARTRATE AND ACETAMINOPHEN 5; 325 MG/1; MG/1
1 TABLET ORAL 2 TIMES DAILY PRN
Qty: 14 TABLET | Refills: 0 | Status: SHIPPED | OUTPATIENT
Start: 2025-04-21 | End: 2025-04-28

## 2025-04-21 RX ORDER — LITHIUM CARBONATE 300 MG/1
CAPSULE ORAL
Qty: 360 CAPSULE | Refills: 0 | Status: SHIPPED | OUTPATIENT
Start: 2025-04-21

## 2025-04-21 NOTE — PROGRESS NOTES
flexion, functional ER with thumb to C7 and functional IR with thumb to T12 without pain to allow for proper joint functioning to enable patient to perform all self care without difficulty.   [x] Progressing: [] Met: [] Not Met: [] Adjusted  Patient will demonstrate increased Strength of L UE to at least 4/5 throughout without pain to allow for proper functional mobility to enable patient to return to all work duties.   [x] Progressing: [] Met: [] Not Met: [] Adjusted  Patient will return to shopping without increased symptoms or restriction.   [x] Progressing: [] Met: [] Not Met: [] Adjusted  Pt will resume family activities and out in community without restrictions  [x] Progressing: [] Met: [] Not Met: [] Adjusted          Overall Progression Towards Functional goals/ Treatment Progress Update:  [] Patient is progressing as expected towards functional goals listed.    [x] Progression is slowed due to complexities/Impairments listed.  [] Progression has been slowed due to co-morbidities.  [] Plan just implemented, too soon (<30days) to assess goals progression   [x] Goals require adjustment due to lack of progress  [] Patient is not progressing as expected and requires additional follow up with physician  [] Other:     TREATMENT PLAN     Frequency/Duration: 2x/week for 8-10 weeks for the following treatment interventions:    Interventions:  Therapeutic Exercise (97039) including: strength training, ROM, and functional mobility  Therapeutic Activities (56223) including: functional mobility training and education.  Neuromuscular Re-education (51911) activation and proprioception, including postural re-education.    Manual Therapy (18707) as indicated to include: Passive Range of Motion, Gr I-IV mobilizations, Soft Tissue Mobilization, and Trigger Point Release  Modalities as needed that may include: Thermal Agents and Vasoneumatic Compression  Patient education on joint protection, postural re-education, activity 
normal

## 2025-04-23 ENCOUNTER — HOSPITAL ENCOUNTER (OUTPATIENT)
Dept: PHYSICAL THERAPY | Age: 50
Setting detail: THERAPIES SERIES
Discharge: HOME | End: 2025-04-23
Attending: ORTHOPAEDIC SURGERY
Payer: COMMERCIAL

## 2025-04-23 PROCEDURE — 97140 MANUAL THERAPY 1/> REGIONS: CPT

## 2025-04-23 PROCEDURE — 97110 THERAPEUTIC EXERCISES: CPT

## 2025-04-23 RX ORDER — AMLODIPINE BESYLATE 10 MG/1
10 TABLET ORAL DAILY
Qty: 30 TABLET | Refills: 0 | Status: SHIPPED | OUTPATIENT
Start: 2025-04-23

## 2025-04-23 NOTE — PLAN OF CARE
Charge Justification:  (87833) THERAPEUTIC EXERCISE - Provided verbal/tactile cueing for HEP and/or activities related to strengthening, flexibility, endurance, ROM performed to prevent loss of range of motion, maintain or improve muscular strength or increase flexibility, following either an injury or surgery.   (79447) MANUAL THERAPY -  Manual therapy techniques, 1 or more regions, each 15 minutes (Mobilization/manipulation, manual lymphatic drainage, manual traction) for the purpose of modulating pain, promoting relaxation,  increasing ROM, reducing/eliminating soft tissue swelling/inflammation/restriction, improving soft tissue extensibility and allowing for proper ROM for normal function with self care, mobility, lifting and ambulation    GOALS     Patient stated goal: \"full recovery\"  [x] Progressing: [] Met: [] Not Met: [] Adjusted    Therapist goals for Patient:   Short Term Goals: To be achieved in: 2 weeks  1Independent in HEP and progression per patient tolerance, in order to prevent re-injury.   [x] Progressing: [] Met: [] Not Met: [] Adjusted  Patient will have a decrease in pain to <2/10 to facilitate improvement in movement, function, and ADLs as indicated by Functional Deficits.  [x] Progressing: [] Met: [] Not Met: [] Adjusted      Long Term Goals: To be achieved in: 8 weeks  Disability index score of 10% or less for the Quick DASH to assist with reaching prior level of function with activities such as household chores.  [x] Progressing: [] Met: [] Not Met: [] Adjusted  Patient will demonstrate increased AROM of L shoulder to 160 degrees flexion, functional ER with thumb to C7 and functional IR with thumb to T12 without pain to allow for proper joint functioning to enable patient to perform all self care without difficulty.   [x] Progressing: [] Met: [] Not Met: [] Adjusted  Patient will demonstrate increased Strength of L UE to at least 4/5 throughout without pain to allow for proper functional

## 2025-04-28 ENCOUNTER — HOSPITAL ENCOUNTER (OUTPATIENT)
Dept: PHYSICAL THERAPY | Age: 50
Setting detail: THERAPIES SERIES
Discharge: HOME | End: 2025-04-28
Attending: ORTHOPAEDIC SURGERY
Payer: COMMERCIAL

## 2025-04-28 PROCEDURE — 97140 MANUAL THERAPY 1/> REGIONS: CPT

## 2025-04-28 PROCEDURE — 97110 THERAPEUTIC EXERCISES: CPT

## 2025-04-28 NOTE — FLOWSHEET NOTE
2 x daily - 7 x weekly - 10 reps - 5 sec hold    ASSESSMENT     Today's Assessment:  Patient able to demonstrate significant improvement in ROM this date showing Left shoulder PROM 135 flexion, abduction 110, ER 30 today by end of session. Best motion from patient thus far since starting her plan of care. Pain was much more tolerable today but still is limited with active abduction and scaption.      Medical Necessity Documentation:  I certify that this patient meets the below criteria necessary for medical necessity for care and/or justification of therapy services:  The patient has functional impairments and/or activity limitations and would benefit from continued outpatient therapy services to address the deficits outlined in the patients goals    Return to Play: NA    Prognosis for POC: [x] Good [] Fair  [] Poor    Patient requires continued skilled intervention: [x] Yes  [] No      CHARGE CAPTURE     CPT Code (TIMED) # Time In Time Out Total Time CPT Code (UNTIMED) #    Therex (65006)  2 401 430 29  EVAL:     Neuromusc. Re-ed (83194)      Re-Eval (22961)     Manual (15625) 1 430 439 9  Estim Unattended (30549)     Ther. Act (16730)      Mech. Traction (23236)     Gait (76479)      Dry Needle 1-2 muscle (36375)     Aquatic Therex (43618)      Dry Needle 3+ muscle (20561)     Iontophoresis (98491)      VASO (90107)     Ultrasound (07377)      Group Therapy (73132)     Estim Attended (32505)      Canalith Repositioning (01911)     Other:      Other:    Totals   3 401 439 38       Total Treatment Minutes 38          Charge Justification:  (52520) THERAPEUTIC EXERCISE - Provided verbal/tactile cueing for HEP and/or activities related to strengthening, flexibility, endurance, ROM performed to prevent loss of range of motion, maintain or improve muscular strength or increase flexibility, following either an injury or surgery.   (48348) MANUAL THERAPY -  Manual therapy techniques, 1 or more regions, each 15 minutes

## 2025-04-29 RX ORDER — LAMOTRIGINE 100 MG/1
100 TABLET ORAL DAILY
Qty: 90 TABLET | Refills: 0 | Status: SHIPPED | OUTPATIENT
Start: 2025-04-29

## 2025-04-30 ENCOUNTER — APPOINTMENT (OUTPATIENT)
Dept: PHYSICAL THERAPY | Age: 50
End: 2025-04-30
Attending: ORTHOPAEDIC SURGERY
Payer: COMMERCIAL

## 2025-05-02 RX ORDER — LAMOTRIGINE 100 MG/1
100 TABLET ORAL DAILY
Qty: 90 TABLET | Refills: 0 | OUTPATIENT
Start: 2025-05-02

## 2025-05-02 RX ORDER — ATORVASTATIN CALCIUM 10 MG/1
10 TABLET, FILM COATED ORAL DAILY
Qty: 90 TABLET | Refills: 0 | OUTPATIENT
Start: 2025-05-02

## 2025-05-02 RX ORDER — DESVENLAFAXINE 100 MG/1
TABLET, EXTENDED RELEASE ORAL DAILY
Qty: 90 TABLET | Refills: 0 | Status: SHIPPED | OUTPATIENT
Start: 2025-05-02

## 2025-05-02 RX ORDER — AMLODIPINE BESYLATE 10 MG/1
10 TABLET ORAL DAILY
Qty: 90 TABLET | Refills: 0 | OUTPATIENT
Start: 2025-05-02

## 2025-05-15 ENCOUNTER — OFFICE VISIT (OUTPATIENT)
Dept: FAMILY MEDICINE CLINIC | Age: 50
End: 2025-05-15
Payer: COMMERCIAL

## 2025-05-15 VITALS
DIASTOLIC BLOOD PRESSURE: 74 MMHG | OXYGEN SATURATION: 96 % | BODY MASS INDEX: 44.93 KG/M2 | HEIGHT: 66 IN | SYSTOLIC BLOOD PRESSURE: 146 MMHG | HEART RATE: 86 BPM | WEIGHT: 279.6 LBS

## 2025-05-15 DIAGNOSIS — F41.9 ANXIETY: ICD-10-CM

## 2025-05-15 DIAGNOSIS — I10 ESSENTIAL HYPERTENSION, BENIGN: ICD-10-CM

## 2025-05-15 DIAGNOSIS — F90.0 ATTENTION DEFICIT HYPERACTIVITY DISORDER (ADHD), PREDOMINANTLY INATTENTIVE TYPE: Primary | ICD-10-CM

## 2025-05-15 DIAGNOSIS — F31.9 BIPOLAR DISORDER WITH DEPRESSION (HCC): ICD-10-CM

## 2025-05-15 DIAGNOSIS — M54.6 ACUTE LEFT-SIDED THORACIC BACK PAIN: ICD-10-CM

## 2025-05-15 DIAGNOSIS — Z12.31 ENCOUNTER FOR SCREENING MAMMOGRAM FOR MALIGNANT NEOPLASM OF BREAST: ICD-10-CM

## 2025-05-15 PROCEDURE — G8417 CALC BMI ABV UP PARAM F/U: HCPCS | Performed by: NURSE PRACTITIONER

## 2025-05-15 PROCEDURE — 99214 OFFICE O/P EST MOD 30 MIN: CPT | Performed by: NURSE PRACTITIONER

## 2025-05-15 PROCEDURE — G9899 SCRN MAM PERF RSLTS DOC: HCPCS | Performed by: NURSE PRACTITIONER

## 2025-05-15 PROCEDURE — 4004F PT TOBACCO SCREEN RCVD TLK: CPT | Performed by: NURSE PRACTITIONER

## 2025-05-15 PROCEDURE — 3017F COLORECTAL CA SCREEN DOC REV: CPT | Performed by: NURSE PRACTITIONER

## 2025-05-15 PROCEDURE — 3078F DIAST BP <80 MM HG: CPT | Performed by: NURSE PRACTITIONER

## 2025-05-15 PROCEDURE — 3077F SYST BP >= 140 MM HG: CPT | Performed by: NURSE PRACTITIONER

## 2025-05-15 PROCEDURE — G8427 DOCREV CUR MEDS BY ELIG CLIN: HCPCS | Performed by: NURSE PRACTITIONER

## 2025-05-15 RX ORDER — LIDOCAINE 50 MG/G
1 PATCH TOPICAL DAILY
Qty: 30 PATCH | Refills: 0 | Status: SHIPPED | OUTPATIENT
Start: 2025-05-15 | End: 2025-06-14

## 2025-05-15 RX ORDER — METHYLPHENIDATE HYDROCHLORIDE 36 MG/1
72 TABLET ORAL DAILY
Qty: 60 TABLET | Refills: 0 | Status: SHIPPED | OUTPATIENT
Start: 2025-08-04 | End: 2025-09-03

## 2025-05-15 RX ORDER — AMLODIPINE BESYLATE 10 MG/1
10 TABLET ORAL DAILY
Qty: 90 TABLET | Refills: 1 | Status: SHIPPED | OUTPATIENT
Start: 2025-05-15 | End: 2025-08-13

## 2025-05-15 RX ORDER — DESVENLAFAXINE 100 MG/1
100 TABLET, EXTENDED RELEASE ORAL DAILY
Qty: 90 TABLET | Refills: 1 | Status: SHIPPED | OUTPATIENT
Start: 2025-05-15 | End: 2025-08-13

## 2025-05-15 RX ORDER — PREDNISONE 10 MG/1
TABLET ORAL
Qty: 20 TABLET | Refills: 0 | Status: SHIPPED | OUTPATIENT
Start: 2025-05-15

## 2025-05-15 RX ORDER — LOSARTAN POTASSIUM 50 MG/1
50 TABLET ORAL DAILY
Qty: 90 TABLET | Refills: 1 | Status: SHIPPED | OUTPATIENT
Start: 2025-05-15 | End: 2025-08-13

## 2025-05-15 RX ORDER — METHYLPHENIDATE HYDROCHLORIDE 36 MG/1
TABLET ORAL
Qty: 60 TABLET | Refills: 0 | Status: SHIPPED | OUTPATIENT
Start: 2025-07-05 | End: 2025-08-04

## 2025-05-15 RX ORDER — BACLOFEN 10 MG/1
10 TABLET ORAL 3 TIMES DAILY
Qty: 30 TABLET | Refills: 0 | Status: SHIPPED | OUTPATIENT
Start: 2025-05-15 | End: 2025-05-25

## 2025-05-15 RX ORDER — METHYLPHENIDATE HYDROCHLORIDE 36 MG/1
TABLET ORAL
Qty: 60 TABLET | Refills: 0 | Status: CANCELLED | OUTPATIENT
Start: 2025-05-15 | End: 2025-06-14

## 2025-05-15 RX ORDER — LAMOTRIGINE 100 MG/1
100 TABLET ORAL DAILY
Qty: 90 TABLET | Refills: 0 | Status: SHIPPED | OUTPATIENT
Start: 2025-05-15

## 2025-05-15 RX ORDER — METHYLPHENIDATE HYDROCHLORIDE 36 MG/1
72 TABLET ORAL EVERY MORNING
Qty: 60 TABLET | Refills: 0 | Status: SHIPPED | OUTPATIENT
Start: 2025-05-29 | End: 2025-06-28

## 2025-05-15 NOTE — PROGRESS NOTES
Tammi Ball (:  1975) is a 50 y.o. female,Established patient, here for evaluation of the following chief complaint(s):  Annual Exam (Pt here for Annual Physical Exam, not fasting for labs ), Back Pain (Pt C/O Left side of back pain x 1 mos, pt state it's getting worse x 5 days ), and Other (Declined T-DAP )         Assessment & Plan  Attention deficit hyperactivity disorder (ADHD), predominantly inattentive type   Chronic, at goal (stable), continue current treatment plan  Controlled substances monitoring: possible medication side effects, risk of tolerance and/or dependence, and alternative treatments discussed, no signs of potential drug abuse or diversion identified, and OARRS report reviewed today- activity consistent with treatment plan.        Orders:  •  methylphenidate (CONCERTA) 36 MG extended release tablet;  2 TABLETS/72 MG DAILY  •  methylphenidate (CONCERTA) 36 MG extended release tablet; Take 2 tablets by mouth every morning for 30 days.  Max Daily Amount: 72 mg  •  methylphenidate (CONCERTA) 36 MG extended release tablet; Take 2 tablets by mouth daily for 30 days. FILL  Max Daily Amount: 72 mg  FOLLOW UP IN THREE MONTHS  Anxiety   Chronic, at goal (stable), continue current treatment plan         Bipolar disorder with depression (HCC)   Chronic, at goal (stable), continue current treatment plan         Essential hypertension, benign   Chronic, at goal (stable), continue current treatment plan         Acute left-sided thoracic back pain   . Apply cold compresses intermittently as needed.  As pain recedes, begin normal activities slowly as tolerated.  Call if symptoms persist.   STRETCHES REVIEWED WITH PT- ENCOURAGED TO DO AT LEAST 4 X DAILY  PT REFERRAL IF NO IMPROVEMENT NOTED  Order  •  baclofen (LIORESAL) 10 MG tablet; Take 1 tablet by mouth 3 times daily for 10 days  •  predniSONE (DELTASONE) 10 MG tablet; 4 tabs x 2 d 3 tabs x 2 d 2 tabs x 2 d 1  tab x 2 d in am with 
Depression Monitoring  02/18/2026    Colorectal Cancer Screen  07/12/2034    Flu vaccine  Completed    HIV screen  Completed    Hepatitis A vaccine  Aged Out    Hib vaccine  Aged Out    Polio vaccine  Aged Out    Meningococcal (ACWY) vaccine  Aged Out    Meningococcal B vaccine  Aged Out    Hepatitis C screen  Discontinued      Hx abnormal PAP: {NO/YES:246004355}  Sexual activity: {Persons; sexual partners:705}   Self-breast exams: {yes no:972869}  Previous DEXA scan: {NO/YES NORMAL ABNORMAL:20091}  Last eye exam: ***, {NORMAL/ABNORMAL:534362364}  Exercise: {EXERCISE TYPE:659585964}  Seatbelt use: ***  Lipid panel:   Lab Results   Component Value Date    CHOL 187 01/22/2024    TRIG 161 (H) 01/22/2024    HDL 55 01/22/2024        Advance Directive: N, <no information>    Immunization History   Administered Date(s) Administered    COVID-19, PFIZER PURPLE top, DILUTE for use, (age 12 y+), 30mcg/0.3mL 04/09/2021, 04/30/2021    Influenza Vaccine, unspecified formulation 11/01/2015, 10/21/2016    Influenza Virus Vaccine 11/01/2015, 10/21/2016    Influenza, FLUCELVAX, (age 6 mo+) IM, Trivalent PF, 0.5mL 10/16/2024    Influenza, FLUCELVAX, (age 6 mo+), MDCK, Quadv PF, 0.5mL 01/22/2024    TDaP, ADACEL (age 10y-64y), BOOSTRIX (age 10y+), IM, 0.5mL 03/01/2010, 03/26/2012       Allergies   Allergen Reactions    Erythromycin Nausea Only     02/09/2023 -    01/27/2022 -    06/08/2021 -        12/28/2020 -    12/26/2019 -    04/12/2019 -    Adhesive Tape Rash     02/09/2023 -    01/27/2022 -    06/08/2021 -    12/28/2020 -    12/26/2019 -    04/12/2019 -     Outpatient Medications Marked as Taking for the 5/15/25 encounter (Office Visit) with Mishel Ag APRN - CNP   Medication Sig Dispense Refill    desvenlafaxine succinate (PRISTIQ) 100 MG TB24 extended release tablet Take 1 tablet by mouth once daily 90 tablet 0    lamoTRIgine (LAMICTAL) 100 MG tablet Take 1 tablet by mouth once daily 90 tablet 0    amLODIPine 
No

## 2025-06-02 ENCOUNTER — OFFICE VISIT (OUTPATIENT)
Dept: ORTHOPEDIC SURGERY | Age: 50
End: 2025-06-02

## 2025-06-02 VITALS — HEIGHT: 66 IN | BODY MASS INDEX: 44.36 KG/M2 | WEIGHT: 276 LBS

## 2025-06-02 DIAGNOSIS — S46.012D TRAUMATIC COMPLETE TEAR OF LEFT ROTATOR CUFF, SUBSEQUENT ENCOUNTER: Primary | ICD-10-CM

## 2025-06-02 NOTE — PROGRESS NOTES
History:  Tammi Ball is here for follow up after left shoulder arthroscopic surgery. Surgery date was 2/20/25. Findings at surgery: large rotator cuff tear, long biceps tendon fraying, Grade 1 chondromlacia glenoid. The patient's pain is rated at 0/10.  She has been in a sling and non-weightbearing as instructed. She has been to PT at [facility].  She went to 10 sessions of physical therapy at the [facility].  She last went on 4/28/2025.  She states that they were just having her doing the same exercises over and over.  She admits to not being completely consistent with a home exercise/strengthening program.  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  Patient was seen with the  today.        Physical Examination:  [facility] 01/11/2010 Comment: 2004   Patient is awake, alert, and in no acute distress.  The incisions are healed.  Left shoulder active forward flexion 170, passive 180, ER 80, IR L4  5-/5 Supraspinatus, External rotation   Mild tenderness at the lateral acromion/rotator cuff insertion in the humeral head      Assessment:   3-1/2 months status post left shoulder arthroscopy, arthroscopy, subacromial decompression, rotator cuff repair  Bipolar disorder  ADHD  Class 3 obesity  HTN  Sleep apnea      Plan:   Discussed that I do recommend she continue PT in order to have optimal outcome from surgery.  I cannot force her to go.  She agreed to continue physical therapy.   did fill out a C9 and approve this for 3 months.    OTC NSAIDs as needed.    Ice as needed.    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    Follow up in 3 months for evaluation of progress or prn if problems.          Kai Manuel MD  Orthopaedic Surgery and Sports Medicine     Disclaimer:  This note was generated with use of a verbal recognition program and an attempt was made to check for errors.  It is possible that there are still dictated errors within

## 2025-06-05 ENCOUNTER — HOSPITAL ENCOUNTER (OUTPATIENT)
Dept: MAMMOGRAPHY | Age: 50
Discharge: HOME | End: 2025-06-10

## 2025-06-05 DIAGNOSIS — Z12.31 ENCOUNTER FOR SCREENING MAMMOGRAM FOR MALIGNANT NEOPLASM OF BREAST: ICD-10-CM

## 2025-06-10 RX ORDER — HYDROXYZINE PAMOATE 100 MG
CAPSULE ORAL
Qty: 30 CAPSULE | Refills: 2 | Status: SHIPPED | OUTPATIENT
Start: 2025-06-10

## 2025-06-10 NOTE — TELEPHONE ENCOUNTER
LV 5/15/25 WITH CC NV NONE    Return in about 3 months (around 8/15/2025), or physical - fasting labs

## 2025-06-11 DIAGNOSIS — F90.0 ATTENTION DEFICIT HYPERACTIVITY DISORDER (ADHD), PREDOMINANTLY INATTENTIVE TYPE: ICD-10-CM

## 2025-06-11 RX ORDER — METHYLPHENIDATE HYDROCHLORIDE 36 MG/1
72 TABLET ORAL EVERY MORNING
Qty: 60 TABLET | Refills: 0 | Status: SHIPPED | OUTPATIENT
Start: 2025-06-11 | End: 2025-07-11

## 2025-06-11 RX ORDER — METHYLPHENIDATE HYDROCHLORIDE 36 MG/1
72 TABLET ORAL DAILY
Qty: 60 TABLET | Refills: 0 | Status: SHIPPED | OUTPATIENT
Start: 2025-07-11 | End: 2025-08-10

## 2025-06-13 ENCOUNTER — HOSPITAL ENCOUNTER (OUTPATIENT)
Dept: PHYSICAL THERAPY | Age: 50
Setting detail: THERAPIES SERIES
Discharge: HOME | End: 2025-06-13
Attending: ORTHOPAEDIC SURGERY
Payer: COMMERCIAL

## 2025-06-13 PROCEDURE — 97112 NEUROMUSCULAR REEDUCATION: CPT

## 2025-06-13 PROCEDURE — 97530 THERAPEUTIC ACTIVITIES: CPT

## 2025-06-13 PROCEDURE — 97110 THERAPEUTIC EXERCISES: CPT

## 2025-06-13 NOTE — PLAN OF CARE
[facility] - [facility]: [address], [place] [address] office: [phone] fax: [phone]     Physical Therapy Re-Certification Plan of Care    Dear Kai Manuel MD  ,    We had the pleasure of treating the following patient for physical therapy services at [facility]. A summary of our findings can be found in the updated assessment below.  This includes our plan of care.  If you have any questions or concerns regarding these findings, please do not hesitate to contact me at the office phone number checked above.  Thank you for the referral.     Physician Signature:________________________________Date:__________________  By signing above (or electronic signature), therapist's plan is approved by physician      Total Visits: 11     Overall Response to Treatment:  Pt presets today for a re-eval, pt seems to have made good progress on her own. Pt ROM is within functional limits demonstrating approximately full ROM with gross shoulder mvts. Pt gross shoulder strength has improved since last visit as well. Pt states that she felt some soreness when completing face pulls, wall clocks, and high pull this session. Overall pt does well with program completed this session. Pt will benefit from continued exercises progress as one of the reasons she discontinued PT prior was due to the fact she was repeating the same exercises each session.     Recommendation:    [x] Continue PT 1-2x / wk for 6 weeks.   [] Hold PT, pending MD visit   [] Discharge to [facility]. Follow up with PT or MD PRN.       ROM/Strength: (Blank cells denote NT)    Mvmt (norm) AROM L AROM R Notes PROM L PROM R Notes     CERVICAL Flex (60)        Ext (70)        SB(45)          Rotation (80)             SHOULDER Flexion (180) 170 170        Abduction (180) 170 180        ER -0 Thumb T4 Thumb to T4        ER -90 (90)          IR -0 Thumb T10 Thumb to T10        IR -90 (70)

## 2025-06-16 ENCOUNTER — HOSPITAL ENCOUNTER (OUTPATIENT)
Dept: PHYSICAL THERAPY | Age: 50
Setting detail: THERAPIES SERIES
End: 2025-06-16
Attending: ORTHOPAEDIC SURGERY
Payer: COMMERCIAL

## 2025-06-19 ENCOUNTER — OFFICE VISIT (OUTPATIENT)
Age: 50
End: 2025-06-19

## 2025-06-19 VITALS
RESPIRATION RATE: 20 BRPM | HEIGHT: 66 IN | OXYGEN SATURATION: 95 % | TEMPERATURE: 98.5 F | DIASTOLIC BLOOD PRESSURE: 84 MMHG | WEIGHT: 272 LBS | HEART RATE: 84 BPM | BODY MASS INDEX: 43.71 KG/M2 | SYSTOLIC BLOOD PRESSURE: 142 MMHG

## 2025-06-19 DIAGNOSIS — N30.90 CYSTITIS: Primary | ICD-10-CM

## 2025-06-19 DIAGNOSIS — R35.0 URINARY FREQUENCY: ICD-10-CM

## 2025-06-19 LAB
BILIRUBIN, POC: ABNORMAL
BLOOD URINE, POC: ABNORMAL
CLARITY, POC: ABNORMAL
COLOR, POC: YELLOW
GLUCOSE URINE, POC: ABNORMAL MG/DL
KETONES, POC: ABNORMAL MG/DL
LEUKOCYTE EST, POC: ABNORMAL
NITRITE, POC: ABNORMAL
PH, POC: 6
PROTEIN, POC: ABNORMAL MG/DL
SPECIFIC GRAVITY, POC: 1.02
UROBILINOGEN, POC: 0.2 MG/DL

## 2025-06-19 RX ORDER — ZOLPIDEM TARTRATE 12.5 MG/1
TABLET, FILM COATED, EXTENDED RELEASE ORAL
COMMUNITY
Start: 2025-06-11

## 2025-06-19 RX ORDER — NITROFURANTOIN 25; 75 MG/1; MG/1
100 CAPSULE ORAL 2 TIMES DAILY
Qty: 10 CAPSULE | Refills: 0 | Status: SHIPPED | OUTPATIENT
Start: 2025-06-19 | End: 2025-06-24

## 2025-06-19 NOTE — PROGRESS NOTES
Ketones, UA Trace mg/dL    Spec Grav, UA 1.020     Blood, UA POC 2+     pH, UA 6.0     Protein, UA POC 1+ mg/dL    Urobilinogen, UA 0.2 mg/dL    Leukocytes, UA 1+     Nitrite, UA Pos         PHYSICAL EXAM  Constitutional:  Well developed, well nourished, no acute distress, non-toxic appearance   Eyes:  PERRL, conjunctiva normal   HENT:  Atraumatic, external ears normal, nose normal, oropharynx moist. Neck supple   Respiratory:  No respiratory distress,   Cardiovascular:   no murmurs, no gallops, RRR,  GI:  No guarding, bowel sounds present x 4, abdomen is non-tender, left CVA tenderness.   : Patient declined exam   Musculoskeletal:  No edema   Neurologic:  AAO x 3, no focal deficits     An electronic signature was used to authenticate this note.    Juni Celis, APRN - CNP

## 2025-06-21 ENCOUNTER — RESULTS FOLLOW-UP (OUTPATIENT)
Age: 50
End: 2025-06-21

## 2025-06-21 LAB
BACTERIA UR CULT: ABNORMAL
ORGANISM: ABNORMAL

## 2025-06-24 ENCOUNTER — HOSPITAL ENCOUNTER (OUTPATIENT)
Dept: PHYSICAL THERAPY | Age: 50
Setting detail: THERAPIES SERIES
Discharge: HOME | End: 2025-06-24
Attending: ORTHOPAEDIC SURGERY
Payer: COMMERCIAL

## 2025-06-24 PROCEDURE — 97110 THERAPEUTIC EXERCISES: CPT

## 2025-06-24 NOTE — FLOWSHEET NOTE
herself on a wire but then let go and fell down the stairs landing on her head.  Worked from home for the two weeks following then when she returned to work she told them at work and went to Workers comp - had x-rays and MRI and determined surgery was the course of action that was required.  States she has been wearing sling since surgery - likely will wear for 6 weeks (per protocol).  States she went out over the weekend and drove - wore sling but not pillow and was very sore after.  States she is cutting back on her pain medication some but still taking throughout the day.  Pt reports she has not done pendulum exercises to date and is very fearful of moving arm.  Reports she has been using her hand as needed.         Test used Initial score  3/25/25 06/13/2025   Pain Summary VAS 3/10 with medication 0   Functional questionnaire Quick DASH 46/84% dysfunction 16 / 11.36%   Other:                OBJECTIVE EXAMINATION     6/13  ROM/Strength: (Blank cells denote NT)    Mvmt (norm) AROM L AROM R Notes PROM L PROM R Notes     CERVICAL Flex (60)        Ext (70)        SB(45)          Rotation (80)             SHOULDER Flexion (180) 170 170        Abduction (180) 170 180        ER -0 Thumb T4 Thumb to T4        ER -90 (90)          IR -0 Thumb T10 Thumb to T10        IR -90 (70)           ELBOW Flex/biceps (140) WFL WFL        Ext/triceps (0) WFL WFL        Pronation (80) WFL WFL        Supination (80) WFL WFL           WRIST Flexion (60) WFL WFL        Extension (60) WFL WFL        RD (20) WFL WFL        UD (20) WFL WFL         WFL WFL             MMT L MMT R Notes     CERVICAL Cerv flexion       Cerv extension       Cerv SB       Cerv rotation          SHOULDER Flexion 4+ 5     Abduction 4+ 5     ER -0 4+** 5 Pain with resistance    ER -90  5     IR -0 4+ 5     IR -90  5      ELBOW Flex/biceps       Ext/triceps       Pronation       supination          WRIST Flexion       Extension       RD       UD

## 2025-06-30 ENCOUNTER — OFFICE VISIT (OUTPATIENT)
Age: 50
End: 2025-06-30

## 2025-06-30 VITALS
BODY MASS INDEX: 44.84 KG/M2 | SYSTOLIC BLOOD PRESSURE: 174 MMHG | DIASTOLIC BLOOD PRESSURE: 122 MMHG | OXYGEN SATURATION: 95 % | HEIGHT: 66 IN | HEART RATE: 81 BPM | WEIGHT: 279 LBS | TEMPERATURE: 98.4 F

## 2025-06-30 DIAGNOSIS — R39.15 URINARY URGENCY: Primary | ICD-10-CM

## 2025-06-30 DIAGNOSIS — I16.0 HYPERTENSIVE URGENCY: ICD-10-CM

## 2025-06-30 LAB
BILIRUBIN, POC: NORMAL
BLOOD URINE, POC: NORMAL
CLARITY, POC: CLEAR
COLOR, POC: YELLOW
GLUCOSE URINE, POC: NORMAL MG/DL
KETONES, POC: NORMAL MG/DL
LEUKOCYTE EST, POC: NORMAL
NITRITE, POC: NORMAL
PH, POC: 6.5
PROTEIN, POC: NORMAL MG/DL
SPECIFIC GRAVITY, POC: 1.01
UROBILINOGEN, POC: 0.2 MG/DL

## 2025-06-30 NOTE — PROGRESS NOTES
Tammi Ball (:  1975) is a 50 y.o. female,Established patient, here for evaluation of the following chief complaint(s):  Urinary Retention (Pressure bladder area, urinary urgency xSat)    Assessment & Plan :  Visit Diagnoses and Associated Orders         Urinary urgency    -  Primary    POCT Urinalysis no Micro [86978 Custom]      Culture, Urine [52483 Custom]             Hypertensive urgency        Amb Referral to Primary Care [ESX012 Custom]                 Patient was seen and evaluated today for having pressure in her bladder thinking that she has another urinary tract infection but her urine dip in the office was negative but will still send out for culture.  I am very concerned for her blood pressure at it is extremely elevated today.  I will go ahead and increase her losartan to 50 mg twice a day with an extra dose being taken as soon as she gets home this evening.  Highly encouraged patient to start checking her blood pressures and record for her primary care doctor.  I advised patient to call her primary care as soon as she leaves our office to make an appointment for later this week to have her blood pressures reevaluated and possibly have her medications changed.  I do believe the way that she is feeling probably has something to do with how high her blood pressure is.  I did give her strict ER precautions over the next few days.   Follow up in 1-2 days if symptoms persist or if symptoms worsen.       Subjective :  HPI     50 y.o. female presents with symptoms of pressure in her bladder for the last few days along with fatigue and is generally not feeling well.  States she was recently treated for E. coli in her urine and finished the antibiotics and felt like it did go away but now feels like symptoms are coming back.  She does admit to her blood pressure being elevated but generally not this high.  She denies checking her blood pressures.  States she is on 2 blood pressure

## 2025-06-30 NOTE — PATIENT INSTRUCTIONS
- Check your blood pressure twice a day and record for your primary care doctor  -Take an extra dose of your losartan when you get home today and continue with both medications this evening  -Start taking losartan 50 mg once in the morning and in the evening along with the amlodipine  -If your headaches become severe or you have blurry vision please go to the emergency room  -When you check your blood pressure if the top number is 190 or greater and you still feel terrible please go to the emergency room  -Call and make an appointment with your primary care doctor for this week to have blood work drawn and blood pressure reevaluated

## 2025-07-01 ENCOUNTER — TELEPHONE (OUTPATIENT)
Dept: FAMILY MEDICINE CLINIC | Age: 50
End: 2025-07-01

## 2025-07-01 ENCOUNTER — HOSPITAL ENCOUNTER (OUTPATIENT)
Dept: PHYSICAL THERAPY | Age: 50
Setting detail: THERAPIES SERIES
Discharge: HOME | End: 2025-07-01
Attending: ORTHOPAEDIC SURGERY

## 2025-07-01 ENCOUNTER — HOSPITAL ENCOUNTER (EMERGENCY)
Age: 50
Discharge: HOME | End: 2025-07-01
Attending: EMERGENCY MEDICINE
Payer: COMMERCIAL

## 2025-07-01 ENCOUNTER — APPOINTMENT (OUTPATIENT)
Dept: GENERAL RADIOLOGY | Age: 50
End: 2025-07-01
Payer: COMMERCIAL

## 2025-07-01 VITALS
RESPIRATION RATE: 16 BRPM | BODY MASS INDEX: 43.87 KG/M2 | DIASTOLIC BLOOD PRESSURE: 83 MMHG | HEIGHT: 66 IN | WEIGHT: 273 LBS | TEMPERATURE: 98.4 F | OXYGEN SATURATION: 92 % | HEART RATE: 73 BPM | SYSTOLIC BLOOD PRESSURE: 173 MMHG

## 2025-07-01 DIAGNOSIS — R07.9 NONSPECIFIC CHEST PAIN: ICD-10-CM

## 2025-07-01 DIAGNOSIS — I10 ESSENTIAL HYPERTENSION: Primary | ICD-10-CM

## 2025-07-01 LAB
ANION GAP SERPL CALCULATED.3IONS-SCNC: 11 MMOL/L (ref 3–16)
BASOPHILS # BLD: 0.1 K/UL (ref 0–0.2)
BASOPHILS NFR BLD: 1 %
BUN SERPL-MCNC: 14 MG/DL (ref 7–20)
CALCIUM SERPL-MCNC: 10.2 MG/DL (ref 8.3–10.6)
CHLORIDE SERPL-SCNC: 106 MMOL/L (ref 99–110)
CO2 SERPL-SCNC: 24 MMOL/L (ref 21–32)
CREAT SERPL-MCNC: 0.7 MG/DL (ref 0.6–1.1)
DEPRECATED RDW RBC AUTO: 13.5 % (ref 12.4–15.4)
EKG ATRIAL RATE: 63 BPM
EKG DIAGNOSIS: NORMAL
EKG P AXIS: 41 DEGREES
EKG P-R INTERVAL: 158 MS
EKG Q-T INTERVAL: 424 MS
EKG QRS DURATION: 70 MS
EKG QTC CALCULATION (BAZETT): 433 MS
EKG R AXIS: 10 DEGREES
EKG T AXIS: 53 DEGREES
EKG VENTRICULAR RATE: 63 BPM
EOSINOPHIL # BLD: 0.2 K/UL (ref 0–0.6)
EOSINOPHIL NFR BLD: 2.2 %
GFR SERPLBLD CREATININE-BSD FMLA CKD-EPI: >90 ML/MIN/{1.73_M2}
GLUCOSE SERPL-MCNC: 120 MG/DL (ref 70–99)
HCT VFR BLD AUTO: 40.2 % (ref 36–48)
HGB BLD-MCNC: 13.5 G/DL (ref 12–16)
LYMPHOCYTES # BLD: 2.5 K/UL (ref 1–5.1)
LYMPHOCYTES NFR BLD: 29.2 %
MCH RBC QN AUTO: 29.6 PG (ref 26–34)
MCHC RBC AUTO-ENTMCNC: 33.7 G/DL (ref 31–36)
MCV RBC AUTO: 87.8 FL (ref 80–100)
MONOCYTES # BLD: 0.5 K/UL (ref 0–1.3)
MONOCYTES NFR BLD: 6.2 %
NEUTROPHILS # BLD: 5.2 K/UL (ref 1.7–7.7)
NEUTROPHILS NFR BLD: 61.4 %
PLATELET # BLD AUTO: 239 K/UL (ref 135–450)
PMV BLD AUTO: 7.7 FL (ref 5–10.5)
POTASSIUM SERPL-SCNC: 4.3 MMOL/L (ref 3.5–5.1)
RBC # BLD AUTO: 4.58 M/UL (ref 4–5.2)
SODIUM SERPL-SCNC: 141 MMOL/L (ref 136–145)
TROPONIN, HIGH SENSITIVITY: 7 NG/L (ref 0–14)
TROPONIN, HIGH SENSITIVITY: 7 NG/L (ref 0–14)
WBC # BLD AUTO: 8.4 K/UL (ref 4–11)

## 2025-07-01 PROCEDURE — 93010 ELECTROCARDIOGRAM REPORT: CPT | Performed by: INTERNAL MEDICINE

## 2025-07-01 PROCEDURE — 99285 EMERGENCY DEPT VISIT HI MDM: CPT

## 2025-07-01 PROCEDURE — 85025 COMPLETE CBC W/AUTO DIFF WBC: CPT

## 2025-07-01 PROCEDURE — 80048 BASIC METABOLIC PNL TOTAL CA: CPT

## 2025-07-01 PROCEDURE — 93005 ELECTROCARDIOGRAM TRACING: CPT | Performed by: EMERGENCY MEDICINE

## 2025-07-01 PROCEDURE — 84484 ASSAY OF TROPONIN QUANT: CPT

## 2025-07-01 PROCEDURE — 71046 X-RAY EXAM CHEST 2 VIEWS: CPT

## 2025-07-01 RX ORDER — HYDRALAZINE HYDROCHLORIDE 25 MG/1
25 TABLET, FILM COATED ORAL EVERY 8 HOURS PRN
Qty: 30 TABLET | Refills: 0 | Status: SHIPPED | OUTPATIENT
Start: 2025-07-01

## 2025-07-01 RX ORDER — LOSARTAN POTASSIUM 50 MG/1
100 TABLET ORAL DAILY
Qty: 60 TABLET | Refills: 0 | Status: SHIPPED | OUTPATIENT
Start: 2025-07-01 | End: 2025-07-31

## 2025-07-01 ASSESSMENT — PAIN SCALES - GENERAL: PAINLEVEL_OUTOF10: 3

## 2025-07-02 NOTE — ED PROVIDER NOTES
EMERGENCY DEPARTMENT PROVIDER NOTE    Patient Identification  Pt Name: Tammi Ball  MRN: [id number]  Birthdate 1975  Date of evaluation: 7/1/2025  Provider: Manuel Nickerson DO  PCP: Mishel Ag APRN - CNP    Chief Complaint  Referral - General (Pt to ED after being referred by Urgent Care for elevated blood pressure readings 190/100's. Pt sts her PCP increased losartan dose yesterday. PT see's PCP regularly. Pt sts her b/p readings are always jace. Pt denies headache. Pt reports intermittent blurry vision. Pt reports SOB. Pt smokes 0.5 ppd. ) and Hypertension      HPI  (History provided by patient)  This is a 50 y.o. female with pertinent past medical history of hypertension, bipolar disorder, anxiety, VICTORIANO who was brought in by self for high blood pressure.  Patient reports her blood pressure has been running high at home with a systolic in the 190s.  She takes amlodipine 10 mg and losartan 50 mg daily which she reports adherence.  She has had intermittent headaches over the past several weeks, however none currently.  Patient went to an urgent care yesterday where she was instructed to increase her losartan to 100 mg daily which she did for the first time yesterday, however her blood pressure has remained high at home today.  She reports having mild intermittent aching chest pains and shortness of breath ongoing intermittently for the past several weeks as well, however specifically denies any of the symptoms currently.         I have reviewed the following nursing documentation:  Allergies: Erythromycin and Adhesive tape    Past medical history:   Past Medical History:   Diagnosis Date    ADHD (attention deficit hyperactivity disorder)     Arthritis     Back pain     Bipolar disorder (HCC)     Depression     Gastritis     Hypertension     Insomnia     Migraine     Neuropathy     feet    Obesity     PLMD (periodic limb movement disorder)     PATIENT UNAWARE    Preoperative clearance

## 2025-07-03 RX ORDER — ATORVASTATIN CALCIUM 10 MG/1
10 TABLET, FILM COATED ORAL DAILY
Qty: 90 TABLET | Refills: 0 | Status: SHIPPED | OUTPATIENT
Start: 2025-07-03

## 2025-07-08 ENCOUNTER — OFFICE VISIT (OUTPATIENT)
Dept: FAMILY MEDICINE CLINIC | Age: 50
End: 2025-07-08
Payer: COMMERCIAL

## 2025-07-08 VITALS
BODY MASS INDEX: 44.03 KG/M2 | HEIGHT: 66 IN | SYSTOLIC BLOOD PRESSURE: 164 MMHG | OXYGEN SATURATION: 98 % | WEIGHT: 274 LBS | HEART RATE: 85 BPM | DIASTOLIC BLOOD PRESSURE: 88 MMHG

## 2025-07-08 DIAGNOSIS — R07.9 CHEST PAIN, UNSPECIFIED TYPE: Primary | ICD-10-CM

## 2025-07-08 DIAGNOSIS — R73.03 PRE-DIABETES: ICD-10-CM

## 2025-07-08 DIAGNOSIS — R06.02 SHORTNESS OF BREATH: ICD-10-CM

## 2025-07-08 DIAGNOSIS — Z12.31 ENCOUNTER FOR SCREENING MAMMOGRAM FOR MALIGNANT NEOPLASM OF BREAST: ICD-10-CM

## 2025-07-08 DIAGNOSIS — Z12.4 PAP SMEAR FOR CERVICAL CANCER SCREENING: ICD-10-CM

## 2025-07-08 DIAGNOSIS — E78.00 HYPERCHOLESTEROLEMIA: ICD-10-CM

## 2025-07-08 DIAGNOSIS — I10 ESSENTIAL HYPERTENSION, BENIGN: ICD-10-CM

## 2025-07-08 PROCEDURE — G8427 DOCREV CUR MEDS BY ELIG CLIN: HCPCS | Performed by: REGISTERED NURSE

## 2025-07-08 PROCEDURE — 3079F DIAST BP 80-89 MM HG: CPT | Performed by: REGISTERED NURSE

## 2025-07-08 PROCEDURE — G9899 SCRN MAM PERF RSLTS DOC: HCPCS | Performed by: REGISTERED NURSE

## 2025-07-08 PROCEDURE — 4004F PT TOBACCO SCREEN RCVD TLK: CPT | Performed by: REGISTERED NURSE

## 2025-07-08 PROCEDURE — G8417 CALC BMI ABV UP PARAM F/U: HCPCS | Performed by: REGISTERED NURSE

## 2025-07-08 PROCEDURE — 3077F SYST BP >= 140 MM HG: CPT | Performed by: REGISTERED NURSE

## 2025-07-08 PROCEDURE — 3017F COLORECTAL CA SCREEN DOC REV: CPT | Performed by: REGISTERED NURSE

## 2025-07-08 PROCEDURE — 99214 OFFICE O/P EST MOD 30 MIN: CPT | Performed by: REGISTERED NURSE

## 2025-07-08 RX ORDER — HYDRALAZINE HYDROCHLORIDE 25 MG/1
25 TABLET, FILM COATED ORAL EVERY 8 HOURS PRN
Qty: 90 TABLET | Refills: 0 | Status: SHIPPED | OUTPATIENT
Start: 2025-07-08 | End: 2025-08-07

## 2025-07-08 ASSESSMENT — ENCOUNTER SYMPTOMS
WHEEZING: 0
SHORTNESS OF BREATH: 1
VOMITING: 0
CHEST TIGHTNESS: 0
DIARRHEA: 0
NAUSEA: 0
COUGH: 0

## 2025-07-08 NOTE — PROGRESS NOTES
shortness of breath during routine house chores, regular walks, therefore, she doesn't walk anymore, dreads cleaning the house with how fatigued these activities make her. Pt has been experiencing this increased fatigue for the past couple of months.    She continues to smoke half a pack of cigarettes daily.   She reports no chest pain, dizziness, wheezing, leg swelling, or fainting episodes. Endorses seldom palpitations and headaches, particularly when driving long distances, have been noted. She wears glasses and uses sunglasses sparingly.   She is seeking a note for her employer to allow her to work from home for a month until her condition stabilizes. Pt feels this may help her control her BP better so she can monitor it more frequently, is concerned about bringing a BP monitor to work.     She has not had a Pap smear in a long time and prefers to see a gynecologist for this. She has not yet rescheduled her mammogram. She had to cancel it because she was sick.  Pt is not fasting today.       Review of Systems   Eyes:  Positive for visual disturbance.   Respiratory:  Positive for shortness of breath. Negative for cough, chest tightness and wheezing.    Cardiovascular:  Negative for chest pain, palpitations (intermittent, couple of times per month) and leg swelling.   Gastrointestinal:  Negative for diarrhea, nausea and vomiting.   Skin: Negative.    Neurological:  Positive for headaches. Negative for dizziness, seizures, syncope, weakness, light-headedness and numbness.       Objective    Vitals:    07/08/25 1338   BP: (!) 164/88   Pulse:    SpO2:      Physical Exam  Vitals and nursing note reviewed.   Constitutional:       General: She is not in acute distress.     Appearance: Normal appearance. She is obese. She is not ill-appearing, toxic-appearing or diaphoretic.   HENT:      Nose: Nose normal.      Mouth/Throat:      Mouth: Mucous membranes are moist.      Pharynx: Oropharynx is clear.   Eyes:

## 2025-07-11 ENCOUNTER — TELEPHONE (OUTPATIENT)
Dept: FAMILY MEDICINE CLINIC | Age: 50
End: 2025-07-11

## 2025-07-11 NOTE — TELEPHONE ENCOUNTER
Saba with [employer] is calling to get  a C-9 for pt so they can get her Stress Test Approved.    I did tell her that we do not do Workers Comp here and no where in her office note is it mentioned that it was a workers comp case.    Saba wants to know if there is anything we can do to get this   Approved for her?

## 2025-07-15 DIAGNOSIS — R73.03 PRE-DIABETES: ICD-10-CM

## 2025-07-15 DIAGNOSIS — E78.00 HYPERCHOLESTEROLEMIA: ICD-10-CM

## 2025-07-15 LAB
CHOLEST SERPL-MCNC: 209 MG/DL (ref 0–199)
HDLC SERPL-MCNC: 69 MG/DL (ref 40–60)
LDL CHOLESTEROL: 109 MG/DL
TRIGL SERPL-MCNC: 153 MG/DL (ref 0–150)
VLDLC SERPL CALC-MCNC: 31 MG/DL

## 2025-07-15 NOTE — TELEPHONE ENCOUNTER
CALLED ANA AND JULIOCESAR FOR HER TO RETURN CALL, WE CANNOT DO THIS UNDER WORKERS COMP. HER C9 IS FOR A SHOULDER INJURY FROM [employer]. AND WE DO NOT DO WORKERS COMP HERE.KW

## 2025-07-16 ENCOUNTER — RESULTS FOLLOW-UP (OUTPATIENT)
Dept: FAMILY MEDICINE CLINIC | Age: 50
End: 2025-07-16

## 2025-07-16 LAB
EST. AVERAGE GLUCOSE BLD GHB EST-MCNC: 131.2 MG/DL
HBA1C MFR BLD: 6.2 %

## 2025-07-16 RX ORDER — LITHIUM CARBONATE 300 MG/1
CAPSULE ORAL
Qty: 360 CAPSULE | Refills: 0 | Status: SHIPPED | OUTPATIENT
Start: 2025-07-16

## 2025-07-17 ENCOUNTER — HOSPITAL ENCOUNTER (OUTPATIENT)
Dept: PHYSICAL THERAPY | Age: 50
Setting detail: THERAPIES SERIES
Discharge: HOME | End: 2025-07-17
Attending: ORTHOPAEDIC SURGERY
Payer: COMMERCIAL

## 2025-07-17 ENCOUNTER — TELEPHONE (OUTPATIENT)
Dept: FAMILY MEDICINE CLINIC | Age: 50
End: 2025-07-17

## 2025-07-17 PROCEDURE — 97530 THERAPEUTIC ACTIVITIES: CPT

## 2025-07-17 NOTE — THERAPY DISCHARGE
Play: NA    Prognosis for POC: [x] Good [] Fair  [] Poor    Patient requires continued skilled intervention: [x] Yes  [] No      CHARGE CAPTURE     CPT Code (TIMED) # Time In Time Out Total Time CPT Code (UNTIMED) #    Therex (11250)   1140 1143 3  EVAL:     Neuromusc. Re-ed (44401)      Re-Eval (83068)     Manual (24107)      Estim Unattended (67754)     Ther. Act (30651) 1 1143 1153 13  Mech. Traction (61281)     Gait (76357)      Dry Needle 1-2 muscle (33048)     Aquatic Therex (10388)      Dry Needle 3+ muscle (18943)     Iontophoresis (23376)      VASO (86711)     Ultrasound (67887)      Group Therapy (68452)     Estim Attended (43183)      Canalith Repositioning (53435)     Other:      Other:    Totals   1   13       Total Treatment Minutes 13          Charge Justification:  (18569) THERAPEUTIC ACTIVITY - use of dynamic activities to improve functional performance. (Ex include squatting, ascending/descending stairs, walking, bending, lifting, catching, throwing, pushing, pulling, jumping.)  Direct, one on one contact, billed in 15-minute increments.    GOALS     Patient stated goal: \"full recovery\"  [] Progressing: [x] Met: [] Not Met: [] Adjusted    Therapist goals for Patient:   Short Term Goals: To be achieved in: 2 weeks  1Independent in HEP and progression per patient tolerance, in order to prevent re-injury.   [] Progressing: [x] Met: [] Not Met: [] Adjusted  Patient will have a decrease in pain to <2/10 to facilitate improvement in movement, function, and ADLs as indicated by Functional Deficits.  [] Progressing: [x] Met: [] Not Met: [] Adjusted      Long Term Goals: To be achieved in: 8 weeks  Disability index score of 10% or less for the Quick DASH to assist with reaching prior level of function with activities such as household chores.  [] Progressing: [x] Met: [] Not Met: [] Adjusted  Patient will demonstrate increased AROM of L shoulder to 160 degrees flexion, functional ER with thumb to C7 and

## 2025-07-17 NOTE — TELEPHONE ENCOUNTER
CALLED PT IN REGARDS TO LABS, HER BP MED WAS INCREASED TO TID AND PHARMACY WILL NOW NOT FILL, CAN WE RESEND LOSARTAN TO [facility] WITH CORRECT DIRECTIONS?

## 2025-07-19 NOTE — TELEPHONE ENCOUNTER
Max dose of losartan 100 mg- I cannot prescribe the 150 mg daily dose- is she taking the hydralazine? If not she needs to start taking that - monitor b/p goal 140/90 or less - follow up in office

## 2025-07-21 DIAGNOSIS — I10 ESSENTIAL HYPERTENSION, BENIGN: ICD-10-CM

## 2025-07-21 DIAGNOSIS — I10 ESSENTIAL HYPERTENSION, BENIGN: Primary | ICD-10-CM

## 2025-07-21 RX ORDER — LOSARTAN POTASSIUM 50 MG/1
50 TABLET ORAL 3 TIMES DAILY
Qty: 180 TABLET | Refills: 1 | Status: SHIPPED | OUTPATIENT
Start: 2025-07-21 | End: 2025-07-21

## 2025-07-21 RX ORDER — LOSARTAN POTASSIUM 50 MG/1
50 TABLET ORAL 2 TIMES DAILY
Qty: 180 TABLET | Refills: 1 | COMMUNITY
Start: 2025-07-21

## 2025-07-21 NOTE — TELEPHONE ENCOUNTER
Called and spoke with pt. Pt state emergency dr increase the dosage of losartan from 100 mg to 150 mg and she is taking hydralazine. Pt will run out of med and needs refill on Losartan. MA

## 2025-07-21 NOTE — TELEPHONE ENCOUNTER
Bobbi Beavers, APRN - CNP  [facility] Practice Staff1 minute ago (10:32 AM)       Refills for Losartan sent to [facility] pharmacy. Thank you. She will take 50 mg 3x daily, either 2 in the morning and 1 at night, or 1 in the morning, 2 at night, etc.    Thanks.   Left detailed message on voice mail for patient. MA

## 2025-07-24 ENCOUNTER — HOSPITAL ENCOUNTER (OUTPATIENT)
Age: 50
Discharge: HOME | End: 2025-07-26
Payer: COMMERCIAL

## 2025-07-24 ENCOUNTER — HOSPITAL ENCOUNTER (OUTPATIENT)
Dept: NUCLEAR MEDICINE | Age: 50
Discharge: HOME | End: 2025-07-24
Payer: COMMERCIAL

## 2025-07-24 VITALS
HEIGHT: 66 IN | SYSTOLIC BLOOD PRESSURE: 164 MMHG | WEIGHT: 275 LBS | DIASTOLIC BLOOD PRESSURE: 94 MMHG | BODY MASS INDEX: 44.2 KG/M2 | HEART RATE: 98 BPM

## 2025-07-24 DIAGNOSIS — R06.02 SHORTNESS OF BREATH: ICD-10-CM

## 2025-07-24 DIAGNOSIS — R07.9 CHEST PAIN, UNSPECIFIED TYPE: ICD-10-CM

## 2025-07-24 PROCEDURE — A9502 TC99M TETROFOSMIN: HCPCS | Performed by: REGISTERED NURSE

## 2025-07-24 PROCEDURE — 78452 HT MUSCLE IMAGE SPECT MULT: CPT

## 2025-07-24 PROCEDURE — 93017 CV STRESS TEST TRACING ONLY: CPT

## 2025-07-24 PROCEDURE — 6360000002 HC RX W HCPCS: Performed by: REGISTERED NURSE

## 2025-07-24 PROCEDURE — 3430000000 HC RX DIAGNOSTIC RADIOPHARMACEUTICAL: Performed by: REGISTERED NURSE

## 2025-07-24 RX ORDER — REGADENOSON 0.08 MG/ML
0.4 INJECTION, SOLUTION INTRAVENOUS
Status: COMPLETED | OUTPATIENT
Start: 2025-07-24 | End: 2025-07-24

## 2025-07-24 RX ADMIN — TETROFOSMIN 32.3 MILLICURIE: 1.38 INJECTION, POWDER, LYOPHILIZED, FOR SOLUTION INTRAVENOUS at 11:24

## 2025-07-24 RX ADMIN — REGADENOSON 0.4 MG: 0.08 INJECTION, SOLUTION INTRAVENOUS at 11:17

## 2025-07-25 ENCOUNTER — HOSPITAL ENCOUNTER (OUTPATIENT)
Dept: NUCLEAR MEDICINE | Age: 50
Discharge: HOME | End: 2025-07-25
Payer: COMMERCIAL

## 2025-07-25 LAB
ECHO BSA: 2.41 M2
NUC REST DIASTOLIC VOLUME INDEX: 91 ML/M2
NUC REST EJECTION FRACTION: 78 %
NUC REST SYSTOLIC VOLUME INDEX: 20 ML/M2
STRESS BASELINE DIAS BP: 94 MMHG
STRESS BASELINE HR: 96 BPM
STRESS BASELINE SYS BP: 164 MMHG
STRESS ESTIMATED WORKLOAD: 1.5 METS
STRESS EXERCISE DUR MIN: 1 MIN
STRESS EXERCISE DUR SEC: 40 SEC
STRESS PEAK DIAS BP: 86 MMHG
STRESS PEAK SYS BP: 189 MMHG
STRESS PERCENT HR ACHIEVED: 78 %
STRESS POST PEAK HR: 131 BPM
STRESS RATE PRESSURE PRODUCT: NORMAL BPM*MMHG
STRESS ST DEPRESSION: 0 MM
STRESS TARGET HR: 168 BPM

## 2025-07-25 PROCEDURE — 3430000000 HC RX DIAGNOSTIC RADIOPHARMACEUTICAL: Performed by: REGISTERED NURSE

## 2025-07-25 PROCEDURE — A9502 TC99M TETROFOSMIN: HCPCS | Performed by: REGISTERED NURSE

## 2025-07-25 RX ADMIN — TETROFOSMIN 32.7 MILLICURIE: 1.38 INJECTION, POWDER, LYOPHILIZED, FOR SOLUTION INTRAVENOUS at 11:58

## 2025-07-30 DIAGNOSIS — I10 ESSENTIAL HYPERTENSION, BENIGN: Primary | ICD-10-CM

## 2025-07-30 RX ORDER — AMLODIPINE BESYLATE 10 MG/1
10 TABLET ORAL DAILY
Qty: 90 TABLET | Refills: 1 | Status: SHIPPED | OUTPATIENT
Start: 2025-07-30 | End: 2026-01-26

## 2025-08-22 ENCOUNTER — TELEPHONE (OUTPATIENT)
Dept: FAMILY MEDICINE CLINIC | Age: 50
End: 2025-08-22

## 2025-09-03 ENCOUNTER — OFFICE VISIT (OUTPATIENT)
Dept: ORTHOPEDIC SURGERY | Age: 50
End: 2025-09-03

## 2025-09-03 VITALS — RESPIRATION RATE: 16 BRPM | HEIGHT: 66 IN | WEIGHT: 279 LBS | BODY MASS INDEX: 44.84 KG/M2

## 2025-09-03 DIAGNOSIS — S46.012D TRAUMATIC COMPLETE TEAR OF LEFT ROTATOR CUFF, SUBSEQUENT ENCOUNTER: Primary | ICD-10-CM

## 2025-09-04 RX ORDER — HYDROXYZINE PAMOATE 100 MG
CAPSULE ORAL
Qty: 30 CAPSULE | Refills: 2 | Status: SHIPPED | OUTPATIENT
Start: 2025-09-04

## (undated) DEVICE — GLOVE ORTHO 7 1/2   MSG9475

## (undated) DEVICE — GLOVE,SURG,SENSICARE SLT,LF,PF,8: Brand: MEDLINE

## (undated) DEVICE — NEEDLE RF 20GA L10CM TIP L10MM CVD ACT TIP SL

## (undated) DEVICE — WEREWOLF FLOW 90 COBLATION WAND: Brand: COBLATION

## (undated) DEVICE — GAUZE,SPONGE,4"X4",8PLY,STRL,LF,10/TRAY: Brand: MEDLINE

## (undated) DEVICE — PORT VLV 2 W NDL FREE SMRTSITE

## (undated) DEVICE — GLOVE SURG SZ 85 L12IN FNGR THK13MIL WHT ISOLEX POLYISOPRENE

## (undated) DEVICE — SUTURE ABSORBABLE MONOFILAMENT 1-0 OS8 14 IN STRATAFIX SPRL SXPD2B202

## (undated) DEVICE — SHEET,DRAPE,53X77,STERILE: Brand: MEDLINE

## (undated) DEVICE — NEEDLE HYPO 18GA L1.5IN THN WALL PIVOTING SHLD BVL ORIENTED

## (undated) DEVICE — DYONICS 25 PATIENT TUBE SET MUST                                    BE USED WITH 7211007, 12 PER BOX

## (undated) DEVICE — TUBING, SUCTION, 1/4" X 12', STRAIGHT: Brand: MEDLINE

## (undated) DEVICE — SPONGE GZ W4XL4IN COT 12 PLY TYP VII WVN C FLD DSGN

## (undated) DEVICE — SYRINGE, LUER LOCK, 10ML: Brand: MEDLINE

## (undated) DEVICE — NEEDLE HYPO 25GA L1.5IN BLU POLYPR HUB S STL REG BVL STR

## (undated) DEVICE — SOLUTION PREP POVIDONE IOD FOR SKIN MUCOUS MEM PRIOR TO

## (undated) DEVICE — 3M™ IOBAN™ 2 ANTIMICROBIAL INCISE DRAPE 6650EZ: Brand: IOBAN™ 2

## (undated) DEVICE — BASIC SINGLE BASIN 1-LF: Brand: MEDLINE INDUSTRIES, INC.

## (undated) DEVICE — PEN: MARKING STD 100/CS: Brand: MEDICAL ACTION INDUSTRIES

## (undated) DEVICE — PAD DRY FLOOR ABS 32X58IN GRN

## (undated) DEVICE — ELECTRODE PT RET AD L9FT HI MOIST COND ADH HYDRGEL CORDED

## (undated) DEVICE — 3M™ TEGADERM™ TRANSPARENT FILM DRESSING FRAME STYLE, 1626W, 4 IN X 4-3/4 IN (10 CM X 12 CM), 50/CT 4CT/CASE: Brand: 3M™ TEGADERM™

## (undated) DEVICE — PROBE ABLAT XL 90DEG ASPIR BPLR RF 1 PC ELECTRD ERGO HNDL

## (undated) DEVICE — COVER LT HNDL BLU PLAS

## (undated) DEVICE — SHOULDER CANNULA SET WITHOUT FENESTRATIONS, 5.5 MM (I.D.) X 70 MM: Brand: CONMED

## (undated) DEVICE — TOWEL,OR,DSP,ST,BLUE,STD,4/PK,20PK/CS: Brand: MEDLINE

## (undated) DEVICE — Device

## (undated) DEVICE — PACK,SHOULDER,DRAPE,POUCH: Brand: MEDLINE

## (undated) DEVICE — TUBING, SUCTION, 1/4" X 10', STRAIGHT: Brand: MEDLINE

## (undated) DEVICE — NEEDLE SUT FOR EXPRESSEW LL AND LLL SUT PASS EXPRESSEW LLL

## (undated) DEVICE — DRAPE,REIN 53X77,STERILE: Brand: MEDLINE

## (undated) DEVICE — DUAL CUT SAGITTAL BLADE

## (undated) DEVICE — APPLICATOR MEDICATED 3 CC SOLUTION CLR STRL CHLORAPREP

## (undated) DEVICE — GOWN SIRUS NONREIN LG W/TWL: Brand: MEDLINE INDUSTRIES, INC.

## (undated) DEVICE — APPLICATOR MEDICATED 26 CC SOLUTION HI LT ORNG CHLORAPREP

## (undated) DEVICE — STERILE LATEX POWDER-FREE SURGICAL GLOVESWITH NITRILE COATING: Brand: PROTEXIS

## (undated) DEVICE — SYRINGE IRRIG 60ML SFT PLIABLE BLB EZ TO GRP 1 HND USE W/

## (undated) DEVICE — CHLORAPREP 26ML ORANGE

## (undated) DEVICE — DRESSING,GAUZE,XEROFORM,CURAD,5"X9",ST: Brand: CURAD

## (undated) DEVICE — SOLUTION IV IRRIG POUR BRL 0.9% SODIUM CHL 2F7124

## (undated) DEVICE — NEEDLE FNAC 22GA L6IN CHIBA TYP THN WALL

## (undated) DEVICE — HYPODERMIC SAFETY NEEDLE: Brand: MAGELLAN

## (undated) DEVICE — SUTURE TICRN BR BL NDL C-20 SZ 5 30 8886302779

## (undated) DEVICE — SYRINGE MED 3ML CLR PLAS STD N CTRL LUERLOCK TIP DISP

## (undated) DEVICE — KIT POS FOAM HANA TBL

## (undated) DEVICE — CANNULA ARTHSCP L7CM ID8.25MM TRNSLUC THRD FLX W/ NO SQUIRT

## (undated) DEVICE — 6 ML SYRINGE LUER-LOCK TIP: Brand: MONOJECT

## (undated) DEVICE — Z CONVERTED USE 2275871 SPONGE GZ W4XL4IN WHT 8 PLY CURITY

## (undated) DEVICE — PAD GRND NEUT ELECTRD AD DISP

## (undated) DEVICE — SUTURE PROL SZ 0 L30IN NONABSORBABLE BLU L36MM CT-1 1/2 CIR 8424H

## (undated) DEVICE — DECANTER BAG 9": Brand: MEDLINE INDUSTRIES, INC.

## (undated) DEVICE — 3 ML SYRINGE LUER-LOCK TIP: Brand: MONOJECT

## (undated) DEVICE — BLADE OPHTH 180DEG CUT SURF BLU STR SHRP DBL BVL GRINDLESS

## (undated) DEVICE — 4-PORT MANIFOLD: Brand: NEPTUNE 2

## (undated) DEVICE — IMMOBILIZER SHLDR SWTH UNIV DEV BLK P.A.D. III

## (undated) DEVICE — PAD,NON-ADHERENT,3X8,STERILE,LF,1/PK: Brand: MEDLINE

## (undated) DEVICE — SUTURE MCRYL SZ 3-0 L27IN ABSRB UD L19MM PS-2 3/8 CIR PRIM Y427H

## (undated) DEVICE — GLOVE ORTHO 8   MSG9480

## (undated) DEVICE — GLOVE SURG SZ 85 L12IN FNGR ORTHO 126MIL CRM LTX FREE

## (undated) DEVICE — Z DISCONTINUED GLOVE SURG SZ 7.5 L12IN FNGR THK13MIL WHT ISOLEX

## (undated) DEVICE — 3M™ STERI-STRIP™ REINFORCED ADHESIVE SKIN CLOSURES, R1547, 1/2 IN X 4 IN (12 MM X 100 MM), 6 STRIPS/ENVELOPE: Brand: 3M™ STERI-STRIP™

## (undated) DEVICE — RIGID LIGHT HANDLE: Brand: CARDINAL HEALTH

## (undated) DEVICE — BLADE SHV L13CM DIA4MM BNE CUT AGG DEB COOLCUT

## (undated) DEVICE — MEDIA CONTRAST RX ISOVUE-300 61% 30ML VIALS

## (undated) DEVICE — E-Z CLEAN, NON-STICK, PTFE COATED, ELECTROSURGICAL BLADE ELECTRODE, 4 INCH (10.2 CM): Brand: MEGADYNE

## (undated) DEVICE — TOTAL BASIC: Brand: MEDLINE INDUSTRIES, INC.

## (undated) DEVICE — DRAPE,U/SHT,SPLIT,FILM,60X84,STERILE: Brand: MEDLINE

## (undated) DEVICE — TUBING PMP L16FT MAIN DISP FOR AR-6400 AR-6475

## (undated) DEVICE — TRAY ANES EPI 5CC GLS LL 18GA CUST

## (undated) DEVICE — SYSTEM SKIN CLSR 22CM DERMBND PRINEO

## (undated) DEVICE — GOWN SIRUS NONREIN XL W/TWL: Brand: MEDLINE INDUSTRIES, INC.

## (undated) DEVICE — AVANOS* EXTENSION SETS: Brand: EXTENSION SET, MINI BORE, 12" LENGTH, 0.50ML VOLUME 25

## (undated) DEVICE — MEDICINE CUP, GRADUATED, STER: Brand: MEDLINE

## (undated) DEVICE — NEEDLE SUT PASS FOR ROT CUF LABRAL REP SUREFIRE SCORPION

## (undated) DEVICE — GLOVE ORANGE PI 7 1/2   MSG9075

## (undated) DEVICE — NEEDLE SPNL L3.5IN PNK HUB S STL REG WALL FIT STYL W/ QNCKE

## (undated) DEVICE — 6619 2 PTNT ISO SYS INCISE AREA&LT;(&GT;&&LT;)&GT;P: Brand: STERI-DRAPE™ IOBAN™ 2

## (undated) DEVICE — NEEDLE SPNL 22GA L5IN BLK HUB S STL W/ QNCKE PNT W/OUT

## (undated) DEVICE — SUTURE STRATAFIX SPRL SZ 2 0 L14IN ABSRB UD MH L36MM 1 2 CIR SXMD2B401

## (undated) DEVICE — 7496-8Z MINI-PLUS KIT: Brand: DEVON

## (undated) DEVICE — SLING ARM L ABV 13IN DE-ROTATION STRP HOOKS PROVIDE IMMOB

## (undated) DEVICE — SHOULDER-LF: Brand: MEDLINE INDUSTRIES, INC.

## (undated) DEVICE — SOLUTION IV 1000ML 0.9% SOD CHL FOR IRRIG PLAS CONT

## (undated) DEVICE — GLOVE SURG SZ 8 L12IN FNGR THK79MIL GRN LTX FREE

## (undated) DEVICE — MAJOR SET UP PK

## (undated) DEVICE — 3M™ MICROPORE™ TAPE, 1530-1: Brand: 3M™ MICROPORE™

## (undated) DEVICE — MERCY HEALTH WEST TURNOVER: Brand: MEDLINE INDUSTRIES, INC.

## (undated) DEVICE — Z INACTIVE USE 2660664 SOLUTION IRRIG 3000ML 0.9% SOD CHL USP UROMATIC PLAS CONT

## (undated) DEVICE — SYRINGE 60ML BULB IRRIG ST LF

## (undated) DEVICE — SNARE ENDOSCP POLYP 2.4 MM 240 CM 10 MM 2.8 MM CAPTIVATOR

## (undated) DEVICE — STERILE POLYISOPRENE POWDER-FREE SURGICAL GLOVES: Brand: PROTEXIS

## (undated) DEVICE — NEEDLE SPNL 22GA L3.5IN BLK HUB S STL REG WALL FIT STYL W/

## (undated) DEVICE — STERILE TOTAL HIP DRAPE PK: Brand: CARDINAL HEALTH

## (undated) DEVICE — 1010 S-DRAPE TOWEL DRAPE 10/BX: Brand: STERI-DRAPE™

## (undated) DEVICE — 3M™ STERI-DRAPE™ U-DRAPE 1015: Brand: STERI-DRAPE™

## (undated) DEVICE — INTENDED FOR TISSUE SEPARATION, AND OTHER PROCEDURES THAT REQUIRE A SHARP SURGICAL BLADE TO PUNCTURE OR CUT.: Brand: BARD-PARKER ® STAINLESS STEEL BLADES

## (undated) DEVICE — Z DISCONTINUED USE 2716304 SUTURE STRATAFIX SPRL SZ 3-0 L12IN ABSRB UD FS-1 L24MM 3/8

## (undated) DEVICE — STANDARD HYPODERMIC NEEDLE,POLYPROPYLENE HUB: Brand: MONOJECT

## (undated) DEVICE — GLOVE,SURG,SENSICARE SLT,LF,PF,8.5: Brand: MEDLINE

## (undated) DEVICE — SYRINGE 20ML LL S/C 50

## (undated) DEVICE — PAD,ABDOMINAL,8"X7.5",STERILE,LF,1/PK: Brand: MEDLINE

## (undated) DEVICE — ELECTRODE BLDE L4IN NONINSULATED EDGE

## (undated) DEVICE — Device: Brand: JELCO

## (undated) DEVICE — TOTAL BASIC PK

## (undated) DEVICE — DYONICS 25 DAY TUBE SET MUST BE                                    USED WITH 7211008, 3 PER BOX

## (undated) DEVICE — SUTURE PROL SZ 3-0 L18IN NONABSORBABLE BLU L19MM FS-2 3/8 8665G

## (undated) DEVICE — CANNULA ARTHSCP L7CM DIA7MM TRNSLUC THRD FLX W/ NO SQUIRT

## (undated) DEVICE — SYRINGE MED 30ML STD CLR PLAS LUERLOCK TIP N CTRL DISP

## (undated) DEVICE — PAD GRND RF

## (undated) DEVICE — BIPOLAR SEALER 23-112-1 AQM 6.0: Brand: AQUAMANTYS ®

## (undated) DEVICE — RETRACTOR SURG WIDE FLAT LO PROF LTWT PHOTONGUIDE

## (undated) DEVICE — SUTURE FIBERWIRE SZ 2 W/ TAPERED NEEDLE BLUE L38IN NONABSORB BLU L26.5MM 1/2 CIRCLE AR7200

## (undated) DEVICE — DYONICS 4.0 MM ELITE                                    ACROMIOBLASTER STRAIGHT DISPOSABLE                                    BURRS, SAGE GREEN, 10000 MAXIMUM                                    RPM, PACKAGED 6 PER BOX, STERILE

## (undated) DEVICE — 3M™ COBAN™ NL STERILE NON-LATEX SELF-ADHERENT WRAP, 2083S, 3 IN X 5 YD (7,5 CM X 4,5 M), 24 ROLLS/CASE: Brand: 3M™ COBAN™

## (undated) DEVICE — GAUZE,SPONGE,2"X2",8PLY,STERILE,LF,2'S: Brand: MEDLINE

## (undated) DEVICE — SPONGE LAP W18XL18IN WHT COT 4 PLY FLD STRUNG RADPQ DISP ST

## (undated) DEVICE — INCISOR PLUS PLATINUM 4.5 MM BLADE: Brand: DYONICS INCISOR

## (undated) DEVICE — KIT OR ROOM TURNOVER W/STRAP

## (undated) DEVICE — COVER,TABLE,77X90,STERILE: Brand: MEDLINE

## (undated) DEVICE — SNARE VASC L240CM LOOP W10MM SHTH DIA2.4MM RND STIFF CLD

## (undated) DEVICE — SOL IRR SOD CHL 0.9% TITAN XL CNTNR 3000ML

## (undated) DEVICE — SUTURE STRATAFIX SPRL SZ 3-0 L12IN ABSRB UD FS-1 L30X30CM SXMP2B410

## (undated) DEVICE — COVER,MAYO STAND,STERILE: Brand: MEDLINE

## (undated) DEVICE — CANISTER, RIGID, 1200CC: Brand: MEDLINE INDUSTRIES, INC.

## (undated) DEVICE — NEEDLE SPNL WEISS LNG 18 GAX5 IN MOD TUOHY PT TW PERISAFE

## (undated) DEVICE — C-ARM: Brand: UNBRANDED

## (undated) DEVICE — SUTURE TICRON SZ 2 L30IN NONABSORBABLE BLU HGS-21 1/2 CIR 8886311381

## (undated) DEVICE — GOWN,REINF,POLY,AURORA,XLNG/XXL,STRL: Brand: MEDLINE